# Patient Record
Sex: MALE | Race: WHITE | NOT HISPANIC OR LATINO | Employment: OTHER | URBAN - METROPOLITAN AREA
[De-identification: names, ages, dates, MRNs, and addresses within clinical notes are randomized per-mention and may not be internally consistent; named-entity substitution may affect disease eponyms.]

---

## 2017-01-26 RX ORDER — POTASSIUM CHLORIDE 750 MG/1
TABLET, EXTENDED RELEASE ORAL
Qty: 30 TABLET | Refills: 2 | Status: SHIPPED | OUTPATIENT
Start: 2017-01-26 | End: 2022-04-05 | Stop reason: HOSPADM

## 2017-03-30 ENCOUNTER — HOSPITAL ENCOUNTER (EMERGENCY)
Facility: HOSPITAL | Age: 72
Discharge: HOME | End: 2017-03-30
Attending: EMERGENCY MEDICINE | Admitting: EMERGENCY MEDICINE

## 2017-03-30 ENCOUNTER — APPOINTMENT (OUTPATIENT)
Dept: GENERAL RADIOLOGY | Facility: HOSPITAL | Age: 72
End: 2017-03-30

## 2017-03-30 VITALS
OXYGEN SATURATION: 98 % | TEMPERATURE: 97.4 F | HEIGHT: 69 IN | HEART RATE: 106 BPM | BODY MASS INDEX: 19.99 KG/M2 | WEIGHT: 135 LBS | SYSTOLIC BLOOD PRESSURE: 107 MMHG | RESPIRATION RATE: 18 BRPM | DIASTOLIC BLOOD PRESSURE: 57 MMHG

## 2017-03-30 DIAGNOSIS — S00.81XA FOREHEAD ABRASION, INITIAL ENCOUNTER: ICD-10-CM
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
## 2017-03-30 DIAGNOSIS — S61.411A LACERATION OF HAND, RIGHT, INITIAL ENCOUNTER: ICD-10-CM

## 2017-03-30 DIAGNOSIS — S00.83XA FACIAL CONTUSION, INITIAL ENCOUNTER: ICD-10-CM

## 2017-03-30 PROCEDURE — 90714 TD VACC NO PRESV 7 YRS+ IM: CPT | Performed by: EMERGENCY MEDICINE

## 2017-03-30 PROCEDURE — 99282 EMERGENCY DEPT VISIT SF MDM: CPT

## 2017-03-30 PROCEDURE — 25010000002 TETANUS-DIPHTHERIA TOXOIDS (ADULT) PER 0.5 ML: Performed by: EMERGENCY MEDICINE

## 2017-03-30 PROCEDURE — 73130 X-RAY EXAM OF HAND: CPT

## 2017-03-30 PROCEDURE — 90471 IMMUNIZATION ADMIN: CPT

## 2017-03-30 RX ORDER — LIDOCAINE HYDROCHLORIDE 10 MG/ML
10 INJECTION, SOLUTION INFILTRATION; PERINEURAL ONCE
Status: COMPLETED | OUTPATIENT
Start: 2017-03-30 | End: 2017-03-30

## 2017-03-30 RX ORDER — LIDOCAINE HYDROCHLORIDE 10 MG/ML
INJECTION, SOLUTION EPIDURAL; INFILTRATION; INTRACAUDAL; PERINEURAL
Status: COMPLETED
Start: 2017-03-30 | End: 2017-03-30

## 2017-03-30 RX ORDER — LIDOCAINE HYDROCHLORIDE AND EPINEPHRINE 10; 10 MG/ML; UG/ML
20 INJECTION, SOLUTION INFILTRATION; PERINEURAL ONCE
Status: DISCONTINUED | OUTPATIENT
Start: 2017-03-30 | End: 2017-03-30

## 2017-03-30 RX ADMIN — LIDOCAINE HYDROCHLORIDE 10 ML: 10 INJECTION, SOLUTION EPIDURAL; INFILTRATION; INTRACAUDAL; PERINEURAL at 01:48

## 2017-03-30 RX ADMIN — LIDOCAINE HYDROCHLORIDE 10 ML: 10 INJECTION, SOLUTION INFILTRATION; PERINEURAL at 01:48

## 2017-03-30 RX ADMIN — CLOSTRIDIUM TETANI TOXOID ANTIGEN (FORMALDEHYDE INACTIVATED) AND CORYNEBACTERIUM DIPHTHERIAE TOXOID ANTIGEN (FORMALDEHYDE INACTIVATED) 0.5 ML: 5; 2 INJECTION, SUSPENSION INTRAMUSCULAR at 01:33

## 2017-03-31 RX ORDER — ATORVASTATIN CALCIUM 20 MG/1
TABLET, FILM COATED ORAL
Qty: 90 TABLET | Refills: 0 | Status: ON HOLD | OUTPATIENT
Start: 2017-03-31 | End: 2018-03-20

## 2017-04-09 ENCOUNTER — HOSPITAL ENCOUNTER (EMERGENCY)
Facility: HOSPITAL | Age: 72
Discharge: HOME | End: 2017-04-09

## 2017-04-09 VITALS
BODY MASS INDEX: 20.73 KG/M2 | TEMPERATURE: 97.4 F | OXYGEN SATURATION: 100 % | RESPIRATION RATE: 16 BRPM | DIASTOLIC BLOOD PRESSURE: 55 MMHG | WEIGHT: 140 LBS | HEIGHT: 69 IN | HEART RATE: 74 BPM | SYSTOLIC BLOOD PRESSURE: 91 MMHG

## 2017-04-09 PROCEDURE — 99201: CPT

## 2018-03-20 ENCOUNTER — APPOINTMENT (OUTPATIENT)
Dept: GENERAL RADIOLOGY | Facility: HOSPITAL | Age: 73
End: 2018-03-20

## 2018-03-20 ENCOUNTER — HOSPITAL ENCOUNTER (INPATIENT)
Facility: HOSPITAL | Age: 73
LOS: 2 days | Discharge: HOME HEALTH | End: 2018-03-22
Attending: EMERGENCY MEDICINE | Admitting: FAMILY MEDICINE

## 2018-03-20 ENCOUNTER — APPOINTMENT (OUTPATIENT)
Dept: CT IMAGING | Facility: HOSPITAL | Age: 73
End: 2018-03-20

## 2018-03-20 DIAGNOSIS — A41.9 SEVERE SEPSIS (HCC): Primary | ICD-10-CM

## 2018-03-20 DIAGNOSIS — R65.20 SEVERE SEPSIS (HCC): Primary | ICD-10-CM

## 2018-03-20 DIAGNOSIS — J18.9 PNEUMONIA OF LEFT LOWER LOBE DUE TO INFECTIOUS ORGANISM: ICD-10-CM

## 2018-03-20 DIAGNOSIS — F10.20 ALCOHOLISM (HCC): ICD-10-CM

## 2018-03-20 DIAGNOSIS — E16.2 HYPOGLYCEMIA: ICD-10-CM

## 2018-03-20 PROBLEM — Z87.891 FORMER SMOKER: Status: ACTIVE | Noted: 2018-03-20

## 2018-03-20 PROBLEM — F10.10 ALCOHOL ABUSE, DAILY USE: Status: ACTIVE | Noted: 2018-03-20

## 2018-03-20 PROBLEM — L30.9 DERMATITIS: Chronic | Status: ACTIVE | Noted: 2018-03-20

## 2018-03-20 PROBLEM — I10 ESSENTIAL HYPERTENSION: Chronic | Status: ACTIVE | Noted: 2018-03-20

## 2018-03-20 PROBLEM — R26.89 BALANCE PROBLEM: Status: ACTIVE | Noted: 2018-03-20

## 2018-03-20 PROBLEM — I10 ESSENTIAL HYPERTENSION: Status: ACTIVE | Noted: 2018-03-20

## 2018-03-20 PROBLEM — E78.5 HYPERLIPIDEMIA: Status: ACTIVE | Noted: 2018-03-20

## 2018-03-20 PROBLEM — L30.9 DERMATITIS: Status: ACTIVE | Noted: 2018-03-20

## 2018-03-20 PROBLEM — F10.10 ALCOHOL ABUSE, DAILY USE: Chronic | Status: ACTIVE | Noted: 2018-03-20

## 2018-03-20 PROBLEM — R26.89 BALANCE PROBLEM: Chronic | Status: ACTIVE | Noted: 2018-03-20

## 2018-03-20 PROBLEM — Z87.891 FORMER SMOKER: Chronic | Status: ACTIVE | Noted: 2018-03-20

## 2018-03-20 PROBLEM — E78.5 HYPERLIPIDEMIA: Chronic | Status: ACTIVE | Noted: 2018-03-20

## 2018-03-20 LAB
ALBUMIN SERPL-MCNC: 3.4 G/DL (ref 3.2–4.8)
ALBUMIN/GLOB SERPL: 1 G/DL (ref 1.5–2.5)
ALP SERPL-CCNC: 139 U/L (ref 25–100)
ALT SERPL W P-5'-P-CCNC: 29 U/L (ref 7–40)
ANION GAP SERPL CALCULATED.3IONS-SCNC: 19 MMOL/L (ref 3–11)
AST SERPL-CCNC: 100 U/L (ref 0–33)
BACTERIA UR QL AUTO: ABNORMAL /HPF
BASOPHILS # BLD AUTO: 0.05 10*3/MM3 (ref 0–0.2)
BASOPHILS NFR BLD AUTO: 0.4 % (ref 0–1)
BILIRUB SERPL-MCNC: 1.8 MG/DL (ref 0.3–1.2)
BILIRUB UR QL STRIP: NEGATIVE
BUN BLD-MCNC: 8 MG/DL (ref 9–23)
BUN/CREAT SERPL: 7.3 (ref 7–25)
CALCIUM SPEC-SCNC: 8.3 MG/DL (ref 8.7–10.4)
CHLORIDE SERPL-SCNC: 102 MMOL/L (ref 99–109)
CLARITY UR: CLEAR
CO2 SERPL-SCNC: 14 MMOL/L (ref 20–31)
COLOR UR: YELLOW
CREAT BLD-MCNC: 1.1 MG/DL (ref 0.6–1.3)
D-LACTATE SERPL-SCNC: 2.5 MMOL/L (ref 0.5–2)
D-LACTATE SERPL-SCNC: 4.9 MMOL/L (ref 0.5–2)
DEPRECATED RDW RBC AUTO: 57.9 FL (ref 37–54)
EOSINOPHIL # BLD AUTO: 0.01 10*3/MM3 (ref 0–0.3)
EOSINOPHIL NFR BLD AUTO: 0.1 % (ref 0–3)
ERYTHROCYTE [DISTWIDTH] IN BLOOD BY AUTOMATED COUNT: 15.9 % (ref 11.3–14.5)
ETHANOL BLD-MCNC: 56 MG/DL (ref 0–10)
GFR SERPL CREATININE-BSD FRML MDRD: 66 ML/MIN/1.73
GLOBULIN UR ELPH-MCNC: 3.5 GM/DL
GLUCOSE BLD-MCNC: 142 MG/DL (ref 70–100)
GLUCOSE BLDC GLUCOMTR-MCNC: 137 MG/DL (ref 70–130)
GLUCOSE BLDC GLUCOMTR-MCNC: 138 MG/DL (ref 70–130)
GLUCOSE UR STRIP-MCNC: NEGATIVE MG/DL
HCT VFR BLD AUTO: 40.3 % (ref 38.9–50.9)
HGB BLD-MCNC: 13.3 G/DL (ref 13.1–17.5)
HGB UR QL STRIP.AUTO: ABNORMAL
HOLD SPECIMEN: NORMAL
HYALINE CASTS UR QL AUTO: ABNORMAL /LPF
IMM GRANULOCYTES # BLD: 0.03 10*3/MM3 (ref 0–0.03)
IMM GRANULOCYTES NFR BLD: 0.2 % (ref 0–0.6)
KETONES UR QL STRIP: ABNORMAL
LEUKOCYTE ESTERASE UR QL STRIP.AUTO: NEGATIVE
LYMPHOCYTES # BLD AUTO: 0.41 10*3/MM3 (ref 0.6–4.8)
LYMPHOCYTES NFR BLD AUTO: 2.9 % (ref 24–44)
MAGNESIUM SERPL-MCNC: 1.8 MG/DL (ref 1.3–2.7)
MCH RBC QN AUTO: 33.7 PG (ref 27–31)
MCHC RBC AUTO-ENTMCNC: 33 G/DL (ref 32–36)
MCV RBC AUTO: 102 FL (ref 80–99)
MONOCYTES # BLD AUTO: 0.62 10*3/MM3 (ref 0–1)
MONOCYTES NFR BLD AUTO: 4.4 % (ref 0–12)
NEUTROPHILS # BLD AUTO: 13.08 10*3/MM3 (ref 1.5–8.3)
NEUTROPHILS NFR BLD AUTO: 92 % (ref 41–71)
NITRITE UR QL STRIP: NEGATIVE
PH UR STRIP.AUTO: 5.5 [PH] (ref 5–8)
PLATELET # BLD AUTO: 146 10*3/MM3 (ref 150–450)
PMV BLD AUTO: 11.1 FL (ref 6–12)
POTASSIUM BLD-SCNC: 4.2 MMOL/L (ref 3.5–5.5)
PROCALCITONIN SERPL-MCNC: 0.26 NG/ML
PROT SERPL-MCNC: 6.9 G/DL (ref 5.7–8.2)
PROT UR QL STRIP: NEGATIVE
RBC # BLD AUTO: 3.95 10*6/MM3 (ref 4.2–5.76)
RBC # UR: ABNORMAL /HPF
REF LAB TEST METHOD: ABNORMAL
SODIUM BLD-SCNC: 135 MMOL/L (ref 132–146)
SP GR UR STRIP: 1.01 (ref 1–1.03)
SQUAMOUS #/AREA URNS HPF: ABNORMAL /HPF
TROPONIN I SERPL-MCNC: 0.01 NG/ML (ref 0–0.07)
UROBILINOGEN UR QL STRIP: ABNORMAL
WBC NRBC COR # BLD: 14.2 10*3/MM3 (ref 3.5–10.8)
WBC UR QL AUTO: ABNORMAL /HPF
WHOLE BLOOD HOLD SPECIMEN: NORMAL
WHOLE BLOOD HOLD SPECIMEN: NORMAL

## 2018-03-20 PROCEDURE — 87040 BLOOD CULTURE FOR BACTERIA: CPT | Performed by: EMERGENCY MEDICINE

## 2018-03-20 PROCEDURE — 82962 GLUCOSE BLOOD TEST: CPT

## 2018-03-20 PROCEDURE — 80307 DRUG TEST PRSMV CHEM ANLYZR: CPT | Performed by: EMERGENCY MEDICINE

## 2018-03-20 PROCEDURE — 80053 COMPREHEN METABOLIC PANEL: CPT | Performed by: EMERGENCY MEDICINE

## 2018-03-20 PROCEDURE — 71045 X-RAY EXAM CHEST 1 VIEW: CPT

## 2018-03-20 PROCEDURE — 25010000002 THIAMINE PER 100 MG: Performed by: FAMILY MEDICINE

## 2018-03-20 PROCEDURE — 81001 URINALYSIS AUTO W/SCOPE: CPT | Performed by: EMERGENCY MEDICINE

## 2018-03-20 PROCEDURE — 25010000002 AZITHROMYCIN: Performed by: EMERGENCY MEDICINE

## 2018-03-20 PROCEDURE — 99222 1ST HOSP IP/OBS MODERATE 55: CPT | Performed by: FAMILY MEDICINE

## 2018-03-20 PROCEDURE — 25010000003 CEFTRIAXONE PER 250 MG: Performed by: EMERGENCY MEDICINE

## 2018-03-20 PROCEDURE — 71250 CT THORAX DX C-: CPT

## 2018-03-20 PROCEDURE — 99285 EMERGENCY DEPT VISIT HI MDM: CPT

## 2018-03-20 PROCEDURE — 85025 COMPLETE CBC W/AUTO DIFF WBC: CPT | Performed by: EMERGENCY MEDICINE

## 2018-03-20 PROCEDURE — 84484 ASSAY OF TROPONIN QUANT: CPT

## 2018-03-20 PROCEDURE — 25010000002 HEPARIN (PORCINE) PER 1000 UNITS: Performed by: NURSE PRACTITIONER

## 2018-03-20 PROCEDURE — 84145 PROCALCITONIN (PCT): CPT | Performed by: EMERGENCY MEDICINE

## 2018-03-20 PROCEDURE — 36415 COLL VENOUS BLD VENIPUNCTURE: CPT

## 2018-03-20 PROCEDURE — 93005 ELECTROCARDIOGRAM TRACING: CPT | Performed by: EMERGENCY MEDICINE

## 2018-03-20 PROCEDURE — 83605 ASSAY OF LACTIC ACID: CPT | Performed by: EMERGENCY MEDICINE

## 2018-03-20 PROCEDURE — 83735 ASSAY OF MAGNESIUM: CPT | Performed by: EMERGENCY MEDICINE

## 2018-03-20 RX ORDER — LORAZEPAM 2 MG/ML
1 INJECTION INTRAMUSCULAR
Status: DISCONTINUED | OUTPATIENT
Start: 2018-03-20 | End: 2018-03-22 | Stop reason: HOSPADM

## 2018-03-20 RX ORDER — CEFTRIAXONE SODIUM 1 G/50ML
1 INJECTION, SOLUTION INTRAVENOUS EVERY 24 HOURS
Status: DISCONTINUED | OUTPATIENT
Start: 2018-03-21 | End: 2018-03-22 | Stop reason: HOSPADM

## 2018-03-20 RX ORDER — LORAZEPAM 2 MG/ML
0.5 INJECTION INTRAMUSCULAR
Status: DISCONTINUED | OUTPATIENT
Start: 2018-03-20 | End: 2018-03-22 | Stop reason: HOSPADM

## 2018-03-20 RX ORDER — SODIUM CHLORIDE 9 MG/ML
75 INJECTION, SOLUTION INTRAVENOUS CONTINUOUS
Status: DISCONTINUED | OUTPATIENT
Start: 2018-03-20 | End: 2018-03-21

## 2018-03-20 RX ORDER — THIAMINE MONONITRATE (VIT B1) 100 MG
100 TABLET ORAL DAILY
Status: DISCONTINUED | OUTPATIENT
Start: 2018-03-21 | End: 2018-03-22 | Stop reason: HOSPADM

## 2018-03-20 RX ORDER — ASPIRIN 81 MG/1
81 TABLET, CHEWABLE ORAL DAILY
Status: DISCONTINUED | OUTPATIENT
Start: 2018-03-21 | End: 2018-03-22 | Stop reason: HOSPADM

## 2018-03-20 RX ORDER — THIAMINE MONONITRATE (VIT B1) 100 MG
100 TABLET ORAL DAILY
Status: DISCONTINUED | OUTPATIENT
Start: 2018-03-20 | End: 2018-03-20

## 2018-03-20 RX ORDER — HEPARIN SODIUM 5000 [USP'U]/ML
5000 INJECTION, SOLUTION INTRAVENOUS; SUBCUTANEOUS EVERY 12 HOURS SCHEDULED
Status: DISCONTINUED | OUTPATIENT
Start: 2018-03-20 | End: 2018-03-22 | Stop reason: HOSPADM

## 2018-03-20 RX ORDER — SODIUM CHLORIDE 0.9 % (FLUSH) 0.9 %
1-10 SYRINGE (ML) INJECTION AS NEEDED
Status: DISCONTINUED | OUTPATIENT
Start: 2018-03-20 | End: 2018-03-22 | Stop reason: HOSPADM

## 2018-03-20 RX ORDER — LISINOPRIL 2.5 MG/1
2.5 TABLET ORAL DAILY
Status: DISCONTINUED | OUTPATIENT
Start: 2018-03-21 | End: 2018-03-22 | Stop reason: HOSPADM

## 2018-03-20 RX ORDER — IPRATROPIUM BROMIDE AND ALBUTEROL SULFATE 2.5; .5 MG/3ML; MG/3ML
3 SOLUTION RESPIRATORY (INHALATION)
Status: DISCONTINUED | OUTPATIENT
Start: 2018-03-21 | End: 2018-03-22 | Stop reason: HOSPADM

## 2018-03-20 RX ORDER — ATORVASTATIN CALCIUM 20 MG/1
20 TABLET, FILM COATED ORAL DAILY
Status: DISCONTINUED | OUTPATIENT
Start: 2018-03-21 | End: 2018-03-22 | Stop reason: HOSPADM

## 2018-03-20 RX ORDER — SPIRONOLACTONE 25 MG/1
50 TABLET ORAL EVERY OTHER DAY
COMMUNITY
End: 2020-02-20 | Stop reason: HOSPADM

## 2018-03-20 RX ORDER — RANITIDINE 150 MG/1
150 TABLET ORAL 2 TIMES DAILY
Status: ON HOLD | COMMUNITY
End: 2018-03-20

## 2018-03-20 RX ORDER — LORAZEPAM 0.5 MG/1
0.5 TABLET ORAL
Status: DISCONTINUED | OUTPATIENT
Start: 2018-03-20 | End: 2018-03-22 | Stop reason: HOSPADM

## 2018-03-20 RX ORDER — LEVOTHYROXINE SODIUM 0.05 MG/1
50 TABLET ORAL DAILY
Status: ON HOLD | COMMUNITY
End: 2020-03-17

## 2018-03-20 RX ORDER — ATORVASTATIN CALCIUM 20 MG/1
20 TABLET, FILM COATED ORAL DAILY
COMMUNITY

## 2018-03-20 RX ORDER — LEVOTHYROXINE SODIUM 0.05 MG/1
50 TABLET ORAL DAILY
Status: DISCONTINUED | OUTPATIENT
Start: 2018-03-21 | End: 2018-03-22 | Stop reason: HOSPADM

## 2018-03-20 RX ORDER — SODIUM CHLORIDE 0.9 % (FLUSH) 0.9 %
10 SYRINGE (ML) INJECTION AS NEEDED
Status: DISCONTINUED | OUTPATIENT
Start: 2018-03-20 | End: 2018-03-22 | Stop reason: HOSPADM

## 2018-03-20 RX ORDER — SPIRONOLACTONE 25 MG/1
25 TABLET ORAL DAILY
Status: DISCONTINUED | OUTPATIENT
Start: 2018-03-21 | End: 2018-03-22 | Stop reason: HOSPADM

## 2018-03-20 RX ORDER — CARVEDILOL 3.12 MG/1
3.12 TABLET ORAL 2 TIMES DAILY WITH MEALS
Status: DISCONTINUED | OUTPATIENT
Start: 2018-03-20 | End: 2018-03-22 | Stop reason: HOSPADM

## 2018-03-20 RX ORDER — THIAMINE MONONITRATE (VIT B1) 100 MG
100 TABLET ORAL ONCE
Status: COMPLETED | OUTPATIENT
Start: 2018-03-20 | End: 2018-03-20

## 2018-03-20 RX ORDER — POTASSIUM CHLORIDE 750 MG/1
10 TABLET, FILM COATED, EXTENDED RELEASE ORAL DAILY
Status: ON HOLD | COMMUNITY
End: 2018-03-20

## 2018-03-20 RX ORDER — CEFTRIAXONE SODIUM 2 G/50ML
2 INJECTION, SOLUTION INTRAVENOUS ONCE
Status: COMPLETED | OUTPATIENT
Start: 2018-03-20 | End: 2018-03-20

## 2018-03-20 RX ORDER — POTASSIUM CHLORIDE 750 MG/1
10 CAPSULE, EXTENDED RELEASE ORAL DAILY
Status: DISCONTINUED | OUTPATIENT
Start: 2018-03-21 | End: 2018-03-22 | Stop reason: HOSPADM

## 2018-03-20 RX ORDER — LORAZEPAM 1 MG/1
1 TABLET ORAL
Status: DISCONTINUED | OUTPATIENT
Start: 2018-03-20 | End: 2018-03-22 | Stop reason: HOSPADM

## 2018-03-20 RX ORDER — ALBUTEROL SULFATE 2.5 MG/3ML
2.5 SOLUTION RESPIRATORY (INHALATION) EVERY 6 HOURS PRN
Status: DISCONTINUED | OUTPATIENT
Start: 2018-03-20 | End: 2018-03-22 | Stop reason: HOSPADM

## 2018-03-20 RX ORDER — FUROSEMIDE 40 MG/1
80 TABLET ORAL DAILY
Status: DISCONTINUED | OUTPATIENT
Start: 2018-03-21 | End: 2018-03-22 | Stop reason: HOSPADM

## 2018-03-20 RX ADMIN — HEPARIN SODIUM 5000 UNITS: 5000 INJECTION, SOLUTION INTRAVENOUS; SUBCUTANEOUS at 22:38

## 2018-03-20 RX ADMIN — CARVEDILOL 3.12 MG: 3.12 TABLET, FILM COATED ORAL at 22:38

## 2018-03-20 RX ADMIN — SODIUM CHLORIDE 75 ML/HR: 9 INJECTION, SOLUTION INTRAVENOUS at 22:40

## 2018-03-20 RX ADMIN — THIAMINE HCL TAB 100 MG 100 MG: 100 TAB at 15:37

## 2018-03-20 RX ADMIN — AZITHROMYCIN MONOHYDRATE 500 MG: 500 INJECTION, POWDER, LYOPHILIZED, FOR SOLUTION INTRAVENOUS at 18:02

## 2018-03-20 RX ADMIN — CEFTRIAXONE SODIUM 2 G: 2 INJECTION, SOLUTION INTRAVENOUS at 19:32

## 2018-03-20 RX ADMIN — SODIUM CHLORIDE 1770 ML: 9 INJECTION, SOLUTION INTRAVENOUS at 19:01

## 2018-03-20 RX ADMIN — THIAMINE HYDROCHLORIDE 100 MG: 100 INJECTION, SOLUTION INTRAMUSCULAR; INTRAVENOUS at 22:40

## 2018-03-21 ENCOUNTER — APPOINTMENT (OUTPATIENT)
Dept: GENERAL RADIOLOGY | Facility: HOSPITAL | Age: 73
End: 2018-03-21

## 2018-03-21 PROBLEM — E16.2 HYPOGLYCEMIA: Status: ACTIVE | Noted: 2018-03-21

## 2018-03-21 PROBLEM — E87.20 LACTIC ACIDOSIS: Status: ACTIVE | Noted: 2018-03-21

## 2018-03-21 PROBLEM — G93.41 METABOLIC ENCEPHALOPATHY: Status: ACTIVE | Noted: 2018-03-21

## 2018-03-21 LAB
ANION GAP SERPL CALCULATED.3IONS-SCNC: 9 MMOL/L (ref 3–11)
BNP SERPL-MCNC: 1197 PG/ML (ref 0–100)
BUN BLD-MCNC: 10 MG/DL (ref 9–23)
BUN/CREAT SERPL: 10 (ref 7–25)
CA-I SERPL ISE-MCNC: 1.16 MMOL/L (ref 1.12–1.32)
CALCIUM SPEC-SCNC: 7.2 MG/DL (ref 8.7–10.4)
CHLORIDE SERPL-SCNC: 111 MMOL/L (ref 99–109)
CO2 SERPL-SCNC: 16 MMOL/L (ref 20–31)
CREAT BLD-MCNC: 1 MG/DL (ref 0.6–1.3)
D-LACTATE SERPL-SCNC: 0.6 MMOL/L (ref 0.5–2)
DEPRECATED RDW RBC AUTO: 56.3 FL (ref 37–54)
ERYTHROCYTE [DISTWIDTH] IN BLOOD BY AUTOMATED COUNT: 15.9 % (ref 11.3–14.5)
FLUAV SUBTYP SPEC NAA+PROBE: NOT DETECTED
FLUBV RNA ISLT QL NAA+PROBE: NOT DETECTED
GFR SERPL CREATININE-BSD FRML MDRD: 73 ML/MIN/1.73
GLUCOSE BLD-MCNC: 61 MG/DL (ref 70–100)
GLUCOSE BLDC GLUCOMTR-MCNC: 88 MG/DL (ref 70–130)
HCT VFR BLD AUTO: 31.4 % (ref 38.9–50.9)
HGB BLD-MCNC: 10.5 G/DL (ref 13.1–17.5)
MAGNESIUM SERPL-MCNC: 1.4 MG/DL (ref 1.3–2.7)
MCH RBC QN AUTO: 33.4 PG (ref 27–31)
MCHC RBC AUTO-ENTMCNC: 33.4 G/DL (ref 32–36)
MCV RBC AUTO: 100 FL (ref 80–99)
PHOSPHATE SERPL-MCNC: 2.8 MG/DL (ref 2.4–5.1)
PLATELET # BLD AUTO: 100 10*3/MM3 (ref 150–450)
PMV BLD AUTO: 10.6 FL (ref 6–12)
POTASSIUM BLD-SCNC: 4 MMOL/L (ref 3.5–5.5)
RBC # BLD AUTO: 3.14 10*6/MM3 (ref 4.2–5.76)
SODIUM BLD-SCNC: 136 MMOL/L (ref 132–146)
TSH SERPL DL<=0.05 MIU/L-ACNC: 1.93 MIU/ML (ref 0.35–5.35)
WBC NRBC COR # BLD: 7.05 10*3/MM3 (ref 3.5–10.8)

## 2018-03-21 PROCEDURE — 25010000002 MAGNESIUM SULFATE 2 GM/50ML SOLUTION: Performed by: NURSE PRACTITIONER

## 2018-03-21 PROCEDURE — 82330 ASSAY OF CALCIUM: CPT | Performed by: NURSE PRACTITIONER

## 2018-03-21 PROCEDURE — 25010000002 CEFTRIAXONE PER 250 MG: Performed by: NURSE PRACTITIONER

## 2018-03-21 PROCEDURE — 83735 ASSAY OF MAGNESIUM: CPT | Performed by: NURSE PRACTITIONER

## 2018-03-21 PROCEDURE — 80048 BASIC METABOLIC PNL TOTAL CA: CPT | Performed by: NURSE PRACTITIONER

## 2018-03-21 PROCEDURE — 85027 COMPLETE CBC AUTOMATED: CPT | Performed by: NURSE PRACTITIONER

## 2018-03-21 PROCEDURE — 25010000002 HEPARIN (PORCINE) PER 1000 UNITS: Performed by: NURSE PRACTITIONER

## 2018-03-21 PROCEDURE — 73610 X-RAY EXAM OF ANKLE: CPT

## 2018-03-21 PROCEDURE — 87502 INFLUENZA DNA AMP PROBE: CPT | Performed by: NURSE PRACTITIONER

## 2018-03-21 PROCEDURE — 84443 ASSAY THYROID STIM HORMONE: CPT | Performed by: NURSE PRACTITIONER

## 2018-03-21 PROCEDURE — 84100 ASSAY OF PHOSPHORUS: CPT | Performed by: NURSE PRACTITIONER

## 2018-03-21 PROCEDURE — 83605 ASSAY OF LACTIC ACID: CPT | Performed by: NURSE PRACTITIONER

## 2018-03-21 PROCEDURE — 94799 UNLISTED PULMONARY SVC/PX: CPT

## 2018-03-21 PROCEDURE — 83880 ASSAY OF NATRIURETIC PEPTIDE: CPT | Performed by: NURSE PRACTITIONER

## 2018-03-21 PROCEDURE — 94760 N-INVAS EAR/PLS OXIMETRY 1: CPT

## 2018-03-21 PROCEDURE — 25010000002 AZITHROMYCIN: Performed by: NURSE PRACTITIONER

## 2018-03-21 PROCEDURE — 82962 GLUCOSE BLOOD TEST: CPT

## 2018-03-21 PROCEDURE — 94640 AIRWAY INHALATION TREATMENT: CPT

## 2018-03-21 PROCEDURE — 99233 SBSQ HOSP IP/OBS HIGH 50: CPT | Performed by: HOSPITALIST

## 2018-03-21 RX ORDER — MAGNESIUM SULFATE HEPTAHYDRATE 40 MG/ML
2 INJECTION, SOLUTION INTRAVENOUS AS NEEDED
Status: DISCONTINUED | OUTPATIENT
Start: 2018-03-21 | End: 2018-03-22 | Stop reason: HOSPADM

## 2018-03-21 RX ORDER — MAGNESIUM SULFATE HEPTAHYDRATE 40 MG/ML
4 INJECTION, SOLUTION INTRAVENOUS AS NEEDED
Status: DISCONTINUED | OUTPATIENT
Start: 2018-03-21 | End: 2018-03-22 | Stop reason: HOSPADM

## 2018-03-21 RX ADMIN — CARVEDILOL 3.12 MG: 3.12 TABLET, FILM COATED ORAL at 08:58

## 2018-03-21 RX ADMIN — Medication 100 MG: at 09:00

## 2018-03-21 RX ADMIN — MAGNESIUM SULFATE HEPTAHYDRATE 2 G: 40 INJECTION, SOLUTION INTRAVENOUS at 12:21

## 2018-03-21 RX ADMIN — IPRATROPIUM BROMIDE AND ALBUTEROL SULFATE 3 ML: .5; 3 SOLUTION RESPIRATORY (INHALATION) at 07:17

## 2018-03-21 RX ADMIN — ASPIRIN 81 MG 81 MG: 81 TABLET ORAL at 09:00

## 2018-03-21 RX ADMIN — HEPARIN SODIUM 5000 UNITS: 5000 INJECTION, SOLUTION INTRAVENOUS; SUBCUTANEOUS at 09:01

## 2018-03-21 RX ADMIN — FUROSEMIDE 80 MG: 40 TABLET ORAL at 08:56

## 2018-03-21 RX ADMIN — CEFTRIAXONE SODIUM 1 G: 1 INJECTION, SOLUTION INTRAVENOUS at 17:48

## 2018-03-21 RX ADMIN — SPIRONOLACTONE 25 MG: 25 TABLET, FILM COATED ORAL at 09:01

## 2018-03-21 RX ADMIN — HEPARIN SODIUM 5000 UNITS: 5000 INJECTION, SOLUTION INTRAVENOUS; SUBCUTANEOUS at 20:54

## 2018-03-21 RX ADMIN — MAGNESIUM SULFATE HEPTAHYDRATE 2 G: 40 INJECTION, SOLUTION INTRAVENOUS at 07:01

## 2018-03-21 RX ADMIN — LEVOTHYROXINE SODIUM 50 MCG: 50 TABLET ORAL at 08:59

## 2018-03-21 RX ADMIN — CARVEDILOL 3.12 MG: 3.12 TABLET, FILM COATED ORAL at 17:48

## 2018-03-21 RX ADMIN — IPRATROPIUM BROMIDE AND ALBUTEROL SULFATE 3 ML: .5; 3 SOLUTION RESPIRATORY (INHALATION) at 19:31

## 2018-03-21 RX ADMIN — POTASSIUM CHLORIDE 10 MEQ: 750 CAPSULE, EXTENDED RELEASE ORAL at 08:59

## 2018-03-21 RX ADMIN — AZITHROMYCIN MONOHYDRATE 500 MG: 500 INJECTION, POWDER, LYOPHILIZED, FOR SOLUTION INTRAVENOUS at 17:48

## 2018-03-21 RX ADMIN — ATORVASTATIN CALCIUM 20 MG: 20 TABLET, FILM COATED ORAL at 08:59

## 2018-03-21 RX ADMIN — MAGNESIUM SULFATE HEPTAHYDRATE 2 G: 40 INJECTION, SOLUTION INTRAVENOUS at 09:02

## 2018-03-21 RX ADMIN — LISINOPRIL 2.5 MG: 2.5 TABLET ORAL at 08:58

## 2018-03-21 NOTE — PLAN OF CARE
Problem: Patient Care Overview  Goal: Plan of Care Review  Outcome: Ongoing (interventions implemented as appropriate)   03/21/18 0517   Coping/Psychosocial   Plan of Care Reviewed With patient   Plan of Care Review   Progress improving   OTHER   Outcome Summary VSS. No visible signs of withdrawl this far. pt awake all night stating he can't sleep in a hospital. early this morning he c/o left ankle pain, APRN notified and xray ordered. no other complaints or concerns.     Goal: Discharge Needs Assessment  Outcome: Ongoing (interventions implemented as appropriate)      Problem: Fall Risk (Adult)  Goal: Identify Related Risk Factors and Signs and Symptoms  Outcome: Ongoing (interventions implemented as appropriate)    Goal: Absence of Fall  Outcome: Ongoing (interventions implemented as appropriate)      Problem: Skin Injury Risk (Adult)  Goal: Identify Related Risk Factors and Signs and Symptoms  Outcome: Outcome(s) achieved Date Met: 03/21/18    Goal: Skin Health and Integrity  Outcome: Ongoing (interventions implemented as appropriate)

## 2018-03-21 NOTE — PLAN OF CARE
Problem: Patient Care Overview  Goal: Plan of Care Review  Outcome: Ongoing (interventions implemented as appropriate)   03/21/18 0517 03/21/18 1655   Coping/Psychosocial   Plan of Care Reviewed With --  patient;spouse   Plan of Care Review   Progress --  improving   OTHER   Outcome Summary VSS. No visible signs of withdrawl this far. pt awake all night stating he can't sleep in a hospital. early this morning he c/o left ankle pain, APRN notified and xray ordered. no other complaints or concerns. --      Goal: Individualization and Mutuality  Outcome: Ongoing (interventions implemented as appropriate)   03/21/18 1655   Individualization   Patient Specific Preferences no lactose     Goal: Discharge Needs Assessment  Outcome: Ongoing (interventions implemented as appropriate)   03/21/18 0517 03/21/18 1516   Discharge Needs Assessment   Readmission Within the Last 30 Days --  no previous admission in last 30 days   Concerns to be Addressed --  no discharge needs identified   Patient/Family Anticipates Transition to home --    Patient/Family Anticipated Services at Transition --  none   Transportation Concerns car, none --    Transportation Anticipated --  family or friend will provide   Anticipated Changes Related to Illness none --    Equipment Needed After Discharge --  none   Current Discharge Risk --  substance use/abuse   Discharge Coordination/Progress --  Pt. lives with his wife and grandson in [place], [place]. Pt. described both of these individuals as helpful. His wife will provide him with transportation at discharge. Pt. has a cane that he uses, but has no other DME and states he does not need additional equipment. Pt. does not use or want home health.   Disability   Equipment Currently Used at Home --  none     Goal: Interprofessional Rounds/Family Conf  Outcome: Ongoing (interventions implemented as appropriate)   03/21/18 1655   Interdisciplinary Rounds/Family Conf   Participants ;nursing        Problem: Fall Risk (Adult)  Goal: Identify Related Risk Factors and Signs and Symptoms  Outcome: Ongoing (interventions implemented as appropriate)   03/21/18 0517   Fall Risk (Adult)   Related Risk Factors (Fall Risk) gait/mobility problems     Goal: Absence of Fall  Outcome: Ongoing (interventions implemented as appropriate)   03/21/18 1655   Fall Risk (Adult)   Absence of Fall making progress toward outcome       Problem: Skin Injury Risk (Adult)  Goal: Skin Health and Integrity  Outcome: Ongoing (interventions implemented as appropriate)   03/21/18 1655   Skin Injury Risk (Adult)   Skin Health and Integrity making progress toward outcome

## 2018-03-21 NOTE — PROGRESS NOTES
[facility] Medicine Services  PROGRESS NOTE    Patient Name: Jet Floyd  : 1945  MRN: [id number]    Date of Admission: 3/20/2018  Length of Stay: 1  Primary Care Physician: Obi Soriano MD    Subjective   Subjective     CC:  F/U hypoglycemia, weakness, confusion    HPI:  Patient seen this morning. Awake, sitting up in bed. Has no complaints.    Review of Systems  Gen-no fevers, no chills  CV-no chest pain, no palpitations  Resp-no cough, no dyspnea  GI-no N/V/D, no abd pain    Otherwise ROS is negative except as mentioned in the HPI.    Objective   Objective     Vital Signs:   Temp:  [97.6 °F (36.4 °C)-98.4 °F (36.9 °C)] 97.6 °F (36.4 °C)  Heart Rate:  [] 78  Resp:  [18] 18  BP: (105-165)/(59-91) 115/68        Physical Exam:  Gen-no acute distress  CV-RRR, S1 S2 normal, no m/r/g  Resp-diminished at the bases, no wheezes, normal effort  Abd-soft, NT, ND, +BS  Ext-no edema  Neuro-A&Ox3, no focal deficits  Psych-appropriate mood    Results Reviewed:  I have personally reviewed current lab, radiology, and data and agree.      Results from last 7 days  Lab Units 18  0523 18  1505   WBC 10*3/mm3 7.05 14.20*   HEMOGLOBIN g/dL 10.5* 13.3   HEMATOCRIT % 31.4* 40.3   PLATELETS 10*3/mm3 100* 146*       Results from last 7 days  Lab Units 18  0523 18  1505   SODIUM mmol/L 136 135   POTASSIUM mmol/L 4.0 4.2   CHLORIDE mmol/L 111* 102   CO2 mmol/L 16.0* 14.0*   BUN mg/dL 10 8*   CREATININE mg/dL 1.00 1.10   GLUCOSE mg/dL 61* 142*   CALCIUM mg/dL 7.2* 8.3*   ALT (SGPT) U/L  --  29   AST (SGOT) U/L  --  100*     Estimated Creatinine Clearance: 55.7 mL/min (by C-G formula based on SCr of 1 mg/dL).  BNP   Date Value Ref Range Status   2018 1,197.0 (H) 0.0 - 100.0 pg/mL Final     Comment:     Results may be falsely decreased if patient taking Biotin.     No results found for: PHART    Microbiology Results Abnormal     Procedure Component Value -  Date/Time    Influenza A & B, RT PCR - Swab, Nasopharynx [[id number]]  (Normal) Collected:  03/21/18 0710    Lab Status:  Final result Specimen:  Swab from Nasopharynx Updated:  03/21/18 0754     Influenza A PCR Not Detected     Influenza B PCR Not Detected    Blood Culture - Blood, [[id number]]  (Normal) Collected:  03/20/18 1725    Lab Status:  Preliminary result Specimen:  Blood from Hand, Right Updated:  03/21/18 0546     Blood Culture No growth at less than 24 hours    Blood Culture - Blood, [[id number]]  (Normal) Collected:  03/20/18 1715    Lab Status:  Preliminary result Specimen:  Blood from Arm, Left Updated:  03/21/18 0546     Blood Culture No growth at less than 24 hours          Imaging Results (last 24 hours)     Procedure Component Value Units Date/Time    XR Ankle 3+ View Left [[id number]] Collected:  03/21/18 0932     Updated:  03/21/18 0932    Narrative:       EXAMINATION: XR ANKLE 3+ VW LEFT-      INDICATION: Pain; A41.9-Sepsis, unspecified organism; R65.20-Severe  sepsis without septic shock; J18.1-Lobar pneumonia, unspecified  organism; E16.2-Hypoglycemia, unspecified; F10.20-Alcohol dependence,  uncomplicated.      COMPARISON: None.     FINDINGS: Three views of the ankle reveal no evidence of fracture or  dislocation. Osteopenia of the bony structures. The joint spaces are  preserved. Cortex is intact. No fracture or dislocation.           Impression:       Osteopenia of the bony structures. No evidence of fracture  or dislocation.     D:  03/21/2018  E:  03/21/2018       CT Chest Without Contrast [[id number]] Collected:  03/20/18 1758     Updated:  03/21/18 0923    Narrative:       EXAMINATION: CT CHEST WO CONTRAST- 03/20/2018     INDICATION: abnormal densities on port CXR      TECHNIQUE: CT chest without intravenous contrast administration     The radiation dose reduction device was turned on for each scan per the  ALARA (As Low as Reasonably Achievable) protocol.     COMPARISON: NONE      FINDINGS: Thyroid is homogeneous in attenuation. No bulky mediastinal  adenopathy. Central airways are patent. Esophagus in normal course and  caliber. Cardiac size enlarged without pericardial effusion.  Atherosclerotic nonaneurysmal thoracic aorta. Extended lung windows  demonstrate severe upper lobe predominant centrilobular emphysema with  biapical pleural-parenchymal scarring and bullous formation greatest on  the right. Minimal dependent atelectasis with focal area of confluent  opacification left lower lung periphery concerning for acute airspace  disease as there is adjacent micronodularity. Multilevel degenerative  changes of the spine without aggressive osseous or soft tissue body wall  lesions of concern. Visualized portions of the upper abdomen demonstrate  hepatic steatosis.       Impression:       Area of confluent opacification left lower lung periphery  with micronodular appearance surrounding most consistent with acute  airspace disease however underlying pulmonary nodule not excluded.  Follow-up to resolution recommended given background emphysematous and  smoking-related changes of the lungs.     D:  03/20/2018  E:  03/21/2018       XR Chest 1 View [[id number]] Collected:  03/20/18 1648     Updated:  03/21/18 0906    Narrative:       EXAMINATION: XR CHEST 1 VW-03/20/2018:      INDICATION: Weak/Dizzy/AMS, triage protocol.      COMPARISON: NONE.     FINDINGS:   1.  The heart size is upper limits of normal for this technique.  2.  There is minimal ill defined density seen in the retrocardiac left  lower lobe. There is also mild density seen in the periphery of the  right mid lung. These areas appear to be new when compared to previous  examinations of 07/19/2016.           Impression:       1.  Worrisome constellation of increased density left lower lobe  retrocardiac region with a focal area of increased density in the  periphery of the right mid lung, both of these are new from distant  studies.  Parenchymal lung disease or even space occupying lesions cannot  be excluded.  2.  Otherwise, there is a background of COPD. The heart size is normal  and there is no free fluid.     D:  03/20/2018  E:  03/20/2018     This report was finalized on 3/21/2018 9:04 AM by Dr. Roberto Schmidt MD.               I have reviewed the medications.    Assessment/Plan   Assessment / Plan     Hospital Problem List     * (Principal)Sepsis    Left lower lobe pneumonia    Hypoglycemia    Lactic acidosis    Metabolic encephalopathy    Essential hypertension (Chronic)    Hyperlipidemia (Chronic)    Alcohol abuse, daily use (Chronic)    Former smoker (Chronic)    Dermatitis (Chronic)    Balance problem (Chronic)             Brief Hospital Course to date:  Jet Floyd is a 72 y.o. male with hx of HTN, HLD, and ETOH abuse who presents to the ER due to somnolence, confusion, fatigue, and hypoglycemia. His glucose was 30 per EMS. In the ER, he was found to have a LLL pneumonia.      Assessment & Plan:  --Continue Rocephin, Azithromycin. Follow cultures. Negative Flu PCR.   --Monitor for withdrawal. Social work to see.   --Monitor glucose closely.  --Potential d/c home tomorrow on PO ATBx.    DVT Prophylaxis:  [facility]    CODE STATUS: Full Code    Disposition: I expect the patient to be discharged home in day.      Electronically signed by Deepali Vincent MD, 03/21/18, 1:18 PM.

## 2018-03-21 NOTE — H&P
[facility] Medicine Services  HISTORY AND PHYSICAL    Patient Name: Jet Floyd  : 1945  MRN: [id number]  Primary Care Physician: Obi Soriano MD    Subjective   Subjective     Chief Complaint: Hypoglycemia/Weakness    HPI:  Jet Floyd is a 72 y.o. male with history of hypertension, hyperlipidemia, balance problems, dermatitis, former smoker, and daily alcohol use, presents [facility] emergency department with complaints of hypoglycemia and generalized weakness.  Patient's wife, who is at bedside, reports that the patient was at baseline when he went to bed last night, but when he woke up this morning he had difficulty getting out of bed.  Patient appeared fatigued, dizzy, and was slightly disoriented.  She also states patient was diaphoretic.  She fed him a peanut butter and jelly sandwich with some orange juice, prior to EMS arrival.  Once EMS was on scene and measured his blood glucose which resulted at 30.  His wife states that he has not been eating regularly and has increase in his daily alcohol consumption recently.  Patient denies abdominal pain, chest pain, shortness of breath, nausea, vomiting, fever, or chills.  Patient's wife currently reports that the patient appears to be at his baseline.  While the emergency department patient was found to have a new airspace disease in the left lower lobe of his lung.  Lactic acid was 4.9.  Blood cultures were obtained and IV fluids administered.  Patient be admitted to the hospital medicine service for further evaluation and treatment.    Review of Systems   Constitutional: Positive for chills. Negative for diaphoresis, fatigue and fever.   Respiratory: Negative for cough, shortness of breath and wheezing.    Cardiovascular: Negative for chest pain, palpitations and leg swelling.   Gastrointestinal: Negative for abdominal pain, nausea and vomiting.   Genitourinary: Negative for dysuria,  hematuria and urgency.   Musculoskeletal: Negative for arthralgias and myalgias.   Skin: Negative for color change, pallor, rash and wound.   Neurological: Positive for weakness. Negative for dizziness, seizures, syncope and light-headedness.        Generalized weakness   Psychiatric/Behavioral: Positive for confusion. Negative for agitation.        Confusion this morning, but wife reports patient is at baseline   All other systems reviewed and are negative.    Otherwise 10-system ROS reviewed and is negative except as mentioned in the HPI.    Personal History     Past Medical History:   Diagnosis Date   • CHF (congestive heart failure)    • Hypertension        History reviewed. No pertinent surgical history.    Family History: family history includes Cancer in his father; Dementia in his mother; No Known Problems in his brother, daughter, and sister.     Social History:  reports that he has quit smoking. His smoking use included Cigarettes. He has a 72.00 pack-year smoking history. He has never used smokeless tobacco. He reports that he drinks alcohol. He reports that he does not use drugs.  Social History     Social History Narrative   • No narrative on file       Medications:  Prescriptions Prior to Admission   Medication Sig Dispense Refill Last Dose   • aspirin 81 MG chewable tablet Chew 81 mg daily.   3/19/2018 at Unknown time   • atorvastatin (LIPITOR) 20 MG tablet TAKE ONE TABLET BY MOUTH DAILY 90 tablet 0    • atorvastatin (LIPITOR) 20 MG tablet Take 20 mg by mouth Daily.   3/18/2018   • carvedilol (COREG) 3.125 MG tablet Take 3.125 mg by mouth 2 (two) times a day with meals.   3/19/2018 at Unknown time   • furosemide (LASIX) 40 MG tablet Take 80 mg by mouth daily.   3/18/2018   • KLOR-CON 10 MEQ CR tablet TAKE ONE TABLET BY MOUTH DAILY 30 tablet 2 3/18/2018   • levothyroxine (SYNTHROID, LEVOTHROID) 50 MCG tablet Take 50 mcg by mouth Daily.   3/18/2018   • lisinopril (PRINIVIL,ZESTRIL) 5 MG tablet Take 2.5  mg by mouth Daily.   3/19/2018 at Unknown time   • potassium chloride (KLOR-CON) 10 MEQ CR tablet Take 10 mEq by mouth Daily.      • raNITIdine (ZANTAC) 150 MG tablet Take 150 mg by mouth 2 (Two) Times a Day.      • spironolactone (ALDACTONE) 25 MG tablet Take 25 mg by mouth Daily.   3/18/2018   • thiamine (VITAMINE B-1) 100 MG tablet Take 100 mg by mouth daily.          Allergies   Allergen Reactions   • Polysporin [Bacitracin-Polymyxin B] Itching and Rash       Objective   Objective     Vital Signs:   Temp:  [97.9 °F (36.6 °C)] 97.9 °F (36.6 °C)  Heart Rate:  [] 88  Resp:  [18] 18  BP: (105-165)/(66-91) 134/73        Physical Exam   Constitutional: He is oriented to person, place, and time. He appears well-developed and well-nourished.   HENT:   Head: Normocephalic and atraumatic.   Eyes: EOM are normal. Pupils are equal, round, and reactive to light. No scleral icterus.   Neck: Normal range of motion. Neck supple. No JVD present.   Cardiovascular: Normal rate, regular rhythm, normal heart sounds and intact distal pulses.  Exam reveals no gallop and no friction rub.    No murmur heard.  Pulmonary/Chest: Effort normal. No respiratory distress. He has wheezes. He has rhonchi in the left lower field. He has no rales. He exhibits no tenderness.   Abdominal: Soft. Bowel sounds are normal. He exhibits no distension and no mass. There is no tenderness. There is no rebound and no guarding. No hernia.   Musculoskeletal: Normal range of motion. He exhibits no edema, tenderness or deformity.   Neurological: He is alert and oriented to person, place, and time.   Skin: Skin is warm and dry. Capillary refill takes less than 2 seconds.   Psychiatric: He has a normal mood and affect. His behavior is normal. Judgment and thought content normal.   Nursing note and vitals reviewed.    Results Reviewed:  I have personally reviewed current lab, radiology, and data and agree.      Results from last 7 days  Lab Units  03/20/18  1505   WBC 10*3/mm3 14.20*   HEMOGLOBIN g/dL 13.3   HEMATOCRIT % 40.3   PLATELETS 10*3/mm3 146*       Results from last 7 days  Lab Units 03/20/18  1505   SODIUM mmol/L 135   POTASSIUM mmol/L 4.2   CHLORIDE mmol/L 102   CO2 mmol/L 14.0*   BUN mg/dL 8*   CREATININE mg/dL 1.10   GLUCOSE mg/dL 142*   CALCIUM mg/dL 8.3*   ALT (SGPT) U/L 29   AST (SGOT) U/L 100*     Estimated Creatinine Clearance: 50.7 mL/min (by C-G formula based on SCr of 1.1 mg/dL).  Brief Urine Lab Results  (Last result in the past 365 days)      Color   Clarity   Blood   Leuk Est   Nitrite   Protein   CREAT   Urine HCG        03/20/18 1837 Yellow Clear Trace(A) Negative Negative Negative             No results found for: BNP  No results found for: PHART  Imaging Results (last 24 hours)     Procedure Component Value Units Date/Time    CT Chest Without Contrast [[id number]] Collected:  03/20/18 1758     Updated:  03/20/18 1801    Narrative:       EXAMINATION: CT CHEST WO CONTRAST-      INDICATION: abnormal densities on port CXR      TECHNIQUE: CT chest without intravenous contrast administration     The radiation dose reduction device was turned on for each scan per the  ALARA (As Low as Reasonably Achievable) protocol.     COMPARISON: NONE     FINDINGS: Thyroid is homogeneous in attenuation. No bulky mediastinal  adenopathy. Central airways are patent. Esophagus in normal course and  caliber. Cardiac size enlarged without pericardial effusion.  Atherosclerotic nonaneurysmal thoracic aorta. Extended lung windows  demonstrate severe upper lobe predominant centrilobular emphysema with  biapical pleural-parenchymal scarring and bullous formation greatest on  the right. Minimal dependent atelectasis with focal area of confluent  opacification left lower lung periphery concerning for acute airspace  disease as there is adjacent micronodularity. Multilevel degenerative  changes of the spine without aggressive osseous or soft tissue body  wall  lesions of concern. Visualized portions of the upper abdomen demonstrate  hepatic steatosis.       Impression:       Area of confluent opacification left lower lung periphery  with micronodular appearance surrounding this consistent with acute  airspace disease however underlying pulmonary nodule not excluded.  Follow-up to resolution recommended given background emphysematous and  smoking-related changes of the lungs.          XR Chest 1 View [[id number]] Collected:  18     Updated:  18    Narrative:       EXAMINATION: XR CHEST 1 VW-2018:      INDICATION: Weak/Dizzy/AMS, triage protocol.      COMPARISON: NONE.     FINDINGS:   1.  The heart size is upper limits of normal for this technique.  2.  There is minimal ill defined density seen in the retrocardiac left  lower lobe. There is also mild density seen in the periphery of the  right mid lung. These areas appear to be new when compared to previous  examinations of 2016.           Impression:       1.  Worrisome constellation of increased density left lower lobe  retrocardiac region with a focal area of increased density in the  periphery of the right mid lung, both of these are new from distant  studies. Parenchymal lung disease or even space occupying lesions cannot  be excluded.  2.  Otherwise, there is a background of COPD. The heart size is normal  and there is no free fluid.     D:  2018  E:  2018                 Results for orders placed during the hospital encounter of 09/03/15   CONVERTED (HISTORICAL) ECHO    Narrative Patient:      SHERIE RUBIO    [facility] Rec#:     [id number]               :          1945            Date:         2015            Age:          70y                   Height:       175.26 cm / 69.0 in  Weight:       75.75 kg / 167.0 lbs  Sex:          M                     BSA:          1.91  Room#:        3348-1                    Sonographer:  Lesa Sheriff [facility]  Referring:     DAVID  Reading:      Mynor Richard MD  Primary:      Obi Soriano  Unit:         3 Main  ______________________________________________________________________    Transthoracic Echocardiogram    Indication:  chf  BP:           119/80    Conclusions  1. The left ventricular chamber size is normal.  2. Severe global hypokinesis of the left ventricle is observed.  3. Ventricular septum is dyskinetic  4. EF=30%  5. The left atrium is slightly dilated.  6. The right ventricle is mild to moderately dilated.   7. The right ventricular global systolic function is severely  reduced.Suspect chiari network in the right ventricle apex  8. The mitral valve leaflets are mildly thickened.  9. There is mild to moderate mitral regurgitation.   10. There is mild to moderate tricuspid regurgitation.  11. The right ventricular systolic pressure is calculated at 36 mmHg.   12. A trivial pericardial effusion is visualized.    Findings       Procedure Info:  The study quality is good.      Left Ventricle:  The left ventricular chamber size is normal. Severe global hypokinesis  of the left ventricle is observed.     Left Atrium:  The left atrium is slightly dilated.     Right Ventricle:  The right ventricle is mild to moderately dilated.  The right  ventricular global systolic function is severely reduced.Suspect chiari  network in the right ventricle apex     Right Atrium:  The right atrial cavity size is normal. No atrial septal defect is  visualized.   Aortic Valve:  The aortic valve is trileaflet. There is no evidence of aortic  regurgitation. There is no evidence of aortic stenosis.     Mitral Valve:  The mitral valve leaflets are mildly thickened. There is no evidence of  mitral valve prolapse. There is mild to moderate mitral regurgitation.   There is no evidence of mitral stenosis.     Tricuspid Valve:  The tricuspid valve leaflets are normal.  There is mild to moderate  tricuspid regurgitation. The right  ventricular systolic pressure is  calculated at 36 mmHg.      Pulmonic Valve:  The pulmonic valve appears normal. There is a trace pulmonic  regurgitation.      Pericardium:  A trivial pericardial effusion is visualized. The pericardial effusion  is seen adjacent to the right atrium. A pericardial fat pad is  visualized.     Aorta:  There is no dilatation of the aortic root.     Pulmonary Artery:  The main pulmonary artery appears normal.     Venous:  The venous system appears normal. The inferior vena cava is dilated.  Measures 2.1 cm There is less than 50% respiratory change in the  inferior vena cava dimension.     Measurements   Chambers  Name                    Value           Ao root diameter (MM)   2.9 cm          RVIDd (AP) 2D           3.55 cm         IVSd (2D)               0.87 cm         LVIDd (2D)              5.41 cm         LVIDs (2D)              5.22 cm         LVPWd (2D)              0.96 cm         LA dimension (AP) 2D    4.1 cm            Volumes  Name                    Value           LA ESV SP 4CH (MOD)     53 ml           LA ESV SP 2CH (MOD)     69 ml           LA ESV BP (MOD)         65 ml           LA ESV BP (MOD) index   34 ml/m2        LV EDV SP 4CH (MOD)     118 ml          LV ESV SP 4CH (MOD)     99 ml           EF SP 4CH (MOD)         15 %            Tricuspid Valve  Name                    Value           TR Vmax                 2.41 m/sec      TR peak gradient        23 mmHg         RAP                     10 mmHg         RVSP                    36 mmHg           Pulmonic Valve/Qp:Qs  Name                    Value           PV acceleration time    77 msec           Electronically signed by: Mynor Richard MD on 09/03/2015 18:06:41       Assessment/Plan   Assessment / Plan     Hospital Problem List     * (Principal)Sepsis    Left lower lobe pneumonia    Essential hypertension (Chronic)    Hyperlipidemia (Chronic)    Alcohol abuse, daily use (Chronic)    Former smoker (Chronic)     Dermatitis (Chronic)    Balance problem (Chronic)        Assessment & Plan:  - Admit to telemetry  - VS q4h  - Rocephin & Azithromycin in ED   - Continue  - Follow blood cultures  - Urine strep and legionella  - Sputum culture if able to produce  - IVF overnight  - Recheck lactic acid pending  - CIWA  - Vitamins  - Continue home medications  - Influenza swab pending  - Case management  --will need radiographic follow up to resolution.    DVT prophylaxis: TEDs/SCDs/Heparin SQ    CODE STATUS: FULL      Admission Status:  I believe this patient meets INPATIENT status due to the need for care which can only be reasonably provided in an hospital setting such as aggressive/expedited ancillary services and/or consultation services, the necessity for IV medications, close physician monitoring and/or the possible need for procedures.  In such, I feel patient’s risk for adverse outcomes and need for care warrant INPATIENT evaluation and predict the patient’s care encounter to likely last beyond 2 midnights.    Electronically signed by FIONA Boyce, 03/20/18, 8:50 PM.       Brief Attending Admission Attestation     I have seen and examined the patient, performing an independent face-to-face diagnostic evaluation with plan of care reviewed and developed with the advanced practice clinician FIONA Durant.      Brief Summary Statement/HPI:   Jet Floyd is a pleasant 72 y.o.  male with PMH significant for HTN, HLD, and alcohol abuse who presents to the ER for somnolence, low blood sugar (30), fatigue and some confusion.  He was in his normal state of health last night, but the patient slept much longer than usual this morning, and the patient's wife called EMS who measured his fsbs at 30.  He is not a diabetic.  The patient ate a sandwich and had some orange juice and felt somewhat improved prior to EMS transfer to the ER.  The patient's wife told staff that he had recently increased his alcohol use.   "In the ER, the patient's ethanol level was 56 (last drink the night before), WBC 14.2K, procal 0.26, ;actic acid 4.9, and CT chest positive for \"Area of confluent opacification left lower lung periphery with micronodular appearance surrounding this consistent with acute airspace disease however underlying pulmonary nodule not excluded.\"  The patient will be admitted to the hospital medicine service for further evaluation and care    Attending Physical Exam:  Constitutional: No acute distress, asleep but rouses easily  Eyes: PERRLA, sclerae anicteric, no conjunctival injection  HENT: NCAT, mucous membranes moist  Neck: Supple, no thyromegaly, no lymphadenopathy, trachea midline  Respiratory: Faint crackles posterior mid lung, nonlabored respirations   Cardiovascular: RRR, no murmurs, rubs, or gallops, palpable pedal pulses bilaterally  Gastrointestinal: Positive bowel sounds, soft, nontender, nondistended  Musculoskeletal: No bilateral ankle edema, no clubbing or cyanosis to extremities  Psychiatric: Appropriate affect, cooperative  Neurologic: Oriented x 3, strength symmetric in all extremities, Cranial Nerves grossly intact to confrontation, speech clear  Skin: No rashes    Brief Assessment/Plan :  See above for further detailed assessment and plan developed with APC which I have reviewed and/or edited.      Electronically signed by Niya Perkins MD, 03/21/18, 12:29 AM.               "

## 2018-03-21 NOTE — PROGRESS NOTES
Discharge Planning Assessment  [facility]     Patient Name: Jet Floyd  MRN: [id number]  Today's Date: 3/21/2018    Admit Date: 3/20/2018          Discharge Needs Assessment     Row Name 03/21/18 1516       Living Environment    Lives With spouse;grandchild(brian)    Name(s) of Who Lives With Patient Spouse is Heather.  Pt's 18-year-old grandson also lives with them.    Current Living Arrangements home/apartment/condo    Primary Care Provided by self    Provides Primary Care For no one    Family Caregiver if Needed spouse    Family Caregiver Names Spouse, Heather, is available to assist if needed.     Quality of Family Relationships helpful;involved;supportive    Able to Return to Prior Arrangements yes       Resource/Environmental Concerns    Resource/Environmental Concerns none       Transition Planning    Patient/Family Anticipated Services at Transition none    Transportation Anticipated family or friend will provide       Discharge Needs Assessment    Readmission Within the Last 30 Days no previous admission in last 30 days    Concerns to be Addressed no discharge needs identified    Equipment Currently Used at Home none    Equipment Needed After Discharge none    Current Discharge Risk substance use/abuse    Discharge Coordination/Progress Pt. lives with his wife and grandson in [place], [place].  Pt. described both of these individuals as helpful.  His wife will provide him with transportation at discharge.  Pt. has a cane that he uses, but has no other DME and states he does not need additional equipment.  Pt. does not use or want home health.            Discharge Plan     Row Name 03/21/18 1526       Plan    Plan home with spouse    Patient/Family in Agreement with Plan yes    Plan Comments MED met with pt. at bedside.  Pt.'s spouse was present with permission.  Pt. denied having any discharge needs, such as DME or home health services.  MED spoke with pt. about alcohol use.  Pt. stated his alcohol use is daily  and he has 5-6 drinks per day.  Pt. stated his usage had increased to this amount recently.  When SW asked pt. if he felt his drinking alcohol was problematic, pt. stated he was not going to drink any more.  MED offered resources to pt., however he declined.  MED informed pt. that he could request SW's assistance at any time, but also stated that pt. should stay in close contact with his PCP if he had problems since he had declined assistance from SW.  Pt and his spouse had no further needs at this time.    Final Discharge Disposition Code 01 - home or self-care        Destination     No service coordination in this encounter.      Durable Medical Equipment     No service coordination in this encounter.      Dialysis/Infusion     No service coordination in this encounter.      Home Medical Care     No service coordination in this encounter.      Social Care     No service coordination in this encounter.        Expected Discharge Date and Time     Expected Discharge Date Expected Discharge Time    Mar 23, 2018               Demographic Summary     Row Name 03/21/18 1510       General Information    Admission Type inpatient    Arrived From home    Referral Source admission list    Reason for Consult discharge planning;substance use concerns    Preferred Language English     Used During This Interaction no    General Information Comments Pt. see's Obi Soriano for primacy care.            Functional Status     Row Name 03/21/18 1510       Employment/    Employment/ Comments Pt. has insurance coverage through [employer] and [employer]. Pt. stated he has [employer] that covers his prescriptions.            Psychosocial    No documentation.           Abuse/Neglect    No documentation.           Legal    No documentation.           Substance Abuse    No documentation.           Patient Forms    No documentation.         KARINA Dennison

## 2018-03-22 VITALS
SYSTOLIC BLOOD PRESSURE: 144 MMHG | HEART RATE: 67 BPM | RESPIRATION RATE: 18 BRPM | OXYGEN SATURATION: 100 % | BODY MASS INDEX: 17.92 KG/M2 | WEIGHT: 121 LBS | TEMPERATURE: 98.4 F | DIASTOLIC BLOOD PRESSURE: 69 MMHG | HEIGHT: 69 IN

## 2018-03-22 LAB
ANION GAP SERPL CALCULATED.3IONS-SCNC: 8 MMOL/L (ref 3–11)
BUN BLD-MCNC: 9 MG/DL (ref 9–23)
BUN/CREAT SERPL: 8.2 (ref 7–25)
CALCIUM SPEC-SCNC: 7.4 MG/DL (ref 8.7–10.4)
CHLORIDE SERPL-SCNC: 103 MMOL/L (ref 99–109)
CO2 SERPL-SCNC: 23 MMOL/L (ref 20–31)
CREAT BLD-MCNC: 1.1 MG/DL (ref 0.6–1.3)
DEPRECATED RDW RBC AUTO: 56.2 FL (ref 37–54)
ERYTHROCYTE [DISTWIDTH] IN BLOOD BY AUTOMATED COUNT: 16.4 % (ref 11.3–14.5)
GFR SERPL CREATININE-BSD FRML MDRD: 66 ML/MIN/1.73
GLUCOSE BLD-MCNC: 85 MG/DL (ref 70–100)
HCT VFR BLD AUTO: 32.6 % (ref 38.9–50.9)
HGB BLD-MCNC: 11 G/DL (ref 13.1–17.5)
MAGNESIUM SERPL-MCNC: 2.1 MG/DL (ref 1.3–2.7)
MCH RBC QN AUTO: 33.1 PG (ref 27–31)
MCHC RBC AUTO-ENTMCNC: 33.7 G/DL (ref 32–36)
MCV RBC AUTO: 98.2 FL (ref 80–99)
PLATELET # BLD AUTO: 118 10*3/MM3 (ref 150–450)
PMV BLD AUTO: 11.5 FL (ref 6–12)
POTASSIUM BLD-SCNC: 3.5 MMOL/L (ref 3.5–5.5)
RBC # BLD AUTO: 3.32 10*6/MM3 (ref 4.2–5.76)
SODIUM BLD-SCNC: 134 MMOL/L (ref 132–146)
WBC NRBC COR # BLD: 5.83 10*3/MM3 (ref 3.5–10.8)

## 2018-03-22 PROCEDURE — 99239 HOSP IP/OBS DSCHRG MGMT >30: CPT | Performed by: HOSPITALIST

## 2018-03-22 PROCEDURE — 80048 BASIC METABOLIC PNL TOTAL CA: CPT | Performed by: HOSPITALIST

## 2018-03-22 PROCEDURE — 83735 ASSAY OF MAGNESIUM: CPT | Performed by: HOSPITALIST

## 2018-03-22 PROCEDURE — 94760 N-INVAS EAR/PLS OXIMETRY 1: CPT

## 2018-03-22 PROCEDURE — 85027 COMPLETE CBC AUTOMATED: CPT | Performed by: HOSPITALIST

## 2018-03-22 PROCEDURE — 94640 AIRWAY INHALATION TREATMENT: CPT

## 2018-03-22 RX ORDER — DOXYCYCLINE HYCLATE 100 MG/1
100 CAPSULE ORAL 2 TIMES DAILY
Qty: 10 CAPSULE | Refills: 0 | Status: SHIPPED | OUTPATIENT
Start: 2018-03-22 | End: 2018-03-27

## 2018-03-22 RX ADMIN — LISINOPRIL 2.5 MG: 2.5 TABLET ORAL at 09:07

## 2018-03-22 RX ADMIN — FUROSEMIDE 80 MG: 40 TABLET ORAL at 09:07

## 2018-03-22 RX ADMIN — POTASSIUM CHLORIDE 10 MEQ: 750 CAPSULE, EXTENDED RELEASE ORAL at 09:08

## 2018-03-22 RX ADMIN — Medication 100 MG: at 09:07

## 2018-03-22 RX ADMIN — ATORVASTATIN CALCIUM 20 MG: 20 TABLET, FILM COATED ORAL at 09:07

## 2018-03-22 RX ADMIN — SPIRONOLACTONE 25 MG: 25 TABLET, FILM COATED ORAL at 09:07

## 2018-03-22 RX ADMIN — LEVOTHYROXINE SODIUM 50 MCG: 50 TABLET ORAL at 09:09

## 2018-03-22 RX ADMIN — IPRATROPIUM BROMIDE AND ALBUTEROL SULFATE 3 ML: .5; 3 SOLUTION RESPIRATORY (INHALATION) at 07:05

## 2018-03-22 RX ADMIN — CARVEDILOL 3.12 MG: 3.12 TABLET, FILM COATED ORAL at 09:07

## 2018-03-22 RX ADMIN — ASPIRIN 81 MG 81 MG: 81 TABLET ORAL at 09:07

## 2018-03-22 NOTE — PROGRESS NOTES
"Adult Nutrition  Assessment/PES    Patient Name:  Jet Floyd  YOB: 1945  MRN: [id number]  Admit Date:  3/20/2018    Assessment Date:  3/22/2018    Comments: 20 minutes  Reason for Assessment   Reason For Assessment identified at risk by screening criteria   15 minutes   Identified At Risk by Screening Criteria BMI              Adult Nutrition Assessment     Row Name 03/22/18 1231       Labs/Procedures/Meds    Lab Results Reviewed Reviewed    Results from last 7 days  Lab Units 03/22/18  0646  03/20/18  1505   SODIUM mmol/L 134  < > 135   POTASSIUM mmol/L 3.5  < > 4.2   CHLORIDE mmol/L 103  < > 102   CO2 mmol/L 23.0  < > 14.0*   BUN mg/dL 9  < > 8*   CREATININE mg/dL 1.10  < > 1.10   CALCIUM mg/dL 7.4*  < > 8.3*   BILIRUBIN mg/dL  --   --  1.8*   ALK PHOS U/L  --   --  139*   ALT (SGPT) U/L  --   --  29   AST (SGOT) U/L  --   --  100*   GLUCOSE mg/dL 85  < > 142*   < > = values in this interval not displayed.         Nutrition Prescription PO    Current PO Diet Regular    Common Modifiers Lactose Restricted       PO Evaluation    Number of Days PO Intake Evaluated Insufficient Data    Number of Meals 1    % PO Intake 100   For breakfast today.    Row Name 03/22/18 1230       Nutrition/Diet History    Meal/Snack Patterns Patient advised RD that he enjoyed breakfast this am.       Anthropometrics    Height 175.3 cm (69.02\")    Weight 54.9 kg (121 lb)       Ideal Body Weight (IBW)    Ideal Body Weight (IBW) (kg) 73.73    % Ideal Body Weight 74.44       Usual Body Weight (UBW)    Usual Body Weight 58.5 kg (129 lb)    % Usual Body Weight 93.8       Body Mass Index (BMI)    BMI (kg/m2) 17.9       Row Name 03/22/18 1229                           Problem/Interventions:        Problem 1     Row Name 03/22/18 1232       Nutrition Diagnoses Problem 1    Problem 1 No Nutrition Diagnosis at this Time                    Intervention Goal     Row Name 03/22/18 1232       Intervention Goal    General Nutrition " support treatment            Nutrition Intervention     Row Name 03/22/18 1232       Nutrition Intervention    RD/Tech Action Follow Tx progress;Care plan reviewd   Noted pending discharge.              Education/Evaluation     Row Name 03/22/18 1232       Education    Education Education offered and refused       Monitor/Evaluation    Monitor Per protocol        Electronically signed by:  Marleen Cleaning RD  03/22/18 12:33 PM

## 2018-03-22 NOTE — PLAN OF CARE
Problem: Patient Care Overview  Goal: Plan of Care Review  Outcome: Ongoing (interventions implemented as appropriate)   03/22/18 0407   Coping/Psychosocial   Plan of Care Reviewed With patient   Plan of Care Review   Progress improving   OTHER   Outcome Summary VSS. no s/s of withdrawl. pt awake all night     Goal: Discharge Needs Assessment  Outcome: Ongoing (interventions implemented as appropriate)   03/22/18 0407   Discharge Needs Assessment   Readmission Within the Last 30 Days no previous admission in last 30 days   Concerns to be Addressed no discharge needs identified   Patient/Family Anticipates Transition to home   Patient/Family Anticipated Services at Transition none   Transportation Concerns car, none   Transportation Anticipated family or friend will provide   Anticipated Changes Related to Illness none   Equipment Needed After Discharge none   Current Discharge Risk substance use/abuse   Disability   Equipment Currently Used at Home none     Goal: Interprofessional Rounds/Family Conf  Outcome: Ongoing (interventions implemented as appropriate)      Problem: Fall Risk (Adult)  Goal: Identify Related Risk Factors and Signs and Symptoms  Outcome: Outcome(s) achieved Date Met: 03/22/18 03/22/18 0407   Fall Risk (Adult)   Related Risk Factors (Fall Risk) gait/mobility problems;history of falls   Signs and Symptoms (Fall Risk) presence of risk factors     Goal: Absence of Fall  Outcome: Ongoing (interventions implemented as appropriate)   03/22/18 0407   Fall Risk (Adult)   Absence of Fall making progress toward outcome       Problem: Skin Injury Risk (Adult)  Goal: Skin Health and Integrity  Outcome: Ongoing (interventions implemented as appropriate)   03/22/18 0407   Skin Injury Risk (Adult)   Skin Health and Integrity making progress toward outcome

## 2018-03-22 NOTE — PROGRESS NOTES
Continued Stay Note   Madhuri     Patient Name: Jet Floyd  MRN: [id number]  Today's Date: 3/22/2018    Admit Date: 3/20/2018          Discharge Plan     Row Name 03/22/18 1037       Plan    Plan Home with HH    Patient/Family in Agreement with Plan yes    Plan Comments The SW spoke with the pt and his spouse. They are requesting HH at [place]. I called the HH referral to [facility] at [facility] per pt choice from list.    Final Discharge Disposition Code 06 - home with home health care              Discharge Codes    No documentation.       Expected Discharge Date and Time     Expected Discharge Date Expected Discharge Time    Mar 22, 2018             Magnolia Costello RN

## 2018-03-22 NOTE — DISCHARGE SUMMARY
Psychiatric Medicine Services  DISCHARGE SUMMARY    Patient Name: Jet Floyd  : 1945  MRN: [id number]    Date of Admission: 3/20/2018  Date of Discharge:  18  Primary Care Physician: Obi Soriano MD    Consults     No orders found from 2018 to 3/21/2018.        Hospital Course     Presenting Problem:   Severe sepsis [A41.9, R65.20]    Active Hospital Problems (** Indicates Principal Problem)    Diagnosis Date Noted   • **Sepsis [A41.9] 2018   • Hypoglycemia [E16.2] 2018     Priority: Medium   • Lactic acidosis [E87.2] 2018     Priority: Medium   • Left lower lobe pneumonia [J18.1] 2018     Priority: Medium   • Metabolic encephalopathy [G93.41] 2018     Priority: Low   • Essential hypertension [I10] 2018   • Hyperlipidemia [E78.5] 2018   • Alcohol abuse, daily use [F10.10] 2018   • Former smoker [Z87.891] 2018   • Dermatitis [L30.9] 2018   • Balance problem [R26.89] 2018      Resolved Hospital Problems    Diagnosis Date Noted Date Resolved   No resolved problems to display.          Hospital Course:  Jet Floyd is a 72 y.o. male with hx of HTN, HLD, and ETOH abuse who presents to the ER due to somnolence, confusion, fatigue, and hypoglycemia. His glucose was 30 per EMS. In the ER, he was found to have a LLL pneumonia. He was admitted to the hospitalist service. Started on Rocephin and Azithromycin. Flu PCR was negative. Blood cultures no growth to date. He remained on room air. His glucose improved and he ate all of his breakfast this morning. Has ambulated to the bathroom and back. Typically uses a cane at home.    He was monitored for alcohol withdrawal but showed no signs or symptoms of it, did not require any Ativan. Social work saw the patient, he declined resources as he stated he was not going to drink anymore.     Discharge patient home today. Will prescribe Doxycycline 100  mg BID x 5 days to complete treatment course for community acquired pneumonia. Follow up with PCP in 1 week.           Day of Discharge     HPI:   Patient seen this morning. Just returned from using the bathroom. Denies SOA. Minimal cough. No other complaints.     Review of Systems  Gen-no fevers, no chills  CV-no chest pain, no palpitations  Resp-+cough, no dyspnea  GI-no N/V/D, no abd pain      Otherwise ROS is negative except as mentioned in the HPI.    Vital Signs:   Temp:  [97.3 °F (36.3 °C)-98.4 °F (36.9 °C)] 98.4 °F (36.9 °C)  Heart Rate:  [64-94] 67  Resp:  [16-18] 18  BP: (109-144)/(59-69) 144/69     Physical Exam:  Gen-no acute distress  CV-RRR, S1 S2 normal, no m/r/g  Resp-CTAB, no wheezes  Abd-soft, NT, ND, +BS  Ext-no edema  Neuro-A&Ox3, no focal deficits  Psych-appropriate mood      Pertinent  and/or Most Recent Results       Results from last 7 days  Lab Units 03/22/18  0646 03/21/18  0523 03/20/18  1505   WBC 10*3/mm3 5.83 7.05 14.20*   HEMOGLOBIN g/dL 11.0* 10.5* 13.3   HEMATOCRIT % 32.6* 31.4* 40.3   PLATELETS 10*3/mm3 118* 100* 146*   SODIUM mmol/L 134 136 135   POTASSIUM mmol/L 3.5 4.0 4.2   CHLORIDE mmol/L 103 111* 102   CO2 mmol/L 23.0 16.0* 14.0*   BUN mg/dL 9 10 8*   CREATININE mg/dL 1.10 1.00 1.10   GLUCOSE mg/dL 85 61* 142*   CALCIUM mg/dL 7.4* 7.2* 8.3*       Results from last 7 days  Lab Units 03/20/18  1505   BILIRUBIN mg/dL 1.8*   ALK PHOS U/L 139*   ALT (SGPT) U/L 29   AST (SGOT) U/L 100*           Invalid input(s): TG, LDLCALC, LDLREALC    Results from last 7 days  Lab Units 03/21/18  0523   TSH mIU/mL 1.925   BNP pg/mL 1,197.0*     Brief Urine Lab Results  (Last result in the past 365 days)      Color   Clarity   Blood   Leuk Est   Nitrite   Protein   CREAT   Urine HCG        03/20/18 1837 Yellow Clear Trace(A) Negative Negative Negative               Microbiology Results Abnormal     Procedure Component Value - Date/Time    Blood Culture - Blood, [[id number]]  (Normal) Collected:   03/20/18 1725    Lab Status:  Preliminary result Specimen:  Blood from Hand, Right Updated:  03/21/18 1746     Blood Culture No growth at 24 hours    Blood Culture - Blood, [[id number]]  (Normal) Collected:  03/20/18 1715    Lab Status:  Preliminary result Specimen:  Blood from Arm, Left Updated:  03/21/18 1746     Blood Culture No growth at 24 hours    Influenza A & B, RT PCR - Swab, Nasopharynx [[id number]]  (Normal) Collected:  03/21/18 0710    Lab Status:  Final result Specimen:  Swab from Nasopharynx Updated:  03/21/18 0754     Influenza A PCR Not Detected     Influenza B PCR Not Detected          Imaging Results (all)     Procedure Component Value Units Date/Time    CT Chest Without Contrast [[id number]] Collected:  03/20/18 1758     Updated:  03/22/18 0806    Narrative:       EXAMINATION: CT CHEST WO CONTRAST- 03/20/2018     INDICATION: abnormal densities on port CXR      TECHNIQUE: CT chest without intravenous contrast administration     The radiation dose reduction device was turned on for each scan per the  ALARA (As Low as Reasonably Achievable) protocol.     COMPARISON: NONE     FINDINGS: Thyroid is homogeneous in attenuation. No bulky mediastinal  adenopathy. Central airways are patent. Esophagus in normal course and  caliber. Cardiac size enlarged without pericardial effusion.  Atherosclerotic nonaneurysmal thoracic aorta. Extended lung windows  demonstrate severe upper lobe predominant centrilobular emphysema with  biapical pleural-parenchymal scarring and bullous formation greatest on  the right. Minimal dependent atelectasis with focal area of confluent  opacification left lower lung periphery concerning for acute airspace  disease as there is adjacent micronodularity. Multilevel degenerative  changes of the spine without aggressive osseous or soft tissue body wall  lesions of concern. Visualized portions of the upper abdomen demonstrate  hepatic steatosis.       Impression:       Area of confluent  opacification left lower lung periphery  with micronodular appearance surrounding most consistent with acute  airspace disease however underlying pulmonary nodule not excluded.  Follow-up to resolution recommended given background emphysematous and  smoking-related changes of the lungs.     D:  03/20/2018  E:  03/21/2018     This report was finalized on 3/22/2018 8:04 AM by Dr. Camacho Herron.       XR Ankle 3+ View Left [[id number]] Collected:  03/21/18 0932     Updated:  03/21/18 1613    Narrative:       EXAMINATION: XR ANKLE 3+ VW LEFT-      INDICATION: Pain; A41.9-Sepsis, unspecified organism; R65.20-Severe  sepsis without septic shock; J18.1-Lobar pneumonia, unspecified  organism; E16.2-Hypoglycemia, unspecified; F10.20-Alcohol dependence,  uncomplicated.      COMPARISON: None.     FINDINGS: Three views of the ankle reveal no evidence of fracture or  dislocation. Osteopenia of the bony structures. The joint spaces are  preserved. Cortex is intact. No fracture or dislocation.           Impression:       Osteopenia of the bony structures. No evidence of fracture  or dislocation.     D:  03/21/2018  E:  03/21/2018     This report was finalized on 3/21/2018 4:11 PM by Dr. Alecia Ruelas MD.       XR Chest 1 View [[id number]] Collected:  03/20/18 1648     Updated:  03/21/18 0906    Narrative:       EXAMINATION: XR CHEST 1 VW-03/20/2018:      INDICATION: Weak/Dizzy/AMS, triage protocol.      COMPARISON: NONE.     FINDINGS:   1.  The heart size is upper limits of normal for this technique.  2.  There is minimal ill defined density seen in the retrocardiac left  lower lobe. There is also mild density seen in the periphery of the  right mid lung. These areas appear to be new when compared to previous  examinations of 07/19/2016.           Impression:       1.  Worrisome constellation of increased density left lower lobe  retrocardiac region with a focal area of increased density in the  periphery of the right mid lung,  both of these are new from distant  studies. Parenchymal lung disease or even space occupying lesions cannot  be excluded.  2.  Otherwise, there is a background of COPD. The heart size is normal  and there is no free fluid.     D:  03/20/2018  E:  03/20/2018     This report was finalized on 3/21/2018 9:04 AM by Dr. Roberto Schmidt MD.                            Order Current Status    Blood Culture - Blood, Preliminary result    Blood Culture - Blood, Preliminary result        Discharge Details      Jet Floyd   Home Medication Instructions [id number]    Printed on:03/22/18 0903   Medication Information                      aspirin 81 MG chewable tablet  Chew 81 mg daily.             atorvastatin (LIPITOR) 20 MG tablet  Take 20 mg by mouth Daily.             carvedilol (COREG) 3.125 MG tablet  Take 3.125 mg by mouth 2 (two) times a day with meals.             doxycycline (VIBRAMYCIN) 100 MG capsule  Take 1 capsule by mouth 2 (Two) Times a Day for 5 days.             furosemide (LASIX) 40 MG tablet  Take 80 mg by mouth daily.             KLOR-CON 10 MEQ CR tablet  TAKE ONE TABLET BY MOUTH DAILY             levothyroxine (SYNTHROID, LEVOTHROID) 50 MCG tablet  Take 50 mcg by mouth Daily.             lisinopril (PRINIVIL,ZESTRIL) 5 MG tablet  Take 2.5 mg by mouth Daily.             spironolactone (ALDACTONE) 25 MG tablet  Take 25 mg by mouth Daily.             thiamine (VITAMINE B-1) 100 MG tablet  Take 100 mg by mouth daily.                   Discharge Disposition:  Home or Self Care    Discharge Diet:  Diet Instructions     Advance Diet As Tolerated             Discharge Activity:   Activity Instructions     Activity as Tolerated             No future appointments.    Additional Instructions for the Follow-ups that You Need to Schedule     Discharge Follow-up with PCP    As directed      Follow Up Details:  1 week               Time Spent on Discharge:  32 minutes    Electronically signed by Deepali FERNANDEZ  MD [place], 03/22/18, 9:07 AM

## 2018-03-25 LAB
BACTERIA SPEC AEROBE CULT: NORMAL
BACTERIA SPEC AEROBE CULT: NORMAL

## 2018-03-26 ENCOUNTER — TRANSCRIBE ORDERS (OUTPATIENT)
Dept: ADMINISTRATIVE | Facility: HOSPITAL | Age: 73
End: 2018-03-26

## 2018-03-26 ENCOUNTER — HOSPITAL ENCOUNTER (OUTPATIENT)
Dept: GENERAL RADIOLOGY | Facility: HOSPITAL | Age: 73
Discharge: HOME | End: 2018-03-26
Attending: FAMILY MEDICINE | Admitting: FAMILY MEDICINE

## 2018-03-26 DIAGNOSIS — J69.0 ASPIRATION PNEUMONIA OF LEFT LOWER LOBE, UNSPECIFIED ASPIRATION PNEUMONIA TYPE (HCC): ICD-10-CM

## 2018-03-26 DIAGNOSIS — J69.0 ASPIRATION PNEUMONIA OF LEFT LOWER LOBE, UNSPECIFIED ASPIRATION PNEUMONIA TYPE (HCC): Primary | ICD-10-CM

## 2018-03-26 PROCEDURE — 71046 X-RAY EXAM CHEST 2 VIEWS: CPT

## 2018-03-29 ENCOUNTER — LAB (OUTPATIENT)
Dept: LAB | Facility: HOSPITAL | Age: 73
End: 2018-03-29

## 2018-03-29 ENCOUNTER — TRANSCRIBE ORDERS (OUTPATIENT)
Dept: LAB | Facility: HOSPITAL | Age: 73
End: 2018-03-29

## 2018-03-29 DIAGNOSIS — J18.1 UNRESOLVED LOBAR PNEUMONIA (HCC): Primary | ICD-10-CM

## 2018-03-29 DIAGNOSIS — J18.1 UNRESOLVED LOBAR PNEUMONIA (HCC): ICD-10-CM

## 2018-03-29 LAB
ANION GAP SERPL CALCULATED.3IONS-SCNC: 8 MMOL/L (ref 3–11)
BUN BLD-MCNC: 25 MG/DL (ref 9–23)
BUN/CREAT SERPL: 12.5 (ref 7–25)
CALCIUM SPEC-SCNC: 8.2 MG/DL (ref 8.7–10.4)
CHLORIDE SERPL-SCNC: 107 MMOL/L (ref 99–109)
CO2 SERPL-SCNC: 22 MMOL/L (ref 20–31)
CREAT BLD-MCNC: 2 MG/DL (ref 0.6–1.3)
GFR SERPL CREATININE-BSD FRML MDRD: 33 ML/MIN/1.73
GLUCOSE BLD-MCNC: 84 MG/DL (ref 70–100)
POTASSIUM BLD-SCNC: 4.3 MMOL/L (ref 3.5–5.5)
SODIUM BLD-SCNC: 137 MMOL/L (ref 132–146)

## 2018-03-29 PROCEDURE — 80048 BASIC METABOLIC PNL TOTAL CA: CPT

## 2018-03-29 PROCEDURE — 36415 COLL VENOUS BLD VENIPUNCTURE: CPT

## 2018-03-30 ENCOUNTER — TRANSCRIBE ORDERS (OUTPATIENT)
Dept: LAB | Facility: HOSPITAL | Age: 73
End: 2018-03-30

## 2018-03-30 ENCOUNTER — APPOINTMENT (OUTPATIENT)
Dept: LAB | Facility: HOSPITAL | Age: 73
End: 2018-03-30

## 2018-03-30 DIAGNOSIS — J18.1 UNRESOLVED LOBAR PNEUMONIA (HCC): Primary | ICD-10-CM

## 2018-03-30 DIAGNOSIS — F10.10 ALCOHOL ABUSE, CONTINUOUS: ICD-10-CM

## 2018-03-30 DIAGNOSIS — I10 ESSENTIAL HYPERTENSION, MALIGNANT: ICD-10-CM

## 2018-03-30 LAB — C DIFF TOX GENS STL QL NAA+PROBE: NOT DETECTED

## 2018-03-30 PROCEDURE — 87493 C DIFF AMPLIFIED PROBE: CPT | Performed by: FAMILY MEDICINE

## 2018-04-26 ENCOUNTER — OUTSIDE FACILITY SERVICE (OUTPATIENT)
Dept: HOSPITALIST | Facility: HOSPITAL | Age: 73
End: 2018-04-26

## 2018-04-26 PROCEDURE — G0180 MD CERTIFICATION HHA PATIENT: HCPCS | Performed by: HOSPITALIST

## 2018-07-05 ENCOUNTER — TRANSCRIBE ORDERS (OUTPATIENT)
Dept: ADMINISTRATIVE | Facility: HOSPITAL | Age: 73
End: 2018-07-05

## 2018-07-05 DIAGNOSIS — R91.1 NODULE OF RIGHT LUNG: Primary | ICD-10-CM

## 2019-05-25 ENCOUNTER — APPOINTMENT (OUTPATIENT)
Dept: MRI IMAGING | Facility: HOSPITAL | Age: 74
End: 2019-05-25

## 2019-05-25 ENCOUNTER — HOSPITAL ENCOUNTER (EMERGENCY)
Facility: HOSPITAL | Age: 74
Discharge: HOME | End: 2019-05-25
Attending: EMERGENCY MEDICINE | Admitting: EMERGENCY MEDICINE

## 2019-05-25 VITALS
HEIGHT: 69 IN | DIASTOLIC BLOOD PRESSURE: 87 MMHG | WEIGHT: 150 LBS | BODY MASS INDEX: 22.22 KG/M2 | SYSTOLIC BLOOD PRESSURE: 138 MMHG | TEMPERATURE: 97.6 F | HEART RATE: 75 BPM | OXYGEN SATURATION: 94 % | RESPIRATION RATE: 16 BRPM

## 2019-05-25 DIAGNOSIS — N18.30 CHRONIC RENAL INSUFFICIENCY, STAGE 3 (MODERATE) (HCC): ICD-10-CM

## 2019-05-25 DIAGNOSIS — M51.36 DDD (DEGENERATIVE DISC DISEASE), LUMBAR: ICD-10-CM

## 2019-05-25 DIAGNOSIS — M54.50 ACUTE MIDLINE LOW BACK PAIN WITHOUT SCIATICA: Primary | ICD-10-CM

## 2019-05-25 DIAGNOSIS — M48.061 SPINAL STENOSIS OF LUMBAR REGION AT MULTIPLE LEVELS: ICD-10-CM

## 2019-05-25 LAB
ALBUMIN SERPL-MCNC: 4.4 G/DL (ref 3.5–5.2)
ALBUMIN/GLOB SERPL: 1.3 G/DL
ALP SERPL-CCNC: 107 U/L (ref 39–117)
ALT SERPL W P-5'-P-CCNC: 7 U/L (ref 1–41)
ANION GAP SERPL CALCULATED.3IONS-SCNC: 14 MMOL/L
AST SERPL-CCNC: 13 U/L (ref 1–40)
BASOPHILS # BLD AUTO: 0.03 10*3/MM3 (ref 0–0.2)
BASOPHILS NFR BLD AUTO: 0.5 % (ref 0–1.5)
BILIRUB SERPL-MCNC: 1.7 MG/DL (ref 0.2–1.2)
BILIRUB UR QL STRIP: NEGATIVE
BUN BLD-MCNC: 21 MG/DL (ref 8–23)
BUN/CREAT SERPL: 14.4 (ref 7–25)
CALCIUM SPEC-SCNC: 8.7 MG/DL (ref 8.6–10.5)
CHLORIDE SERPL-SCNC: 105 MMOL/L (ref 98–107)
CLARITY UR: CLEAR
CO2 SERPL-SCNC: 20 MMOL/L (ref 22–29)
COLOR UR: YELLOW
CREAT BLD-MCNC: 1.46 MG/DL (ref 0.76–1.27)
DEPRECATED RDW RBC AUTO: 56.4 FL (ref 37–54)
EOSINOPHIL # BLD AUTO: 0.15 10*3/MM3 (ref 0–0.4)
EOSINOPHIL NFR BLD AUTO: 2.7 % (ref 0.3–6.2)
ERYTHROCYTE [DISTWIDTH] IN BLOOD BY AUTOMATED COUNT: 17.4 % (ref 12.3–15.4)
GFR SERPL CREATININE-BSD FRML MDRD: 47 ML/MIN/1.73
GLOBULIN UR ELPH-MCNC: 3.3 GM/DL
GLUCOSE BLD-MCNC: 107 MG/DL (ref 65–99)
GLUCOSE UR STRIP-MCNC: NEGATIVE MG/DL
HCT VFR BLD AUTO: 37.9 % (ref 37.5–51)
HGB BLD-MCNC: 12.7 G/DL (ref 13–17.7)
HGB UR QL STRIP.AUTO: NEGATIVE
HOLD SPECIMEN: NORMAL
HOLD SPECIMEN: NORMAL
IMM GRANULOCYTES # BLD AUTO: 0.02 10*3/MM3 (ref 0–0.05)
IMM GRANULOCYTES NFR BLD AUTO: 0.4 % (ref 0–0.5)
KETONES UR QL STRIP: NEGATIVE
LEUKOCYTE ESTERASE UR QL STRIP.AUTO: NEGATIVE
LIPASE SERPL-CCNC: 37 U/L (ref 13–60)
LYMPHOCYTES # BLD AUTO: 0.84 10*3/MM3 (ref 0.7–3.1)
LYMPHOCYTES NFR BLD AUTO: 14.8 % (ref 19.6–45.3)
MCH RBC QN AUTO: 29.5 PG (ref 26.6–33)
MCHC RBC AUTO-ENTMCNC: 33.5 G/DL (ref 31.5–35.7)
MCV RBC AUTO: 87.9 FL (ref 79–97)
MONOCYTES # BLD AUTO: 0.74 10*3/MM3 (ref 0.1–0.9)
MONOCYTES NFR BLD AUTO: 13.1 % (ref 5–12)
NEUTROPHILS # BLD AUTO: 3.88 10*3/MM3 (ref 1.7–7)
NEUTROPHILS NFR BLD AUTO: 68.5 % (ref 42.7–76)
NITRITE UR QL STRIP: NEGATIVE
PH UR STRIP.AUTO: 5.5 [PH] (ref 5–8)
PLATELET # BLD AUTO: 166 10*3/MM3 (ref 140–450)
PMV BLD AUTO: 10.4 FL (ref 6–12)
POTASSIUM BLD-SCNC: 4.9 MMOL/L (ref 3.5–5.2)
PROT SERPL-MCNC: 7.7 G/DL (ref 6–8.5)
PROT UR QL STRIP: NEGATIVE
RBC # BLD AUTO: 4.31 10*6/MM3 (ref 4.14–5.8)
SODIUM BLD-SCNC: 139 MMOL/L (ref 136–145)
SP GR UR STRIP: 1.01 (ref 1–1.03)
UROBILINOGEN UR QL STRIP: NORMAL
WBC NRBC COR # BLD: 5.66 10*3/MM3 (ref 3.4–10.8)
WHOLE BLOOD HOLD SPECIMEN: NORMAL
WHOLE BLOOD HOLD SPECIMEN: NORMAL

## 2019-05-25 PROCEDURE — 25010000002 DEXAMETHASONE PER 1 MG: Performed by: EMERGENCY MEDICINE

## 2019-05-25 PROCEDURE — 96375 TX/PRO/DX INJ NEW DRUG ADDON: CPT

## 2019-05-25 PROCEDURE — 25010000002 HYDROMORPHONE PER 4 MG: Performed by: EMERGENCY MEDICINE

## 2019-05-25 PROCEDURE — 72148 MRI LUMBAR SPINE W/O DYE: CPT

## 2019-05-25 PROCEDURE — 80053 COMPREHEN METABOLIC PANEL: CPT | Performed by: EMERGENCY MEDICINE

## 2019-05-25 PROCEDURE — 83690 ASSAY OF LIPASE: CPT | Performed by: EMERGENCY MEDICINE

## 2019-05-25 PROCEDURE — 81003 URINALYSIS AUTO W/O SCOPE: CPT | Performed by: EMERGENCY MEDICINE

## 2019-05-25 PROCEDURE — 96374 THER/PROPH/DIAG INJ IV PUSH: CPT

## 2019-05-25 PROCEDURE — 85025 COMPLETE CBC W/AUTO DIFF WBC: CPT | Performed by: EMERGENCY MEDICINE

## 2019-05-25 PROCEDURE — 99284 EMERGENCY DEPT VISIT MOD MDM: CPT

## 2019-05-25 RX ORDER — SODIUM CHLORIDE 0.9 % (FLUSH) 0.9 %
10 SYRINGE (ML) INJECTION AS NEEDED
Status: DISCONTINUED | OUTPATIENT
Start: 2019-05-25 | End: 2019-05-25 | Stop reason: HOSPADM

## 2019-05-25 RX ORDER — HYDROCODONE BITARTRATE AND ACETAMINOPHEN 5; 325 MG/1; MG/1
1 TABLET ORAL EVERY 6 HOURS PRN
Qty: 12 TABLET | Refills: 0 | Status: SHIPPED | OUTPATIENT
Start: 2019-05-25 | End: 2022-04-05 | Stop reason: HOSPADM

## 2019-05-25 RX ORDER — METHYLPREDNISOLONE 4 MG/1
TABLET ORAL
Qty: 21 EACH | Refills: 0 | Status: ON HOLD | OUTPATIENT
Start: 2019-05-25 | End: 2020-02-15

## 2019-05-25 RX ORDER — HYDROMORPHONE HYDROCHLORIDE 1 MG/ML
0.25 INJECTION, SOLUTION INTRAMUSCULAR; INTRAVENOUS; SUBCUTANEOUS
Status: DISCONTINUED | OUTPATIENT
Start: 2019-05-25 | End: 2019-05-25 | Stop reason: HOSPADM

## 2019-05-25 RX ORDER — LANOLIN ALCOHOL/MO/W.PET/CERES
1000 CREAM (GRAM) TOPICAL DAILY
Status: ON HOLD | COMMUNITY
End: 2022-03-29

## 2019-05-25 RX ORDER — DIAZEPAM 5 MG/1
5 TABLET ORAL ONCE
Status: COMPLETED | OUTPATIENT
Start: 2019-05-25 | End: 2019-05-25

## 2019-05-25 RX ORDER — DEXAMETHASONE SODIUM PHOSPHATE 4 MG/ML
8 INJECTION, SOLUTION INTRA-ARTICULAR; INTRALESIONAL; INTRAMUSCULAR; INTRAVENOUS; SOFT TISSUE ONCE
Status: COMPLETED | OUTPATIENT
Start: 2019-05-25 | End: 2019-05-25

## 2019-05-25 RX ORDER — ACETAMINOPHEN 500 MG
1000 TABLET ORAL ONCE
Status: COMPLETED | OUTPATIENT
Start: 2019-05-25 | End: 2019-05-25

## 2019-05-25 RX ADMIN — ACETAMINOPHEN 1000 MG: 500 TABLET, FILM COATED ORAL at 11:13

## 2019-05-25 RX ADMIN — DIAZEPAM 5 MG: 5 TABLET ORAL at 11:13

## 2019-05-25 RX ADMIN — DEXAMETHASONE SODIUM PHOSPHATE 8 MG: 4 INJECTION, SOLUTION INTRAMUSCULAR; INTRAVENOUS at 11:14

## 2019-05-25 RX ADMIN — SODIUM CHLORIDE 500 ML: 9 INJECTION, SOLUTION INTRAVENOUS at 11:12

## 2019-05-25 RX ADMIN — HYDROMORPHONE HYDROCHLORIDE 0.25 MG: 1 INJECTION, SOLUTION INTRAMUSCULAR; INTRAVENOUS; SUBCUTANEOUS at 11:22

## 2019-05-25 RX ADMIN — SODIUM CHLORIDE, PRESERVATIVE FREE 10 ML: 5 INJECTION INTRAVENOUS at 09:45

## 2019-05-29 ENCOUNTER — HOSPITAL ENCOUNTER (OUTPATIENT)
Dept: PHYSICAL THERAPY | Facility: HOSPITAL | Age: 74
Setting detail: THERAPIES SERIES
Discharge: HOME | End: 2019-05-29

## 2019-05-29 DIAGNOSIS — M54.50 ACUTE MIDLINE LOW BACK PAIN WITHOUT SCIATICA: Primary | ICD-10-CM

## 2019-05-29 DIAGNOSIS — M48.061 SPINAL STENOSIS OF LUMBAR REGION AT MULTIPLE LEVELS: ICD-10-CM

## 2019-05-29 PROCEDURE — 97161 PT EVAL LOW COMPLEX 20 MIN: CPT | Performed by: PHYSICAL THERAPIST

## 2019-05-29 NOTE — THERAPY EVALUATION
Outpatient Physical Therapy Ortho Initial Evaluation  [facility]     Patient Name: Jet Floyd  : 1945  MRN: [id number]  Today's Date: 2019      Visit Date: 2019    Patient Active Problem List   Diagnosis   • Sepsis (CMS/HCC)   • Left lower lobe pneumonia (CMS/HCC)   • Essential hypertension   • Hyperlipidemia   • Alcohol abuse, daily use   • Former smoker   • Dermatitis   • Balance problem   • Hypoglycemia   • Metabolic encephalopathy   • Lactic acidosis        Past Medical History:   Diagnosis Date   • Cancer (CMS/HCC)    • CHF (congestive heart failure) (CMS/HCC)    • Hypertension         Past Surgical History:   Procedure Laterality Date   • CARDIAC DEFIBRILLATOR PLACEMENT Bilateral    • CARDIAC DEFIBRILLATOR REMOVAL Bilateral    • CATARACT EXTRACTION Bilateral    • CERVICAL SPINE SURGERY Bilateral        Visit Dx:     ICD-10-CM ICD-9-CM   1. Acute midline low back pain without sciatica M54.5 724.2   2. Spinal stenosis of lumbar region at multiple levels M48.061 724.02         Patient History     Row Name 19 1100             History    Chief Complaint  Pain  -CR      Type of Pain  Back pain  -CR      Date Current Problem(s) Began  19  -CR      Brief Description of Current Complaint  73 yo male reports awakening this past saturday and having signficant low back pain. He initially thought it was his kidneys . He reports kidneys were cleared and it was determined it was coming from the low back. Wednesday he reports liftinga  back of charcoal at store.  Friday night began pain after getting toe nails clipped . Sat AM pain was excruciating.  Pain is most located in right low back. Pain is described as an ache in right low back.    -CR      Patient/Caregiver Goals  Relieve pain  -CR      Current Tobacco Use  no  -CR      Smoking Status  no  -CR      Hand Dominance  right-handed  -CR      What clinical tests have you had for this problem?  MRI  -CR      Results of  Clinical Tests  lumbar stenosis   -CR         Pain     Pain at Present  1  -CR      Pain at Best  1  -CR      Pain at Worst  9  -CR      What Performance Factors Make the Current Problem(s) WORSE?  Prolonged standing  -CR      Is your sleep disturbed?  No  -CR      Is medication used to assist with sleep?  No  -CR      Difficulties with ADL's?  yes, unable to care for home  -CR         Fall Risk Assessment    Any falls in the past year:  Yes  -CR      Number of falls reported in the last 12 months  1  -CR      Factors that contributed to the fall:  Uneven surface  -CR      Does patient have a fear of falling  No  -CR         Daily Activities    Primary Language  English  -CR      Pt Participated in POC and Goals  Yes  -CR         Safety    Are you being hurt, hit, or frightened by anyone at home or in your life?  No  -CR      Are you being neglected by a caregiver  No  -CR        User Key  (r) = Recorded By, (t) = Taken By, (c) = Cosigned By    Initials Name Provider Type    Demetrius Chacon PT Physical Therapist          PT Ortho     Row Name 05/29/19 1100       Precautions and Contraindications    Precautions/Limitations  no known precautions/limitations  -CR       Posture/Observations    Alignment Options  Lumbar lordosis  -CR    Lumbar lordosis  Decreased  -CR       Quarter Clearing    Quarter Clearing  Lower Quarter Clearing  -CR       DTR- Lower Quarter Clearing    Patellar tendon (L2-4)  2- Normal response  -CR       Neural Tension Signs- Lower Quarter Clearing    Slump  Negative  -CR    Well Slump  Negative  -CR    SLR  Negative  -CR    Prone knee flexion  Negative  -CR       Sensory Screen for Light Touch- Lower Quarter Clearing    L1 (inguinal area)  Intact  -CR    L2 (anterior mid thigh)  Intact  -CR    L3 (distal anterior thigh)  Intact  -CR    L4 (medial lower leg/foot)  Intact  -CR    L5 (lateral lower leg/great toe)  Intact  -CR    S1 (bottom of foot)  Intact  -CR       Myotomal Screen- Lower  Quarter Clearing    Ankle PF (S1)  Left:;3 (Fair)  -CR       Lumbar ROM Screen- Lower Quarter Clearing    Lumbar Flexion  Impaired  -CR    Lumbar Extension  Impaired  -CR    Lumbar Lateral Flexion  Impaired  -CR    Lumbar Rotation  Impaired  -CR    Lumbar Quadrant   Impaired  -CR       SI/Hip Screen- Lower Quarter Clearing    Pain in Jeffry's area  Right:;Positive  -CR       Special Tests/Palpation    Special Tests/Palpation  Lumbar/SI  -CR       Lumbosacral Accessory Motions    Lumbosacral Accessory Motions Tested?  Yes  -CR    PA Glide- L1  Hypomobile  -CR    PA Glide- L2  Hypomobile  -CR    PA Glide- L3  Hypomobile  -CR    PA Glide- L4  Hypomobile  -CR    PA Glide- L5  Hypomobile  -CR       Lumbar/SI Special Tests    Stork Test (SI Dysfunction)  Right:;Positive  -CR    Slump Test (Neural Tension)  Right:;Negative  -CR    SLR (Neural Tension)  Right:;Negative  -CR    [place] Test (SI Dysfunction)  Right:;Positive  -CR       Lumbosacral Palpation    Lumbosacral Palpation?  Yes  -CR    SI  Right:;Tender  -CR    Thoracolumbar Segment  Right:;Tender;Guarded/taut  -CR    Quadratus Lumborum  Right:;Tender  -CR       General ROM    Head/Neck/Trunk  Trunk Extension;Trunk Flexion  -CR    RT Lower Ext  Rt Hip Flexion;Rt Hip External Rotation;Rt Hip Internal Rotation  -CR    LT Lower Ext  Lt Hip Flexion;Lt Hip External Rotation;Lt Hip Internal Rotation  -CR       Head/Neck/Trunk    Trunk Extension AROM  max limitation  -CR    Trunk Flexion AROM  mod limitation  -CR       Right Lower Ext    Rt Hip Flexion PROM  100 right low back pain/stretch  -CR    Rt Hip External Rotation PROM  30  -CR    Rt Hip Internal Rotation PROM  10  -CR       Left Lower Ext    Lt Hip Flexion PROM  105  -CR    Lt Hip External Rotation PROM  35  -CR    Lt Hip Internal Rotation PROM  30  -CR       Sensation    Sensation WNL?  WNL  -CR       Flexibility    Flexibility Tested?  Lower Extremity  -CR       Lower Extremity Flexibility    Hamstrings   Bilateral:;Moderately limited  -CR    Hip Flexors  Bilateral:;Moderately limited  -CR    Quadriceps  Bilateral:;Severely limited  -CR    Gastrocnemius  Bilateral:;Moderately limited  -CR       Pathomechanics    Spine Pathomechanics  Bends knees with attempted lumbar extension  -CR       Gait/Stairs Assessment/Training    [place] Level (Gait)  conditional independence  -CR    Assistive Device (Gait)  cane, straight  -CR    Deviations/Abnormal Patterns (Gait)  left sided deviations  -CR    Left Sided Gait Deviations  knee hyperextension  -CR      User Key  (r) = Recorded By, (t) = Taken By, (c) = Cosigned By    Initials Name Provider Type    Demetrius Chacon PT Physical Therapist                      Therapy Education  Education Details: Client provided written HEP including SKTC, LTR, hamstring stretching and education  Given: HEP, Symptoms/condition management, Pain management, Posture/body mechanics  Program: New  How Provided: Verbal, Demonstration, Written  Provided to: Patient  Level of Understanding: Verbalized     PT OP Goals     Row Name 05/29/19 1100          PT Short Term Goals    STG Date to Achieve  06/26/19  -CR     STG 1  client will be independent with initial HEP  -CR     STG 1 Progress  New  -CR     STG 2  client will demonstrate 25% increase in AROM  -CR     STG 2 Progress  New  -CR     STG 3  cleint will report pain at worst <4/10  -CR        Long Term Goals    LTG Date to Achieve  07/24/19  -CR     LTG 1  client will be independent with self management of chief complaint  -CR     LTG 1 Progress  New  -CR     LTG 2  client will demonstrate increased AROM by 50%   -CR     LTG 2 Progress  Met  -CR     LTG 3  client will report OSW limited <10/50  -CR     LTG 3 Progress  New  -CR        Time Calculation    PT Goal Re-Cert Due Date  08/27/19  -CR       User Key  (r) = Recorded By, (t) = Taken By, (c) = Cosigned By    Initials Name Provider Type    Demetrius Chacon PT Physical  Therapist          PT Assessment/Plan     Row Name 05/29/19 1200          PT Assessment    Functional Limitations  Performance in self-care ADL;Limitation in home management;Performance in leisure activities  -CR     Impairments  Range of motion;Posture;Poor body mechanics;Pain;Muscle strength;Joint mobility  -CR       User Key  (r) = Recorded By, (t) = Taken By, (c) = Cosigned By    Initials Name Provider Type    CR Demetrius Hunt, PT Physical Therapist                              Outcome Measure Options: Modifed [place]  Modified Oswestry  Modified Oswestry Score/Comments: 13/50      Time Calculation:     Start Time: 1110     Therapy Charges for Today     Code Description Service Date Service Provider Modifiers Qty    36720492872 HC PT EVAL LOW COMPLEXITY 3 5/29/2019 Demetrius Hunt, PT GP 1          PT G-Codes  Outcome Measure Options: Modifed [place]  Modified Oswestry Score/Comments: 13/50         Demetrius Hunt, ERUM  5/29/2019

## 2019-06-05 ENCOUNTER — HOSPITAL ENCOUNTER (OUTPATIENT)
Dept: PHYSICAL THERAPY | Facility: HOSPITAL | Age: 74
Setting detail: THERAPIES SERIES
Discharge: HOME | End: 2019-06-05

## 2019-06-05 DIAGNOSIS — M54.50 ACUTE MIDLINE LOW BACK PAIN WITHOUT SCIATICA: Primary | ICD-10-CM

## 2019-06-05 DIAGNOSIS — M48.061 SPINAL STENOSIS OF LUMBAR REGION AT MULTIPLE LEVELS: ICD-10-CM

## 2019-06-05 PROCEDURE — 97110 THERAPEUTIC EXERCISES: CPT | Performed by: PHYSICAL THERAPIST

## 2019-06-05 NOTE — THERAPY TREATMENT NOTE
Outpatient Physical Therapy Ortho Treatment Note  [facility]     Patient Name: Jet Floyd  : 1945  MRN: [id number]  Today's Date: 2019      Visit Date: 2019    Visit Dx:    ICD-10-CM ICD-9-CM   1. Acute midline low back pain without sciatica M54.5 724.2   2. Spinal stenosis of lumbar region at multiple levels M48.061 724.02       Patient Active Problem List   Diagnosis   • Sepsis (CMS/HCC)   • Left lower lobe pneumonia (CMS/HCC)   • Essential hypertension   • Hyperlipidemia   • Alcohol abuse, daily use   • Former smoker   • Dermatitis   • Balance problem   • Hypoglycemia   • Metabolic encephalopathy   • Lactic acidosis        Past Medical History:   Diagnosis Date   • Cancer (CMS/HCC)    • CHF (congestive heart failure) (CMS/HCC)    • Hypertension         Past Surgical History:   Procedure Laterality Date   • CARDIAC DEFIBRILLATOR PLACEMENT Bilateral    • CARDIAC DEFIBRILLATOR REMOVAL Bilateral    • CATARACT EXTRACTION Bilateral    • CERVICAL SPINE SURGERY Bilateral                        PT Assessment/Plan     Row Name 19 1110          PT Assessment    Assessment Comments  Treatment session emphasis on right hip flexibility, mobility, and soft tissue work to address what appears to be restrictions in hip abductor and glute musculature. Addition of aj stretch to HEP utilized to assist with discomfort managment.  Ongoing assessment will be neccessary to continue to develop POC.   -CR       User Key  (r) = Recorded By, (t) = Taken By, (c) = Cosigned By    Initials Name Provider Type    Demetrius Chacon, PT Physical Therapist          Modalities     Row Name 19 1000             Moist Heat    MH Applied  Yes  -CR      Location  right hip abductors  -CR      MH Prior to Rx  Yes  -CR        User Key  (r) = Recorded By, (t) = Taken By, (c) = Cosigned By    Initials Name Provider Type    Demetrius Chacon, PT Physical Therapist        Exercises     Row  Name 06/05/19 1000             Precautions    Existing Precautions/Restrictions  no known precautions/restrictions  -CR         Subjective Comments    Subjective Comments  Client reports feeling relatively well all week.  Notes that as of yesterday having an increase in pain with right hip and low back following walking around [employer]  -CR         Subjective Pain    Able to rate subjective pain?  yes  -CR      Pre-Treatment Pain Level  3  -CR      Post-Treatment Pain Level  3  -CR         Total Minutes    17187 - PT Therapeutic Exercise Minutes  25  -CR         Exercise 1    Exercise Name 1  Recumbent bike  -CR      Time 1  5  -CR         Exercise 2    Exercise Name 2  HS stretching  -CR      Reps 2  5  -CR      Time 2  30  -CR         Exercise 3    Exercise Name 3  quad stretching  -CR      Reps 3  5  -CR      Time 3  30  -CR         Exercise 4    Exercise Name 4  Hip IR/ER stretching  -CR      Sets 4  2  -CR      Reps 4  5  -CR         Exercise 5    Exercise Name 5  aj stretching   -CR      Reps 5  5  -CR      Time 5  30  -CR        User Key  (r) = Recorded By, (t) = Taken By, (c) = Cosigned By    Initials Name Provider Type    CR Demetrius Hunt, PT Physical Therapist                                          Time Calculation:   Start Time: 1045  Therapy Charges for Today     Code Description Service Date Service Provider Modifiers Qty    28796701192 HC PT THER PROC EA 15 MIN 6/5/2019 Demetrius Hunt, PT GP 2                    Demetrius Hunt PT  6/5/2019

## 2019-06-12 ENCOUNTER — HOSPITAL ENCOUNTER (OUTPATIENT)
Dept: PHYSICAL THERAPY | Facility: HOSPITAL | Age: 74
Setting detail: THERAPIES SERIES
Discharge: HOME | End: 2019-06-12

## 2019-06-12 DIAGNOSIS — M54.50 ACUTE MIDLINE LOW BACK PAIN WITHOUT SCIATICA: Primary | ICD-10-CM

## 2019-06-12 PROCEDURE — 97110 THERAPEUTIC EXERCISES: CPT | Performed by: PHYSICAL THERAPIST

## 2019-06-12 NOTE — THERAPY TREATMENT NOTE
Outpatient Physical Therapy Ortho Treatment Note  [facility]     Patient Name: Jet Floyd  : 1945  MRN: [id number]  Today's Date: 2019      Visit Date: 2019    Visit Dx:    ICD-10-CM ICD-9-CM   1. Acute midline low back pain without sciatica M54.5 724.2       Patient Active Problem List   Diagnosis   • Sepsis (CMS/HCC)   • Left lower lobe pneumonia (CMS/HCC)   • Essential hypertension   • Hyperlipidemia   • Alcohol abuse, daily use   • Former smoker   • Dermatitis   • Balance problem   • Hypoglycemia   • Metabolic encephalopathy   • Lactic acidosis        Past Medical History:   Diagnosis Date   • Cancer (CMS/HCC)    • CHF (congestive heart failure) (CMS/[facility])    • Hypertension         Past Surgical History:   Procedure Laterality Date   • CARDIAC DEFIBRILLATOR PLACEMENT Bilateral    • CARDIAC DEFIBRILLATOR REMOVAL Bilateral    • CATARACT EXTRACTION Bilateral    • CERVICAL SPINE SURGERY Bilateral                        PT Assessment/Plan     Row Name 19 1141          PT Assessment    Assessment Comments  Stretching and flexibility continues to be primary emphasis of intervention at this time.  Client is tolerating these well with decreased reports of pain and improved tolerance of ADLs.  He will continue to benefit from skilled services with addition of strengtheing next session and plan for transition towards HEP within next 2 visits should symptoms remain stable.   -CR        PT Plan    PT Plan Comments  Continue with POC.  Progression of flxibility.  Add strengthening as tolerated.    -CR       User Key  (r) = Recorded By, (t) = Taken By, (c) = Cosigned By    Initials Name Provider Type    Demetrius Chacon, PT Physical Therapist            Exercises     Row Name 19 1100             Subjective Comments    Subjective Comments  Client reports having decreased pain with rolling in bed. Overall feeling improvemetn.   -CR         Subjective Pain    Able to  rate subjective pain?  yes  -CR      Pre-Treatment Pain Level  0  -CR         Total Minutes    65436 - PT Therapeutic Exercise Minutes  30  -CR         Exercise 1    Exercise Name 1  Recumbent bike  -CR      Time 1  6  -CR         Exercise 2    Exercise Name 2  HS stretching  -CR      Reps 2  5  -CR      Time 2  30  -CR         Exercise 3    Exercise Name 3  quad stretching  -CR      Reps 3  5  -CR      Time 3  30  -CR         Exercise 4    Exercise Name 4  Hip IR/ER stretching  -CR      Sets 4  2  -CR      Reps 4  5  -CR         Exercise 5    Exercise Name 5  aj stretching   -CR      Reps 5  5  -CR      Time 5  30  -CR         Exercise 6    Exercise Name 6  bilateral piriformis stretch  -CR      Reps 6  5  -CR      Time 6  30 seconds  -CR        User Key  (r) = Recorded By, (t) = Taken By, (c) = Cosigned By    Initials Name Provider Type    CR Demetrius Hunt, PT Physical Therapist                                          Time Calculation:   Start Time: 1100  Therapy Charges for Today     Code Description Service Date Service Provider Modifiers Qty    24078735478 HC PT THER PROC EA 15 MIN 6/12/2019 Demetrius Hunt, PT GP 2                    Demetrius Hunt, PT  6/12/2019

## 2019-06-19 ENCOUNTER — HOSPITAL ENCOUNTER (OUTPATIENT)
Dept: PHYSICAL THERAPY | Facility: HOSPITAL | Age: 74
Setting detail: THERAPIES SERIES
Discharge: HOME | End: 2019-06-19

## 2019-06-19 DIAGNOSIS — M54.50 ACUTE MIDLINE LOW BACK PAIN WITHOUT SCIATICA: Primary | ICD-10-CM

## 2019-06-19 PROCEDURE — 97110 THERAPEUTIC EXERCISES: CPT | Performed by: PHYSICAL THERAPIST

## 2019-06-19 NOTE — THERAPY TREATMENT NOTE
Outpatient Physical Therapy Ortho Treatment Note  [facility]     Patient Name: Jet Floyd  : 1945  MRN: [id number]  Today's Date: 2019      Visit Date: 2019    Visit Dx:    ICD-10-CM ICD-9-CM   1. Acute midline low back pain without sciatica M54.5 724.2       Patient Active Problem List   Diagnosis   • Sepsis (CMS/HCC)   • Left lower lobe pneumonia (CMS/HCC)   • Essential hypertension   • Hyperlipidemia   • Alcohol abuse, daily use   • Former smoker   • Dermatitis   • Balance problem   • Hypoglycemia   • Metabolic encephalopathy   • Lactic acidosis        Past Medical History:   Diagnosis Date   • Cancer (CMS/HCC)    • CHF (congestive heart failure) (CMS/HCC)    • Hypertension         Past Surgical History:   Procedure Laterality Date   • CARDIAC DEFIBRILLATOR PLACEMENT Bilateral    • CARDIAC DEFIBRILLATOR REMOVAL Bilateral    • CATARACT EXTRACTION Bilateral    • CERVICAL SPINE SURGERY Bilateral                        PT Assessment/Plan     Row Name 19 1140          PT Assessment    Assessment Comments  Addition of strength training today was tolerated well and client educated on soreness that may be present following strength additions.  He will continue to benefit from additional strengthening to improve endurance and functional mobility gains.    -CR        PT Plan    PT Plan Comments  Cont with POC.  Continue with strenghtening   -CR       User Key  (r) = Recorded By, (t) = Taken By, (c) = Cosigned By    Initials Name Provider Type    Demetrius Chacon, PT Physical Therapist            Exercises     Row Name 19 1000             Subjective Comments    Subjective Comments  client reports improvement in symptoms with decreased discomfort with awakening and rolling in be  -CR         Subjective Pain    Able to rate subjective pain?  yes  -CR      Pre-Treatment Pain Level  0  -CR      Post-Treatment Pain Level  0  -CR         Total Minutes    29298 - PT  Therapeutic Exercise Minutes  38  -CR         Exercise 1    Exercise Name 1  Recumbent bike  -CR      Time 1  6  -CR         Exercise 2    Exercise Name 2  HS stretching  -CR      Reps 2  5  -CR      Time 2  30  -CR         Exercise 3    Exercise Name 3  quad stretching  -CR      Reps 3  5  -CR      Time 3  30  -CR         Exercise 4    Exercise Name 4  Hip IR/ER stretching  -CR      Sets 4  2  -CR      Reps 4  5  -CR         Exercise 5    Exercise Name 5  aj stretching   -CR      Reps 5  5  -CR      Time 5  30  -CR         Exercise 6    Exercise Name 6  bilateral piriformis stretch  -CR      Reps 6  5  -CR      Time 6  30 seconds  -CR         Exercise 7    Exercise Name 7  bridge  -CR      Sets 7  2  -CR      Reps 7  10  -CR         Exercise 8    Exercise Name 8  clamshells red t band   -CR      Sets 8  2  -CR      Reps 8  10  -CR         Exercise 9    Exercise Name 9  leg press  -CR      Sets 9  2  -CR      Reps 9  10  -CR         Exercise 10    Exercise Name 10  heel raises  -CR      Sets 10  2  -CR      Reps 10  10  -CR        User Key  (r) = Recorded By, (t) = Taken By, (c) = Cosigned By    Initials Name Provider Type    CR Demetrius Hunt, PT Physical Therapist                                          Time Calculation:   Start Time: 1050  Therapy Charges for Today     Code Description Service Date Service Provider Modifiers Qty    99706913622 HC PT THER PROC EA 15 MIN 6/19/2019 Demetrius Hunt, PT GP 3                    Demetrius Hunt, PT  6/19/2019

## 2019-06-26 ENCOUNTER — HOSPITAL ENCOUNTER (OUTPATIENT)
Dept: PHYSICAL THERAPY | Facility: HOSPITAL | Age: 74
Setting detail: THERAPIES SERIES
Discharge: HOME | End: 2019-06-26

## 2019-06-26 DIAGNOSIS — M54.50 ACUTE MIDLINE LOW BACK PAIN WITHOUT SCIATICA: Primary | ICD-10-CM

## 2019-06-26 PROCEDURE — 97110 THERAPEUTIC EXERCISES: CPT | Performed by: PHYSICAL THERAPIST

## 2019-06-26 NOTE — THERAPY DISCHARGE NOTE
Outpatient Physical Therapy Ortho Treatment Note/Discharge Summary   [facility]     Patient Name: Jet Floyd  : 1945  MRN: [id number]  Today's Date: 2019      Visit Date: 2019    Visit Dx:    ICD-10-CM ICD-9-CM   1. Acute midline low back pain without sciatica M54.5 724.2       Patient Active Problem List   Diagnosis   • Sepsis (CMS/HCC)   • Left lower lobe pneumonia (CMS/HCC)   • Essential hypertension   • Hyperlipidemia   • Alcohol abuse, daily use   • Former smoker   • Dermatitis   • Balance problem   • Hypoglycemia   • Metabolic encephalopathy   • Lactic acidosis        Past Medical History:   Diagnosis Date   • Cancer (CMS/HCC)    • CHF (congestive heart failure) (CMS/[facility])    • Hypertension         Past Surgical History:   Procedure Laterality Date   • CARDIAC DEFIBRILLATOR PLACEMENT Bilateral    • CARDIAC DEFIBRILLATOR REMOVAL Bilateral    • CATARACT EXTRACTION Bilateral    • CERVICAL SPINE SURGERY Bilateral        PT Ortho     Row Name 19 1100       Lumbar/SI Special Tests    Stork Test (SI Dysfunction)  Right:;Negative  -CR    Sacral Spring Test (SI Dysfunction)  Right:;Negative  -CR       Head/Neck/Trunk    Trunk Extension AROM  mod limitation  -CR    Trunk Flexion AROM  min limitation  -CR       Lower Extremity Flexibility    Hamstrings  Bilateral:;Mildly limited  -CR    Hip Flexors  Bilateral:;Moderately limited  -CR    Quadriceps  Bilateral:;Mildly limited  -CR       Gait/Stairs Assessment/Training    [place] Level (Gait)  conditional independence  -CR      User Key  (r) = Recorded By, (t) = Taken By, (c) = Cosigned By    Initials Name Provider Type    Demetrius Chacon, PT Physical Therapist                      PT Assessment/Plan     Row Name 19 1148          PT Assessment    Assessment Comments  Client has demonstrated independence with HEP and use of TE for self managment of symptoms.  OSW has decreased to  from initial .   Client will be following up with MD within 1 week.  He is appropriate for transition to [facility] for ongoing managmetn of current condition.    -CR        PT Plan    PT Plan Comments  Discharge to HEP for self management.   -CR       User Key  (r) = Recorded By, (t) = Taken By, (c) = Cosigned By    Initials Name Provider Type    Demetrius Chacon, PT Physical Therapist              Exercises     Row Name 06/26/19 1100             Subjective Comments    Subjective Comments  client reporrts overall improvement from strethcing program   -CR         Subjective Pain    Able to rate subjective pain?  yes  -CR      Pre-Treatment Pain Level  0  -CR      Post-Treatment Pain Level  0  -CR         Total Minutes    19480 - PT Therapeutic Exercise Minutes  30  -CR         Exercise 1    Exercise Name 1  Recumbent bike  -CR      Time 1  8  -CR         Exercise 2    Exercise Name 2  HS stretching  -CR      Reps 2  5  -CR      Time 2  30  -CR         Exercise 3    Exercise Name 3  quad stretching  -CR      Reps 3  5  -CR      Time 3  30  -CR         Exercise 4    Exercise Name 4  Hip IR/ER stretching  -CR      Sets 4  2  -CR      Reps 4  5  -CR         Exercise 5    Exercise Name 5  aj stretching   -CR      Reps 5  5  -CR      Time 5  30  -CR         Exercise 6    Exercise Name 6  bilateral piriformis stretch  -CR      Reps 6  5  -CR      Time 6  30 seconds  -CR         Exercise 7    Exercise Name 7  bridge  -CR      Sets 7  2  -CR      Reps 7  10  -CR         Exercise 8    Exercise Name 8  clamshells red t band   -CR      Sets 8  2  -CR      Reps 8  10  -CR        User Key  (r) = Recorded By, (t) = Taken By, (c) = Cosigned By    Initials Name Provider Type    Demetrius Chacon PT Physical Therapist                         PT OP Goals     Row Name 06/26/19 1100          PT Short Term Goals    STG Date to Achieve  06/26/19  -CR     STG 1  client will be independent with initial HEP  -CR     STG 1 Progress  Met  -CR     STG  2  client will demonstrate 25% increase in AROM  -CR     STG 2 Progress  Met  -CR     STG 3  cleint will report pain at worst <4/10  -CR     STG 3 Progress  Met  -CR        Long Term Goals    LTG Date to Achieve  07/24/19  -CR     LTG 1  client will be independent with self management of chief complaint  -CR     LTG 1 Progress  Met  -CR     LTG 2  client will demonstrate increased AROM by 50%   -CR     LTG 2 Progress  Met  -CR     LTG 3  client will report OSW limited <10/50  -CR     LTG 3 Progress  Met  -CR       User Key  (r) = Recorded By, (t) = Taken By, (c) = Cosigned By    Initials Name Provider Type    CR Demetrius Hunt, PT Physical Therapist               Outcome Measure Options: Modifed Owestry  Modified Oswestry  Modified Oswestry Score/Comments: 7/50      Time Calculation:   Start Time: 1050  Therapy Charges for Today     Code Description Service Date Service Provider Modifiers Qty    96292585501 HC PT THER PROC EA 15 MIN 6/26/2019 Demetrius Hnut, PT GP 2          PT G-Codes  Outcome Measure Options: Modifed Owestry  Modified Oswestry Score/Comments: 7/50     OP PT Discharge Summary  Date of Discharge: 06/26/19  Reason for Discharge: Maximum functional potential achieved, Independent  Outcomes Achieved: Able to achieve all goals within established timeline  Discharge Destination: Home with home program  Discharge Instructions/Additional Comments: Discharge to [facility] at this time.       Demetrius Hunt, PT  6/26/2019

## 2019-11-29 ENCOUNTER — HOSPITAL ENCOUNTER (EMERGENCY)
Facility: HOSPITAL | Age: 74
Discharge: HOME | End: 2019-11-30
Attending: EMERGENCY MEDICINE | Admitting: EMERGENCY MEDICINE

## 2019-11-29 ENCOUNTER — APPOINTMENT (OUTPATIENT)
Dept: GENERAL RADIOLOGY | Facility: HOSPITAL | Age: 74
End: 2019-11-29

## 2019-11-29 DIAGNOSIS — S50.02XA CONTUSION OF LEFT ELBOW, INITIAL ENCOUNTER: ICD-10-CM

## 2019-11-29 DIAGNOSIS — S51.012A SKIN TEAR OF LEFT ELBOW WITHOUT COMPLICATION, INITIAL ENCOUNTER: ICD-10-CM

## 2019-11-29 DIAGNOSIS — S70.02XA CONTUSION OF LEFT HIP, INITIAL ENCOUNTER: Primary | ICD-10-CM

## 2019-11-29 PROCEDURE — 99283 EMERGENCY DEPT VISIT LOW MDM: CPT

## 2019-11-29 PROCEDURE — 73502 X-RAY EXAM HIP UNI 2-3 VIEWS: CPT

## 2019-11-29 PROCEDURE — 73070 X-RAY EXAM OF ELBOW: CPT

## 2019-11-30 VITALS
HEIGHT: 69 IN | WEIGHT: 150 LBS | OXYGEN SATURATION: 94 % | DIASTOLIC BLOOD PRESSURE: 62 MMHG | BODY MASS INDEX: 22.22 KG/M2 | SYSTOLIC BLOOD PRESSURE: 108 MMHG | TEMPERATURE: 98.5 F | HEART RATE: 95 BPM | RESPIRATION RATE: 16 BRPM

## 2020-02-14 ENCOUNTER — HOSPITAL ENCOUNTER (INPATIENT)
Facility: HOSPITAL | Age: 75
LOS: 6 days | Discharge: HOME | End: 2020-02-20
Attending: EMERGENCY MEDICINE | Admitting: FAMILY MEDICINE

## 2020-02-14 ENCOUNTER — APPOINTMENT (OUTPATIENT)
Dept: GENERAL RADIOLOGY | Facility: HOSPITAL | Age: 75
End: 2020-02-14

## 2020-02-14 DIAGNOSIS — N17.9 AKI (ACUTE KIDNEY INJURY) (HCC): ICD-10-CM

## 2020-02-14 DIAGNOSIS — I50.9 ACUTE ON CHRONIC CONGESTIVE HEART FAILURE, UNSPECIFIED HEART FAILURE TYPE (HCC): Primary | ICD-10-CM

## 2020-02-14 DIAGNOSIS — N28.9 RENAL INSUFFICIENCY: ICD-10-CM

## 2020-02-14 DIAGNOSIS — Z78.9 IMPAIRED MOBILITY AND ADLS: ICD-10-CM

## 2020-02-14 DIAGNOSIS — Z74.09 IMPAIRED MOBILITY AND ADLS: ICD-10-CM

## 2020-02-14 DIAGNOSIS — E87.1 HYPONATREMIA: ICD-10-CM

## 2020-02-14 DIAGNOSIS — J90 PLEURAL EFFUSION: ICD-10-CM

## 2020-02-14 PROBLEM — R73.9 HYPERGLYCEMIA: Status: ACTIVE | Noted: 2020-02-14

## 2020-02-14 PROBLEM — F10.21 HISTORY OF ALCOHOLISM (HCC): Status: ACTIVE | Noted: 2020-02-14

## 2020-02-14 PROBLEM — R74.8 ELEVATED LIVER ENZYMES: Status: ACTIVE | Noted: 2020-02-14

## 2020-02-14 LAB
ALBUMIN SERPL-MCNC: 4.2 G/DL (ref 3.5–5.2)
ALBUMIN/GLOB SERPL: 1.3 G/DL
ALP SERPL-CCNC: 240 U/L (ref 39–117)
ALT SERPL W P-5'-P-CCNC: 132 U/L (ref 1–41)
ANION GAP SERPL CALCULATED.3IONS-SCNC: 15 MMOL/L (ref 5–15)
AST SERPL-CCNC: 150 U/L (ref 1–40)
BASOPHILS # BLD AUTO: 0.07 10*3/MM3 (ref 0–0.2)
BASOPHILS NFR BLD AUTO: 1.2 % (ref 0–1.5)
BILIRUB SERPL-MCNC: 2.2 MG/DL (ref 0.2–1.2)
BUN BLD-MCNC: 35 MG/DL (ref 8–23)
BUN/CREAT SERPL: 14.4 (ref 7–25)
CALCIUM SPEC-SCNC: 8.8 MG/DL (ref 8.6–10.5)
CHLORIDE SERPL-SCNC: 91 MMOL/L (ref 98–107)
CO2 SERPL-SCNC: 22 MMOL/L (ref 22–29)
CREAT BLD-MCNC: 2.43 MG/DL (ref 0.76–1.27)
DEPRECATED RDW RBC AUTO: 59.7 FL (ref 37–54)
EOSINOPHIL # BLD AUTO: 0.02 10*3/MM3 (ref 0–0.4)
EOSINOPHIL NFR BLD AUTO: 0.3 % (ref 0.3–6.2)
ERYTHROCYTE [DISTWIDTH] IN BLOOD BY AUTOMATED COUNT: 18.1 % (ref 12.3–15.4)
GFR SERPL CREATININE-BSD FRML MDRD: 26 ML/MIN/1.73
GLOBULIN UR ELPH-MCNC: 3.2 GM/DL
GLUCOSE BLD-MCNC: 149 MG/DL (ref 65–99)
HCT VFR BLD AUTO: 38 % (ref 37.5–51)
HGB BLD-MCNC: 12.5 G/DL (ref 13–17.7)
HOLD SPECIMEN: NORMAL
HOLD SPECIMEN: NORMAL
IMM GRANULOCYTES # BLD AUTO: 0.03 10*3/MM3 (ref 0–0.05)
IMM GRANULOCYTES NFR BLD AUTO: 0.5 % (ref 0–0.5)
INR PPP: 1.51 (ref 0.85–1.16)
LYMPHOCYTES # BLD AUTO: 0.83 10*3/MM3 (ref 0.7–3.1)
LYMPHOCYTES NFR BLD AUTO: 13.8 % (ref 19.6–45.3)
MCH RBC QN AUTO: 30.1 PG (ref 26.6–33)
MCHC RBC AUTO-ENTMCNC: 32.9 G/DL (ref 31.5–35.7)
MCV RBC AUTO: 91.6 FL (ref 79–97)
MONOCYTES # BLD AUTO: 0.9 10*3/MM3 (ref 0.1–0.9)
MONOCYTES NFR BLD AUTO: 15 % (ref 5–12)
NEUTROPHILS # BLD AUTO: 4.17 10*3/MM3 (ref 1.7–7)
NEUTROPHILS NFR BLD AUTO: 69.2 % (ref 42.7–76)
NRBC BLD AUTO-RTO: 0.3 /100 WBC (ref 0–0.2)
NT-PROBNP SERPL-MCNC: ABNORMAL PG/ML (ref 5–900)
PLATELET # BLD AUTO: 169 10*3/MM3 (ref 140–450)
PMV BLD AUTO: 12.2 FL (ref 6–12)
POTASSIUM BLD-SCNC: 4.7 MMOL/L (ref 3.5–5.2)
PROT SERPL-MCNC: 7.4 G/DL (ref 6–8.5)
PROTHROMBIN TIME: 17.5 SECONDS (ref 11.2–14.3)
RBC # BLD AUTO: 4.15 10*6/MM3 (ref 4.14–5.8)
SODIUM BLD-SCNC: 128 MMOL/L (ref 136–145)
TROPONIN T SERPL-MCNC: <0.01 NG/ML (ref 0–0.03)
WBC NRBC COR # BLD: 6.02 10*3/MM3 (ref 3.4–10.8)
WHOLE BLOOD HOLD SPECIMEN: NORMAL
WHOLE BLOOD HOLD SPECIMEN: NORMAL

## 2020-02-14 PROCEDURE — 80053 COMPREHEN METABOLIC PANEL: CPT

## 2020-02-14 PROCEDURE — 99222 1ST HOSP IP/OBS MODERATE 55: CPT | Performed by: INTERNAL MEDICINE

## 2020-02-14 PROCEDURE — 93005 ELECTROCARDIOGRAM TRACING: CPT

## 2020-02-14 PROCEDURE — 83930 ASSAY OF BLOOD OSMOLALITY: CPT | Performed by: INTERNAL MEDICINE

## 2020-02-14 PROCEDURE — 80307 DRUG TEST PRSMV CHEM ANLYZR: CPT | Performed by: PHYSICIAN ASSISTANT

## 2020-02-14 PROCEDURE — 85610 PROTHROMBIN TIME: CPT | Performed by: PHYSICIAN ASSISTANT

## 2020-02-14 PROCEDURE — 51798 US URINE CAPACITY MEASURE: CPT

## 2020-02-14 PROCEDURE — 93005 ELECTROCARDIOGRAM TRACING: CPT | Performed by: EMERGENCY MEDICINE

## 2020-02-14 PROCEDURE — 99284 EMERGENCY DEPT VISIT MOD MDM: CPT

## 2020-02-14 PROCEDURE — 84484 ASSAY OF TROPONIN QUANT: CPT

## 2020-02-14 PROCEDURE — 83880 ASSAY OF NATRIURETIC PEPTIDE: CPT | Performed by: EMERGENCY MEDICINE

## 2020-02-14 PROCEDURE — 71045 X-RAY EXAM CHEST 1 VIEW: CPT

## 2020-02-14 PROCEDURE — 85025 COMPLETE CBC W/AUTO DIFF WBC: CPT

## 2020-02-14 RX ORDER — SODIUM CHLORIDE 0.9 % (FLUSH) 0.9 %
10 SYRINGE (ML) INJECTION AS NEEDED
Status: DISCONTINUED | OUTPATIENT
Start: 2020-02-14 | End: 2020-02-20 | Stop reason: HOSPADM

## 2020-02-14 RX ORDER — BUMETANIDE 0.25 MG/ML
2 INJECTION INTRAMUSCULAR; INTRAVENOUS ONCE
Status: COMPLETED | OUTPATIENT
Start: 2020-02-14 | End: 2020-02-14

## 2020-02-14 RX ORDER — ALLOPURINOL 100 MG/1
100 TABLET ORAL DAILY
COMMUNITY

## 2020-02-14 RX ADMIN — BUMETANIDE 2 MG: 0.25 INJECTION INTRAMUSCULAR; INTRAVENOUS at 22:53

## 2020-02-15 ENCOUNTER — APPOINTMENT (OUTPATIENT)
Dept: CARDIOLOGY | Facility: HOSPITAL | Age: 75
End: 2020-02-15

## 2020-02-15 LAB
ANION GAP SERPL CALCULATED.3IONS-SCNC: 18 MMOL/L (ref 5–15)
BUN BLD-MCNC: 38 MG/DL (ref 8–23)
BUN/CREAT SERPL: 17.8 (ref 7–25)
CALCIUM SPEC-SCNC: 8.4 MG/DL (ref 8.6–10.5)
CHLORIDE SERPL-SCNC: 90 MMOL/L (ref 98–107)
CO2 SERPL-SCNC: 20 MMOL/L (ref 22–29)
CREAT BLD-MCNC: 2.13 MG/DL (ref 0.76–1.27)
DEPRECATED RDW RBC AUTO: 59 FL (ref 37–54)
ERYTHROCYTE [DISTWIDTH] IN BLOOD BY AUTOMATED COUNT: 17.8 % (ref 12.3–15.4)
ETHANOL BLD-MCNC: <10 MG/DL (ref 0–10)
GFR SERPL CREATININE-BSD FRML MDRD: 31 ML/MIN/1.73
GLUCOSE BLD-MCNC: 95 MG/DL (ref 65–99)
HAV IGM SERPL QL IA: NORMAL
HBA1C MFR BLD: 5.5 % (ref 4.8–5.6)
HBV CORE IGM SERPL QL IA: NORMAL
HBV SURFACE AG SERPL QL IA: NORMAL
HCT VFR BLD AUTO: 34.5 % (ref 37.5–51)
HCV AB SER DONR QL: NORMAL
HGB BLD-MCNC: 11.7 G/DL (ref 13–17.7)
HOLD SPECIMEN: NORMAL
MCH RBC QN AUTO: 31 PG (ref 26.6–33)
MCHC RBC AUTO-ENTMCNC: 33.9 G/DL (ref 31.5–35.7)
MCV RBC AUTO: 91.3 FL (ref 79–97)
OSMOLALITY SERPL: 278 MOSM/KG (ref 275–295)
OSMOLALITY UR: 312 MOSM/KG (ref 300–1100)
PLATELET # BLD AUTO: 161 10*3/MM3 (ref 140–450)
PMV BLD AUTO: 12.5 FL (ref 6–12)
POTASSIUM BLD-SCNC: 3.9 MMOL/L (ref 3.5–5.2)
RBC # BLD AUTO: 3.78 10*6/MM3 (ref 4.14–5.8)
SODIUM BLD-SCNC: 128 MMOL/L (ref 136–145)
SODIUM UR-SCNC: 61 MMOL/L
TROPONIN T SERPL-MCNC: <0.01 NG/ML (ref 0–0.03)
TROPONIN T SERPL-MCNC: <0.01 NG/ML (ref 0–0.03)
TSH SERPL DL<=0.05 MIU/L-ACNC: 4.18 UIU/ML (ref 0.27–4.2)
WBC NRBC COR # BLD: 5.33 10*3/MM3 (ref 3.4–10.8)

## 2020-02-15 PROCEDURE — 80048 BASIC METABOLIC PNL TOTAL CA: CPT | Performed by: PHYSICIAN ASSISTANT

## 2020-02-15 PROCEDURE — 25010000002 SULFUR HEXAFLUORIDE MICROSPH 60.7-25 MG RECONSTITUTED SUSPENSION: Performed by: INTERNAL MEDICINE

## 2020-02-15 PROCEDURE — 85027 COMPLETE CBC AUTOMATED: CPT | Performed by: PHYSICIAN ASSISTANT

## 2020-02-15 PROCEDURE — 94799 UNLISTED PULMONARY SVC/PX: CPT

## 2020-02-15 PROCEDURE — 25010000002 HEPARIN (PORCINE) PER 1000 UNITS: Performed by: INTERNAL MEDICINE

## 2020-02-15 PROCEDURE — 83036 HEMOGLOBIN GLYCOSYLATED A1C: CPT | Performed by: PHYSICIAN ASSISTANT

## 2020-02-15 PROCEDURE — 99233 SBSQ HOSP IP/OBS HIGH 50: CPT | Performed by: INTERNAL MEDICINE

## 2020-02-15 PROCEDURE — 93306 TTE W/DOPPLER COMPLETE: CPT

## 2020-02-15 PROCEDURE — 80074 ACUTE HEPATITIS PANEL: CPT | Performed by: PHYSICIAN ASSISTANT

## 2020-02-15 PROCEDURE — 84300 ASSAY OF URINE SODIUM: CPT | Performed by: INTERNAL MEDICINE

## 2020-02-15 PROCEDURE — 84484 ASSAY OF TROPONIN QUANT: CPT | Performed by: PHYSICIAN ASSISTANT

## 2020-02-15 PROCEDURE — 83935 ASSAY OF URINE OSMOLALITY: CPT | Performed by: INTERNAL MEDICINE

## 2020-02-15 PROCEDURE — 84443 ASSAY THYROID STIM HORMONE: CPT | Performed by: PHYSICIAN ASSISTANT

## 2020-02-15 PROCEDURE — 25010000002 HEPARIN (PORCINE) PER 1000 UNITS: Performed by: PHYSICIAN ASSISTANT

## 2020-02-15 RX ORDER — ASPIRIN 81 MG/1
81 TABLET, CHEWABLE ORAL DAILY
Status: DISCONTINUED | OUTPATIENT
Start: 2020-02-15 | End: 2020-02-20 | Stop reason: HOSPADM

## 2020-02-15 RX ORDER — ALLOPURINOL 100 MG/1
100 TABLET ORAL DAILY
Status: DISCONTINUED | OUTPATIENT
Start: 2020-02-15 | End: 2020-02-20 | Stop reason: HOSPADM

## 2020-02-15 RX ORDER — BUMETANIDE 0.25 MG/ML
2 INJECTION INTRAMUSCULAR; INTRAVENOUS
Status: DISCONTINUED | OUTPATIENT
Start: 2020-02-15 | End: 2020-02-17

## 2020-02-15 RX ORDER — FUROSEMIDE 40 MG/1
80 TABLET ORAL DAILY
Status: DISCONTINUED | OUTPATIENT
Start: 2020-02-15 | End: 2020-02-15

## 2020-02-15 RX ORDER — SODIUM CHLORIDE 0.9 % (FLUSH) 0.9 %
10 SYRINGE (ML) INJECTION EVERY 12 HOURS SCHEDULED
Status: DISCONTINUED | OUTPATIENT
Start: 2020-02-15 | End: 2020-02-20 | Stop reason: HOSPADM

## 2020-02-15 RX ORDER — SODIUM CHLORIDE 0.9 % (FLUSH) 0.9 %
10 SYRINGE (ML) INJECTION AS NEEDED
Status: DISCONTINUED | OUTPATIENT
Start: 2020-02-15 | End: 2020-02-20 | Stop reason: HOSPADM

## 2020-02-15 RX ORDER — CARVEDILOL 3.12 MG/1
3.12 TABLET ORAL 2 TIMES DAILY WITH MEALS
Status: DISCONTINUED | OUTPATIENT
Start: 2020-02-15 | End: 2020-02-16

## 2020-02-15 RX ORDER — HEPARIN SODIUM 5000 [USP'U]/ML
5000 INJECTION, SOLUTION INTRAVENOUS; SUBCUTANEOUS EVERY 12 HOURS SCHEDULED
Status: DISCONTINUED | OUTPATIENT
Start: 2020-02-15 | End: 2020-02-20 | Stop reason: HOSPADM

## 2020-02-15 RX ORDER — SPIRONOLACTONE 25 MG/1
25 TABLET ORAL DAILY
Status: DISCONTINUED | OUTPATIENT
Start: 2020-02-15 | End: 2020-02-18

## 2020-02-15 RX ORDER — SPIRONOLACTONE 25 MG/1
25 TABLET ORAL DAILY
Status: DISCONTINUED | OUTPATIENT
Start: 2020-02-15 | End: 2020-02-15

## 2020-02-15 RX ORDER — ATORVASTATIN CALCIUM 20 MG/1
20 TABLET, FILM COATED ORAL DAILY
Status: DISCONTINUED | OUTPATIENT
Start: 2020-02-15 | End: 2020-02-20 | Stop reason: HOSPADM

## 2020-02-15 RX ORDER — METOLAZONE 2.5 MG/1
2.5 TABLET ORAL ONCE
Status: COMPLETED | OUTPATIENT
Start: 2020-02-15 | End: 2020-02-15

## 2020-02-15 RX ADMIN — ASPIRIN 81 MG 81 MG: 81 TABLET ORAL at 08:04

## 2020-02-15 RX ADMIN — CARVEDILOL 3.12 MG: 3.12 TABLET, FILM COATED ORAL at 08:04

## 2020-02-15 RX ADMIN — HEPARIN SODIUM 5000 UNITS: 5000 INJECTION, SOLUTION INTRAVENOUS; SUBCUTANEOUS at 06:35

## 2020-02-15 RX ADMIN — SULFUR HEXAFLUORIDE 2 ML: KIT at 09:04

## 2020-02-15 RX ADMIN — ALLOPURINOL 100 MG: 100 TABLET ORAL at 08:05

## 2020-02-15 RX ADMIN — SODIUM CHLORIDE, PRESERVATIVE FREE 10 ML: 5 INJECTION INTRAVENOUS at 03:19

## 2020-02-15 RX ADMIN — HEPARIN SODIUM 5000 UNITS: 5000 INJECTION, SOLUTION INTRAVENOUS; SUBCUTANEOUS at 20:14

## 2020-02-15 RX ADMIN — BUMETANIDE 2 MG: 0.25 INJECTION INTRAMUSCULAR; INTRAVENOUS at 08:05

## 2020-02-15 RX ADMIN — CARVEDILOL 3.12 MG: 3.12 TABLET, FILM COATED ORAL at 19:40

## 2020-02-15 RX ADMIN — SODIUM CHLORIDE, PRESERVATIVE FREE 10 ML: 5 INJECTION INTRAVENOUS at 08:05

## 2020-02-15 RX ADMIN — BUMETANIDE 2 MG: 0.25 INJECTION INTRAMUSCULAR; INTRAVENOUS at 18:57

## 2020-02-15 RX ADMIN — SODIUM CHLORIDE, PRESERVATIVE FREE 10 ML: 5 INJECTION INTRAVENOUS at 20:15

## 2020-02-15 RX ADMIN — METOLAZONE 2.5 MG: 2.5 TABLET ORAL at 03:19

## 2020-02-15 RX ADMIN — SPIRONOLACTONE 25 MG: 25 TABLET ORAL at 13:04

## 2020-02-15 RX ADMIN — ATORVASTATIN CALCIUM 20 MG: 20 TABLET, FILM COATED ORAL at 08:05

## 2020-02-15 NOTE — PLAN OF CARE
Problem: Patient Care Overview  Goal: Plan of Care Review  Outcome: Ongoing (interventions implemented as appropriate)  Flowsheets (Taken 2/15/2020 0142)  Plan of Care Reviewed With: patient  Outcome Summary: Patient arrived from the ER for CHF, alert and oriented, stable vital signs, EKG- NSR, UAL standby with the cane at bedside, swelling bilateral lower extremities. Plan: Echo in AM. Will continue to monitor.

## 2020-02-15 NOTE — PROGRESS NOTES
[facility] Medicine Services  Short Stay Unit  PROGRESS NOTE    Patient Name: Jet Floyd  : 1945  MRN: [id number]    Admission Date and Time: 2020  8:31 PM  Primary Care Physician: Obi Soriano MD    Subjective   Subjective     CC:  F/U CHF exacerbation    HPI:  Patient resting in bed. He reports his swelling and shortness of air have significantly improved overnight. He denies chest pain.     Review of Systems  Gen- No fevers, chills  CV- No chest pain, palpitations  Resp- No cough, dyspnea  GI- No N/V/D, abd pain    Objective   Objective     Vital Signs:   Temp:  [97.1 °F (36.2 °C)-98.2 °F (36.8 °C)] 97.1 °F (36.2 °C)  Heart Rate:  [] 86  Resp:  [16-18] 16  BP: (117-135)/(75-94) 133/89        Physical Exam:  Constitutional: No acute distress, awake, alert  HENT: NCAT, mucous membranes moist  Respiratory: Clear to auscultation bilaterally, respiratory effort normal   Cardiovascular: RRR, no murmurs, rubs, or gallops, palpable pedal pulses bilaterally  Gastrointestinal: Positive bowel sounds, soft, nontender, nondistended  Musculoskeletal: 1+ mild edema in bilateral ankles  Psychiatric: Appropriate affect, cooperative  Neurologic: Oriented x 3, strength symmetric in all extremities, Cranial Nerves grossly intact to confrontation, speech clear  Skin: No rashes      Results Reviewed:  Results from last 7 days   Lab Units 02/15/20  0721   WBC 10*3/mm3 5.33   HEMOGLOBIN g/dL 11.7*   HEMATOCRIT % 34.5*   PLATELETS 10*3/mm3 161     Results from last 7 days   Lab Units 02/15/20  0721   SODIUM mmol/L 128*   POTASSIUM mmol/L 3.9   CHLORIDE mmol/L 90*   CO2 mmol/L 20.0*   BUN mg/dL 38*   CREATININE mg/dL 2.13*   GLUCOSE mg/dL 95   CALCIUM mg/dL 8.4*         Assessment/Plan   Assessment / Plan     Active Hospital Problems    Diagnosis  POA   • **Acute on chronic congestive heart failure (CMS/HCC) [I50.9]  Yes   • History of alcoholism (CMS/HCC) [F10.21]  Not  Applicable   • GABRIELLE (acute kidney injury) (CMS/HCC) [N17.9]  Yes   • Elevated liver enzymes [R74.8]  Yes   • Hyperglycemia [R73.9]  Yes   • Hyponatremia [E87.1]  Yes   • Pleural effusion [J90]  Yes   • Essential hypertension [I10]  Yes   • Hyperlipidemia [E78.5]  Yes      Resolved Hospital Problems   No resolved problems to display.        Brief course to date:  Jet Floyd is a 74 y.o. male with past medical history of hypertension, CHF, alcoholism, hyperlipidemia who presents to the ER with progressively worsening shortness of breath over the last 3 days, found to have acute on chronic congestive heart failure exacerbation.    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 Richard (last Echo 9/2015, EF 30%)  - continue Bumex 2mg IV BID, strict I&O's, daily weights, Net output -1.2L  - daily fluid restriction  - continue coreg and aldactone, holding lisinopril due to worsening renal fxn     GABRIELLE on suspected CKD  - ? Cardiorenal. Baseline creatinine ~1.1. Has previously followed with Nephrology  - improving slowly with diuresis, monitor  - avoid nephrotoxins, holding lisinopril     Hyperglycemia:  - no history of DM. A1c 5.5     Hyponatremia:  - suspect hypervolemic hyponatremia secondary to volume overload, likely also compounded by ETOH use  - TSH WNL  - urine studies reviewed, mixed picture     Elevated liver enzymes:  - ? Suspect Congestive hepatopathy, also possibly related to ETOH use  - Hep panel negative  - avoid hepatotoxins     History of alcoholism:  - continue with plan above  - CIWA assessment. Initiate protocol as needed     DVT prophylaxis: DAVID    Discharge Blueprint:   1. Improvement in volume status  2. Improvement in renal function and electrolytes    Electronically signed by Radha ABBASI  DO Martin, 02/15/20, 9:59 AM.

## 2020-02-15 NOTE — H&P
"    [facility]  HISTORY AND PHYSICAL    Patient Name: Jet Folyd  : 1945  MRN: [id number]  Primary Care Physician: Obi Soriano MD  Date of admission: 2020      Subjective   Subjective     Chief Complaint:  SOB    HPI:  Jet Floyd is a 74 y.o. male with a past medical history significant for hypertension, HLD, systolic CHF last known EF of 30% in , and alcoholism. He presents today with complaints of progressively worsening dyspnea going on for the pat 3 days. Dyspnea worse with exertion and lying flat. There is associated lower extremity edema and unknown amt of weight gain. Patient also endorses decreased urinary output with dysuria. He is reporting compliance with lasix  and spironolactone. There are no complaints of fever, cough, congestion, chest pain, syncope, abdominal pain, or N/V/D. No history of DVT/PE. Patient currently reporting cessation of alcoholism. States he hasn't had a drink in \"months\". No other complaints at this time. Will admit for further evaluation and treatment.    Emergency Department Evaluation: vitals stable. troponin negative. BNP 24992. Glucose 150. Sodium 128. Creatinine 2.43. BUN 35. . . CXR demonstrating cardiomegaly with pulmonary edema and right sided pleural effusion    Review of Systems   Constitutional: Positive for unexpected weight change. Negative for chills and fever.   HENT: Negative for congestion and trouble swallowing.    Eyes: Negative for photophobia and visual disturbance.   Respiratory: Positive for shortness of breath. Negative for cough.    Cardiovascular: Positive for leg swelling. Negative for chest pain.   Gastrointestinal: Negative for abdominal pain, diarrhea, nausea and vomiting.   Endocrine: Negative for cold intolerance and heat intolerance.   Genitourinary: Positive for decreased urine volume and dysuria.   Musculoskeletal: Negative for arthralgias and back " pain.   Skin: Negative for pallor and rash.   Allergic/Immunologic: Positive for immunocompromised state.   Neurological: Negative for dizziness, weakness and headaches.   Hematological: Negative for adenopathy.   Psychiatric/Behavioral: Negative for agitation and confusion.        All other systems reviewed and are negative.     Personal History     Past Medical History:   Diagnosis Date   • Cancer (CMS/HCC)    • CHF (congestive heart failure) (CMS/HCC)    • Hypertension        Past Surgical History:   Procedure Laterality Date   • CARDIAC DEFIBRILLATOR PLACEMENT Bilateral 1998   • CARDIAC DEFIBRILLATOR REMOVAL Bilateral 2003   • CATARACT EXTRACTION Bilateral    • CERVICAL SPINE SURGERY Bilateral 2014       Family History: family history includes Cancer in his father; Dementia in his mother; No Known Problems in his brother, daughter, and sister. Otherwise pertinent FHx was reviewed and unremarkable.     Social History:  reports that he has quit smoking. His smoking use included cigarettes. He has a 72.00 pack-year smoking history. He has never used smokeless tobacco. He reports that he drinks about 35.0 standard drinks of alcohol per week. He reports that he does not use drugs.  Social History     Social History Narrative   • Not on file       Medications:  Available home medication information reviewed.    (Not in a hospital admission)    Allergies   Allergen Reactions   • Lactose Intolerance (Gi) Diarrhea   • Polysporin [Bacitracin-Polymyxin B] Itching and Rash       Objective   Objective     Vital Signs:   Temp:  [98.2 °F (36.8 °C)] 98.2 °F (36.8 °C)  Heart Rate:  [] 95  Resp:  [16] 16  BP: (117-129)/(75-82) 129/81        Physical Exam   Constitutional: Awake, alert  Eyes: PERRLA, sclerae anicteric, no conjunctival injection  HENT: NCAT, mucous membranes moist  Neck: Supple, no thyromegaly, no lymphadenopathy, trachea midline  Respiratory: Clear to auscultation bilaterally, nonlabored respirations    Cardiovascular: RRR, no murmurs, rubs, or gallops, palpable pedal pulses bilaterally  Gastrointestinal: Positive bowel sounds, soft, nontender, nondistended  Musculoskeletal: BLE edema, trace no clubbing or cyanosis to extremities  Psychiatric: Appropriate affect, cooperative  Neurologic: Oriented x 3, strength symmetric in all extremities, Cranial Nerves grossly intact to confrontation, speech clear  Skin: No rashes    Results Reviewed:  I have personally reviewed current lab and radiology data.    Results from last 7 days   Lab Units 02/14/20 2013   WBC 10*3/mm3 6.02   HEMOGLOBIN g/dL 12.5*   HEMATOCRIT % 38.0   PLATELETS 10*3/mm3 169     Results from last 7 days   Lab Units 02/14/20 2013   SODIUM mmol/L 128*   POTASSIUM mmol/L 4.7   CHLORIDE mmol/L 91*   CO2 mmol/L 22.0   BUN mg/dL 35*   CREATININE mg/dL 2.43*   GLUCOSE mg/dL 149*   CALCIUM mg/dL 8.8   ALT (SGPT) U/L 132*   AST (SGOT) U/L 150*   TROPONIN T ng/mL <0.010   PROBNP pg/mL 47,622.0*     Estimated Creatinine Clearance: 25.7 mL/min (A) (by C-G formula based on SCr of 2.43 mg/dL (H)).  Brief Urine Lab Results  (Last result in the past 365 days)      Color   Clarity   Blood   Leuk Est   Nitrite   Protein   CREAT   Urine HCG        05/25/19 1128 Yellow Clear Negative Negative Negative Negative             Imaging Results (Last 24 Hours)     Procedure Component Value Units Date/Time    XR Chest 1 View [[id number]] Collected:  02/14/20 2045     Updated:  02/14/20 2048    Narrative:       CR Chest 1 Vw 2/14/2020    INDICATION:   Increased shortness of breath for the past 2 days. Fluid buildup.     COMPARISON:    None available.    FINDINGS:  Single portable AP view(s) of the chest.  There is moderate cardiomegaly. Mild pulmonary edema. Small right pleural effusion. No pneumothorax.      Impression:       Cardiomegaly. Mild pulmonary edema and small right pleural effusion.    Signer Name: Alex Paredes MD   Signed: 2/14/2020 8:45 PM   Workstation Name:  [id number]    [facility]             Assessment/Plan   Assessment & Plan     Active Hospital Problems    Diagnosis POA   • **Acute on chronic congestive heart failure (CMS/HCC) [I50.9] Yes     Priority: High   • GABRIELLE (acute kidney injury) (CMS/HCC) [N17.9] Yes     Priority: High   • Hyperglycemia [R73.9] Yes     Priority: High   • Hyponatremia [E87.1] Yes     Priority: High   • Pleural effusion [J90] Yes     Priority: High   • Elevated liver enzymes [R74.8] Yes     Priority: Medium   • Essential hypertension [I10] Yes     Priority: Medium   • History of alcoholism (CMS/[facility]) [F10.21] Not Applicable     Priority: Low   • Hyperlipidemia [E78.5] Yes     Priority: Low       1. 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 On room air. No TTE on file.  - administered bumex 2 mg in ED. Continue bumex 2mg Q12h due to better bioavailability, consider addition of metolazone if output is not adequate.  hold spironolactone until renal function improves to avoid hyperkalemia  - obtain TTE. Check TSH. continue to trend cardiac enzymes  - daily weights  - strict I&O  - daily fluid restriction  - am labs    2. GABRIELLE:  - ? Cardiorenal. Baseline creatinine ~1.1.  He does report he started following with nephrology about 6 months ago.  He had overall improvement in his renal function over the next several months and has a follow-up appt in April.  He was last told his CrCl was 35-45  - suspect some baseline insuffiencey  - monitor chemistry. Anticipation improvement with diuresis  - avoid nephrotoxins  -if no improvement consider nephro consult    3. Hyperglycemia:  - no history of DM. Check A1c    4. Hyponatremia:  - suspect hypervolemic hyponatremia.   - baseline sodium ~ 139  - appears that patient was once taking levothyroxine. Check TSH  - check urine sodium and serum, urine osmilality   - trend chemistry q 4 hours  X6    5. Elevated liver enzymes:  - ? Congestive hepatopathy  - also concern for alcoholism  - check acute hepatitis panel, ethanol level, PT/INR  - avoid hepatotoxins    7. History of alcoholism:  - continue with plan above  - CIWA assessment. Initiate protocol as needed    DVT prophylaxis: DAVID    CODE STATUS:  Full code  Code Status and Medical Interventions:   Ordered at: 02/14/20 0092     Level Of Support Discussed With:    Patient     Code Status:    CPR     Medical Interventions (Level of Support Prior to Arrest):    Full       Admission Status:  I believe this patient meets INPATIENT status due to heat failure.  I feel patient’s risk for adverse outcomes and need for care warrant INPATIENT evaluation and I predict the patient’s care encounter to likely last beyond 2 midnights.      Electronically signed by Jayson Belle PA-C, 02/14/20, 11:46 PM.        Attending   Admission Attestation       I have seen and examined the patient, performing an independent face-to-face diagnostic evaluation with plan of care reviewed and developed with the advanced practice clinician (APC).      Brief Summary Statement:   Jet Floyd is a 74 y.o. male with past medical history of hypertension, CHF, alcoholism, hyperlipidemia who presents to the ER with progressively worsening shortness of breath over the last 3 days.  Patient reports that he has had worsening shortness of breath, worsened by lying flat, and increasing lower extremity edema.  He does report that he has had weight gain, however does not weigh himself.  He reports decreased urinary output compared to his baseline with dysuria.  Denies any difficulty with bladder emptying.  Bladder scan was performed in the ER and was found to have 51 cc.    Right shortness of breath as 6 out of 10.  Bumex 2 was administered in the ER.  Patient will be admitted for further evaluation and treatment.    Remainder of detailed HPI is as noted by APC and has been reviewed  and/or edited by me for completeness.    Attending Physical Exam:  Constitutional: Awake, alert  Eyes: PERRLA, sclerae anicteric, no conjunctival injection  HENT: NCAT, mucous membranes moist  Neck: Supple, no thyromegaly, no lymphadenopathy, trachea midline  Respiratory: Bibasilar crackles, mildly labored respirations   Cardiovascular: RRR, no murmurs, rubs, or gallops, palpable pedal pulses bilaterally  Gastrointestinal: Positive bowel sounds, soft, nontender, nondistended  Musculoskeletal:  bilateral ankle edema, no clubbing or cyanosis to extremities  Psychiatric: Appropriate affect, cooperative  Neurologic: Oriented x 3, strength symmetric in all extremities, Cranial Nerves grossly intact to confrontation, speech clear  Skin: No rashes      Brief Assessment/Plan :  See detailed assessment and plan developed with APC which I have reviewed and/or edited for completeness.    Electronically signed by Edwar Roberts DO, 02/15/20, 1:53 AM.

## 2020-02-15 NOTE — PLAN OF CARE
Problem: Fall Risk (Adult)  Goal: Identify Related Risk Factors and Signs and Symptoms  Outcome: Ongoing (interventions implemented as appropriate)  Goal: Absence of Fall  Outcome: Ongoing (interventions implemented as appropriate)     Problem: Pain, Chronic (Adult)  Goal: Identify Related Risk Factors and Signs and Symptoms  Outcome: Ongoing (interventions implemented as appropriate)  Goal: Acceptable Pain/Comfort Level and Functional Ability  Outcome: Ongoing (interventions implemented as appropriate)     Problem: Patient Care Overview  Goal: Plan of Care Review  Outcome: Ongoing (interventions implemented as appropriate)  Flowsheets  Taken 2/15/2020 1714  Progress: improving  Outcome Summary: Pt states he is feeling much better today. He continues to deny getting out of bed to chair for a walk. IS was taught and demonstrated back.  Taken 2/15/2020 1600  Plan of Care Reviewed With: patient  Goal: Individualization and Mutuality  Outcome: Ongoing (interventions implemented as appropriate)  Goal: Discharge Needs Assessment  Outcome: Ongoing (interventions implemented as appropriate)  Goal: Interprofessional Rounds/Family Conf  Outcome: Ongoing (interventions implemented as appropriate)

## 2020-02-15 NOTE — ED PROVIDER NOTES
EMERGENCY DEPARTMENT ENCOUNTER      Pt Name: Jet Floyd  MRN: [id number]  YOB: 1945  Date of evaluation: 2/14/2020  Provider: Brannon Meyers DO    CHIEF COMPLAINT       Chief Complaint   Patient presents with   • Shortness of Breath         HISTORY OF PRESENT ILLNESS  (Location/Symptom, Timing/Onset, Context/Setting, Quality, Duration, Modifying Factors, Severity.)   Jet Floyd is a 74 y.o. male who presents to the emergency department with complaints of shortness of breath. The patient reports his shortness of breath has been ongoing for the past couple weeks. Since onset, his shortness of breath has been worsening. His shortness of breath is especially exacerbated upon exertion. He has also noticed fluid retention in his lower extremities and he has had decreased urine output, despite taking Lasix and Spironolactone daily. He denies any fever, cough, chills, or recent illnesses. There are no other complaints at this time.     Nursing notes were reviewed.    REVIEW OF SYSTEMS    (2-9 systems for level 4, 10 or more for level 5)   ROS:  General:  No fevers, no chills, no weakness  Cardiovascular:  No chest pain, no palpitations, +leg swelling  Respiratory:  +shortness of breath, no cough, no wheezing  Gastrointestinal:  No pain, no nausea, no vomiting, no diarrhea  Musculoskeletal:  No muscle pain, no joint pain  Skin:  No rash, no easy bruising  Neurologic:  No speech problems, no headache, no extremity numbness, no extremity tingling, no extremity weakness  Psychiatric:  No anxiety  Genitourinary:  No dysuria, no hematuria, +decreased urine output    Except as noted above the remainder of the review of systems was reviewed and negative.       PAST MEDICAL HISTORY     Past Medical History:   Diagnosis Date   • Cancer (CMS/HCC)    • CHF (congestive heart failure) (CMS/HCC)    • Hypertension          SURGICAL HISTORY       Past Surgical History:   Procedure Laterality Date   •  CARDIAC DEFIBRILLATOR PLACEMENT Bilateral 1998   • CARDIAC DEFIBRILLATOR REMOVAL Bilateral 2003   • CATARACT EXTRACTION Bilateral    • CERVICAL SPINE SURGERY Bilateral 2014         CURRENT MEDICATIONS       Current Facility-Administered Medications:   •  allopurinol (ZYLOPRIM) tablet 100 mg, 100 mg, Oral, Daily, Jayson Belle PA-C  •  aspirin chewable tablet 81 mg, 81 mg, Oral, Daily, Jayson Belle PA-C  •  atorvastatin (LIPITOR) tablet 20 mg, 20 mg, Oral, Daily, Jayson Belle PA-C  •  bumetanide (BUMEX) injection 2 mg, 2 mg, Intravenous, BID, Edwar Roberts, DO  •  carvedilol (COREG) tablet 3.125 mg, 3.125 mg, Oral, BID With Meals, Jayson Belle PA-C  •  heparin (porcine) 5000 UNIT/ML injection 5,000 Units, 5,000 Units, Subcutaneous, Q12H, Jayson Belle PA-C  •  sodium chloride 0.9 % flush 10 mL, 10 mL, Intravenous, PRN, Brannon Meyers, DO  •  sodium chloride 0.9 % flush 10 mL, 10 mL, Intravenous, Q12H, Jayson Belle PA-C, 10 mL at 02/15/20 0319  •  sodium chloride 0.9 % flush 10 mL, 10 mL, Intravenous, PRN, Jayson Belle PA-C    ALLERGIES     Lactose intolerance (gi) and Polysporin [bacitracin-polymyxin b]    FAMILY HISTORY       Family History   Problem Relation Age of Onset   • Dementia Mother    • Cancer Father         prostate   • No Known Problems Sister    • No Known Problems Brother    • No Known Problems Daughter           SOCIAL HISTORY       Social History     Socioeconomic History   • Marital status:      Spouse name: Not on file   • Number of children: Not on file   • Years of education: Not on file   • Highest education level: Not on file   Tobacco Use   • Smoking status: Former Smoker     Packs/day: 1.50     Years: 48.00     Pack years: 72.00     Types: Cigarettes   • Smokeless tobacco: Never Used   Substance and Sexual Activity   • Alcohol use: Yes     Alcohol/week: 35.0 standard drinks     Types: 35 Shots of liquor per week     Comment: liquor daily  5-6   • Drug use: No   • Sexual activity: Defer         PHYSICAL EXAM    (up to 7 for level 4, 8 or more for level 5)     Vitals:    02/15/20 0000 02/15/20 0101 02/15/20 0105 02/15/20 0319   BP: 132/89  129/82 125/90   BP Location:   Left arm    Patient Position:   Lying    Pulse: 96  102 97   Resp:   18    Temp:   98.1 °F (36.7 °C) 97.6 °F (36.4 °C)   TempSrc:   Oral    SpO2: 95%  98%    Weight:  68.5 kg (151 lb)     Height:           Physical Exam  General :Patient is awake, alert, oriented, in no acute distress, nontoxic appearing  HEENT: Pupils are equally round and reactive to light, EOMI, conjunctivae clear, sclerae white, there is no injection no icterus.  Oral mucosa is moist, no exudate. Uvula is midline  Neck: Neck is supple, full range of motion, trachea midline  Cardiac: Heart regular rate, rhythm, no murmurs, rubs, or gallops  Lungs: There is no wheezing or rhonchi. There is no use of accessory muscles. Decreased breath sounds bilaterally. Scattered rales.   Chest wall: There is no tenderness to palpation over the chest wall or over ribs  Abdomen: Abdomen is soft, nontender, nondistended. There is no firm or pulsatile masses, no rebound rigidity or guarding.   Musculoskeletal: 5 out of 5 strength in all 4 extremities.  No focal muscle deficits are appreciated. Trace edema to bilateral lower extremities.   Neuro: Motor intact, sensory intact, level of consciousness is normal  Dermatology: Skin is warm and dry  Psych: Mentation is grossly normal, cognition is grossly normal. Affect is appropriate.      DIAGNOSTIC RESULTS     EKG: All EKG's are interpreted by the Emergency Department Physician who either signs or Co-signs this chart in the absence of a cardiologist.    ECG 12 Lead   Final Result   Test Reason : SOB   Blood Pressure : **/** mmHG   Vent. Rate : 100 BPM     Atrial Rate : 100 BPM      P-R Int : 176 ms          QRS Dur : 156 ms       QT Int : 404 ms       P-R-T Axes : 053 -30 -10 degrees       QTc Int : 521 ms         Normal sinus rhythm   Left atrial enlargement   Indeterminate axis   Nonspecific intraventricular block   Abnormal ECG   When compared with ECG of 20-MAR-2018 18:31,   premature atrial complexes are no longer present   Nonspecific T wave abnormality has replaced inverted T waves in Lateral   leads   Confirmed by EARNEST ELIZABETH MD ([id number]) on 2/14/2020 9:02:08 PM      Referred By:  ED MD           Confirmed By:EARNEST ELIZABETH MD          RADIOLOGY:   Non-plain film images such as CT, Ultrasound and MRI are read by the radiologist. Plain radiographic images are visualized and preliminarily interpreted by the emergency physician with the below findings:      [] Radiologist's Report Reviewed:  XR Chest 1 View   Final Result   Cardiomegaly. Mild pulmonary edema and small right pleural effusion.      Signer Name: Alex Paredes MD    Signed: 2/14/2020 8:45 PM    Workstation Name: [id number]     [facility]            ED BEDSIDE ULTRASOUND:   Performed by ED Physician - none    LABS:    I have reviewed and interpreted all of the currently available lab results from this visit (if applicable):  Results for orders placed or performed during the hospital encounter of 02/14/20   Comprehensive Metabolic Panel   Result Value Ref Range    Glucose 149 (H) 65 - 99 mg/dL    BUN 35 (H) 8 - 23 mg/dL    Creatinine 2.43 (H) 0.76 - 1.27 mg/dL    Sodium 128 (L) 136 - 145 mmol/L    Potassium 4.7 3.5 - 5.2 mmol/L    Chloride 91 (L) 98 - 107 mmol/L    CO2 22.0 22.0 - 29.0 mmol/L    Calcium 8.8 8.6 - 10.5 mg/dL    Total Protein 7.4 6.0 - 8.5 g/dL    Albumin 4.20 3.50 - 5.20 g/dL    ALT (SGPT) 132 (H) 1 - 41 U/L    AST (SGOT) 150 (H) 1 - 40 U/L    Alkaline Phosphatase 240 (H) 39 - 117 U/L    Total Bilirubin 2.2 (H) 0.2 - 1.2 mg/dL    eGFR Non African Amer 26 (L) >60 mL/min/1.73    Globulin 3.2 gm/dL    A/G Ratio 1.3 g/dL    BUN/Creatinine Ratio 14.4 7.0 - 25.0    Anion Gap 15.0 5.0 - 15.0 mmol/L    BNP   Result Value Ref Range    proBNP 47,622.0 (H) 5.0 - 900.0 pg/mL   Troponin   Result Value Ref Range    Troponin T <0.010 0.000 - 0.030 ng/mL   CBC Auto Differential   Result Value Ref Range    WBC 6.02 3.40 - 10.80 10*3/mm3    RBC 4.15 4.14 - 5.80 10*6/mm3    Hemoglobin 12.5 (L) 13.0 - 17.7 g/dL    Hematocrit 38.0 37.5 - 51.0 %    MCV 91.6 79.0 - 97.0 fL    MCH 30.1 26.6 - 33.0 pg    MCHC 32.9 31.5 - 35.7 g/dL    RDW 18.1 (H) 12.3 - 15.4 %    RDW-SD 59.7 (H) 37.0 - 54.0 fl    MPV 12.2 (H) 6.0 - 12.0 fL    Platelets 169 140 - 450 10*3/mm3    Neutrophil % 69.2 42.7 - 76.0 %    Lymphocyte % 13.8 (L) 19.6 - 45.3 %    Monocyte % 15.0 (H) 5.0 - 12.0 %    Eosinophil % 0.3 0.3 - 6.2 %    Basophil % 1.2 0.0 - 1.5 %    Immature Grans % 0.5 0.0 - 0.5 %    Neutrophils, Absolute 4.17 1.70 - 7.00 10*3/mm3    Lymphocytes, Absolute 0.83 0.70 - 3.10 10*3/mm3    Monocytes, Absolute 0.90 0.10 - 0.90 10*3/mm3    Eosinophils, Absolute 0.02 0.00 - 0.40 10*3/mm3    Basophils, Absolute 0.07 0.00 - 0.20 10*3/mm3    Immature Grans, Absolute 0.03 0.00 - 0.05 10*3/mm3    nRBC 0.3 (H) 0.0 - 0.2 /100 WBC   Osmolality, Serum   Result Value Ref Range    Osmolality 278 275 - 295 mOsm/kg   Sodium, Urine, Random - Urine, Clean Catch   Result Value Ref Range    Sodium, Urine 61 mmol/L   Protime-INR   Result Value Ref Range    Protime 17.5 (H) 11.2 - 14.3 Seconds    INR 1.51 (H) 0.85 - 1.16   Hepatitis Panel, Acute   Result Value Ref Range    Hepatitis B Surface Ag Non-Reactive Non-Reactive    Hep A IgM Non-Reactive Non-Reactive    Hep B C IgM Non-Reactive Non-Reactive    Hepatitis C Ab Non-Reactive Non-Reactive   Ethanol   Result Value Ref Range    Ethanol <10 0 - 10 mg/dL   Troponin   Result Value Ref Range    Troponin T <0.010 0.000 - 0.030 ng/mL   Light Blue Top   Result Value Ref Range    Extra Tube hold for add-on    Green Top (Gel)   Result Value Ref Range    Extra Tube Hold for add-ons.    Lavender Top   Result Value Ref Range    Extra  Tube hold for add-on    Gold Top - SST   Result Value Ref Range    Extra Tube Hold for add-ons.         All other labs were within normal range or not returned as of this dictation.      EMERGENCY DEPARTMENT COURSE and DIFFERENTIAL DIAGNOSIS/MDM:   Vitals:    Vitals:    02/15/20 0000 02/15/20 0101 02/15/20 0105 02/15/20 0319   BP: 132/89  129/82 125/90   BP Location:   Left arm    Patient Position:   Lying    Pulse: 96  102 97   Resp:   18    Temp:   98.1 °F (36.7 °C) 97.6 °F (36.4 °C)   TempSrc:   Oral    SpO2: 95%  98%    Weight:  68.5 kg (151 lb)     Height:            !    Patient with increasing shortness of breath, dyspnea, decreased urinary output over the last few days.  Does have a history of chronic congestive heart failure but has been compliant with his Lasix and spironolactone.  Does admit to decreased urinary output, also has a history of renal insufficiency.  Today his BUN is 35, creatinine 2.5 which is elevated compared to his baseline.  BNP over 47,000.  We did obtain chest x-ray which does show cardiomegaly with CHF findings.  Patient is without any severe respiratory distress, overall not feeling well with dyspnea with even mild exertion.  We did give the patient a dose of Bumex, bladder scan, plan for admission for continued therapies and reevaluation's by his specialist.  Case  discussed with our hospitalist team for admission.      MEDICATIONS ADMINISTERED IN ED:  Medications   sodium chloride 0.9 % flush 10 mL (has no administration in time range)   carvedilol (COREG) tablet 3.125 mg (has no administration in time range)   atorvastatin (LIPITOR) tablet 20 mg (has no administration in time range)   aspirin chewable tablet 81 mg (has no administration in time range)   allopurinol (ZYLOPRIM) tablet 100 mg (has no administration in time range)   sodium chloride 0.9 % flush 10 mL (10 mL Intravenous Given 2/15/20 0319)   sodium chloride 0.9 % flush 10 mL (has no administration in time range)    heparin (porcine) 5000 UNIT/ML injection 5,000 Units (has no administration in time range)   bumetanide (BUMEX) injection 2 mg (has no administration in time range)   bumetanide (BUMEX) injection 2 mg (2 mg Intravenous Given 2/14/20 1929)   metOLazone (ZAROXOLYN) tablet 2.5 mg (2.5 mg Oral Given 2/15/20 3239)       PROCEDURES:  Procedures    CRITICAL CARE TIME    Total Critical Care time was 0 minutes, excluding separately reportable procedures.   There was a high probability of clinically significant/life threatening deterioration in the patient's condition which required my urgent intervention.      FINAL IMPRESSION      1. Acute on chronic congestive heart failure, unspecified heart failure type (CMS/HCC)    2. Renal insufficiency    3. Pleural effusion    4. Hyponatremia          DISPOSITION/PLAN     ED Disposition     ED Disposition Condition Comment    Decision to Admit  Level of Care: Telemetry [5]   Diagnosis: Acute on chronic congestive heart failure, unspecified heart failure type (CMS/HCC) [[id number]]   Admitting Physician: ARTUR HERNANDEZ [[id number]]   Attending Physician: ARTUR HERNANDEZ [[id number]]   Certification: I certify that inpatient hospital services are medically necessary for greater than 2 midnights.            PATIENT REFERRED TO:  No follow-up provider specified.    DISCHARGE MEDICATIONS:     Medication List      ASK your doctor about these medications    allopurinol 100 MG tablet  Commonly known as:  ZYLOPRIM     aspirin 81 MG chewable tablet     atorvastatin 20 MG tablet  Commonly known as:  LIPITOR     carvedilol 3.125 MG tablet  Commonly known as:  COREG     furosemide 40 MG tablet  Commonly known as:  LASIX     HYDROcodone-acetaminophen 5-325 MG per tablet  Commonly known as:  NORCO  Take 1 tablet by mouth Every 6 (Six) Hours As Needed for Moderate Pain .     KLOR-CON 10 MEQ CR tablet  Generic drug:  potassium chloride  TAKE ONE TABLET BY MOUTH DAILY     levothyroxine 50 MCG tablet  Commonly  known as:  SYNTHROID, LEVOTHROID     lisinopril 5 MG tablet  Commonly known as:  PRINIVIL,ZESTRIL     methylPREDNISolone 4 MG tablet  Commonly known as:  MEDROL (LAURA)  Take as directed on package instructions.     spironolactone 25 MG tablet  Commonly known as:  ALDACTONE     thiamine 100 MG tablet  Commonly known as:  VITAMIN B-1     vitamin B-12 1000 MCG tablet  Commonly known as:  CYANOCOBALAMIN            Documentation assistance provided by Leeroy Key acting as scribe for Dr. Brannon Meyers.     The scribe's documentation has been prepared under my direction and personally reviewed by me in its entirety.  I confirm that the note above accurately reflects all work, treatment, procedures, and medical decision making performed by me.      Comment: Please note this report has been produced using speech recognition software.      Brannon Meyers DO  Attending Emergency Physician                Leeroy Key  02/14/20 [id number]       Brannon Meyers DO  02/15/20 [id number]

## 2020-02-16 LAB
ALBUMIN SERPL-MCNC: 3.9 G/DL (ref 3.5–5.2)
ALBUMIN/GLOB SERPL: 1.3 G/DL
ALP SERPL-CCNC: 195 U/L (ref 39–117)
ALT SERPL W P-5'-P-CCNC: 93 U/L (ref 1–41)
ANION GAP SERPL CALCULATED.3IONS-SCNC: 19 MMOL/L (ref 5–15)
AST SERPL-CCNC: 50 U/L (ref 1–40)
BASOPHILS # BLD AUTO: 0.07 10*3/MM3 (ref 0–0.2)
BASOPHILS NFR BLD AUTO: 1.4 % (ref 0–1.5)
BH CV ECHO MEAS - AO ROOT AREA (BSA CORRECTED): 1.5
BH CV ECHO MEAS - AO ROOT AREA: 5.7 CM^2
BH CV ECHO MEAS - AO ROOT DIAM: 2.7 CM
BH CV ECHO MEAS - BSA(HAYCOCK): 1.8 M^2
BH CV ECHO MEAS - BSA: 1.8 M^2
BH CV ECHO MEAS - BZI_BMI: 22.3 KILOGRAMS/M^2
BH CV ECHO MEAS - BZI_METRIC_HEIGHT: 175.3 CM
BH CV ECHO MEAS - BZI_METRIC_WEIGHT: 68.5 KG
BH CV ECHO MEAS - EDV(CUBED): 139.8 ML
BH CV ECHO MEAS - EDV(MOD-SP2): 84 ML
BH CV ECHO MEAS - EDV(MOD-SP4): 97 ML
BH CV ECHO MEAS - EDV(TEICH): 129 ML
BH CV ECHO MEAS - EF(CUBED): 26.6 %
BH CV ECHO MEAS - EF(MOD-BP): 21 %
BH CV ECHO MEAS - EF(MOD-SP2): 19 %
BH CV ECHO MEAS - EF(MOD-SP4): 20.6 %
BH CV ECHO MEAS - EF(TEICH): 21.4 %
BH CV ECHO MEAS - ESV(CUBED): 102.6 ML
BH CV ECHO MEAS - ESV(MOD-SP2): 68 ML
BH CV ECHO MEAS - ESV(MOD-SP4): 77 ML
BH CV ECHO MEAS - ESV(TEICH): 101.4 ML
BH CV ECHO MEAS - FS: 9.8 %
BH CV ECHO MEAS - IVS/LVPW: 1.1
BH CV ECHO MEAS - IVSD: 1.3 CM
BH CV ECHO MEAS - LA DIMENSION: 4 CM
BH CV ECHO MEAS - LA/AO: 1.5
BH CV ECHO MEAS - LAD MAJOR: 6.4 CM
BH CV ECHO MEAS - LV DIASTOLIC VOL/BSA (35-75): 52.9 ML/M^2
BH CV ECHO MEAS - LV MASS(C)D: 259.5 GRAMS
BH CV ECHO MEAS - LV MASS(C)DI: 141.5 GRAMS/M^2
BH CV ECHO MEAS - LV SYSTOLIC VOL/BSA (12-30): 42 ML/M^2
BH CV ECHO MEAS - LVIDD: 5.2 CM
BH CV ECHO MEAS - LVIDS: 4.7 CM
BH CV ECHO MEAS - LVLD AP2: 7.2 CM
BH CV ECHO MEAS - LVLD AP4: 7.9 CM
BH CV ECHO MEAS - LVLS AP2: 7.8 CM
BH CV ECHO MEAS - LVLS AP4: 8 CM
BH CV ECHO MEAS - LVPWD: 1.2 CM
BH CV ECHO MEAS - MV DEC SLOPE: 1051 CM/SEC^2
BH CV ECHO MEAS - MV DEC TIME: 0.11 SEC
BH CV ECHO MEAS - MV E MAX VEL: 130.3 CM/SEC
BH CV ECHO MEAS - MV P1/2T MAX VEL: 162.9 CM/SEC
BH CV ECHO MEAS - MV P1/2T: 45.4 MSEC
BH CV ECHO MEAS - MVA P1/2T LCG: 1.4 CM^2
BH CV ECHO MEAS - MVA(P1/2T): 4.8 CM^2
BH CV ECHO MEAS - PA ACC SLOPE: 453.1 CM/SEC^2
BH CV ECHO MEAS - PA ACC TIME: 0.1 SEC
BH CV ECHO MEAS - PA PR(ACCEL): 34.6 MMHG
BH CV ECHO MEAS - RVDD: 4 CM
BH CV ECHO MEAS - SI(CUBED): 20.3 ML/M^2
BH CV ECHO MEAS - SI(MOD-SP2): 8.7 ML/M^2
BH CV ECHO MEAS - SI(MOD-SP4): 10.9 ML/M^2
BH CV ECHO MEAS - SI(TEICH): 15 ML/M^2
BH CV ECHO MEAS - SV(CUBED): 37.3 ML
BH CV ECHO MEAS - SV(MOD-SP2): 16 ML
BH CV ECHO MEAS - SV(MOD-SP4): 20 ML
BH CV ECHO MEAS - SV(TEICH): 27.6 ML
BH CV ECHO MEAS - TAPSE (>1.6): 1.6 CM2
BH CV ECHO MEAS - TR MAX PG: 25 MMHG
BH CV ECHO MEAS - TR MAX VEL: 250 CM/SEC
BH CV VAS BP RIGHT ARM: NORMAL MMHG
BH CV XLRA - RV BASE: 3.6 CM
BH CV XLRA - RV LENGTH: 7.8 CM
BH CV XLRA - RV MID: 3.2 CM
BH CV XLRA - TDI S': 6.6 CM/SEC
BILIRUB SERPL-MCNC: 1.7 MG/DL (ref 0.2–1.2)
BILIRUB UR QL STRIP: NEGATIVE
BUN BLD-MCNC: 40 MG/DL (ref 8–23)
BUN/CREAT SERPL: 17.2 (ref 7–25)
CALCIUM SPEC-SCNC: 8.6 MG/DL (ref 8.6–10.5)
CHLORIDE SERPL-SCNC: 86 MMOL/L (ref 98–107)
CK SERPL-CCNC: 34 U/L (ref 20–200)
CLARITY UR: CLEAR
CO2 SERPL-SCNC: 24 MMOL/L (ref 22–29)
COLOR UR: YELLOW
CREAT BLD-MCNC: 2.32 MG/DL (ref 0.76–1.27)
CREAT UR-MCNC: 25.6 MG/DL
DEPRECATED RDW RBC AUTO: 56.7 FL (ref 37–54)
EOSINOPHIL # BLD AUTO: 0.07 10*3/MM3 (ref 0–0.4)
EOSINOPHIL NFR BLD AUTO: 1.4 % (ref 0.3–6.2)
EOSINOPHIL SPEC QL MICRO: 0 % EOS/100 CELLS (ref 0–0)
ERYTHROCYTE [DISTWIDTH] IN BLOOD BY AUTOMATED COUNT: 17.7 % (ref 12.3–15.4)
GFR SERPL CREATININE-BSD FRML MDRD: 28 ML/MIN/1.73
GLOBULIN UR ELPH-MCNC: 2.9 GM/DL
GLUCOSE BLD-MCNC: 99 MG/DL (ref 65–99)
GLUCOSE UR STRIP-MCNC: NEGATIVE MG/DL
HCT VFR BLD AUTO: 35.4 % (ref 37.5–51)
HGB BLD-MCNC: 12 G/DL (ref 13–17.7)
HGB UR QL STRIP.AUTO: NEGATIVE
IMM GRANULOCYTES # BLD AUTO: 0.02 10*3/MM3 (ref 0–0.05)
IMM GRANULOCYTES NFR BLD AUTO: 0.4 % (ref 0–0.5)
KETONES UR QL STRIP: NEGATIVE
LDH SERPL-CCNC: 133 U/L (ref 135–225)
LEFT ATRIUM VOLUME INDEX: 40.9 ML/M^2
LEFT ATRIUM VOLUME: 75 ML
LEUKOCYTE ESTERASE UR QL STRIP.AUTO: NEGATIVE
LV EF 2D ECHO EST: 20 %
LYMPHOCYTES # BLD AUTO: 1.05 10*3/MM3 (ref 0.7–3.1)
LYMPHOCYTES NFR BLD AUTO: 20.5 % (ref 19.6–45.3)
MAXIMAL PREDICTED HEART RATE: 146 BPM
MCH RBC QN AUTO: 30.1 PG (ref 26.6–33)
MCHC RBC AUTO-ENTMCNC: 33.9 G/DL (ref 31.5–35.7)
MCV RBC AUTO: 88.7 FL (ref 79–97)
MONOCYTES # BLD AUTO: 0.8 10*3/MM3 (ref 0.1–0.9)
MONOCYTES NFR BLD AUTO: 15.7 % (ref 5–12)
NEUTROPHILS # BLD AUTO: 3.1 10*3/MM3 (ref 1.7–7)
NEUTROPHILS NFR BLD AUTO: 60.6 % (ref 42.7–76)
NITRITE UR QL STRIP: NEGATIVE
NRBC BLD AUTO-RTO: 0 /100 WBC (ref 0–0.2)
NT-PROBNP SERPL-MCNC: ABNORMAL PG/ML (ref 5–900)
PH UR STRIP.AUTO: 6 [PH] (ref 5–8)
PLATELET # BLD AUTO: 163 10*3/MM3 (ref 140–450)
PMV BLD AUTO: 12.2 FL (ref 6–12)
POTASSIUM BLD-SCNC: 3.8 MMOL/L (ref 3.5–5.2)
PROT SERPL-MCNC: 6.8 G/DL (ref 6–8.5)
PROT UR QL STRIP: NEGATIVE
PROT UR-MCNC: <4 MG/DL
RBC # BLD AUTO: 3.99 10*6/MM3 (ref 4.14–5.8)
SODIUM BLD-SCNC: 129 MMOL/L (ref 136–145)
SODIUM UR-SCNC: 97 MMOL/L
SP GR UR STRIP: 1.01 (ref 1–1.03)
STRESS TARGET HR: 124 BPM
URATE SERPL-MCNC: 8.3 MG/DL (ref 3.4–7)
UROBILINOGEN UR QL STRIP: NORMAL
UUN 24H UR-MCNC: 131 MG/DL
WBC NRBC COR # BLD: 5.11 10*3/MM3 (ref 3.4–10.8)

## 2020-02-16 PROCEDURE — 99223 1ST HOSP IP/OBS HIGH 75: CPT | Performed by: INTERNAL MEDICINE

## 2020-02-16 PROCEDURE — 99232 SBSQ HOSP IP/OBS MODERATE 35: CPT | Performed by: INTERNAL MEDICINE

## 2020-02-16 PROCEDURE — 83615 LACTATE (LD) (LDH) ENZYME: CPT | Performed by: INTERNAL MEDICINE

## 2020-02-16 PROCEDURE — 80053 COMPREHEN METABOLIC PANEL: CPT | Performed by: INTERNAL MEDICINE

## 2020-02-16 PROCEDURE — 82570 ASSAY OF URINE CREATININE: CPT | Performed by: INTERNAL MEDICINE

## 2020-02-16 PROCEDURE — 87205 SMEAR GRAM STAIN: CPT | Performed by: INTERNAL MEDICINE

## 2020-02-16 PROCEDURE — 85025 COMPLETE CBC W/AUTO DIFF WBC: CPT | Performed by: INTERNAL MEDICINE

## 2020-02-16 PROCEDURE — 84540 ASSAY OF URINE/UREA-N: CPT | Performed by: INTERNAL MEDICINE

## 2020-02-16 PROCEDURE — 84156 ASSAY OF PROTEIN URINE: CPT | Performed by: INTERNAL MEDICINE

## 2020-02-16 PROCEDURE — 82550 ASSAY OF CK (CPK): CPT | Performed by: INTERNAL MEDICINE

## 2020-02-16 PROCEDURE — 94799 UNLISTED PULMONARY SVC/PX: CPT

## 2020-02-16 PROCEDURE — 81003 URINALYSIS AUTO W/O SCOPE: CPT | Performed by: INTERNAL MEDICINE

## 2020-02-16 PROCEDURE — 84300 ASSAY OF URINE SODIUM: CPT | Performed by: INTERNAL MEDICINE

## 2020-02-16 PROCEDURE — 83880 ASSAY OF NATRIURETIC PEPTIDE: CPT | Performed by: INTERNAL MEDICINE

## 2020-02-16 PROCEDURE — 84550 ASSAY OF BLOOD/URIC ACID: CPT | Performed by: INTERNAL MEDICINE

## 2020-02-16 PROCEDURE — 25010000002 HEPARIN (PORCINE) PER 1000 UNITS: Performed by: INTERNAL MEDICINE

## 2020-02-16 RX ORDER — CARVEDILOL 6.25 MG/1
6.25 TABLET ORAL 2 TIMES DAILY WITH MEALS
Status: DISCONTINUED | OUTPATIENT
Start: 2020-02-16 | End: 2020-02-20 | Stop reason: HOSPADM

## 2020-02-16 RX ADMIN — SODIUM CHLORIDE, PRESERVATIVE FREE 10 ML: 5 INJECTION INTRAVENOUS at 21:15

## 2020-02-16 RX ADMIN — CARVEDILOL 6.25 MG: 6.25 TABLET, FILM COATED ORAL at 17:23

## 2020-02-16 RX ADMIN — SODIUM CHLORIDE, PRESERVATIVE FREE 10 ML: 5 INJECTION INTRAVENOUS at 08:25

## 2020-02-16 RX ADMIN — HEPARIN SODIUM 5000 UNITS: 5000 INJECTION, SOLUTION INTRAVENOUS; SUBCUTANEOUS at 08:23

## 2020-02-16 RX ADMIN — CARVEDILOL 3.12 MG: 3.12 TABLET, FILM COATED ORAL at 08:23

## 2020-02-16 RX ADMIN — BUMETANIDE 2 MG: 0.25 INJECTION INTRAMUSCULAR; INTRAVENOUS at 08:23

## 2020-02-16 RX ADMIN — HEPARIN SODIUM 5000 UNITS: 5000 INJECTION, SOLUTION INTRAVENOUS; SUBCUTANEOUS at 21:14

## 2020-02-16 RX ADMIN — ALLOPURINOL 100 MG: 100 TABLET ORAL at 08:23

## 2020-02-16 RX ADMIN — ASPIRIN 81 MG 81 MG: 81 TABLET ORAL at 08:23

## 2020-02-16 RX ADMIN — BUMETANIDE 2 MG: 0.25 INJECTION INTRAMUSCULAR; INTRAVENOUS at 17:23

## 2020-02-16 RX ADMIN — SPIRONOLACTONE 25 MG: 25 TABLET ORAL at 08:23

## 2020-02-16 RX ADMIN — ATORVASTATIN CALCIUM 20 MG: 20 TABLET, FILM COATED ORAL at 08:23

## 2020-02-16 NOTE — PLAN OF CARE
"Patient resting in bed. Up to bedside chair earlier this shift. Denies pain or discomfort. Room air. Telemetry running NSR. Bumex IV given as ordered. Patient reports \"my breathing is much better.\" Fall precautions in use. Call light in reach to make needs known.  "

## 2020-02-16 NOTE — PROGRESS NOTES
"    [facility] Medicine Services  Short Stay Unit  PROGRESS NOTE    Patient Name: Jet Floyd  : 1945  MRN: [id number]    Admission Date and Time: 2020  8:31 PM  Primary Care Physician: Obi Soriano MD    Subjective   Subjective     CC:  F/U CHF exacerbation    HPI:  Patient seen getting out of bed to chair, very unsteady on his feet. Says he uses a cane at baseline but did have a fall around thanksgiving. He denies chest pain. Discussed ICD placement, says he previously had one but that it \"fell out\". Breathing improved.    Review of Systems  Gen- No fevers, chills  CV- No chest pain, palpitations  Resp- No cough, dyspnea  GI- No N/V/D, abd pain    Objective   Objective     Vital Signs:   Temp:  [97.4 °F (36.3 °C)-97.9 °F (36.6 °C)] 97.9 °F (36.6 °C)  Heart Rate:  [84-95] 92  Resp:  [16-18] 16  BP: (119-130)/(68-84) 129/68        Physical Exam:  Constitutional: No acute distress, awake, alert, sitting in bedside chair  HENT: NCAT, mucous membranes moist  Respiratory: Clear to auscultation bilaterally, respiratory effort normal   Cardiovascular: tachycardic, no murmurs, rubs, or gallops, palpable pedal pulses bilaterally  Gastrointestinal: Positive bowel sounds, soft, nontender, nondistended  Musculoskeletal: 1+ mild edema in bilateral ankles, much improved  Psychiatric: Appropriate affect, cooperative  Neurologic: Oriented x 3, strength symmetric in all extremities, Cranial Nerves grossly intact to confrontation, speech clear  Skin: No rashes      Results Reviewed:  Results from last 7 days   Lab Units 20  0240   WBC 10*3/mm3 5.11   HEMOGLOBIN g/dL 12.0*   HEMATOCRIT % 35.4*   PLATELETS 10*3/mm3 163     Results from last 7 days   Lab Units 20  0240   SODIUM mmol/L 129*   POTASSIUM mmol/L 3.8   CHLORIDE mmol/L 86*   CO2 mmol/L 24.0   BUN mg/dL 40*   CREATININE mg/dL 2.32*   GLUCOSE mg/dL 99   CALCIUM mg/dL 8.6     ProBNP 42,458    Assessment/Plan "   Assessment / Plan     Active Hospital Problems    Diagnosis  POA   • **Acute on chronic congestive heart failure (CMS/HCC) [I50.9]  Yes   • History of alcoholism (CMS/HCC) [F10.21]  Not Applicable   • GABRIELLE (acute kidney injury) (CMS/HCC) [N17.9]  Yes   • Elevated liver enzymes [R74.8]  Yes   • Hyperglycemia [R73.9]  Yes   • Hyponatremia [E87.1]  Yes   • Pleural effusion [J90]  Yes   • Essential hypertension [I10]  Yes   • Hyperlipidemia [E78.5]  Yes      Resolved Hospital Problems   No resolved problems to display.        Brief course to date:  Jet Floyd is a 74 y.o. male with past medical history of hypertension, CHF, alcoholism, hyperlipidemia who presents to the ER with progressively worsening shortness of breath over the last 3 days, found to have acute on chronic congestive heart failure exacerbation.    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 Richard (last Echo 9/2015, EF 30%), states he has not had cardiology follow up in 5 yrs (previous ICD that fell out?) Will consult cardiology.  - continue Bumex 2mg IV BID, strict I&O's, daily weights, Net output -3.6L  - daily fluid restriction  - continue coreg and aldactone, holding lisinopril due to worsening renal fxn     GABRIELLE on suspected CKD  - ? Cardiorenal. Baseline creatinine ~1.1, follows outpatient with Nephrology  - Nephrology consult today   - avoid nephrotoxins, holding lisinopril     Hyperglycemia:  - no history of DM. A1c 5.5     Hyponatremia:  - suspect hypervolemic hyponatremia secondary to volume overload, likely also compounded by ETOH use  - urine studies reviewed, mixed picture  - TSH WNL     Elevated liver enzymes - improving  - ? Suspect Congestive hepatopathy, improving with diuresis, also possibly related  to ETOH use  - Hep panel negative  - avoid hepatotoxins     History of alcoholism:  - continue with plan above  - CIWA has been normal     DVT prophylaxis: DAVID    Discharge Blueprint:   1. Improvement in volume status  2. Improvement in renal function and electrolytes    Electronically signed by Radha Salazar DO, 02/16/20, 9:35 AM.

## 2020-02-16 NOTE — PLAN OF CARE
Problem: Fall Risk (Adult)  Goal: Identify Related Risk Factors and Signs and Symptoms  Outcome: Ongoing (interventions implemented as appropriate)   Pt adm 2/14 for worsening SOA X 3 days.  Pt has been diuresed and feels notably better.  Pt is being maintained on CIWA protocol,  which has been negative.  Will continue to reaccess and monitor

## 2020-02-16 NOTE — CONSULTS
[facility]   Consult Note    Referring Provider: Dr. Salazar    Reason for Consultation: CHF    Patient Care Team:  Obi Soriano MD as PCP - General  Obi Soriano MD as PCP - Family Medicine    Past Medical History:   Diagnosis Date   • Cancer (CMS/HCC)    • CHF (congestive heart failure) (CMS/[facility])    • Hypertension        Past Surgical History:   Procedure Laterality Date   • CARDIAC DEFIBRILLATOR PLACEMENT Bilateral 1998   • CARDIAC DEFIBRILLATOR REMOVAL Bilateral 2003   • CATARACT EXTRACTION Bilateral    • CERVICAL SPINE SURGERY Bilateral 2014         Allergies   Allergen Reactions   • Lactose Intolerance (Gi) Diarrhea   • Polysporin [Bacitracin-Polymyxin B] Itching and Rash           Current Facility-Administered Medications:   •  allopurinol (ZYLOPRIM) tablet 100 mg, 100 mg, Oral, Daily, Radha Salazar, DO, 100 mg at 02/16/20 0823  •  aspirin chewable tablet 81 mg, 81 mg, Oral, Daily, Radha Salazar R, DO, 81 mg at 02/16/20 0823  •  atorvastatin (LIPITOR) tablet 20 mg, 20 mg, Oral, Daily, Radha Salazar R, DO, 20 mg at 02/16/20 0823  •  bumetanide (BUMEX) injection 2 mg, 2 mg, Intravenous, BID, Radha Salazar R, DO, 2 mg at 02/16/20 0823  •  carvedilol (COREG) tablet 3.125 mg, 3.125 mg, Oral, BID With Meals, Radha Salazar R, DO, 3.125 mg at 02/16/20 0823  •  heparin (porcine) 5000 UNIT/ML injection 5,000 Units, 5,000 Units, Subcutaneous, Q12H, Radha Salazar R, DO, 5,000 Units at 02/16/20 0823  •  Pharmacy Consult - [facility], , Does not apply, Daily, Mike Scott RP, Stopped at 02/15/20 9118  •  sodium chloride 0.9 % flush 10 mL, 10 mL, Intravenous, PRN, Radha Salazar R, DO  •  sodium chloride 0.9 % flush 10 mL, 10 mL, Intravenous, Q12H, Radha Salazar R, DO, 10 mL at 02/16/20 0825  •  sodium chloride 0.9 % flush 10 mL, 10 mL, Intravenous, PRN, Radha Salazar, DO  •  spironolactone (ALDACTONE) tablet 25 mg, 25 mg, Oral, Daily, Radha Salazar DO,  25 mg at 02/16/20 0823         Medications Prior to Admission   Medication Sig Dispense Refill Last Dose   • allopurinol (ZYLOPRIM) 100 MG tablet Take 100 mg by mouth Daily.      • aspirin 81 MG chewable tablet Chew 81 mg daily.   3/19/2018 at Unknown time   • atorvastatin (LIPITOR) 20 MG tablet Take 20 mg by mouth Daily.   3/18/2018   • carvedilol (COREG) 3.125 MG tablet Take 3.125 mg by mouth 2 (two) times a day with meals.   3/19/2018 at Unknown time   • furosemide (LASIX) 40 MG tablet Take 80 mg by mouth Every Other Day.   3/18/2018   • KLOR-CON 10 MEQ CR tablet TAKE ONE TABLET BY MOUTH DAILY 30 tablet 2 3/18/2018   • spironolactone (ALDACTONE) 25 MG tablet Take 50 mg by mouth Every Other Day.   3/18/2018   • vitamin B-12 (CYANOCOBALAMIN) 1000 MCG tablet Take 1,000 mcg by mouth Daily.      • HYDROcodone-acetaminophen (NORCO) 5-325 MG per tablet Take 1 tablet by mouth Every 6 (Six) Hours As Needed for Moderate Pain . 12 tablet 0    • levothyroxine (SYNTHROID, LEVOTHROID) 50 MCG tablet Take 50 mcg by mouth Daily.   3/18/2018         Subjective .   History of present illness:    Patient is a 74-year-old  male who we are asked to see today for acute exacerbation of congestive heart failure.  Patient has previously documented LVEF of 20%.  Is on chronic diuretic therapy.  Patient reports that last week he was told by his nephrologist to stop taking some of his diuretics.  These were then taken out of his pills by his wife.  Since that time he had significant lower extremity edema as well as increasing shortness of breath.  He presented to the hospital for further evaluation and was subsequently admitted for treatment of heart failure.  Patient has noted renal insufficiency and nephrology has been consulted but not yet seen the patient.  No significant chest pain, pressure.  Patient was given gentle diuretics and has responded nicely.  Is feeling better but is not back to 100%.  He typically follows with   [facility].  No reported syncope, near syncope.  Symptoms presented and got worse over the last 3 to 4 days.      Social History     Socioeconomic History   • Marital status:      Spouse name: Not on file   • Number of children: Not on file   • Years of education: Not on file   • Highest education level: Not on file   Tobacco Use   • Smoking status: Former Smoker     Packs/day: 1.50     Years: 48.00     Pack years: 72.00     Types: Cigarettes   • Smokeless tobacco: Never Used   Substance and Sexual Activity   • Alcohol use: Yes     Alcohol/week: 35.0 standard drinks     Types: 35 Shots of liquor per week     Comment: liquor daily 5-6   • Drug use: No   • Sexual activity: Defer     Family History   Problem Relation Age of Onset   • Dementia Mother    • Cancer Father         prostate   • No Known Problems Sister    • No Known Problems Brother    • No Known Problems Daughter          Review of Systems:  Review of Systems   Constitution: Positive for malaise/fatigue. Negative for fever.   HENT: Negative for nosebleeds.    Eyes: Negative for redness and visual disturbance.   Cardiovascular: Positive for leg swelling. Negative for palpitations and paroxysmal nocturnal dyspnea.   Respiratory: Positive for shortness of breath. Negative for cough, snoring, sputum production and wheezing.    Hematologic/Lymphatic: Negative for bleeding problem.   Skin: Negative for flushing, itching and rash.   Musculoskeletal: Positive for arthritis and joint pain. Negative for falls and muscle cramps.   Gastrointestinal: Negative for abdominal pain, diarrhea, heartburn, nausea and vomiting.   Genitourinary: Negative for hematuria.   Neurological: Positive for excessive daytime sleepiness. Negative for dizziness, headaches, tremors and weakness.   Psychiatric/Behavioral: Negative for substance abuse. The patient is not nervous/anxious.         Objective   Vitals:  /68 (BP Location: Right arm, Patient Position: Sitting)   Pulse 92  "  Temp 97.9 °F (36.6 °C) (Oral)   Resp 16   Ht 175 cm (68.9\")   Wt 67.2 kg (148 lb 3.2 oz)   SpO2 93%   BMI 21.95 kg/m²      Intake/Output Summary (Last 24 hours) at 2/16/2020 1126  Last data filed at 2/16/2020 0700  Gross per 24 hour   Intake 590 ml   Output 2425 ml   Net -1835 ml       Physical Exam   Constitutional: He is oriented to person, place, and time. He appears well-developed and well-nourished. No distress.   HENT:   Head: Normocephalic and atraumatic.   Eyes: Right eye exhibits no discharge. Left eye exhibits no discharge.   Neck: No JVD present. No tracheal deviation present.   Cardiovascular: Normal rate, regular rhythm, normal heart sounds and intact distal pulses. Exam reveals no friction rub.   No murmur heard.  Pulmonary/Chest: Effort normal. No respiratory distress.   Bibasilar crackles   Abdominal: Soft. Bowel sounds are normal. There is no tenderness.   Musculoskeletal: He exhibits edema (Trace bilateral pretibial). He exhibits no deformity.   Neurological: He is alert and oriented to person, place, and time.   Skin: Skin is warm and dry.            Results Review:  I reviewed the patient's new clinical results.  Results from last 7 days   Lab Units 02/16/20  0240   WBC 10*3/mm3 5.11   HEMOGLOBIN g/dL 12.0*   HEMATOCRIT % 35.4*   PLATELETS 10*3/mm3 163     Results from last 7 days   Lab Units 02/16/20  0240   SODIUM mmol/L 129*   POTASSIUM mmol/L 3.8   CHLORIDE mmol/L 86*   CO2 mmol/L 24.0   BUN mg/dL 40*   CREATININE mg/dL 2.32*   CALCIUM mg/dL 8.6   BILIRUBIN mg/dL 1.7*   ALK PHOS U/L 195*   ALT (SGPT) U/L 93*   AST (SGOT) U/L 50*   GLUCOSE mg/dL 99     Results from last 7 days   Lab Units 02/16/20  0240   SODIUM mmol/L 129*   POTASSIUM mmol/L 3.8   CHLORIDE mmol/L 86*   CO2 mmol/L 24.0   BUN mg/dL 40*   CREATININE mg/dL 2.32*   GLUCOSE mg/dL 99   CALCIUM mg/dL 8.6     Results from last 7 days   Lab Units 02/14/20 2013   INR  1.51*     No results found for: TROPONINI  Results from last " 7 days   Lab Units 02/15/20  0721   TSH uIU/mL 4.180         Results from last 7 days   Lab Units 02/16/20  0240   PROBNP pg/mL 42,458.0*           Tele:  SR    EKG: SR with LBBB      Assessment/Plan     1. Acute on chronic systolic congestive heart failure  2. Acute renal insufficiency  3. Hypertension  4. LBBB  5. Hyponatremia, nephrology consulted.      Plan:    1. Continue diuretics. Nephrology to see also regarding renal issues and volume management.   2. Increase coreg to 6.25 mg BID  3. No ACE/ARB/Entresto due to renal insufficiency  4. If needed may want to consider Isorsorbide/hydralazine combination  5. Possible candidate for BIV pacing. Will defer to patient's primary Cardiologist Dr. Richard who will assume cardiac care tomorrow.      FIONA Galicia obtained past medical, family history, social history, review of systems and functioned as a scribe for the remainder of the dictation for Dr. Michelle.      Dictated utilizing Dragon dictation    Patient seen and examined in face-to-face encounter.  Case discussed with nurse practitioner.  Jet is presenting with lower extremity edema, abdominal bloating and worsening dyspnea on exertion after missing diuretic doses.  Has responded well to overnight diuresis.  Physical exam is remarkable for normal S1-S2, mild bibasilar inspiratory rales and trivial posterior tibial edema.  Overall consistent with acute on chronic systolic congestive heart failure.  Continue gradual diuresis.  Titrating afterload reduction as blood pressure tolerates.

## 2020-02-16 NOTE — CONSULTS
Referring Provider: Radha Salazar  Reason for Consultation: GABRIELLE     Subjective     Chief complaint SOA    History of present illness:  This is 74 year old man with past medical hx of hypertension, HLD, systolic CHF with EF 30% and alcoholism who presented to ED with Shortness of breath. SOA has been getting progressive worse over 3 days and associated with leg swelling. Denies fever, chills, rigors, headache, nausea, vomiting, chest pain, dysuria, hematuria or hemoptysis. No hx of NSAID use. Has been complaint with his diuretics. He follows with [facility]. Nephrology service has been consulted for further management.     History  Past Medical History:   Diagnosis Date   • Cancer (CMS/HCC)    • CHF (congestive heart failure) (CMS/HCC)    • Hypertension    ,   Past Surgical History:   Procedure Laterality Date   • CARDIAC DEFIBRILLATOR PLACEMENT Bilateral 1998   • CARDIAC DEFIBRILLATOR REMOVAL Bilateral 2003   • CATARACT EXTRACTION Bilateral    • CERVICAL SPINE SURGERY Bilateral 2014   ,   Family History   Problem Relation Age of Onset   • Dementia Mother    • Cancer Father         prostate   • No Known Problems Sister    • No Known Problems Brother    • No Known Problems Daughter    ,   Social History     Tobacco Use   • Smoking status: Former Smoker     Packs/day: 1.50     Years: 48.00     Pack years: 72.00     Types: Cigarettes   • Smokeless tobacco: Never Used   Substance Use Topics   • Alcohol use: Yes     Alcohol/week: 35.0 standard drinks     Types: 35 Shots of liquor per week     Comment: liquor daily 5-6   • Drug use: No   ,   Medications Prior to Admission   Medication Sig Dispense Refill Last Dose   • allopurinol (ZYLOPRIM) 100 MG tablet Take 100 mg by mouth Daily.      • aspirin 81 MG chewable tablet Chew 81 mg daily.   3/19/2018 at Unknown time   • atorvastatin (LIPITOR) 20 MG tablet Take 20 mg by mouth Daily.   3/18/2018   • carvedilol (COREG) 3.125 MG tablet Take 3.125 mg by mouth 2 (two) times a day with  meals.   3/19/2018 at Unknown time   • furosemide (LASIX) 40 MG tablet Take 80 mg by mouth Every Other Day.   3/18/2018   • KLOR-CON 10 MEQ CR tablet TAKE ONE TABLET BY MOUTH DAILY 30 tablet 2 3/18/2018   • spironolactone (ALDACTONE) 25 MG tablet Take 50 mg by mouth Every Other Day.   3/18/2018   • vitamin B-12 (CYANOCOBALAMIN) 1000 MCG tablet Take 1,000 mcg by mouth Daily.      • HYDROcodone-acetaminophen (NORCO) 5-325 MG per tablet Take 1 tablet by mouth Every 6 (Six) Hours As Needed for Moderate Pain . 12 tablet 0    • levothyroxine (SYNTHROID, LEVOTHROID) 50 MCG tablet Take 50 mcg by mouth Daily.   3/18/2018   , Scheduled Meds:    allopurinol 100 mg Oral Daily   aspirin 81 mg Oral Daily   atorvastatin 20 mg Oral Daily   bumetanide 2 mg Intravenous BID   carvedilol 6.25 mg Oral BID With Meals   heparin (porcine) 5,000 Units Subcutaneous Q12H   pharmacy consult - MTM  Does not apply Daily   sodium chloride 10 mL Intravenous Q12H   spironolactone 25 mg Oral Daily   , Continuous Infusions:   , PRN Meds:  sodium chloride  •  sodium chloride and Allergies:  Lactose intolerance (gi) and Polysporin [bacitracin-polymyxin b]    Review of Systems  Pertinent items are noted in HPI    Objective     Vital Signs  Temp:  [97.4 °F (36.3 °C)-97.9 °F (36.6 °C)] 97.9 °F (36.6 °C)  Heart Rate:  [88-94] 92  Resp:  [16-18] 16  BP: (119-130)/(68-83) 129/68    No intake/output data recorded.  I/O last 3 completed shifts:  In: 590 [P.O.:590]  Out: 4250 [Urine:3900; Other:350]    Physical Exam:     General Appearance:    Alert, cooperative, in no acute distress   Head:    Normocephalic, without obvious abnormality, atraumatic   Eyes:            Lids and lashes normal, conjunctivae and sclerae normal, no   icterus, no pallor, corneas clear, PERRLA           Neck:   No adenopathy, supple, trachea midline, no thyromegaly, no   carotid bruit, no JVD       Lungs:     Clear to auscultation,respirations regular, even and                   unlabored    Heart:    Regular rhythm and normal rate, normal S1 and S2, no            murmur, no gallop, no rub, no click   Chest Wall:    No abnormalities observed   Abdomen:     Normal bowel sounds, no masses, no organomegaly, soft        non-tender, non-distended, no guarding, no rebound                tenderness   Rectal:     Deferred   Extremities:   Moves all extremities well, trace edema, no cyanosis, no             redness               Neurologic:   Cranial nerves 2 - 12 grossly intact, No focal deficit noted.        Results Review:   I reviewed the patient's new clinical results.    WBC WBC   Date Value Ref Range Status   02/16/2020 5.11 3.40 - 10.80 10*3/mm3 Final   02/15/2020 5.33 3.40 - 10.80 10*3/mm3 Final   02/14/2020 6.02 3.40 - 10.80 10*3/mm3 Final      HGB Hemoglobin   Date Value Ref Range Status   02/16/2020 12.0 (L) 13.0 - 17.7 g/dL Final   02/15/2020 11.7 (L) 13.0 - 17.7 g/dL Final   02/14/2020 12.5 (L) 13.0 - 17.7 g/dL Final      HCT Hematocrit   Date Value Ref Range Status   02/16/2020 35.4 (L) 37.5 - 51.0 % Final   02/15/2020 34.5 (L) 37.5 - 51.0 % Final   02/14/2020 38.0 37.5 - 51.0 % Final      Platlets No results found for: LABPLAT   MCV MCV   Date Value Ref Range Status   02/16/2020 88.7 79.0 - 97.0 fL Final   02/15/2020 91.3 79.0 - 97.0 fL Final   02/14/2020 91.6 79.0 - 97.0 fL Final          Sodium Sodium   Date Value Ref Range Status   02/16/2020 129 (L) 136 - 145 mmol/L Final   02/15/2020 128 (L) 136 - 145 mmol/L Final   02/14/2020 128 (L) 136 - 145 mmol/L Final      Potassium Potassium   Date Value Ref Range Status   02/16/2020 3.8 3.5 - 5.2 mmol/L Final   02/15/2020 3.9 3.5 - 5.2 mmol/L Final   02/14/2020 4.7 3.5 - 5.2 mmol/L Final      Chloride Chloride   Date Value Ref Range Status   02/16/2020 86 (L) 98 - 107 mmol/L Final   02/15/2020 90 (L) 98 - 107 mmol/L Final   02/14/2020 91 (L) 98 - 107 mmol/L Final      CO2 CO2   Date Value Ref Range Status   02/16/2020 24.0 22.0 - 29.0  mmol/L Final   02/15/2020 20.0 (L) 22.0 - 29.0 mmol/L Final   02/14/2020 22.0 22.0 - 29.0 mmol/L Final      BUN BUN   Date Value Ref Range Status   02/16/2020 40 (H) 8 - 23 mg/dL Final   02/15/2020 38 (H) 8 - 23 mg/dL Final   02/14/2020 35 (H) 8 - 23 mg/dL Final      Creatinine Creatinine   Date Value Ref Range Status   02/16/2020 2.32 (H) 0.76 - 1.27 mg/dL Final   02/15/2020 2.13 (H) 0.76 - 1.27 mg/dL Final   02/14/2020 2.43 (H) 0.76 - 1.27 mg/dL Final      Calcium Calcium   Date Value Ref Range Status   02/16/2020 8.6 8.6 - 10.5 mg/dL Final   02/15/2020 8.4 (L) 8.6 - 10.5 mg/dL Final   02/14/2020 8.8 8.6 - 10.5 mg/dL Final      PO4 No results found for: CAPO4   Albumin Albumin   Date Value Ref Range Status   02/16/2020 3.90 3.50 - 5.20 g/dL Final   02/14/2020 4.20 3.50 - 5.20 g/dL Final      Magnesium No results found for: MG   Uric Acid No results found for: URICACID           allopurinol 100 mg Oral Daily   aspirin 81 mg Oral Daily   atorvastatin 20 mg Oral Daily   bumetanide 2 mg Intravenous BID   carvedilol 6.25 mg Oral BID With Meals   heparin (porcine) 5,000 Units Subcutaneous Q12H   pharmacy consult - MTM  Does not apply Daily   sodium chloride 10 mL Intravenous Q12H   spironolactone 25 mg Oral Daily          Assessment/Plan       Acute on chronic congestive heart failure (CMS/HCC)    Essential hypertension    Hyperlipidemia    History of alcoholism (CMS/HCC)    GABRIELLE (acute kidney injury) (CMS/[facility])    Elevated liver enzymes    Hyperglycemia    Hyponatremia    Pleural effusion      1- GABRIELLE on CKD- non oliguric - Cardio-renal syndrome.    2- Volume overload  3- Respiratory distress secondary to volume overload  4- hyponatremia   5- Systolic CHF with EF 30%    Plan:-   - UA   - Urine lytes   - Monitor I/O   - Avoid nephrotoxic agents.   - Continue with Diuresis   - Adjust meds per renal function   - Fluid restriction   - no emergent need of dialysis.   - Will get labs from office tomorrow.      I discussed the  patients findings and my recommendations with patient and nursing staff    Ave Roberson MD  02/16/20  @NOW

## 2020-02-17 ENCOUNTER — TRANSCRIBE ORDERS (OUTPATIENT)
Dept: CARDIAC REHAB | Facility: HOSPITAL | Age: 75
End: 2020-02-17

## 2020-02-17 DIAGNOSIS — I50.23 ACUTE ON CHRONIC SYSTOLIC CHF (CONGESTIVE HEART FAILURE) (HCC): Primary | ICD-10-CM

## 2020-02-17 LAB
ALBUMIN SERPL-MCNC: 3.6 G/DL (ref 3.5–5.2)
ALBUMIN/GLOB SERPL: 1 G/DL
ALP SERPL-CCNC: 181 U/L (ref 39–117)
ALT SERPL W P-5'-P-CCNC: 65 U/L (ref 1–41)
ANION GAP SERPL CALCULATED.3IONS-SCNC: 16 MMOL/L (ref 5–15)
ANION GAP SERPL CALCULATED.3IONS-SCNC: 16 MMOL/L (ref 5–15)
ANION GAP SERPL CALCULATED.3IONS-SCNC: 18 MMOL/L (ref 5–15)
AST SERPL-CCNC: 26 U/L (ref 1–40)
BILIRUB SERPL-MCNC: 1.8 MG/DL (ref 0.2–1.2)
BUN BLD-MCNC: 41 MG/DL (ref 8–23)
BUN BLD-MCNC: 42 MG/DL (ref 8–23)
BUN BLD-MCNC: 44 MG/DL (ref 8–23)
BUN/CREAT SERPL: 17.7 (ref 7–25)
BUN/CREAT SERPL: 17.7 (ref 7–25)
BUN/CREAT SERPL: 18.5 (ref 7–25)
CALCIUM SPEC-SCNC: 8.1 MG/DL (ref 8.6–10.5)
CALCIUM SPEC-SCNC: 8.2 MG/DL (ref 8.6–10.5)
CALCIUM SPEC-SCNC: 8.3 MG/DL (ref 8.6–10.5)
CHLORIDE SERPL-SCNC: 79 MMOL/L (ref 98–107)
CHLORIDE SERPL-SCNC: 82 MMOL/L (ref 98–107)
CHLORIDE SERPL-SCNC: 83 MMOL/L (ref 98–107)
CO2 SERPL-SCNC: 26 MMOL/L (ref 22–29)
CO2 SERPL-SCNC: 27 MMOL/L (ref 22–29)
CO2 SERPL-SCNC: 28 MMOL/L (ref 22–29)
CREAT BLD-MCNC: 2.32 MG/DL (ref 0.76–1.27)
CREAT BLD-MCNC: 2.37 MG/DL (ref 0.76–1.27)
CREAT BLD-MCNC: 2.38 MG/DL (ref 0.76–1.27)
GFR SERPL CREATININE-BSD FRML MDRD: 27 ML/MIN/1.73
GFR SERPL CREATININE-BSD FRML MDRD: 27 ML/MIN/1.73
GFR SERPL CREATININE-BSD FRML MDRD: 28 ML/MIN/1.73
GLOBULIN UR ELPH-MCNC: 3.5 GM/DL
GLUCOSE BLD-MCNC: 151 MG/DL (ref 65–99)
GLUCOSE BLD-MCNC: 168 MG/DL (ref 65–99)
GLUCOSE BLD-MCNC: 91 MG/DL (ref 65–99)
POTASSIUM BLD-SCNC: 3.1 MMOL/L (ref 3.5–5.2)
POTASSIUM BLD-SCNC: 3.3 MMOL/L (ref 3.5–5.2)
POTASSIUM BLD-SCNC: 3.4 MMOL/L (ref 3.5–5.2)
PROT SERPL-MCNC: 7.1 G/DL (ref 6–8.5)
SODIUM BLD-SCNC: 122 MMOL/L (ref 136–145)
SODIUM BLD-SCNC: 126 MMOL/L (ref 136–145)
SODIUM BLD-SCNC: 127 MMOL/L (ref 136–145)

## 2020-02-17 PROCEDURE — 25010000002 HEPARIN (PORCINE) PER 1000 UNITS: Performed by: INTERNAL MEDICINE

## 2020-02-17 PROCEDURE — 97165 OT EVAL LOW COMPLEX 30 MIN: CPT

## 2020-02-17 PROCEDURE — 94799 UNLISTED PULMONARY SVC/PX: CPT

## 2020-02-17 PROCEDURE — 99232 SBSQ HOSP IP/OBS MODERATE 35: CPT | Performed by: PHYSICIAN ASSISTANT

## 2020-02-17 PROCEDURE — 80053 COMPREHEN METABOLIC PANEL: CPT | Performed by: INTERNAL MEDICINE

## 2020-02-17 PROCEDURE — 97162 PT EVAL MOD COMPLEX 30 MIN: CPT

## 2020-02-17 RX ORDER — FUROSEMIDE 40 MG/1
80 TABLET ORAL DAILY
Status: DISCONTINUED | OUTPATIENT
Start: 2020-02-18 | End: 2020-02-18

## 2020-02-17 RX ORDER — TEMAZEPAM 15 MG/1
15 CAPSULE ORAL NIGHTLY
Status: DISCONTINUED | OUTPATIENT
Start: 2020-02-17 | End: 2020-02-20 | Stop reason: HOSPADM

## 2020-02-17 RX ORDER — TOLVAPTAN 15 MG/1
7.5 TABLET ORAL ONCE
Status: COMPLETED | OUTPATIENT
Start: 2020-02-17 | End: 2020-02-17

## 2020-02-17 RX ADMIN — SPIRONOLACTONE 25 MG: 25 TABLET ORAL at 08:07

## 2020-02-17 RX ADMIN — ATORVASTATIN CALCIUM 20 MG: 20 TABLET, FILM COATED ORAL at 08:07

## 2020-02-17 RX ADMIN — TOLVAPTAN 7.5 MG: 15 TABLET ORAL at 15:33

## 2020-02-17 RX ADMIN — SODIUM CHLORIDE, PRESERVATIVE FREE 10 ML: 5 INJECTION INTRAVENOUS at 08:10

## 2020-02-17 RX ADMIN — CARVEDILOL 6.25 MG: 6.25 TABLET, FILM COATED ORAL at 18:01

## 2020-02-17 RX ADMIN — HEPARIN SODIUM 5000 UNITS: 5000 INJECTION, SOLUTION INTRAVENOUS; SUBCUTANEOUS at 21:16

## 2020-02-17 RX ADMIN — ASPIRIN 81 MG 81 MG: 81 TABLET ORAL at 08:08

## 2020-02-17 RX ADMIN — TEMAZEPAM 15 MG: 15 CAPSULE ORAL at 21:16

## 2020-02-17 RX ADMIN — ALLOPURINOL 100 MG: 100 TABLET ORAL at 08:08

## 2020-02-17 RX ADMIN — HEPARIN SODIUM 5000 UNITS: 5000 INJECTION, SOLUTION INTRAVENOUS; SUBCUTANEOUS at 08:07

## 2020-02-17 RX ADMIN — BUMETANIDE 2 MG: 0.25 INJECTION INTRAMUSCULAR; INTRAVENOUS at 08:08

## 2020-02-17 RX ADMIN — CARVEDILOL 6.25 MG: 6.25 TABLET, FILM COATED ORAL at 08:07

## 2020-02-17 NOTE — THERAPY EVALUATION
Acute Care - Occupational Therapy Initial Evaluation  [facility]     Patient Name: Jet Floyd  : 1945  MRN: [id number]  Today's Date: 2020     Date of Referral to OT: 20       Admit Date: 2020       ICD-10-CM ICD-9-CM   1. Acute on chronic congestive heart failure, unspecified heart failure type (CMS/HCC) I50.9 428.0   2. Renal insufficiency N28.9 593.9   3. Pleural effusion J90 511.9   4. Hyponatremia E87.1 276.1   5. Impaired mobility and ADLs Z74.09 799.89     Patient Active Problem List   Diagnosis   • Sepsis (CMS/HCC)   • Left lower lobe pneumonia (CMS/HCC)   • Essential hypertension   • Hyperlipidemia   • Alcohol abuse, daily use   • Former smoker   • Dermatitis   • Balance problem   • Hypoglycemia   • Metabolic encephalopathy   • Lactic acidosis   • Acute on chronic congestive heart failure (CMS/HCC)   • History of alcoholism (CMS/HCC)   • GABRIELLE (acute kidney injury) (CMS/HCC)   • Elevated liver enzymes   • Hyperglycemia   • Hyponatremia   • Pleural effusion     Past Medical History:   Diagnosis Date   • Cancer (CMS/HCC)    • CHF (congestive heart failure) (CMS/HCC)    • Hypertension      Past Surgical History:   Procedure Laterality Date   • CARDIAC DEFIBRILLATOR PLACEMENT Bilateral    • CARDIAC DEFIBRILLATOR REMOVAL Bilateral    • CATARACT EXTRACTION Bilateral    • CERVICAL SPINE SURGERY Bilateral           OT ASSESSMENT FLOWSHEET (last 12 hours)      Occupational Therapy Evaluation     Row Name 20 0755                   OT Evaluation Time/Intention    Subjective Information  complains of;weakness;fatigue  -KF        Document Type  evaluation  -KF        Mode of Treatment  occupational therapy  -KF        Patient Effort  excellent  -KF        Symptoms Noted During/After Treatment  none  -KF           General Information    Patient Profile Reviewed?  yes  -KF        Patient Observations  alert;cooperative;agree to therapy  -KF        Prior Level of Function   independent:;all household mobility;community mobility;gait;transfer;bed mobility;ADL's  -KF        Equipment Currently Used at Home  cane, quad;shower chair  -KF        Existing Precautions/Restrictions  fall  -KF        Risks Reviewed  patient:;LOB;nausea/vomiting;dizziness;increased discomfort  -KF        Benefits Reviewed  patient:;improve function;increase independence;increase strength;increase balance;decrease pain;increase knowledge  -KF        Barriers to Rehab  none identified  -KF           Relationship/Environment    Primary Source of Support/Comfort  spouse  -KF        Lives With  spouse  -KF        Family Caregiver if Needed  spouse  -KF           Resource/Environmental Concerns    Current Living Arrangements  home/apartment/condo  -KF           Home Main Entrance    Number of Stairs, Main Entrance  one garage door  -KF           Stairs Within Home, Primary    Stairs, Within Home, Primary  6+7  -KF        Stair Railings, Within Home, Primary  railings on both sides of stairs  -KF           Cognitive Assessment/Interventions    Additional Documentation  Cognitive Assessment/Intervention (Group)  -KF           Cognitive Assessment/Intervention- PT/OT    Affect/Mental Status (Cognitive)  WNL  -KF        Orientation Status (Cognition)  oriented x 4  -KF        Follows Commands (Cognition)  WNL;follows one step commands;over 90% accuracy  -KF        Cognitive Function (Cognitive)  safety deficit  -KF        Safety Deficit (Cognitive)  mild deficit;safety precautions awareness  -KF        Cognitive Interventions (Cognitive)  occupation/activity based interventions;process/task specific training  -KF           Safety Issues, Functional Mobility    Safety Issues Affecting Function (Mobility)  insight into deficits/self awareness;safety precaution awareness  -KF        Impairments Affecting Function (Mobility)  balance;endurance/activity tolerance  -KF           Bed Mobility Assessment/Treatment    Bed Mobility  "Assessment/Treatment  rolling right;scooting/bridging;supine-sit  -KF        Rolling Right [place] (Bed Mobility)  conditional independence  -KF        Scooting/Bridging [place] (Bed Mobility)  conditional independence  -KF        Supine-Sit [place] (Bed Mobility)  conditional independence  -KF        Bed Mobility, Safety Issues  other (see comments) good safety for bed mob   -KF           Functional Mobility    Functional Mobility- Ind. Level  contact guard assist;standby assist  -KF        Functional Mobility- Device  quad cane;other (see comments) \"hurrycane\"  -KF        Functional Mobility-Distance (Feet)  16  -KF        Functional Mobility- Safety Issues  weight-shifting ability decreased  -KF        Functional Mobility- Comment  slightly unsteady with mobilty no LOB, progressed to SBA   -KF           Transfer Assessment/Treatment    Transfer Assessment/Treatment  sit-stand transfer;stand-sit transfer;bed-chair transfer;chair-bed transfer  -KF        Comment (Transfers)  min Vc's for seq   -KF           Bed-Chair Transfer    Bed-Chair [place] (Transfers)  stand by assist;verbal cues  -KF        Assistive Device (Bed-Chair Transfers)  cane, quad  -KF           Chair-Bed Transfer    Chair-Bed [place] (Transfers)  stand by assist;verbal cues  -KF        Assistive Device (Chair-Bed Transfers)  cane, quad  -KF           Sit-Stand Transfer    Sit-Stand [place] (Transfers)  stand by assist;verbal cues  -KF        Assistive Device (Sit-Stand Transfers)  cane, quad  -KF           Stand-Sit Transfer    Stand-Sit [place] (Transfers)  stand by assist  -KF        Assistive Device (Stand-Sit Transfers)  cane, quad  -KF           ADL Assessment/Intervention    BADL Assessment/Intervention  lower body dressing;upper body dressing  -KF           Upper Body Dressing Assessment/Training    Upper Body Dressing [place] Level  don;front opening garment;set up  -KF        Upper Body Dressing " Position  edge of bed sitting  -KF        Comment (Upper Body Dressing)  good AROM, no assist required   -KF           Lower Body Dressing Assessment/Training    Lower Body Dressing [place] Level  don;doff;socks;set up  -KF        Lower Body Dressing Position  edge of bed sitting  -KF        Comment (Lower Body Dressing)  no LOB   -KF           BADL Safety/Performance    Impairments, BADL Safety/Performance  balance;endurance/activity tolerance  -KF        Skilled BADL Treatment/Intervention  BADL process/adaptation training;cognitive/safety deficit modifications  -KF        Progress in BADL Status  improvement noted  -KF           General ROM    GENERAL ROM COMMENTS  BUE WFL   -KF           MMT (Manual Muscle Testing)    General MMT Comments  BUE grossly 4+/5  -KF           Motor Assessment/Interventions    Additional Documentation  Balance (Group);Balance Interventions (Group);Fine Motor Testing & Training (Group);Gross Motor Coordination (Group)  -KF           Gross Motor Coordination    Gross Motor Impairments  finger to nose;AROM (active range of motion)  -KF        Gross Motor Skill, Impairments Detail  WNL BUE   -KF           Balance    Balance  static sitting balance;static standing balance;dynamic sitting balance;dynamic standing balance  -KF           Static Sitting Balance    Level of [place] (Unsupported Sitting, Static Balance)  independent  -KF        Sitting Position (Unsupported Sitting, Static Balance)  sitting in chair;sitting on edge of bed  -KF           Dynamic Sitting Balance    Level of [place], Reaches Outside Midline (Sitting, Dynamic Balance)  independent  -KF        Sitting Position, Reaches Outside Midline (Sitting, Dynamic Balance)  sitting in chair;sitting on edge of bed  -KF        Comment, Reaches Outside Midline (Sitting, Dynamic Balance)  no LOB in sitting   -KF           Static Standing Balance    Level of [place] (Supported Standing, Static Balance)  standby  assist  -KF        Time Able to Maintain Position (Supported Standing, Static Balance)  1 to 2 minutes  -KF        Assistive Device Utilized (Supported Standing, Static Balance)  cane, quad  -KF           Dynamic Standing Balance    Level of [place], Reaches Outside Midline (Standing, Dynamic Balance)  contact guard assist  -KF        Time Able to Maintain Position, Reaches Outside Midline (Standing, Dynamic Balance)  1 to 2 minutes  -KF        Assistive Device Utilized (Supported Standing, Dynamic Balance)  cane, quad  -KF        Comment, Reaches Outside Midline (Standing, Dynamic Balance)  moments SBA   -KF           Fine Motor Testing & Training    Training Activity, Fine Motor Coordination  manipulation of eating utensils  -KF        Comment, Fine Motor Coordination  WFL demo during med management with RN   -KF           Sensory Assessment/Intervention    Sensory General Assessment  no sensation deficits identified  -KF           Positioning and Restraints    Pre-Treatment Position  in bed  -KF        Post Treatment Position  chair  -KF        In Chair  notified nsg;reclined;sitting;call light within reach;encouraged to call for assist;with nsg;legs elevated RN waved exit   -KF           Pain Assessment    Additional Documentation  Pain Scale: Numbers Pre/Post-Treatment (Group)  -KF           Pain Scale: Numbers Pre/Post-Treatment    Pain Scale: Numbers, Pretreatment  0/10 - no pain  -KF        Pain Scale: Numbers, Post-Treatment  0/10 - no pain  -KF        Pain Intervention(s)  Repositioned;Ambulation/increased activity  -KF           Plan of Care Review    Plan of Care Reviewed With  patient  -KF        Progress  improving  -KF           Clinical Impression (OT)    Date of Referral to OT  02/16/20  -KF        OT Diagnosis  ADL decline   -KF        Patient/Family Goals Statement (OT Eval)  PLOF   -KF        Criteria for Skilled Therapeutic Interventions Met (OT Eval)  yes;treatment indicated  -KF         Rehab Potential (OT Eval)  good, to achieve stated therapy goals  -KF        Therapy Frequency (OT Eval)  daily  -KF        Care Plan Review (OT)  evaluation/treatment results reviewed;care plan/treatment goals reviewed;risks/benefits reviewed;current/potential barriers reviewed;patient/other agree to care plan  -KF        Anticipated Equipment Needs at Discharge (OT)  -- TBD  -KF        Anticipated Discharge Disposition (OT)  home with assist  -KF           Vital Signs    Pre Systolic BP Rehab  118  -KF        Pre Treatment Diastolic BP  78  -KF        Post Systolic BP Rehab  120  -KF        Post Treatment Diastolic BP  67  -KF        Pretreatment Heart Rate (beats/min)  95  -KF        Posttreatment Heart Rate (beats/min)  88  -KF        Pre SpO2 (%)  95  -KF        O2 Delivery Pre Treatment  room air  -KF        Pre Patient Position  Supine  -KF        Intra Patient Position  Standing  -KF        Post Patient Position  Sitting  -KF           Planned OT Interventions    Planned Therapy Interventions (OT Eval)  activity tolerance training;adaptive equipment training;BADL retraining;cognitive/visual perception retraining;edema control/reduction;functional balance retraining;neuromuscular control/coordination retraining;IADL retraining;occupation/activity based interventions;patient/caregiver education/training;ROM/therapeutic exercise;strengthening exercise;transfer/mobility retraining  -KF           OT Goals    Transfer Goal Selection (OT)  transfer, OT goal 1  -KF        Bathing Goal Selection (OT)  bathing, OT goal 1  -KF        Toileting Goal Selection (OT)  toileting, OT goal 1  -KF           Transfer Goal 1 (OT)    Activity/Assistive Device (Transfer Goal 1, OT)  toilet;cane, quad  -KF        [place] Level/Cues Needed (Transfer Goal 1, OT)  supervision required  -KF        Time Frame (Transfer Goal 1, OT)  long term goal (LTG);by discharge  -KF        Progress/Outcome (Transfer Goal 1, OT)  goal ongoing   -KF           Bathing Goal 1 (OT)    Activity/Assistive Device (Bathing Goal 1, OT)  upper body bathing;lower body bathing;shower chair AE prn  -KF        [place] Level/Cues Needed (Bathing Goal 1, OT)  set-up required  -KF        Time Frame (Bathing Goal 1, OT)  long term goal (LTG);by discharge  -KF        Progress/Outcomes (Bathing Goal 1, OT)  goal ongoing  -KF           Toileting Goal 1 (OT)    Activity/Device (Toileting Goal 1, OT)  adjust/manage clothing;perform perineal hygiene;commode;grab bar/safety frame  -KF        [place] Level/Cues Needed (Toileting Goal 1, OT)  supervision required  -KF        Time Frame (Toileting Goal 1, OT)  long term goal (LTG);by discharge  -KF        Progress/Outcome (Toileting Goal 1, OT)  goal ongoing  -KF           Living Environment    Home Accessibility  stairs within home;stairs to enter home;tub/shower is not walk in  -KF          User Key  (r) = Recorded By, (t) = Taken By, (c) = Cosigned By    Initials Name Effective Dates    Emerald dHz, OT 04/03/18 -                OT Recommendation and Plan  Outcome Summary/Treatment Plan (OT)  Anticipated Equipment Needs at Discharge (OT): (TBD)  Anticipated Discharge Disposition (OT): home with assist  Planned Therapy Interventions (OT Eval): activity tolerance training, adaptive equipment training, BADL retraining, cognitive/visual perception retraining, edema control/reduction, functional balance retraining, neuromuscular control/coordination retraining, IADL retraining, occupation/activity based interventions, patient/caregiver education/training, ROM/therapeutic exercise, strengthening exercise, transfer/mobility retraining  Therapy Frequency (OT Eval): daily  Plan of Care Review  Plan of Care Reviewed With: patient  Plan of Care Reviewed With: patient  Outcome Summary: OT completed Pt presents with defictis in balance and activity tolerance compared to baseline ADL completion, recom IPOT d/c home with  assist    Outcome Measures     Row Name 02/17/20 0755             How much help from another is currently needed...    Putting on and taking off regular lower body clothing?  3  -KF      Bathing (including washing, rinsing, and drying)  3  -KF      Toileting (which includes using toilet bed pan or urinal)  3  -KF      Putting on and taking off regular upper body clothing  4  -KF      Taking care of personal grooming (such as brushing teeth)  4  -KF      Eating meals  4  -KF      AM-PAC 6 Clicks Score (OT)  21  -KF         Functional Assessment    Outcome Measure Options  AM-PAC 6 Clicks Daily Activity (OT)  -KF        User Key  (r) = Recorded By, (t) = Taken By, (c) = Cosigned By    Initials Name Provider Type    Emerald Hdz OT Occupational Therapist          Time Calculation:   Time Calculation- OT     Row Name 02/17/20 0755             Time Calculation- OT    OT Start Time  0755  -KF      Total Timed Code Minutes- OT  0 minute(s)  -KF      OT Received On  02/17/20  -KF      OT Goal Re-Cert Due Date  02/27/20  -KF        User Key  (r) = Recorded By, (t) = Taken By, (c) = Cosigned By    Initials Name Provider Type    Emerald Hdz OT Occupational Therapist        Therapy Charges for Today     Code Description Service Date Service Provider Modifiers Qty    [id number]  OT EVAL LOW COMPLEXITY 4 2/17/2020 Emerald Elizondo OT GO 1               Emerald Elizondo OT  2/17/2020

## 2020-02-17 NOTE — NURSING NOTE
Pt. Referred for Phase II Cardiac Rehab. Staff discussed benefits of exercise, program protocol, and educational material provided. Teach back verified.  Patient scheduled for orientation at [facility] on .Pt. Referred for Phase II Cardiac Rehab. Staff discussed benefits of exercise, program protocol, and educational material provided. Teach back verified.  Patient scheduled for orientation at [facility] on April 1, 2020 at 1:00 pm.

## 2020-02-17 NOTE — PROGRESS NOTES
"                                 [facility] Progress Note      LOS: 3 days   Patient Care Team:  Obi Soriano MD as PCP - General  Obi Soriano MD as PCP - Family Medicine    Chief Complaint:    Chief Complaint   Patient presents with   • Shortness of Breath       Subjective     Interval History:     Patient Complaints: No chest pain or dyspnea today.      Review of Systems:   A 14 point review of systems was negative except as was stated in the HPI      Objective     Vital Sign Min/Max for last 24 hours  Temp  Min: 97.3 °F (36.3 °C)  Max: 98.5 °F (36.9 °C)   BP  Min: 100/69  Max: 124/90   Pulse  Min: 83  Max: 96   Resp  Min: 16  Max: 18   SpO2  Min: 90 %  Max: 96 %   No data recorded   No data recorded     Flowsheet Rows      First Filed Value   Admission Height  175.3 cm (69\") Documented at 02/14/2020 2006   Admission Weight  68 kg (150 lb) Documented at 02/14/2020 2006          Physical Exam:  General Appearance: Alert, appears stated age and cooperative  Lungs: Clear to auscultation  Heart:: RRR   No Murmurs, Rubs or Gallops  Abdomen: Soft and nontender with adequate bowel sounds.  No organomegaly  Extremities: No cyanosis, clubbing or edema  Pulses: Pulses palpable and equal bilaterally  Skin: Warm and dry with no rash  Psych: Normal     Results Review:     I reviewed the patient's new clinical results.  Results from last 7 days   Lab Units 02/16/20  0240 02/15/20  0721 02/14/20  2013   SODIUM mmol/L 129* 128* 128*   POTASSIUM mmol/L 3.8 3.9 4.7   CHLORIDE mmol/L 86* 90* 91*   CO2 mmol/L 24.0 20.0* 22.0   BUN mg/dL 40* 38* 35*   CREATININE mg/dL 2.32* 2.13* 2.43*   GLUCOSE mg/dL 99 95 149*   CALCIUM mg/dL 8.6 8.4* 8.8     Results from last 7 days   Lab Units 02/16/20  0240 02/15/20  0721 02/14/20  2013   WBC 10*3/mm3 5.11 5.33 6.02   HEMOGLOBIN g/dL 12.0* 11.7* 12.5*   HEMATOCRIT % 35.4* 34.5* 38.0   PLATELETS 10*3/mm3 163 161 169     Lab Results   Lab Value Date/Time    " TROPONINT <0.010 02/15/2020 0721    TROPONINT <0.010 02/15/2020 0158    TROPONINT <0.010 02/14/2020 2013         Results from last 7 days   Lab Units 02/14/20 2013   INR  1.51*               Medication Review: yes  Current Facility-Administered Medications   Medication Dose Route Frequency Provider Last Rate Last Dose   • allopurinol (ZYLOPRIM) tablet 100 mg  100 mg Oral Daily Radha Salazar, DO   100 mg at 02/17/20 0808   • aspirin chewable tablet 81 mg  81 mg Oral Daily Radha Salazar, DO   81 mg at 02/17/20 0808   • atorvastatin (LIPITOR) tablet 20 mg  20 mg Oral Daily Radha Salazar DO   20 mg at 02/17/20 0807   • bumetanide (BUMEX) injection 2 mg  2 mg Intravenous BID Radha Salazar, DO   2 mg at 02/17/20 0808   • carvedilol (COREG) tablet 6.25 mg  6.25 mg Oral BID With Meals Emily Barron APRN   6.25 mg at 02/17/20 0807   • heparin (porcine) 5000 UNIT/ML injection 5,000 Units  5,000 Units Subcutaneous Q12H Radha Salazar DO   5,000 Units at 02/17/20 0807   • Pharmacy Consult - MTM   Does not apply Daily Mike Scott RPH   Stopped at 02/15/20 1548   • sodium chloride 0.9 % flush 10 mL  10 mL Intravenous PRN Radha Salazar R, DO       • sodium chloride 0.9 % flush 10 mL  10 mL Intravenous Q12H Radha Salazar, DO   10 mL at 02/17/20 0810   • sodium chloride 0.9 % flush 10 mL  10 mL Intravenous PRN Radha Salazar R, DO       • spironolactone (ALDACTONE) tablet 25 mg  25 mg Oral Daily Radha Salazar DO   25 mg at 02/17/20 0807         Acute on chronic congestive heart failure (CMS/HCC)    Essential hypertension    Hyperlipidemia    History of alcoholism (CMS/HCC)    GABRIELLE (acute kidney injury) (CMS/[facility])    Elevated liver enzymes    Hyperglycemia    Hyponatremia    Pleural effusion        Impression      History of nonischemic cardiomyopathy  Ejection fraction 20% with moderate to severe MR by echocardiography February 2020  Decompensated heart failure presently well treated  Chronic  kidney disease  Left bundle branch block            Plan     Continue present medications.  After present illness resolves the patient would be a candidate for biventricular pacing and/or ICD backup.  I will follow this up as an outpatient.              Mynor Richard MD  02/17/20  8:20 AM

## 2020-02-17 NOTE — THERAPY EVALUATION
Patient Name: Jet Floyd  : 1945    MRN: [id number]                              Today's Date: 2020       Admit Date: 2020    Visit Dx:     ICD-10-CM ICD-9-CM   1. Acute on chronic congestive heart failure, unspecified heart failure type (CMS/HCC) I50.9 428.0   2. Renal insufficiency N28.9 593.9   3. Pleural effusion J90 511.9   4. Hyponatremia E87.1 276.1   5. Impaired mobility and ADLs Z74.09 799.89     Patient Active Problem List   Diagnosis   • Sepsis (CMS/HCC)   • Left lower lobe pneumonia (CMS/HCC)   • Essential hypertension   • Hyperlipidemia   • Alcohol abuse, daily use   • Former smoker   • Dermatitis   • Balance problem   • Hypoglycemia   • Metabolic encephalopathy   • Lactic acidosis   • Acute on chronic congestive heart failure (CMS/HCC)   • History of alcoholism (CMS/HCC)   • GABRIELLE (acute kidney injury) (CMS/HCC)   • Elevated liver enzymes   • Hyperglycemia   • Hyponatremia   • Pleural effusion     Past Medical History:   Diagnosis Date   • Cancer (CMS/HCC)    • CHF (congestive heart failure) (CMS/HCC)    • Hypertension      Past Surgical History:   Procedure Laterality Date   • CARDIAC DEFIBRILLATOR PLACEMENT Bilateral    • CARDIAC DEFIBRILLATOR REMOVAL Bilateral    • CATARACT EXTRACTION Bilateral    • CERVICAL SPINE SURGERY Bilateral      General Information     Row Name 20 1111          PT Evaluation Time/Intention    Document Type  evaluation  -SC     Mode of Treatment  physical therapy  -SC     Row Name 20 1111          General Information    Patient Profile Reviewed?  yes  -SC     Existing Precautions/Restrictions  fall HX OF FALLS  -SC     Row Name 20 1111          Relationship/Environment    Lives With  spouse  -SC     Row Name 20 1111          Resource/Environmental Concerns    Current Living Arrangements  home/apartment/condo  -SC     Row Name 20 1111          Home Main Entrance    Number of Stairs, Main Entrance  one  -SC      Row Name 02/17/20 1111          Stairs Within Home, Primary    Stairs, Within Home, Primary  6X2   -SC     Stair Railings, Within Home, Primary  railings safe and in good condition  -SC     Row Name 02/17/20 1111          Cognitive Assessment/Intervention- PT/OT    Orientation Status (Cognition)  oriented x 3  -SC     Cognitive Assessment/Intervention Comment  ALERT, FOLLOWING COMMANDS  -SC     Row Name 02/17/20 1111          Safety Issues, Functional Mobility    Impairments Affecting Function (Mobility)  balance;endurance/activity tolerance;motor control;coordination  -SC       User Key  (r) = Recorded By, (t) = Taken By, (c) = Cosigned By    Initials Name Provider Type    SC Altagracia, Shearon A, PT Physical Therapist        Mobility     Row Name 02/17/20 1112          Bed Mobility Assessment/Treatment    Comment (Bed Mobility)  UIC  -SC     Row Name 02/17/20 1112          Sit-Stand Transfer    Sit-Stand [place] (Transfers)  stand by assist;verbal cues  -SC     Assistive Device (Sit-Stand Transfers)  cane, quad  -SC     Row Name 02/17/20 1112          Gait/Stairs Assessment/Training    Gait/Stairs Assessment/Training  gait/ambulation independence  -SC     [place] Level (Gait)  contact guard  -SC     Assistive Device (Gait)  cane, quad  -SC     Distance in Feet (Gait)  70  -SC     Pattern (Gait)  step-through  -SC     Deviations/Abnormal Patterns (Gait)  base of support, narrow;ataxic;gait speed decreased;stride length decreased  -SC     Left Sided Gait Deviations  heel strike decreased  -SC     Right Sided Gait Deviations  heel strike decreased  -SC     Comment (Gait/Stairs)  Patient demonstrated unsteady ambulation with some sway on turns,   -SC       User Key  (r) = Recorded By, (t) = Taken By, (c) = Cosigned By    Initials Name Provider Type    SC Altagracia, Shearon A, PT Physical Therapist        Obj/Interventions     Row Name 02/17/20 1120          General ROM    GENERAL ROM COMMENTS  wfl  -SC     Row Name  02/17/20 1120          MMT (Manual Muscle Testing)    General MMT Comments  B LE grossly 4/5  -SC     Row Name 02/17/20 1120          Therapeutic Exercise    Lower Extremity (Therapeutic Exercise)  LAQ (long arc quad), bilateral  -SC     Sets/Reps (Therapeutic Exercise)  10  -SC     Row Name 02/17/20 1120          Dynamic Standing Balance    Level of [place], Reaches Outside Midline (Standing, Dynamic Balance)  contact guard assist  -SC     Assistive Device Utilized (Supported Standing, Dynamic Balance)  cane, quad  -[facility] Name 02/17/20 1123          Sensory Assessment/Intervention    Sensory General Assessment  -- L LE with impaired coordination  -SC       User Key  (r) = Recorded By, (t) = Taken By, (c) = Cosigned By    Initials Name Provider Type    SC Tisha Frias, PT Physical Therapist        Goals/Plan     [facility] Name 02/17/20 1122          Bed Mobility Goal 1 (PT)    Activity/Assistive Device (Bed Mobility Goal 1, PT)  bed mobility activities, all  -SC     [place] Level/Cues Needed (Bed Mobility Goal 1, PT)  conditional independence  -SC     Time Frame (Bed Mobility Goal 1, PT)  long term goal (LTG);10 days  -[facility] Name 02/17/20 1122          Transfer Goal 1 (PT)    Activity/Assistive Device (Transfer Goal 1, PT)  transfers, all;cane, quad  -SC     [place] Level/Cues Needed (Transfer Goal 1, PT)  conditional independence  -SC     Time Frame (Transfer Goal 1, PT)  long term goal (LTG)  -[facility] Name 02/17/20 1122          Gait Training Goal 1 (PT)    Activity/Assistive Device (Gait Training Goal 1, PT)  gait (walking locomotion);cane, quad  -SC     [place] Level (Gait Training Goal 1, PT)  supervision required  -SC     Distance (Gait Goal 1, PT)  150  -SC     Time Frame (Gait Training Goal 1, PT)  long term goal (LTG);10 days  -[facility] Name 02/17/20 1122          Stairs Goal 1 (PT)    Activity/Assistive Device (Stairs Goal 1, PT)  stairs, all skills  -SC     [place]  Level/Cues Needed (Stairs Goal 1, PT)  contact guard assist  -SC     Number of Stairs (Stairs Goal 1, PT)  6  -SC     Time Frame (Stairs Goal 1, PT)  long term goal (LTG)  -SC       User Key  (r) = Recorded By, (t) = Taken By, (c) = Cosigned By    Initials Name Provider Type    SC Tisha Frias, PT Physical Therapist        Clinical Impression     [facility] Name 02/17/20 1121          Pain Assessment    Additional Documentation  Pain Scale: FACES Pre/Post-Treatment (Group)  -SC     Row Name 02/17/20 1121          Pain Scale: Numbers Pre/Post-Treatment    Pain Location - Side  Bilateral  -SC     Pain Location  foot  -SC     Pain Intervention(s)  Repositioned  -SC     Row Name 02/17/20 1121          Pain Scale: FACES Pre/Post-Treatment    Pain: FACES Scale, Pretreatment  2-->hurts little bit  -SC     Pain: FACES Scale, Post-Treatment  2-->hurts little bit  -SC     Row Name 02/17/20 1121          Plan of Care Review    Plan of Care Reviewed With  patient  -SC     Row Name 02/17/20 1121          Physical Therapy Clinical Impression    Criteria for Skilled Interventions Met (PT Clinical Impression)  treatment indicated  -SC     Rehab Potential (PT Clinical Summary)  good, to achieve stated therapy goals  -SC     Row Name 02/17/20 1121          Vital Signs    Post Systolic BP Rehab  112  -SC     Post Treatment Diastolic BP  62  -SC     Posttreatment Heart Rate (beats/min)  96  -SC     Row Name 02/17/20 1121          Positioning and Restraints    Pre-Treatment Position  sitting in chair/recliner  -SC     Post Treatment Position  chair  -SC     In Chair  reclined;sitting;notified nsg;call light within reach  -SC       User Key  (r) = Recorded By, (t) = Taken By, (c) = Cosigned By    Initials Name Provider Type    SC Tisha Frias, PT Physical Therapist        Outcome Measures     Row Name 02/17/20 1124          How much help from another person do you currently need...    Turning from your back to your side while in flat bed  without using bedrails?  3  -SC     Moving from lying on back to sitting on the side of a flat bed without bedrails?  3  -SC     Moving to and from a bed to a chair (including a wheelchair)?  3  -SC     Standing up from a chair using your arms (e.g., wheelchair, bedside chair)?  3  -SC     Climbing 3-5 steps with a railing?  3  -SC     To walk in hospital room?  3  -SC     AM-PAC 6 Clicks Score (PT)  18  -SC     Row Name 02/17/20 1124          Functional Assessment    Outcome Measure Options  AM-PAC 6 Clicks Basic Mobility (PT)  -SC       User Key  (r) = Recorded By, (t) = Taken By, (c) = Cosigned By    Initials Name Provider Type    SC Tisha Frias PT Physical Therapist          PT Recommendation and Plan  Planned Therapy Interventions (PT Eval): balance training, gait training, bed mobility training, home exercise program, patient/family education, stair training, transfer training, strengthening  Outcome Summary/Treatment Plan (PT)  Anticipated Discharge Disposition (PT): home with home health  Plan of Care Reviewed With: patient  Outcome Summary: Patient presents with impaired strength and coordination affecting safe ambulation. Recommend continued skilled PT with home with home health     Time Calculation:   PT Charges     Row Name 02/17/20 0843             Time Calculation    Start Time  0843  -SC      PT Received On  02/17/20  -SC      PT Goal Re-Cert Due Date  02/27/20  -SC        User Key  (r) = Recorded By, (t) = Taken By, (c) = Cosigned By    Initials Name Provider Type    SC Tisha Frias PT Physical Therapist        Therapy Charges for Today     Code Description Service Date Service Provider Modifiers Qty    25549578975 HC PT EVAL MOD COMPLEXITY 4 2/17/2020 Tisha Frias PT GP 1          PT G-Codes  Outcome Measure Options: AM-PAC 6 Clicks Basic Mobility (PT)  AM-PAC 6 Clicks Score (PT): 18  AM-PAC 6 Clicks Score (OT): 21    Tisha Frias PT  2/17/2020

## 2020-02-17 NOTE — PLAN OF CARE
Problem: Patient Care Overview  Goal: Plan of Care Review  Flowsheets (Taken 2/17/2020 1125)  Plan of Care Reviewed With: patient  Outcome Summary: Patient presents with impaired strength and coordination affecting safe ambulation. He may be close to his baseline function. Recommend continued skilled PT with home with home health

## 2020-02-17 NOTE — PLAN OF CARE
Problem: Patient Care Overview  Goal: Plan of Care Review  Flowsheets (Taken 2/17/2020 5404)  Outcome Summary: OT completed Pt presents with defictis in balance and activity tolerance compared to baseline ADL completion, recom IPOT d/c home with assist

## 2020-02-17 NOTE — PROGRESS NOTES
[facility] Medicine Services  Short Stay Unit  PROGRESS NOTE    Patient Name: Jet Floyd  : 1945  MRN: [id number]    Admission Date and Time: 2020  8:31 PM  Primary Care Physician: Obi Soriano MD    Subjective     CC: f/u CHF exacerbation    HPI:  Sitting up in chair. He is feeling better today and hopeful that he can go home soon     Review of Systems  Gen- No fevers, chills  CV- No chest pain, palpitations  Resp- No cough, dyspnea  GI- No N/V/D, abd pain    Objective     Vital Signs:   Temp:  [97.3 °F (36.3 °C)-98.5 °F (36.9 °C)] 97.3 °F (36.3 °C)  Heart Rate:  [83-96] 85  Resp:  [16-18] 18  BP: (107-124)/(64-90) 113/64        Physical Exam:  Constitutional: No acute distress, awake, alert, sitting in bedside chair  HENT: NCAT, mucous membranes moist  Respiratory: Clear to auscultation bilaterally, respiratory effort normal on room air   Cardiovascular: tachycardic, no murmurs, rubs, or gallops, palpable pedal pulses bilaterally  Gastrointestinal: Positive bowel sounds, soft, nontender, nondistended  Musculoskeletal: 1+ pitting edema in BLEs   Psychiatric: Appropriate affect, cooperative  Neurologic: Oriented x 3, strength symmetric in all extremities, Cranial Nerves grossly intact to confrontation, speech clear  Skin: No rashes    Results Reviewed:  Results from last 7 days   Lab Units 20  0240   WBC 10*3/mm3 5.11   HEMOGLOBIN g/dL 12.0*   HEMATOCRIT % 35.4*   PLATELETS 10*3/mm3 163     Results from last 7 days   Lab Units 20  0836   SODIUM mmol/L 126*   POTASSIUM mmol/L 3.3*   CHLORIDE mmol/L 82*   CO2 mmol/L 28.0   BUN mg/dL 42*   CREATININE mg/dL 2.37*   GLUCOSE mg/dL 168*   CALCIUM mg/dL 8.3*     ProBNP 42,458    Assessment / Plan     Active Hospital Problems    Diagnosis  POA   • **Acute on chronic congestive heart failure (CMS/HCC) [I50.9]  Yes   • History of alcoholism (CMS/[facility]) [F10.21]  Not Applicable   • GABRIELLE (acute kidney injury)  (CMS/HCC) [N17.9]  Yes   • Elevated liver enzymes [R74.8]  Yes   • Hyperglycemia [R73.9]  Yes   • Hyponatremia [E87.1]  Yes   • Pleural effusion [J90]  Yes   • Essential hypertension [I10]  Yes   • Hyperlipidemia [E78.5]  Yes      Resolved Hospital Problems   No resolved problems to display.     Brief course to date:  Jet Floyd is a 74 y.o. male with past medical history of hypertension, CHF, alcoholism, hyperlipidemia who presents to the ER with progressively worsening shortness of breath over the last 3 days, found to have acute on chronic congestive heart failure exacerbation.    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 Richard (last Echo 9/2015, EF 30%), states he has not had cardiology follow up in 5 yrs (previous ICD that fell out?)  - Appreciate cardiology evaluation - Dr. Richard recommends transition to Lasix 80mg PO daily tomorrow   - Will complete Bumex 2mg IV BID today - continue strict I&O's, daily weights, Net output -3.6L  - daily fluid restriction  - continue coreg and aldactone, holding lisinopril due to worsening renal fxn  - No LifeVest at discharge per Dr. Richard      GABRIELLE on suspected CKD  - ? Cardiorenal. Baseline creatinine ~1.1, follows outpatient with Nephrology  - Appreciate nephrology evaluation -   - avoid nephrotoxins, holding lisinopril     Hyperglycemia:  - no history of DM. A1c 5.5     Hyponatremia:  - suspect hypervolemic hyponatremia secondary to volume overload, likely also compounded by ETOH use  - urine studies reviewed, mixed picture  - TSH WNL  - Nephrology giving Tolvaptan x 1 dose today      Elevated liver enzymes - improving  - ? Suspect Congestive hepatopathy, improving with diuresis, also possibly related to ETOH use  - Hep panel  negative  - avoid hepatotoxins     History of alcoholism:  - continue with plan above  - CIWA has been normal     DVT prophylaxis: DAVID    Discharge Blueprint:   1. Improvement in volume status  2. Improvement in renal function and electrolytes  3. Cleared for DC by cardiology and nephrology     Electronically signed by Rachel Velázquez PA-C, 02/17/20, 1:38 PM.

## 2020-02-17 NOTE — PLAN OF CARE
Problem: Fall Risk (Adult)  Goal: Identify Related Risk Factors and Signs and Symptoms  Outcome: Ongoing (interventions implemented as appropriate)  Flowsheets (Taken 2/15/2020 0142 by Elyssa Asher RN)  Related Risk Factors (Fall Risk): age-related changes;objects hard to reach;slippery/uneven surfaces;environment unfamiliar  Signs and Symptoms (Fall Risk): presence of risk factors  Goal: Absence of Fall  Outcome: Ongoing (interventions implemented as appropriate)     Problem: Pain, Chronic (Adult)  Goal: Identify Related Risk Factors and Signs and Symptoms  Outcome: Ongoing (interventions implemented as appropriate)  Goal: Acceptable Pain/Comfort Level and Functional Ability  Outcome: Ongoing (interventions implemented as appropriate)     Problem: Patient Care Overview  Goal: Plan of Care Review  Outcome: Ongoing (interventions implemented as appropriate)  Goal: Individualization and Mutuality  Outcome: Ongoing (interventions implemented as appropriate)  Goal: Discharge Needs Assessment  Outcome: Ongoing (interventions implemented as appropriate)  Goal: Interprofessional Rounds/Family Conf  Outcome: Ongoing (interventions implemented as appropriate)

## 2020-02-17 NOTE — PROGRESS NOTES
"   LOS: 3 days    Patient Care Team:  Obi Soriano MD as PCP - General  Obi Soriano MD as PCP - Family Medicine    Chief Complaint:  SOA    Subjective     Interval History:     No acute events overnight. No new complaints.     Review of Systems:   No CP or SOA . Pedal edema present.     Objective     Vital Sign Min/Max for last 24 hours  Temp  Min: 97.3 °F (36.3 °C)  Max: 98.5 °F (36.9 °C)   BP  Min: 100/69  Max: 124/90   Pulse  Min: 83  Max: 96   Resp  Min: 16  Max: 18   SpO2  Min: 90 %  Max: 96 %   No data recorded   No data recorded     Flowsheet Rows      First Filed Value   Admission Height  175.3 cm (69\") Documented at 02/14/2020 2006   Admission Weight  68 kg (150 lb) Documented at 02/14/2020 2006          I/O this shift:  In: -   Out: 325 [Urine:325]  I/O last 3 completed shifts:  In: 1550 [P.O.:1550]  Out: 3825 [Urine:3475; Other:350]    Physical Exam:     General Appearance:    Alert, cooperative, in no acute distress   Head:    Normocephalic, without obvious abnormality, atraumatic               Neck:   No adenopathy, supple, trachea midline, no thyromegaly, no   carotid bruit, no JVD       Lungs:     Clear to auscultation,respirations regular, even and                  unlabored    Heart:    Regular rhythm and normal rate, normal S1 and S2, no            murmur, no gallop, no rub, no click       Abdomen:     Normal bowel sounds, no masses, no organomegaly, soft        non-tender, non-distended, no guarding, no rebound                tenderness       Extremities:   Moves all extremities well, ++edema, no cyanosis, no             redness               Neurologic:   No focal deficit noted.        WBC WBC   Date Value Ref Range Status   02/16/2020 5.11 3.40 - 10.80 10*3/mm3 Final   02/15/2020 5.33 3.40 - 10.80 10*3/mm3 Final   02/14/2020 6.02 3.40 - 10.80 10*3/mm3 Final      HGB Hemoglobin   Date Value Ref Range Status   02/16/2020 12.0 (L) 13.0 - 17.7 g/dL Final   02/15/2020 11.7 (L) " 13.0 - 17.7 g/dL Final   02/14/2020 12.5 (L) 13.0 - 17.7 g/dL Final      HCT Hematocrit   Date Value Ref Range Status   02/16/2020 35.4 (L) 37.5 - 51.0 % Final   02/15/2020 34.5 (L) 37.5 - 51.0 % Final   02/14/2020 38.0 37.5 - 51.0 % Final      Platlets No results found for: LABPLAT   MCV MCV   Date Value Ref Range Status   02/16/2020 88.7 79.0 - 97.0 fL Final   02/15/2020 91.3 79.0 - 97.0 fL Final   02/14/2020 91.6 79.0 - 97.0 fL Final          Sodium Sodium   Date Value Ref Range Status   02/17/2020 126 (L) 136 - 145 mmol/L Final   02/17/2020 127 (L) 136 - 145 mmol/L Final   02/16/2020 129 (L) 136 - 145 mmol/L Final   02/15/2020 128 (L) 136 - 145 mmol/L Final   02/14/2020 128 (L) 136 - 145 mmol/L Final      Potassium Potassium   Date Value Ref Range Status   02/17/2020 3.3 (L) 3.5 - 5.2 mmol/L Final   02/17/2020 3.1 (L) 3.5 - 5.2 mmol/L Final   02/16/2020 3.8 3.5 - 5.2 mmol/L Final   02/15/2020 3.9 3.5 - 5.2 mmol/L Final   02/14/2020 4.7 3.5 - 5.2 mmol/L Final      Chloride Chloride   Date Value Ref Range Status   02/17/2020 82 (L) 98 - 107 mmol/L Final   02/17/2020 83 (L) 98 - 107 mmol/L Final   02/16/2020 86 (L) 98 - 107 mmol/L Final   02/15/2020 90 (L) 98 - 107 mmol/L Final   02/14/2020 91 (L) 98 - 107 mmol/L Final      CO2 CO2   Date Value Ref Range Status   02/17/2020 28.0 22.0 - 29.0 mmol/L Final   02/17/2020 26.0 22.0 - 29.0 mmol/L Final   02/16/2020 24.0 22.0 - 29.0 mmol/L Final   02/15/2020 20.0 (L) 22.0 - 29.0 mmol/L Final   02/14/2020 22.0 22.0 - 29.0 mmol/L Final      BUN BUN   Date Value Ref Range Status   02/17/2020 42 (H) 8 - 23 mg/dL Final   02/17/2020 41 (H) 8 - 23 mg/dL Final   02/16/2020 40 (H) 8 - 23 mg/dL Final   02/15/2020 38 (H) 8 - 23 mg/dL Final   02/14/2020 35 (H) 8 - 23 mg/dL Final      Creatinine Creatinine   Date Value Ref Range Status   02/17/2020 2.37 (H) 0.76 - 1.27 mg/dL Final   02/17/2020 2.32 (H) 0.76 - 1.27 mg/dL Final   02/16/2020 2.32 (H) 0.76 - 1.27 mg/dL Final   02/15/2020  2.13 (H) 0.76 - 1.27 mg/dL Final   02/14/2020 2.43 (H) 0.76 - 1.27 mg/dL Final      Calcium Calcium   Date Value Ref Range Status   02/17/2020 8.3 (L) 8.6 - 10.5 mg/dL Final   02/17/2020 8.2 (L) 8.6 - 10.5 mg/dL Final   02/16/2020 8.6 8.6 - 10.5 mg/dL Final   02/15/2020 8.4 (L) 8.6 - 10.5 mg/dL Final   02/14/2020 8.8 8.6 - 10.5 mg/dL Final      PO4 No results found for: CAPO4   Albumin Albumin   Date Value Ref Range Status   02/17/2020 3.60 3.50 - 5.20 g/dL Final   02/16/2020 3.90 3.50 - 5.20 g/dL Final   02/14/2020 4.20 3.50 - 5.20 g/dL Final      Magnesium No results found for: MG   Uric Acid Uric Acid   Date Value Ref Range Status   02/16/2020 8.3 (H) 3.4 - 7.0 mg/dL Final     Comment:     Falsely depressed results may occur on samples drawn from patients receiving N-Acetylcysteine (NAC) or Metamizole.           Results Review:     I reviewed the patient's new clinical results.      allopurinol 100 mg Oral Daily   aspirin 81 mg Oral Daily   atorvastatin 20 mg Oral Daily   bumetanide 2 mg Intravenous BID   carvedilol 6.25 mg Oral BID With Meals   heparin (porcine) 5,000 Units Subcutaneous Q12H   pharmacy consult - MTM  Does not apply Daily   sodium chloride 10 mL Intravenous Q12H   spironolactone 25 mg Oral Daily          Medication Review:     Assessment/Plan       Acute on chronic congestive heart failure (CMS/HCC)    Essential hypertension    Hyperlipidemia    History of alcoholism (CMS/HCC)    GABRIELLE (acute kidney injury) (CMS/HCC)    Elevated liver enzymes    Hyperglycemia    Hyponatremia    Pleural effusion         1- GABRIELLE on CKD- non oliguric - Cardio-renal syndrome.  Stable.   2- Volume overload  3- Respiratory distress secondary to volume overload  4- hyponatremia -  Worsening.   5- Systolic CHF with EF 30%  6- CKD stage III - baseline Scr 1.5-1.8 range.     Plan:-   - Will give a dose of tolvaptan. No fluid restriction while on tolvaptan.   - Monitor I/O   - Avoid nephrotoxic agents.   - Continue with  Diuresis    - Adjust meds per renal function   - no emergent need of dialysis.       I discussed the patients findings and my recommendations with patient and nursing staff    Ave Roberson MD  02/17/20  10:21 AM

## 2020-02-17 NOTE — PLAN OF CARE
Problem: Fall Risk (Adult)  Goal: Identify Related Risk Factors and Signs and Symptoms  2/17/2020 0335 by Yazmin Capps, RN  Outcome: Ongoing (interventions implemented as appropriate)  Flowsheets (Taken 2/15/2020 0142 by Elyssa Asher, RN)  Related Risk Factors (Fall Risk): age-related changes;objects hard to reach;slippery/uneven surfaces;environment unfamiliar  Signs and Symptoms (Fall Risk): presence of risk factors   Pt up in chair.  No distress noted.  Has no co pain or SOA.  Will cont to diuresis and with improvement of creatinine will be d/c'd soon.  Will cont to monitor

## 2020-02-17 NOTE — PROGRESS NOTES
Discharge Planning Assessment  [facility]     Patient Name: Jet Floyd  MRN: [id number]  Today's Date: 2/17/2020    Admit Date: 2/14/2020    Discharge Needs Assessment     Row Name 02/17/20 1447       Living Environment    Lives With  spouse;grandchild(brian) pt resides in [facility]    Name(s) of Who Lives With Patient  wife- Heather and 21yo grandson    Current Living Arrangements  home/apartment/condo    Primary Care Provided by  self    Provides Primary Care For  no one    Family Caregiver if Needed  spouse;grandchild(brian), adult    Family Caregiver Names  Heather and grandchauncey    Quality of Family Relationships  helpful;involved;supportive    Able to Return to Prior Arrangements  yes       Resource/Environmental Concerns    Resource/Environmental Concerns  none       Transition Planning    Patient/Family Anticipates Transition to  home with family;home with help/services    Patient/Family Anticipated Services at Transition  home health care    Transportation Anticipated  family or friend will provide       Discharge Needs Assessment    Readmission Within the Last 30 Days  no previous admission in last 30 days    Concerns to be Addressed  discharge planning    Equipment Currently Used at Home  cane, quad;walker, rolling;shower chair    Anticipated Changes Related to Illness  none    Equipment Needed After Discharge  none    Outpatient/Agency/Support Group Needs  homecare agency    Discharge Facility/Level of Care Needs  home with home health    Provided post acute provider list?  Yes    Post Acute Provider List  Home Health    Post Acute Provider Quality & Resource List  Yes    Delivered To  Patient    Method of Delivery  In person    Patient's Choice of Community Agency(s)  pt has used [facility] in the past and would like to use them again        Discharge Plan     Row Name 02/17/20 1449       Plan    Plan  home with home health    Patient/Family in Agreement with Plan  yes    Plan Comments  CM spoke  with pt at bedside, pt resides in [facility] with his wife and 19 yo grandson. Pt is independent of adls with use of Dme. Pt has a quad cane, shower chair and walker. Pt is not current with home health or outpatient services at this time. Pt has been evaluated by therapy services and they recommend home with home health and he is agreeable. CM provided list of agencies and pt would like to use [facility] as he has used them in the past. CM received order and called referral to [facility] with [facility]. Cm will continue to follow and pt will have private transportation home at discharge.     Final Discharge Disposition Code  06 - home with home health care        Destination      Coordination has not been started for this encounter.      Durable Medical Equipment      Coordination has not been started for this encounter.      Dialysis/Infusion      Coordination has not been started for this encounter.      Home Medical Care - Selection Complete      Service Provider Request Status Selected Services Address Phone Number Fax Number    [facility] [address] [facility] [address] [phone]      Therapy      Coordination has not been started for this encounter.      Community Resources      Coordination has not been started for this encounter.          Demographic Summary     Row Name 02/17/20 1445       General Information    Referral Source  admission list    Reason for Consult  decision making    Preferred Language  English     Used During This Interaction  no    General Information Comments  HONEY- Obi Soriano       Contact Information    Permission Granted to Share Info With      Contact Information Comments  [phone]        Functional Status     Row Name 02/17/20 1446       Functional Status, IADL    Medications  independent    Meal Preparation  assistive equipment    Housekeeping  assistive  equipment    Laundry  assistive equipment    Shopping  assistive equipment       Employment/    Employment/ Comments  Pt confirms he has Medicare and [employer] [place] [employer] [place], denies concerns or disruption in coverage. Pt has prescription drug coverage and denies issues obtaining or affording current medications.         Psychosocial    No documentation.       Abuse/Neglect    No documentation.       Legal    No documentation.       Substance Abuse    No documentation.       Patient Forms    No documentation.           Niya Marin RN

## 2020-02-18 LAB
ANION GAP SERPL CALCULATED.3IONS-SCNC: 16 MMOL/L (ref 5–15)
ANION GAP SERPL CALCULATED.3IONS-SCNC: 17 MMOL/L (ref 5–15)
ANION GAP SERPL CALCULATED.3IONS-SCNC: 20 MMOL/L (ref 5–15)
BUN BLD-MCNC: 45 MG/DL (ref 8–23)
BUN BLD-MCNC: 47 MG/DL (ref 8–23)
BUN BLD-MCNC: 51 MG/DL (ref 8–23)
BUN/CREAT SERPL: 15.9 (ref 7–25)
BUN/CREAT SERPL: 17.2 (ref 7–25)
BUN/CREAT SERPL: 18.2 (ref 7–25)
CALCIUM SPEC-SCNC: 8 MG/DL (ref 8.6–10.5)
CALCIUM SPEC-SCNC: 8.1 MG/DL (ref 8.6–10.5)
CALCIUM SPEC-SCNC: 8.4 MG/DL (ref 8.6–10.5)
CHLORIDE SERPL-SCNC: 79 MMOL/L (ref 98–107)
CHLORIDE SERPL-SCNC: 79 MMOL/L (ref 98–107)
CHLORIDE SERPL-SCNC: 80 MMOL/L (ref 98–107)
CO2 SERPL-SCNC: 27 MMOL/L (ref 22–29)
CO2 SERPL-SCNC: 27 MMOL/L (ref 22–29)
CO2 SERPL-SCNC: 28 MMOL/L (ref 22–29)
CREAT BLD-MCNC: 2.61 MG/DL (ref 0.76–1.27)
CREAT BLD-MCNC: 2.8 MG/DL (ref 0.76–1.27)
CREAT BLD-MCNC: 2.96 MG/DL (ref 0.76–1.27)
GFR SERPL CREATININE-BSD FRML MDRD: 21 ML/MIN/1.73
GFR SERPL CREATININE-BSD FRML MDRD: 22 ML/MIN/1.73
GFR SERPL CREATININE-BSD FRML MDRD: 24 ML/MIN/1.73
GLUCOSE BLD-MCNC: 113 MG/DL (ref 65–99)
GLUCOSE BLD-MCNC: 127 MG/DL (ref 65–99)
GLUCOSE BLD-MCNC: 85 MG/DL (ref 65–99)
POTASSIUM BLD-SCNC: 3.3 MMOL/L (ref 3.5–5.2)
POTASSIUM BLD-SCNC: 3.3 MMOL/L (ref 3.5–5.2)
POTASSIUM BLD-SCNC: 3.7 MMOL/L (ref 3.5–5.2)
SODIUM BLD-SCNC: 123 MMOL/L (ref 136–145)
SODIUM BLD-SCNC: 123 MMOL/L (ref 136–145)
SODIUM BLD-SCNC: 127 MMOL/L (ref 136–145)

## 2020-02-18 PROCEDURE — 80048 BASIC METABOLIC PNL TOTAL CA: CPT | Performed by: INTERNAL MEDICINE

## 2020-02-18 PROCEDURE — 25010000002 HEPARIN (PORCINE) PER 1000 UNITS: Performed by: INTERNAL MEDICINE

## 2020-02-18 PROCEDURE — 97116 GAIT TRAINING THERAPY: CPT

## 2020-02-18 PROCEDURE — 99231 SBSQ HOSP IP/OBS SF/LOW 25: CPT | Performed by: PHYSICIAN ASSISTANT

## 2020-02-18 PROCEDURE — 97110 THERAPEUTIC EXERCISES: CPT

## 2020-02-18 PROCEDURE — 94799 UNLISTED PULMONARY SVC/PX: CPT

## 2020-02-18 RX ORDER — TOLVAPTAN 15 MG/1
15 TABLET ORAL ONCE
Status: COMPLETED | OUTPATIENT
Start: 2020-02-18 | End: 2020-02-18

## 2020-02-18 RX ADMIN — HEPARIN SODIUM 5000 UNITS: 5000 INJECTION, SOLUTION INTRAVENOUS; SUBCUTANEOUS at 09:03

## 2020-02-18 RX ADMIN — ASPIRIN 81 MG 81 MG: 81 TABLET ORAL at 09:03

## 2020-02-18 RX ADMIN — TEMAZEPAM 15 MG: 15 CAPSULE ORAL at 21:03

## 2020-02-18 RX ADMIN — ATORVASTATIN CALCIUM 20 MG: 20 TABLET, FILM COATED ORAL at 09:03

## 2020-02-18 RX ADMIN — CARVEDILOL 6.25 MG: 6.25 TABLET, FILM COATED ORAL at 09:02

## 2020-02-18 RX ADMIN — TOLVAPTAN 15 MG: 15 TABLET ORAL at 11:54

## 2020-02-18 RX ADMIN — HEPARIN SODIUM 5000 UNITS: 5000 INJECTION, SOLUTION INTRAVENOUS; SUBCUTANEOUS at 21:04

## 2020-02-18 RX ADMIN — ALLOPURINOL 100 MG: 100 TABLET ORAL at 09:03

## 2020-02-18 RX ADMIN — CARVEDILOL 6.25 MG: 6.25 TABLET, FILM COATED ORAL at 17:53

## 2020-02-18 NOTE — PROGRESS NOTES
"   LOS: 4 days    Patient Care Team:  Obi Soriano MD as PCP - General  Obi Soriano MD as PCP - Family Medicine    Chief Complaint:  SOA    Subjective     Interval History:     No acute events overnight. No new complaints.     Review of Systems:   No CP or SOA . Pedal edema improved.      Objective     Vital Sign Min/Max for last 24 hours  Temp  Min: 97.2 °F (36.2 °C)  Max: 98.3 °F (36.8 °C)   BP  Min: 103/67  Max: 116/70   Pulse  Min: 76  Max: 97   Resp  Min: 14  Max: 18   SpO2  Min: 92 %  Max: 96 %   No data recorded   Weight  Min: 58.7 kg (129 lb 6.4 oz)  Max: 60.7 kg (133 lb 12.8 oz)     Flowsheet Rows      First Filed Value   Admission Height  175.3 cm (69\") Documented at 02/14/2020 2006   Admission Weight  68 kg (150 lb) Documented at 02/14/2020 2006          I/O this shift:  In: 240 [P.O.:240]  Out: -   I/O last 3 completed shifts:  In: -   Out: 3200 [Urine:3200]    Physical Exam:     General Appearance:    Alert, cooperative, in no acute distress   Head:    Normocephalic, without obvious abnormality, atraumatic               Neck:   Supple, trachea midline, no thyromegaly, no   carotid bruit, no JVD       Lungs:     Clear to auscultation,respirations regular, even and                  unlabored    Heart:    Regular rhythm and normal rate, normal S1 and S2       Abdomen:     Normal bowel sounds, soft ,non-tender, non-distended       Extremities:   Moves all extremities well, trace edema, no cyanosis, no             redness               Neurologic:   No focal deficit noted.        WBC WBC   Date Value Ref Range Status   02/16/2020 5.11 3.40 - 10.80 10*3/mm3 Final      HGB Hemoglobin   Date Value Ref Range Status   02/16/2020 12.0 (L) 13.0 - 17.7 g/dL Final      HCT Hematocrit   Date Value Ref Range Status   02/16/2020 35.4 (L) 37.5 - 51.0 % Final      Platlets No results found for: LABPLAT   MCV MCV   Date Value Ref Range Status   02/16/2020 88.7 79.0 - 97.0 fL Final          Sodium " Sodium   Date Value Ref Range Status   02/18/2020 127 (L) 136 - 145 mmol/L Final   02/18/2020 123 (L) 136 - 145 mmol/L Final   02/17/2020 122 (L) 136 - 145 mmol/L Final   02/17/2020 126 (L) 136 - 145 mmol/L Final   02/17/2020 127 (L) 136 - 145 mmol/L Final   02/16/2020 129 (L) 136 - 145 mmol/L Final      Potassium Potassium   Date Value Ref Range Status   02/18/2020 3.3 (L) 3.5 - 5.2 mmol/L Final   02/18/2020 3.3 (L) 3.5 - 5.2 mmol/L Final   02/17/2020 3.4 (L) 3.5 - 5.2 mmol/L Final   02/17/2020 3.3 (L) 3.5 - 5.2 mmol/L Final   02/17/2020 3.1 (L) 3.5 - 5.2 mmol/L Final   02/16/2020 3.8 3.5 - 5.2 mmol/L Final      Chloride Chloride   Date Value Ref Range Status   02/18/2020 79 (L) 98 - 107 mmol/L Final   02/18/2020 80 (L) 98 - 107 mmol/L Final   02/17/2020 79 (L) 98 - 107 mmol/L Final   02/17/2020 82 (L) 98 - 107 mmol/L Final   02/17/2020 83 (L) 98 - 107 mmol/L Final   02/16/2020 86 (L) 98 - 107 mmol/L Final      CO2 CO2   Date Value Ref Range Status   02/18/2020 28.0 22.0 - 29.0 mmol/L Final   02/18/2020 27.0 22.0 - 29.0 mmol/L Final   02/17/2020 27.0 22.0 - 29.0 mmol/L Final   02/17/2020 28.0 22.0 - 29.0 mmol/L Final   02/17/2020 26.0 22.0 - 29.0 mmol/L Final   02/16/2020 24.0 22.0 - 29.0 mmol/L Final      BUN BUN   Date Value Ref Range Status   02/18/2020 47 (H) 8 - 23 mg/dL Final   02/18/2020 45 (H) 8 - 23 mg/dL Final   02/17/2020 44 (H) 8 - 23 mg/dL Final   02/17/2020 42 (H) 8 - 23 mg/dL Final   02/17/2020 41 (H) 8 - 23 mg/dL Final   02/16/2020 40 (H) 8 - 23 mg/dL Final      Creatinine Creatinine   Date Value Ref Range Status   02/18/2020 2.96 (H) 0.76 - 1.27 mg/dL Final   02/18/2020 2.61 (H) 0.76 - 1.27 mg/dL Final   02/17/2020 2.38 (H) 0.76 - 1.27 mg/dL Final   02/17/2020 2.37 (H) 0.76 - 1.27 mg/dL Final   02/17/2020 2.32 (H) 0.76 - 1.27 mg/dL Final   02/16/2020 2.32 (H) 0.76 - 1.27 mg/dL Final      Calcium Calcium   Date Value Ref Range Status   02/18/2020 8.4 (L) 8.6 - 10.5 mg/dL Final   02/18/2020 8.0 (L)  8.6 - 10.5 mg/dL Final   02/17/2020 8.1 (L) 8.6 - 10.5 mg/dL Final   02/17/2020 8.3 (L) 8.6 - 10.5 mg/dL Final   02/17/2020 8.2 (L) 8.6 - 10.5 mg/dL Final   02/16/2020 8.6 8.6 - 10.5 mg/dL Final      PO4 No results found for: CAPO4   Albumin Albumin   Date Value Ref Range Status   02/17/2020 3.60 3.50 - 5.20 g/dL Final   02/16/2020 3.90 3.50 - 5.20 g/dL Final      Magnesium No results found for: MG   Uric Acid Uric Acid   Date Value Ref Range Status   02/16/2020 8.3 (H) 3.4 - 7.0 mg/dL Final     Comment:     Falsely depressed results may occur on samples drawn from patients receiving N-Acetylcysteine (NAC) or Metamizole.           Results Review:     I reviewed the patient's new clinical results.      allopurinol 100 mg Oral Daily   aspirin 81 mg Oral Daily   atorvastatin 20 mg Oral Daily   carvedilol 6.25 mg Oral BID With Meals   heparin (porcine) 5,000 Units Subcutaneous Q12H   pharmacy consult - MTM  Does not apply Daily   sodium chloride 10 mL Intravenous Q12H   temazepam 15 mg Oral Nightly   tolvaptan 15 mg Oral Once          Medication Review:     Assessment/Plan       Acute on chronic congestive heart failure (CMS/HCC)    Essential hypertension    Hyperlipidemia    History of alcoholism (CMS/[facility])    GABRIELLE (acute kidney injury) (CMS/[facility])    Elevated liver enzymes    Hyperglycemia    Hyponatremia    Pleural effusion         1- GABRIELLE on CKD- non oliguric - Cardio-renal syndrome.  Stable.   2- Volume overload  3- Respiratory distress secondary to volume overload  4- hyponatremia -  Worsening.   5- Systolic CHF with EF 30%  6- CKD stage III - baseline Scr 1.5-1.8 range.     Plan:-   - will give tolvaptan 15 mg today once.   - Hold diuretics for now  - Monitor I/O   - Avoid nephrotoxic agents.   - Adjust meds per renal function        I discussed the patients findings and my recommendations with patient and nursing staff    Ave Roberson MD  02/18/20  10:13 AM

## 2020-02-18 NOTE — PROGRESS NOTES
Continued Stay Note  [facility]     Patient Name: Jet Floyd  MRN: [id number]  Today's Date: 2/18/2020    Admit Date: 2/14/2020    Discharge Plan     Row Name 02/18/20 1335       Plan    Plan Comments  CM discussed in morning rounds and pt is not medically ready for discharge due to no change in his renal function and low sodium. Pt is receiving additional dose of Samsca today, per FLAKO Page they do not anticipate pt needing to be discharged on Samsca. CM will continue to follow.         Discharge Codes    No documentation.             Niya Marin RN

## 2020-02-18 NOTE — PLAN OF CARE
Problem: Patient Care Overview  Goal: Plan of Care Review  Flowsheets (Taken 2/18/2020 1133)  Progress: improving  Plan of Care Reviewed With: patient;spouse  Outcome Summary: Pt increased ambulation distance to 120' using RW and CGA. Pt a bit unsteady today, attempted use of quad cane but had to switch to RW due to minor LOB. Distance limited by fatigue. Pt also completed LE exercises. Cont to progress as tolerated.

## 2020-02-18 NOTE — PLAN OF CARE
Problem: Patient Care Overview  Goal: Plan of Care Review  Outcome: Ongoing (interventions implemented as appropriate)  Flowsheets  Taken 2/18/2020 1134 by Magui Ramirez PT  Progress: improving  Taken 2/18/2020 1742 by Dalila Goode RN  Plan of Care Reviewed With: patient  Outcome Summary: Pt is remaining to get electrolytes in balance. He is eager to go home.  Goal: Individualization and Mutuality  Outcome: Ongoing (interventions implemented as appropriate)  Goal: Discharge Needs Assessment  Outcome: Ongoing (interventions implemented as appropriate)  Goal: Interprofessional Rounds/Family Conf  Outcome: Ongoing (interventions implemented as appropriate)     Problem: Fall Risk (Adult)  Goal: Identify Related Risk Factors and Signs and Symptoms  Outcome: Ongoing (interventions implemented as appropriate)  Goal: Absence of Fall  Outcome: Ongoing (interventions implemented as appropriate)     Problem: Cardiac: Heart Failure (Adult)  Goal: Signs and Symptoms of Listed Potential Problems Will be Absent, Minimized or Managed (Cardiac: Heart Failure)  Outcome: Ongoing (interventions implemented as appropriate)

## 2020-02-18 NOTE — PROGRESS NOTES
Continued Stay Note  [facility]     Patient Name: Jet Floyd  MRN: [id number]  Today's Date: 2/18/2020    Admit Date: 2/14/2020    Discharge Plan     Row Name 02/18/20 1335       Plan    Plan Comments  CM discussed in morning rounds and pt is not medically ready for discharge due to no change in his renal function and low sodium. Pt is receiving additional dose of Samsca today, per FLAKO Page they do not anticipate pt needing to be discharged on Samsca. CM will continue to follow.         Discharge Codes    No documentation.             Niya Marin RN

## 2020-02-18 NOTE — NURSING NOTE
Patient Name:  Jet Floyd  :  1945  DOS:  20    Active Hospital Problems    Diagnosis   • **Acute on chronic congestive heart failure (CMS/HCC)   • History of alcoholism (CMS/HCC)   • GABRIELLE (acute kidney injury) (CMS/HCC)   • Elevated liver enzymes   • Hyperglycemia   • Hyponatremia   • Pleural effusion   • Essential hypertension   • Hyperlipidemia       Consult has been received.  Medical records have been reviewed.  Patient is a good candidate for the [facility].  Education provided.  Education time 5 min.  Patient to be scheduled follow up 1 week post discharge.    Echo Results:  · Left ventricular wall thickness is consistent with mild concentric hypertrophy.  · Right ventricular cavity is mildly dilated.  · Moderately reduced right ventricular systolic function noted.  · Left atrial cavity size is borderline dilated.  · Moderate-to-severe mitral valve regurgitation is present  · Moderate tricuspid valve regurgitation is present.  · Estimated EF = 20%.  · Left ventricular systolic function is severely decreased.  NYHA Class: III    Heart Failure Quality Measures    ACE I, ARB, ARNI (if EF <40%)     If no contraindications:  GABRIELLE / CKD / Renal Stenosis    Evidence-based Beta Blocker (EF<40%)    (Bisoprolol, Carvedilol, Metoprolol Succinate)  Yes  Coreg    Aldosterone Antagonist (EF <40%)  No-on hold due to GABRIELLE    Heart Failure Education    Low Na diet, Signs / symptoms, Daily weight monitoring and Role of [facility] and when to call    If EF < 35%  LifeVest per cardiology discretion

## 2020-02-18 NOTE — PLAN OF CARE
Patient's VSS, UOP adequate.  Remained on room air throughout shift.  Lung sounds clear & equal bilaterally.  Slept well this shift.  Looking forward to being discharged tomorrow.  Will continue to monitor.

## 2020-02-18 NOTE — PROGRESS NOTES
[facility] Medicine Services  Short Stay Unit  PROGRESS NOTE    Patient Name: Jet Floyd  : 1945  MRN: [id number]    Admission Date and Time: 2020  8:31 PM  Primary Care Physician: Obi Soriano MD    Subjective     CC: f/u CHF exacerbation    HPI:  Sitting up in bed, watching TV. He is feeling much better and states that leg swelling is almost completely resolved. He is disappointed that his renal function has not improved more.     Review of Systems  Gen- No fevers, chills  CV- No chest pain, palpitations  Resp- No cough, dyspnea  GI- No N/V/D, abd pain    Objective     Vital Signs:   Temp:  [97.2 °F (36.2 °C)-98.3 °F (36.8 °C)] 98.2 °F (36.8 °C)  Heart Rate:  [76-97] 79  Resp:  [14-18] 18  BP: (103-116)/(58-79) 116/70        Physical Exam:  Constitutional: No acute distress, awake, alert and conversant. Sitting up in bed   HENT: NCAT, mucous membranes moist  Respiratory: Clear to auscultation bilaterally, respiratory effort normal on room air   Cardiovascular: RRR, no murmurs, rubs, or gallops, palpable pedal pulses bilaterally  Gastrointestinal: Positive bowel sounds, soft, nontender, nondistended  Musculoskeletal: Trace bilateral ankle edema  Psychiatric: Appropriate affect, cooperative  Neurologic: Oriented x 3, strength symmetric in all extremities, Cranial Nerves grossly intact to confrontation, speech clear  Skin: No rashes    Results Reviewed:  Results from last 7 days   Lab Units 20  0240   WBC 10*3/mm3 5.11   HEMOGLOBIN g/dL 12.0*   HEMATOCRIT % 35.4*   PLATELETS 10*3/mm3 163     Results from last 7 days   Lab Units 20  0500   SODIUM mmol/L 127*   POTASSIUM mmol/L 3.3*   CHLORIDE mmol/L 79*   CO2 mmol/L 28.0   BUN mg/dL 47*   CREATININE mg/dL 2.96*   GLUCOSE mg/dL 85   CALCIUM mg/dL 8.4*     Assessment / Plan     Active Hospital Problems    Diagnosis  POA   • **Acute on chronic congestive heart failure (CMS/HCC) [I50.9]  Yes   • History  of alcoholism (CMS/HCC) [F10.21]  Not Applicable   • GABRIELLE (acute kidney injury) (CMS/HCC) [N17.9]  Yes   • Elevated liver enzymes [R74.8]  Yes   • Hyperglycemia [R73.9]  Yes   • Hyponatremia [E87.1]  Yes   • Pleural effusion [J90]  Yes   • Essential hypertension [I10]  Yes   • Hyperlipidemia [E78.5]  Yes      Resolved Hospital Problems   No resolved problems to display.     Brief course to date:  Jet Folyd is a 74 y.o. male with past medical history of hypertension, dilated DM, systolic CHF, alcoholism, hyperlipidemia who presents to the ER with progressively worsening shortness of breath over the last 3 days, found to have acute on chronic congestive heart failure exacerbation.    Acute on chronic systolic congestive heart failure   - 2/15 ECHO showed EF 20% with mild concentric LV hypertrophy, mild RV dilation, moderately reduced RV systolic function, borderline dilation of LA and moderate to severe MR   - Appreciate nephrology and Dr. Richard assistance  - s/p diuresis with IV Bumex  - Holding further diuretic due to elevated creatinine  - Continue Carvedilol, holding Aldactone   - Discussed LifeVest with Dr. Richard on 2/17, no LifeVest at [place]     GABRIELLE on CKD   - Creatinine 2.93 today   - Baseline creatinine ~1.1 - follows with Dr. Vences of [facility] outpatient  - Holding nephrotoxins  - Nephrology giving Tolvaptan x 1 today in hopes it will improve Na and Cr    Hyponatremia  - s/p Tolvaptan 15mg on 2/17, repeating 15mg again today  - Na 127     Hypokalemia  - Monitoring due to low GFR     Hyperglycemia:  - no history of DM. A1c 5.5     Elevated liver enzymes, resolved  - Suspect congestive hepatopathy worsened by ETOH abuse, improved with diuresis  - Hepatitis panel negative      History of alcoholism:  - continue with plan above  - CIWA has been normal     DVT prophylaxis: DAVID    Discharge Blueprint:   1. Improvement in volume status - GOAL MET   2. Improvement in renal function and electrolytes  3. Cleared  for DC by cardiology and nephrology     Electronically signed by Rachel Velázquez PA-C, 02/18/20, .

## 2020-02-18 NOTE — THERAPY TREATMENT NOTE
Patient Name: Jet Floyd  : 1945    MRN: [id number]                              Today's Date: 2020       Admit Date: 2020    Visit Dx:     ICD-10-CM ICD-9-CM   1. Acute on chronic congestive heart failure, unspecified heart failure type (CMS/HCC) I50.9 428.0   2. Renal insufficiency N28.9 593.9   3. Pleural effusion J90 511.9   4. Hyponatremia E87.1 276.1   5. Impaired mobility and ADLs Z74.09 799.89   6. GABRIELLE (acute kidney injury) (CMS/HCC) N17.9 584.9     Patient Active Problem List   Diagnosis   • Sepsis (CMS/HCC)   • Left lower lobe pneumonia (CMS/HCC)   • Essential hypertension   • Hyperlipidemia   • Alcohol abuse, daily use   • Former smoker   • Dermatitis   • Balance problem   • Hypoglycemia   • Metabolic encephalopathy   • Lactic acidosis   • Acute on chronic congestive heart failure (CMS/HCC)   • History of alcoholism (CMS/[facility])   • GABRIELLE (acute kidney injury) (CMS/[facility])   • Elevated liver enzymes   • Hyperglycemia   • Hyponatremia   • Pleural effusion     Past Medical History:   Diagnosis Date   • Cancer (CMS/HCC)    • CHF (congestive heart failure) (CMS/[facility])    • Hypertension      Past Surgical History:   Procedure Laterality Date   • CARDIAC DEFIBRILLATOR PLACEMENT Bilateral    • CARDIAC DEFIBRILLATOR REMOVAL Bilateral    • CATARACT EXTRACTION Bilateral    • CERVICAL SPINE SURGERY Bilateral      General Information     Row Name 20 1130          PT Evaluation Time/Intention    Document Type  therapy note (daily note)  -BENITO     Mode of Treatment  physical therapy  -     Row Name 20 1130          General Information    Patient Profile Reviewed?  yes  -BENITO     Existing Precautions/Restrictions  fall  -BENITO     Row Name 20 1130          Cognitive Assessment/Intervention- PT/OT    Orientation Status (Cognition)  oriented x 3  -BENITO     Row Name 20 1130          Safety Issues, Functional Mobility    Impairments Affecting Function (Mobility)   balance;endurance/activity tolerance;motor control;coordination  -BENITO       User Key  (r) = Recorded By, (t) = Taken By, (c) = Cosigned By    Initials Name Provider Type    Magui Beach PT Physical Therapist        Mobility     Row Name 02/18/20 1130          Bed Mobility Assessment/Treatment    Bed Mobility Assessment/Treatment  supine-sit;sit-supine  -BENITO     Supine-Sit [place] (Bed Mobility)  conditional independence  -BENITO     Sit-Supine [place] (Bed Mobility)  conditional independence  -BENITO     Assistive Device (Bed Mobility)  head of bed elevated  -BENITO     Row Name 02/18/20 1130          Transfer Assessment/Treatment    Comment (Transfers)  Assisted with donning shoes. VC for sequencing.   -BENITO     Row Name 02/18/20 1130          Sit-Stand Transfer    Sit-Stand [place] (Transfers)  contact guard  -BENITO     Assistive Device (Sit-Stand Transfers)  cane, quad  -BENITO     Row Name 02/18/20 1130          Gait/Stairs Assessment/Training    Gait/Stairs Assessment/Training  gait/ambulation independence  -BENITO     [place] Level (Gait)  minimum assist (75% patient effort);contact guard  -BENITO     Assistive Device (Gait)  cane, quad  -BENITO     Distance in Feet (Gait)  120  -BENITO     Pattern (Gait)  step-through  -BENITO     Deviations/Abnormal Patterns (Gait)  base of support, narrow;ataxic;gait speed decreased;stride length decreased  -BENITO     Bilateral Gait Deviations  heel strike decreased  -BENITO     Comment (Gait/Stairs)  Started with using quad cane, pt very unsteady, had 2 minor LOB. Req Shana. PT then replaced cane with RW, pt demo much more steady gait, req only CGA. Pt stated his L foot was cramping and that was what caused his unsteadiness.   -BENITO       User Key  (r) = Recorded By, (t) = Taken By, (c) = Cosigned By    Initials Name Provider Type    Magui Beach PT Physical Therapist        Obj/Interventions     Row Name 02/18/20 1133          Therapeutic Exercise    Lower Extremity (Therapeutic Exercise)   LAQ (long arc quad), bilateral;marching while seated;heel slides, bilateral  -BENITO     Lower Extremity Range of Motion (Therapeutic Exercise)  hip internal/external rotation, bilateral;hip abduction/adduction, bilateral;ankle dorsiflexion/plantar flexion, bilateral  -BENITO     Exercise Type (Therapeutic Exercise)  AROM (active range of motion)  -BENITO     Position (Therapeutic Exercise)  seated;supine  -BENITO     Sets/Reps (Therapeutic Exercise)  10  -BENITO     Comment (Therapeutic Exercise)  Pt completed all TE with cues, no complaints.   -[facility] Name 02/18/20 1133          Static Sitting Balance    Sitting Position (Unsupported Sitting, Static Balance)  sitting on edge of bed  -BENITO     Time Able to Maintain Position (Unsupported Sitting, Static Balance)  2 to 3 minutes  -[facility] Name 02/18/20 1133          Dynamic Sitting Balance    Level of [place], Reaches Outside Midline (Sitting, Dynamic Balance)  supervision  -BENITO     Sitting Position, Reaches Outside Midline (Sitting, Dynamic Balance)  sitting on edge of bed  -BENITO     Comment, Reaches Outside Midline (Sitting, Dynamic Balance)  Reaching to put shoes on.   -[facility] Name 02/18/20 1133          Static Standing Balance    Level of [place] (Supported Standing, Static Balance)  supervision  -BENITO     Time Able to Maintain Position (Supported Standing, Static Balance)  30 to 45 seconds  -     Assistive Device Utilized (Supported Standing, Static Balance)  cane, quad  -       User Key  (r) = Recorded By, (t) = Taken By, (c) = Cosigned By    Initials Name Provider Type    Magui Beach, PT Physical Therapist        Goals/Plan    No documentation.       Clinical Impression     [facility] Name 02/18/20 1134          Pain Assessment    Additional Documentation  Pain Scale: Numbers Pre/Post-Treatment (Group)  -BENITO     Row Name 02/18/20 1134          Pain Scale: Numbers Pre/Post-Treatment    Pain Scale: Numbers, Pretreatment  0/10 - no pain  -     Pain Scale:  Numbers, Post-Treatment  0/10 - no pain  -BENITO     Row Name 02/18/20 1134          Plan of Care Review    Plan of Care Reviewed With  patient;spouse  -BENITO     Progress  improving  -BENITO     Outcome Summary  Pt increased ambulation distance to 120' using RW and CGA. Pt a bit unsteady today, attempted use of quad cane but had to switch to RW. Distance limited by fatigue. Pt also completed LE exercises. Cont to progress as tolerated.   -BENITO     Row Name 02/18/20 1134          Physical Therapy Clinical Impression    Criteria for Skilled Interventions Met (PT Clinical Impression)  treatment indicated;yes  -BENITO     Rehab Potential (PT Clinical Summary)  good, to achieve stated therapy goals  -BENITO     Row Name 02/18/20 1134          Vital Signs    Pre Systolic BP Rehab  -- VSS throughout  -BENITO     O2 Delivery Pre Treatment  room air  -BENITO     O2 Delivery Intra Treatment  room air  -BENITO     O2 Delivery Post Treatment  room air  -BENITO     Pre Patient Position  Supine  -BENITO     Intra Patient Position  Standing  -BENITO     Post Patient Position  Supine  -BENITO     Row Name 02/18/20 1134          Positioning and Restraints    Pre-Treatment Position  in bed No alarm on.   -BENITO     Post Treatment Position  bed  -BENITO     In Bed  notified nsg;fowlers;encouraged to call for assist;call light within reach;with family/caregiver  -BENITO       User Key  (r) = Recorded By, (t) = Taken By, (c) = Cosigned By    Initials Name Provider Type    Magui Beach, PT Physical Therapist        Outcome Measures     Row Name 02/18/20 1133          How much help from another person do you currently need...    Turning from your back to your side while in flat bed without using bedrails?  4  -BENITO     Moving from lying on back to sitting on the side of a flat bed without bedrails?  4  -BENITO     Moving to and from a bed to a chair (including a wheelchair)?  3  -BENITO     Standing up from a chair using your arms (e.g., wheelchair, bedside chair)?  3  -BENITO     Climbing 3-5 steps  with a railing?  2  -BENITO     To walk in hospital room?  3  -BENITO     AM-PAC 6 Clicks Score (PT)  19  -BENITO     Row Name 02/18/20 1136          Functional Assessment    Outcome Measure Options  AM-PAC 6 Clicks Basic Mobility (PT)  -BENITO       User Key  (r) = Recorded By, (t) = Taken By, (c) = Cosigned By    Initials Name Provider Type    Magui Beach PT Physical Therapist          PT Recommendation and Plan     Outcome Summary/Treatment Plan (PT)  Anticipated Discharge Disposition (PT): home with home health  Plan of Care Reviewed With: patient, spouse  Progress: improving  Outcome Summary: Pt increased ambulation distance to 120' using RW and CGA. Pt a bit unsteady today, attempted use of quad cane but had to switch to RW. Distance limited by fatigue. Pt also completed LE exercises. Cont to progress as tolerated.      Time Calculation:   PT Charges     Row Name 02/18/20 1137             Time Calculation    Start Time  1102  -BENITO      PT Received On  02/18/20  -      PT Goal Re-Cert Due Date  02/27/20  -BENITO         Time Calculation- PT    Total Timed Code Minutes- PT  23 minute(s)  -BENITO         Timed Charges    70800 - PT Therapeutic Exercise Minutes  9  -BENITO      32468 - Gait Training Minutes   14  -BENITO        User Key  (r) = Recorded By, (t) = Taken By, (c) = Cosigned By    Initials Name Provider Type    Magui Beach PT Physical Therapist        Therapy Charges for Today     Code Description Service Date Service Provider Modifiers Qty    04085322642 HC PT THER PROC EA 15 MIN 2/18/2020 Magui Ramirez, PT GP 1    47244905095 HC GAIT TRAINING EA 15 MIN 2/18/2020 Magui Ramirez, PT GP 1          PT G-Codes  Outcome Measure Options: AM-PAC 6 Clicks Basic Mobility (PT)  AM-PAC 6 Clicks Score (PT): 19  AM-PAC 6 Clicks Score (OT): 21    Magui Ramirez PT  2/18/2020

## 2020-02-18 NOTE — PROGRESS NOTES
"                                 [facility] Progress Note      LOS: 4 days   Patient Care Team:  Obi Soriano MD as PCP - General  Obi Soriano MD as PCP - Family Medicine    Chief Complaint:    Chief Complaint   Patient presents with   • Shortness of Breath       Subjective     Interval History: Breathing much better today    Patient Complaints: No chest pain or dyspnea today.      Review of Systems:   A 14 point review of systems was negative except as was stated in the HPI      Objective     Vital Sign Min/Max for last 24 hours  Temp  Min: 97.2 °F (36.2 °C)  Max: 98.3 °F (36.8 °C)   BP  Min: 103/67  Max: 116/70   Pulse  Min: 76  Max: 97   Resp  Min: 14  Max: 18   SpO2  Min: 92 %  Max: 96 %   No data recorded   Weight  Min: 58.7 kg (129 lb 6.4 oz)  Max: 60.7 kg (133 lb 12.8 oz)     Flowsheet Rows      First Filed Value   Admission Height  175.3 cm (69\") Documented at 02/14/2020 2006   Admission Weight  68 kg (150 lb) Documented at 02/14/2020 2006          Physical Exam:  General Appearance: Alert, appears stated age and cooperative  Lungs: Clear to auscultation  Heart:: RRR   No Murmurs, Rubs or Gallops  Abdomen: Soft and nontender with adequate bowel sounds.  No organomegaly  Extremities: No cyanosis, clubbing or edema  Pulses: Pulses palpable and equal bilaterally  Skin: Warm and dry with no rash  Psych: Normal     Results Review:     I reviewed the patient's new clinical results.  Results from last 7 days   Lab Units 02/18/20  0500 02/18/20  0001 02/17/20  1829   SODIUM mmol/L 127* 123* 122*   POTASSIUM mmol/L 3.3* 3.3* 3.4*   CHLORIDE mmol/L 79* 80* 79*   CO2 mmol/L 28.0 27.0 27.0   BUN mg/dL 47* 45* 44*   CREATININE mg/dL 2.96* 2.61* 2.38*   GLUCOSE mg/dL 85 113* 151*   CALCIUM mg/dL 8.4* 8.0* 8.1*     Results from last 7 days   Lab Units 02/16/20  0240 02/15/20  0721 02/14/20 2013   WBC 10*3/mm3 5.11 5.33 6.02   HEMOGLOBIN g/dL 12.0* 11.7* 12.5*   HEMATOCRIT % 35.4* " 34.5* 38.0   PLATELETS 10*3/mm3 163 161 169     Lab Results   Lab Value Date/Time    TROPONINT <0.010 02/15/2020 0721    TROPONINT <0.010 02/15/2020 0158    TROPONINT <0.010 02/14/2020 2013         Results from last 7 days   Lab Units 02/14/20 2013   INR  1.51*               Medication Review: yes  Current Facility-Administered Medications   Medication Dose Route Frequency Provider Last Rate Last Dose   • allopurinol (ZYLOPRIM) tablet 100 mg  100 mg Oral Daily Radha Salazar, DO   100 mg at 02/18/20 0903   • aspirin chewable tablet 81 mg  81 mg Oral Daily Radha Salazar DO   81 mg at 02/18/20 0903   • atorvastatin (LIPITOR) tablet 20 mg  20 mg Oral Daily Radha Salazar DO   20 mg at 02/18/20 0903   • carvedilol (COREG) tablet 6.25 mg  6.25 mg Oral BID With Meals Emily Barron APRN   6.25 mg at 02/18/20 0902   • heparin (porcine) 5000 UNIT/ML injection 5,000 Units  5,000 Units Subcutaneous Q12H Radha Salazar, DO   5,000 Units at 02/18/20 0903   • Pharmacy Consult - MTM   Does not apply Daily Mike Scott RPH   Stopped at 02/15/20 1548   • sodium chloride 0.9 % flush 10 mL  10 mL Intravenous PRN Radha Salazar R, DO       • sodium chloride 0.9 % flush 10 mL  10 mL Intravenous Q12H Radha Salazar, DO   10 mL at 02/17/20 0810   • sodium chloride 0.9 % flush 10 mL  10 mL Intravenous PRN Radha Salazar R, DO       • temazepam (RESTORIL) capsule 15 mg  15 mg Oral Nightly Rachel Velázquez PA-C   15 mg at 02/17/20 2116         Acute on chronic congestive heart failure (CMS/HCC)    Essential hypertension    Hyperlipidemia    History of alcoholism (CMS/HCC)    GABRIELLE (acute kidney injury) (CMS/HCC)    Elevated liver enzymes    Hyperglycemia    Hyponatremia    Pleural effusion        Impression      History of nonischemic cardiomyopathy  Ejection fraction 20% with moderate to severe MR by echocardiography February 2020  Decompensated heart failure presently well treated  Chronic kidney disease  Left  bundle branch block            Plan     Continue present medications.  After present illness resolves the patient would be a candidate for biventricular pacing and/or ICD backup.  I will follow this up as an outpatient.            MD Shiva Acuña PA  02/18/20

## 2020-02-19 LAB
ANION GAP SERPL CALCULATED.3IONS-SCNC: 15 MMOL/L (ref 5–15)
ANION GAP SERPL CALCULATED.3IONS-SCNC: 16 MMOL/L (ref 5–15)
ANION GAP SERPL CALCULATED.3IONS-SCNC: 19 MMOL/L (ref 5–15)
BUN BLD-MCNC: 54 MG/DL (ref 8–23)
BUN BLD-MCNC: 57 MG/DL (ref 8–23)
BUN BLD-MCNC: 59 MG/DL (ref 8–23)
BUN/CREAT SERPL: 18.9 (ref 7–25)
BUN/CREAT SERPL: 19.4 (ref 7–25)
BUN/CREAT SERPL: 19.6 (ref 7–25)
CALCIUM SPEC-SCNC: 8 MG/DL (ref 8.6–10.5)
CHLORIDE SERPL-SCNC: 78 MMOL/L (ref 98–107)
CHLORIDE SERPL-SCNC: 81 MMOL/L (ref 98–107)
CHLORIDE SERPL-SCNC: 84 MMOL/L (ref 98–107)
CO2 SERPL-SCNC: 27 MMOL/L (ref 22–29)
CO2 SERPL-SCNC: 27 MMOL/L (ref 22–29)
CO2 SERPL-SCNC: 28 MMOL/L (ref 22–29)
CREAT BLD-MCNC: 2.75 MG/DL (ref 0.76–1.27)
CREAT BLD-MCNC: 3.02 MG/DL (ref 0.76–1.27)
CREAT BLD-MCNC: 3.04 MG/DL (ref 0.76–1.27)
GFR SERPL CREATININE-BSD FRML MDRD: 20 ML/MIN/1.73
GFR SERPL CREATININE-BSD FRML MDRD: 20 ML/MIN/1.73
GFR SERPL CREATININE-BSD FRML MDRD: 23 ML/MIN/1.73
GLUCOSE BLD-MCNC: 140 MG/DL (ref 65–99)
GLUCOSE BLD-MCNC: 149 MG/DL (ref 65–99)
GLUCOSE BLD-MCNC: 154 MG/DL (ref 65–99)
POTASSIUM BLD-SCNC: 3.4 MMOL/L (ref 3.5–5.2)
POTASSIUM BLD-SCNC: 3.7 MMOL/L (ref 3.5–5.2)
POTASSIUM BLD-SCNC: 3.8 MMOL/L (ref 3.5–5.2)
SODIUM BLD-SCNC: 124 MMOL/L (ref 136–145)
SODIUM BLD-SCNC: 124 MMOL/L (ref 136–145)
SODIUM BLD-SCNC: 127 MMOL/L (ref 136–145)

## 2020-02-19 PROCEDURE — 80048 BASIC METABOLIC PNL TOTAL CA: CPT | Performed by: PHYSICIAN ASSISTANT

## 2020-02-19 PROCEDURE — 80048 BASIC METABOLIC PNL TOTAL CA: CPT | Performed by: INTERNAL MEDICINE

## 2020-02-19 PROCEDURE — 97110 THERAPEUTIC EXERCISES: CPT

## 2020-02-19 PROCEDURE — P9047 ALBUMIN (HUMAN), 25%, 50ML: HCPCS | Performed by: INTERNAL MEDICINE

## 2020-02-19 PROCEDURE — 99232 SBSQ HOSP IP/OBS MODERATE 35: CPT | Performed by: PHYSICIAN ASSISTANT

## 2020-02-19 PROCEDURE — 97535 SELF CARE MNGMENT TRAINING: CPT

## 2020-02-19 PROCEDURE — 25010000002 ALBUMIN HUMAN 25% PER 50 ML: Performed by: INTERNAL MEDICINE

## 2020-02-19 PROCEDURE — 97116 GAIT TRAINING THERAPY: CPT

## 2020-02-19 PROCEDURE — 25010000002 HEPARIN (PORCINE) PER 1000 UNITS: Performed by: INTERNAL MEDICINE

## 2020-02-19 RX ORDER — ALBUMIN (HUMAN) 12.5 G/50ML
12.5 SOLUTION INTRAVENOUS ONCE
Status: COMPLETED | OUTPATIENT
Start: 2020-02-19 | End: 2020-02-19

## 2020-02-19 RX ORDER — POTASSIUM CHLORIDE 750 MG/1
20 CAPSULE, EXTENDED RELEASE ORAL DAILY
Status: DISCONTINUED | OUTPATIENT
Start: 2020-02-19 | End: 2020-02-20 | Stop reason: HOSPADM

## 2020-02-19 RX ORDER — SODIUM CHLORIDE 9 MG/ML
75 INJECTION, SOLUTION INTRAVENOUS CONTINUOUS
Status: DISCONTINUED | OUTPATIENT
Start: 2020-02-19 | End: 2020-02-20 | Stop reason: HOSPADM

## 2020-02-19 RX ORDER — TOLVAPTAN 30 MG/1
30 TABLET ORAL ONCE
Status: COMPLETED | OUTPATIENT
Start: 2020-02-19 | End: 2020-02-19

## 2020-02-19 RX ADMIN — TEMAZEPAM 15 MG: 15 CAPSULE ORAL at 22:11

## 2020-02-19 RX ADMIN — HEPARIN SODIUM 5000 UNITS: 5000 INJECTION, SOLUTION INTRAVENOUS; SUBCUTANEOUS at 08:09

## 2020-02-19 RX ADMIN — POTASSIUM CHLORIDE 20 MEQ: 750 CAPSULE, EXTENDED RELEASE ORAL at 11:15

## 2020-02-19 RX ADMIN — HEPARIN SODIUM 5000 UNITS: 5000 INJECTION, SOLUTION INTRAVENOUS; SUBCUTANEOUS at 21:09

## 2020-02-19 RX ADMIN — ALLOPURINOL 100 MG: 100 TABLET ORAL at 08:09

## 2020-02-19 RX ADMIN — CARVEDILOL 6.25 MG: 6.25 TABLET, FILM COATED ORAL at 16:49

## 2020-02-19 RX ADMIN — CARVEDILOL 6.25 MG: 6.25 TABLET, FILM COATED ORAL at 08:09

## 2020-02-19 RX ADMIN — ALBUMIN HUMAN 12.5 G: 0.25 SOLUTION INTRAVENOUS at 16:50

## 2020-02-19 RX ADMIN — TOLVAPTAN 30 MG: 30 TABLET ORAL at 09:41

## 2020-02-19 RX ADMIN — SODIUM CHLORIDE, PRESERVATIVE FREE 10 ML: 5 INJECTION INTRAVENOUS at 21:09

## 2020-02-19 RX ADMIN — ASPIRIN 81 MG 81 MG: 81 TABLET ORAL at 08:09

## 2020-02-19 RX ADMIN — ATORVASTATIN CALCIUM 20 MG: 20 TABLET, FILM COATED ORAL at 08:09

## 2020-02-19 RX ADMIN — SODIUM CHLORIDE 75 ML/HR: 9 INJECTION, SOLUTION INTRAVENOUS at 15:39

## 2020-02-19 NOTE — PLAN OF CARE
Problem: Patient Care Overview  Goal: Plan of Care Review  Outcome: Ongoing (interventions implemented as appropriate)  Flowsheets (Taken 2/19/2020 0255)  Progress: improving  Plan of Care Reviewed With: patient  Outcome Summary: VSS. No complaints of pain, discomfort, or edema. Will continue to monitor.

## 2020-02-19 NOTE — PLAN OF CARE
Problem: Patient Care Overview  Goal: Plan of Care Review  Outcome: Ongoing (interventions implemented as appropriate)  Flowsheets  Taken 2/19/2020 1621 by Laura Foreman RN  Progress: improving  Outcome Summary: VSS, no complaints of pain. Pt. AxO, RA, NSR with 1st AVB. Up with assist. Pt. receiving samsca for low NA+, started on IVF and given a dose of Albumin. Plan is to go home tomorrow if NA+ improved. Continue to monitor.  Taken 2/19/2020 1330 by Melvi Saleem OT  Plan of Care Reviewed With: patient  Goal: Individualization and Mutuality  Outcome: Ongoing (interventions implemented as appropriate)  Goal: Discharge Needs Assessment  Outcome: Ongoing (interventions implemented as appropriate)  Goal: Interprofessional Rounds/Family Conf  Outcome: Ongoing (interventions implemented as appropriate)     Problem: Fall Risk (Adult)  Goal: Identify Related Risk Factors and Signs and Symptoms  Outcome: Ongoing (interventions implemented as appropriate)  Goal: Absence of Fall  Outcome: Ongoing (interventions implemented as appropriate)     Problem: Pain, Chronic (Adult)  Goal: Identify Related Risk Factors and Signs and Symptoms  Outcome: Ongoing (interventions implemented as appropriate)  Goal: Acceptable Pain/Comfort Level and Functional Ability  Outcome: Ongoing (interventions implemented as appropriate)

## 2020-02-19 NOTE — PLAN OF CARE
Problem: Patient Care Overview  Goal: Plan of Care Review  Outcome: Ongoing (interventions implemented as appropriate)  Flowsheets (Taken 2/19/2020 1051)  Progress: improving  Plan of Care Reviewed With: patient  Outcome Summary: patient ambulated 120 feet with CGA and rolling walker for support, verbal cues to widen SANDRO , patient reported left foot weakness has brace but does not have in room. Distance limited by weakness.

## 2020-02-19 NOTE — THERAPY TREATMENT NOTE
Patient Name: Jet Floyd  : 1945    MRN: [id number]                              Today's Date: 2020       Admit Date: 2020    Visit Dx:     ICD-10-CM ICD-9-CM   1. Acute on chronic congestive heart failure, unspecified heart failure type (CMS/HCC) I50.9 428.0   2. Renal insufficiency N28.9 593.9   3. Pleural effusion J90 511.9   4. Hyponatremia E87.1 276.1   5. Impaired mobility and ADLs Z74.09 799.89   6. GABRIELLE (acute kidney injury) (CMS/HCC) N17.9 584.9     Patient Active Problem List   Diagnosis   • Sepsis (CMS/[facility])   • Left lower lobe pneumonia (CMS/[facility])   • Essential hypertension   • Hyperlipidemia   • Alcohol abuse, daily use   • Former smoker   • Dermatitis   • Balance problem   • Hypoglycemia   • Metabolic encephalopathy   • Lactic acidosis   • Acute on chronic congestive heart failure (CMS/HCC)   • History of alcoholism (CMS/[facility])   • GABRIELLE (acute kidney injury) (CMS/[facility])   • Elevated liver enzymes   • Hyperglycemia   • Hyponatremia   • Pleural effusion     Past Medical History:   Diagnosis Date   • Cancer (CMS/HCC)    • CHF (congestive heart failure) (CMS/[facility])    • Hypertension      Past Surgical History:   Procedure Laterality Date   • CARDIAC DEFIBRILLATOR PLACEMENT Bilateral    • CARDIAC DEFIBRILLATOR REMOVAL Bilateral    • CATARACT EXTRACTION Bilateral    • CERVICAL SPINE SURGERY Bilateral      General Information     Row Name 20 1041          PT Evaluation Time/Intention    Document Type  therapy note (daily note)  -AS     Mode of Treatment  physical therapy  -AS     Row Name 20 1041          General Information    Patient Profile Reviewed?  yes  -AS     Existing Precautions/Restrictions  fall  -AS     Barriers to Rehab  none identified  -AS     Row Name 20 1041          Cognitive Assessment/Intervention- PT/OT    Orientation Status (Cognition)  oriented x 3  -AS     Cognitive Assessment/Intervention Comment  alert, following commands  -AS     Row Name  02/19/20 1041          Safety Issues, Functional Mobility    Safety Issues Affecting Function (Mobility)  awareness of need for assistance;insight into deficits/self awareness;safety precaution awareness;safety precautions follow-through/compliance  -AS     Impairments Affecting Function (Mobility)  balance;endurance/activity tolerance;motor control;coordination  -AS       User Key  (r) = Recorded By, (t) = Taken By, (c) = Cosigned By    Initials Name Provider Type    AS Octavia Mansfield PTA Physical Therapy Assistant        Mobility     Row Name 02/19/20 1045          Bed Mobility Assessment/Treatment    Supine-Sit [place] (Bed Mobility)  conditional independence  -AS     Sit-Supine [place] (Bed Mobility)  not tested  -AS     Assistive Device (Bed Mobility)  head of bed elevated  -AS     Comment (Bed Mobility)  patient demonstrated safe technique  -AS     Row Name 02/19/20 1045          Transfer Assessment/Treatment    Comment (Transfers)  donned shoes prior to OOB activity  -AS     Row Name 02/19/20 1045          Bed-Chair Transfer    Bed-Chair [place] (Transfers)  stand by assist  -AS     Assistive Device (Bed-Chair Transfers)  walker, front-wheeled  -AS     Row Name 02/19/20 1045          Sit-Stand Transfer    Sit-Stand [place] (Transfers)  verbal cues;contact guard  -AS     Assistive Device (Sit-Stand Transfers)  walker, front-wheeled  -AS     Row Name 02/19/20 1045          Gait/Stairs Assessment/Training    Gait/Stairs Assessment/Training  gait/ambulation assistive device  -AS     [place] Level (Gait)  verbal cues;contact guard  -AS     Assistive Device (Gait)  walker, front-wheeled  -AS     Distance in Feet (Gait)  120  -AS     Pattern (Gait)  step-through  -AS     Deviations/Abnormal Patterns (Gait)  base of support, narrow;sade decreased;gait speed decreased;ataxic  -AS     Comment (Gait/Stairs)  patient ambulated 120 feet with CGA and rolling walker for support, verbal cues  to widen [place]DRO , patient reported left foot weakness has brace but does not have in room.  -AS       User Key  (r) = Recorded By, (t) = Taken By, (c) = Cosigned By    Initials Name Provider Type    AS Octavia Mansfield PTA Physical Therapy Assistant        Obj/Interventions     Row Name 02/19/20 1051          Therapeutic Exercise    Lower Extremity (Therapeutic Exercise)  LAQ (long arc quad), bilateral;marching while seated  -AS     Lower Extremity Range of Motion (Therapeutic Exercise)  ankle dorsiflexion/plantar flexion, bilateral  -AS     Exercise Type (Therapeutic Exercise)  AROM (active range of motion)  -AS     Position (Therapeutic Exercise)  seated  -AS     Sets/Reps (Therapeutic Exercise)  1/10  -AS       User Key  (r) = Recorded By, (t) = Taken By, (c) = Cosigned By    Initials Name Provider Type    AS Octavia Mansfield PTA Physical Therapy Assistant        Goals/Plan    No documentation.       Clinical Impression     Row Name 02/19/20 1051          Pain Assessment    Additional Documentation  Pain Scale: Numbers Pre/Post-Treatment (Group)  -AS     Row Name 02/19/20 1051          Pain Scale: Numbers Pre/Post-Treatment    Pain Scale: Numbers, Pretreatment  0/10 - no pain  -AS     Pain Scale: Numbers, Post-Treatment  0/10 - no pain  -AS       User Key  (r) = Recorded By, (t) = Taken By, (c) = Cosigned By    Initials Name Provider Type    AS Octavia Mansfield PTA Physical Therapy Assistant        Outcome Measures     Row Name 02/19/20 1051          How much help from another person do you currently need...    Turning from your back to your side while in flat bed without using bedrails?  4  -AS     Moving from lying on back to sitting on the side of a flat bed without bedrails?  4  -AS     Moving to and from a bed to a chair (including a wheelchair)?  3  -AS     Standing up from a chair using your arms (e.g., wheelchair, bedside chair)?  3  -AS     Climbing 3-5 steps with a railing?  3  -AS     To walk  in hospital room?  3  -AS     AM-PAC 6 Clicks Score (PT)  20  -AS       User Key  (r) = Recorded By, (t) = Taken By, (c) = Cosigned By    Initials Name Provider Type    AS Octavia Mansfield PTA Physical Therapy Assistant          PT Recommendation and Plan     Plan of Care Reviewed With: patient  Progress: improving  Outcome Summary: patient ambulated 120 feet with CGA and rolling walker for support, verbal cues to widen [place]DRO , patient reported left foot weakness has brace but does not have in room. Distance limited by weakness.     Time Calculation:   PT Charges     Row Name 02/19/20 1052             Time Calculation    Start Time  0915  -AS      PT Received On  02/19/20  -AS      PT Goal Re-Cert Due Date  02/27/20  -AS         Timed Charges    36860 - PT Therapeutic Exercise Minutes  8  -AS      19456 - Gait Training Minutes   15  -AS        User Key  (r) = Recorded By, (t) = Taken By, (c) = Cosigned By    Initials Name Provider Type    AS Octavia Mansfield PTA Physical Therapy Assistant        Therapy Charges for Today     Code Description Service Date Service Provider Modifiers Qty    56426726711 HC PT THER PROC EA 15 MIN 2/19/2020 Octavia Mansfield PTA GP 1    03067837151 HC GAIT TRAINING EA 15 MIN 2/19/2020 Octavia Mansfield PTA GP 1          PT G-Codes  Outcome Measure Options: AM-PAC 6 Clicks Basic Mobility (PT)  AM-PAC 6 Clicks Score (PT): 20  AM-PAC 6 Clicks Score (OT): 21    Octavia Mansfield PTA  2/19/2020

## 2020-02-19 NOTE — PROGRESS NOTES
"   LOS: 5 days    Patient Care Team:  Obi Soriano MD as PCP - General  Obi Soriano MD as PCP - Family Medicine    Chief Complaint:  SOA    Subjective     Interval History:     No acute events overnight. No new complaints.     Review of Systems:   No CP or SOA . No edema       Objective     Vital Sign Min/Max for last 24 hours  Temp  Min: 97.5 °F (36.4 °C)  Max: 98.2 °F (36.8 °C)   BP  Min: 93/59  Max: 108/69   Pulse  Min: 76  Max: 92   Resp  Min: 14  Max: 18   SpO2  Min: 93 %  Max: 98 %   No data recorded   No data recorded     Flowsheet Rows      First Filed Value   Admission Height  175.3 cm (69\") Documented at 02/14/2020 2006   Admission Weight  68 kg (150 lb) Documented at 02/14/2020 2006          No intake/output data recorded.  I/O last 3 completed shifts:  In: 1080 [P.O.:1080]  Out: 1950 [Urine:1950]    Physical Exam:     General Appearance:    Alert, cooperative, in no acute distress   Head:    Normocephalic, without obvious abnormality, atraumatic               Neck:   Supple, trachea midline, no thyromegaly, no   carotid bruit, no JVD       Lungs:     Clear to auscultation,respirations regular, even and                  unlabored    Heart:    Regular rhythm and normal rate, normal S1 and S2       Abdomen:     Normal bowel sounds, soft ,non-tender, non-distended       Extremities:   Moves all extremities well, trace edema, no cyanosis, no             redness               Neurologic:   No focal deficit noted.        WBC No results found for: WBC   HGB No results found for: HGB   HCT No results found for: HCT   Platlets No results found for: LABPLAT   MCV No results found for: MCV       Sodium Sodium   Date Value Ref Range Status   02/19/2020 124 (L) 136 - 145 mmol/L Final   02/18/2020 123 (L) 136 - 145 mmol/L Final   02/18/2020 127 (L) 136 - 145 mmol/L Final   02/18/2020 123 (L) 136 - 145 mmol/L Final   02/17/2020 122 (L) 136 - 145 mmol/L Final   02/17/2020 126 (L) 136 - 145 mmol/L " Final   02/17/2020 127 (L) 136 - 145 mmol/L Final      Potassium Potassium   Date Value Ref Range Status   02/19/2020 3.4 (L) 3.5 - 5.2 mmol/L Final   02/18/2020 3.7 3.5 - 5.2 mmol/L Final   02/18/2020 3.3 (L) 3.5 - 5.2 mmol/L Final   02/18/2020 3.3 (L) 3.5 - 5.2 mmol/L Final   02/17/2020 3.4 (L) 3.5 - 5.2 mmol/L Final   02/17/2020 3.3 (L) 3.5 - 5.2 mmol/L Final   02/17/2020 3.1 (L) 3.5 - 5.2 mmol/L Final      Chloride Chloride   Date Value Ref Range Status   02/19/2020 81 (L) 98 - 107 mmol/L Final   02/18/2020 79 (L) 98 - 107 mmol/L Final   02/18/2020 79 (L) 98 - 107 mmol/L Final   02/18/2020 80 (L) 98 - 107 mmol/L Final   02/17/2020 79 (L) 98 - 107 mmol/L Final   02/17/2020 82 (L) 98 - 107 mmol/L Final   02/17/2020 83 (L) 98 - 107 mmol/L Final      CO2 CO2   Date Value Ref Range Status   02/19/2020 27.0 22.0 - 29.0 mmol/L Final   02/18/2020 27.0 22.0 - 29.0 mmol/L Final   02/18/2020 28.0 22.0 - 29.0 mmol/L Final   02/18/2020 27.0 22.0 - 29.0 mmol/L Final   02/17/2020 27.0 22.0 - 29.0 mmol/L Final   02/17/2020 28.0 22.0 - 29.0 mmol/L Final   02/17/2020 26.0 22.0 - 29.0 mmol/L Final      BUN BUN   Date Value Ref Range Status   02/19/2020 54 (H) 8 - 23 mg/dL Final   02/18/2020 51 (H) 8 - 23 mg/dL Final   02/18/2020 47 (H) 8 - 23 mg/dL Final   02/18/2020 45 (H) 8 - 23 mg/dL Final   02/17/2020 44 (H) 8 - 23 mg/dL Final   02/17/2020 42 (H) 8 - 23 mg/dL Final   02/17/2020 41 (H) 8 - 23 mg/dL Final      Creatinine Creatinine   Date Value Ref Range Status   02/19/2020 2.75 (H) 0.76 - 1.27 mg/dL Final   02/18/2020 2.80 (H) 0.76 - 1.27 mg/dL Final   02/18/2020 2.96 (H) 0.76 - 1.27 mg/dL Final   02/18/2020 2.61 (H) 0.76 - 1.27 mg/dL Final   02/17/2020 2.38 (H) 0.76 - 1.27 mg/dL Final   02/17/2020 2.37 (H) 0.76 - 1.27 mg/dL Final   02/17/2020 2.32 (H) 0.76 - 1.27 mg/dL Final      Calcium Calcium   Date Value Ref Range Status   02/19/2020 8.0 (L) 8.6 - 10.5 mg/dL Final   02/18/2020 8.1 (L) 8.6 - 10.5 mg/dL Final   02/18/2020  8.4 (L) 8.6 - 10.5 mg/dL Final   02/18/2020 8.0 (L) 8.6 - 10.5 mg/dL Final   02/17/2020 8.1 (L) 8.6 - 10.5 mg/dL Final   02/17/2020 8.3 (L) 8.6 - 10.5 mg/dL Final   02/17/2020 8.2 (L) 8.6 - 10.5 mg/dL Final      PO4 No results found for: CAPO4   Albumin Albumin   Date Value Ref Range Status   02/17/2020 3.60 3.50 - 5.20 g/dL Final      Magnesium No results found for: MG   Uric Acid No results found for: URICACID        Results Review:     I reviewed the patient's new clinical results.      allopurinol 100 mg Oral Daily   aspirin 81 mg Oral Daily   atorvastatin 20 mg Oral Daily   carvedilol 6.25 mg Oral BID With Meals   heparin (porcine) 5,000 Units Subcutaneous Q12H   pharmacy consult - MTM  Does not apply Daily   potassium chloride 20 mEq Oral Daily   sodium chloride 10 mL Intravenous Q12H   temazepam 15 mg Oral Nightly          Medication Review:     Assessment/Plan       Acute on chronic congestive heart failure (CMS/[facility])    Essential hypertension    Hyperlipidemia    History of alcoholism (CMS/[facility])    GABRIELLE (acute kidney injury) (CMS/[facility])    Elevated liver enzymes    Hyperglycemia    Hyponatremia    Pleural effusion         1- GABRIELLE on CKD- non oliguric - Cardio-renal syndrome.  Stable.   2- Volume overload- resolved.  3- Respiratory distress secondary to volume overload  4- hyponatremia -  Worsening.   5- Systolic CHF with EF 30%  6- CKD stage III - baseline Scr 1.5-1.8 range.     Plan:-   -  tolvaptan 30 mg today once.   - Hold diuretics for now  - Monitor I/O   - Avoid nephrotoxic agents.   - Adjust meds per renal function        I discussed the patients findings and my recommendations with patient and nursing staff    Ave Roberson MD  02/19/20  10:55 AM

## 2020-02-19 NOTE — PROGRESS NOTES
"                                 [facility] Progress Note      LOS: 5 days   Patient Care Team:  Obi Soriano MD as PCP - General  Obi Soriano MD as PCP - Family Medicine    Chief Complaint:    Chief Complaint   Patient presents with   • Shortness of Breath       Subjective     Interval History: Breathing much better today    Patient Complaints: No chest pain or dyspnea today.  Wants to go home      Review of Systems:   A 14 point review of systems was negative except as was stated in the HPI      Objective     Vital Sign Min/Max for last 24 hours  Temp  Min: 97.5 °F (36.4 °C)  Max: 98.2 °F (36.8 °C)   BP  Min: 93/59  Max: 108/69   Pulse  Min: 76  Max: 92   Resp  Min: 14  Max: 18   SpO2  Min: 93 %  Max: 97 %   No data recorded   No data recorded     Flowsheet Rows      First Filed Value   Admission Height  175.3 cm (69\") Documented at 02/14/2020 2006   Admission Weight  68 kg (150 lb) Documented at 02/14/2020 2006          Physical Exam:  General Appearance: Alert, appears stated age and cooperative  Lungs: Clear to auscultation  Heart:: RRR   No Murmurs, Rubs or Gallops  Abdomen: Soft and nontender with adequate bowel sounds.  No organomegaly  Extremities: No cyanosis, clubbing or edema  Pulses: Pulses palpable and equal bilaterally  Skin: Warm and dry with no rash  Psych: Normal     Results Review:     I reviewed the patient's new clinical results.  Results from last 7 days   Lab Units 02/19/20  0821 02/18/20  1129 02/18/20  0500   SODIUM mmol/L 124* 123* 127*   POTASSIUM mmol/L 3.4* 3.7 3.3*   CHLORIDE mmol/L 81* 79* 79*   CO2 mmol/L 27.0 27.0 28.0   BUN mg/dL 54* 51* 47*   CREATININE mg/dL 2.75* 2.80* 2.96*   GLUCOSE mg/dL 154* 127* 85   CALCIUM mg/dL 8.0* 8.1* 8.4*     Results from last 7 days   Lab Units 02/16/20  0240 02/15/20  0721 02/14/20 2013   WBC 10*3/mm3 5.11 5.33 6.02   HEMOGLOBIN g/dL 12.0* 11.7* 12.5*   HEMATOCRIT % 35.4* 34.5* 38.0   PLATELETS 10*3/mm3 163 " 161 169     Lab Results   Lab Value Date/Time    TROPONINT <0.010 02/15/2020 0721    TROPONINT <0.010 02/15/2020 0158    TROPONINT <0.010 02/14/2020 2013         Results from last 7 days   Lab Units 02/14/20 2013   INR  1.51*               Medication Review: yes  Current Facility-Administered Medications   Medication Dose Route Frequency Provider Last Rate Last Dose   • allopurinol (ZYLOPRIM) tablet 100 mg  100 mg Oral Daily Radha Salazar DO   100 mg at 02/19/20 0809   • aspirin chewable tablet 81 mg  81 mg Oral Daily Radha Salazar, DO   81 mg at 02/19/20 0809   • atorvastatin (LIPITOR) tablet 20 mg  20 mg Oral Daily Radha Salazar DO   20 mg at 02/19/20 0809   • carvedilol (COREG) tablet 6.25 mg  6.25 mg Oral BID With Meals Emily Barron APRN   6.25 mg at 02/19/20 0809   • heparin (porcine) 5000 UNIT/ML injection 5,000 Units  5,000 Units Subcutaneous Q12H Radha Salazar DO   5,000 Units at 02/19/20 0809   • Pharmacy Consult - MTM   Does not apply Daily Mike Scott RPH   Stopped at 02/15/20 1548   • sodium chloride 0.9 % flush 10 mL  10 mL Intravenous PRN Radha Salazar DO       • sodium chloride 0.9 % flush 10 mL  10 mL Intravenous Q12H Radha Salazar, DO   10 mL at 02/17/20 0810   • sodium chloride 0.9 % flush 10 mL  10 mL Intravenous PRN Radha Salazar DO       • temazepam (RESTORIL) capsule 15 mg  15 mg Oral Nightly Rachel Velázquez PA-C   15 mg at 02/18/20 2103         Acute on chronic congestive heart failure (CMS/HCC)    Essential hypertension    Hyperlipidemia    History of alcoholism (CMS/HCC)    GABRIELLE (acute kidney injury) (CMS/HCC)    Elevated liver enzymes    Hyperglycemia    Hyponatremia    Pleural effusion        Impression      History of nonischemic cardiomyopathy  Ejection fraction 20% with moderate to severe MR by echocardiography February 2020  Decompensated heart failure presently well treated  Chronic kidney disease  Left bundle branch block            Plan      Continue present medications.  After present illness resolves the patient would be a candidate for biventricular pacing and/or ICD backup.  I will follow this up as an outpatient.    Continue support        MD Shiva Acuña PA  02/19/20

## 2020-02-19 NOTE — THERAPY TREATMENT NOTE
Acute Care - Occupational Therapy Treatment Note  [facility]     Patient Name: Jet Floyd  : 1945  MRN: [id number]  Today's Date: 2020     Date of Referral to OT: 20       Admit Date: 2020       ICD-10-CM ICD-9-CM   1. Acute on chronic congestive heart failure, unspecified heart failure type (CMS/HCC) I50.9 428.0   2. Renal insufficiency N28.9 593.9   3. Pleural effusion J90 511.9   4. Hyponatremia E87.1 276.1   5. Impaired mobility and ADLs Z74.09 799.89   6. GABRIELLE (acute kidney injury) (CMS/HCC) N17.9 584.9     Patient Active Problem List   Diagnosis   • Sepsis (CMS/HCC)   • Left lower lobe pneumonia (CMS/HCC)   • Essential hypertension   • Hyperlipidemia   • Alcohol abuse, daily use   • Former smoker   • Dermatitis   • Balance problem   • Hypoglycemia   • Metabolic encephalopathy   • Lactic acidosis   • Acute on chronic congestive heart failure (CMS/HCC)   • History of alcoholism (CMS/HCC)   • GABRIELLE (acute kidney injury) (CMS/HCC)   • Elevated liver enzymes   • Hyperglycemia   • Hyponatremia   • Pleural effusion     Past Medical History:   Diagnosis Date   • Cancer (CMS/HCC)    • CHF (congestive heart failure) (CMS/[facility])    • Hypertension      Past Surgical History:   Procedure Laterality Date   • CARDIAC DEFIBRILLATOR PLACEMENT Bilateral    • CARDIAC DEFIBRILLATOR REMOVAL Bilateral    • CATARACT EXTRACTION Bilateral    • CERVICAL SPINE SURGERY Bilateral        Therapy Treatment    Rehabilitation Treatment Summary     Row Name 20 1330             Treatment Time/Intention    Discipline  occupational therapist  -KA      Document Type  therapy note (daily note)  -KA      Subjective Information  no complaints  -KA      Mode of Treatment  occupational therapy  -KA      Therapy Frequency (OT Eval)  daily  -KA      Patient Effort  excellent  -KA      Existing Precautions/Restrictions  fall  -KA      Equipment Issued to Patient  long handled sponge;reacher  -KA      Recorded  by [KA] Melvi Saleem, OT 02/19/20 1406      Row Name 02/19/20 1330             Vital Signs    Pre Systolic BP Rehab  99  -KA      Pre Treatment Diastolic BP  64  -KA      Post Systolic BP Rehab  96  -KA      Post Treatment Diastolic BP  72  -KA      Pre Patient Position  Sitting  -KA      Intra Patient Position  Standing  -KA      Post Patient Position  Sitting  -KA      Recorded by [KA] Melvi Saleem OT 02/19/20 1406      Row Name 02/19/20 1330             Transfer Assessment/Treatment    Transfer Assessment/Treatment  sit-stand transfer;stand-sit transfer  -KA      Recorded by [KA] Melvi Saleem OT 02/19/20 1406      Row Name 02/19/20 1330             Sit-Stand Transfer    Sit-Stand [place] (Transfers)  stand by assist  -KA      Recorded by [KA] Melvi Saleem OT 02/19/20 1406      Row Name 02/19/20 1330             Stand-Sit Transfer    Stand-Sit [place] (Transfers)  stand by assist  -KA      Recorded by [KA] Melvi Saleem OT 02/19/20 1406      Row Name 02/19/20 1330             ADL Assessment/Intervention    61585 - OT Self Care/Mgmt Minutes  10  -KA      BADL Assessment/Intervention  bathing  -KA      Recorded by [KA] Melvi Saleem OT 02/19/20 1406      Row Name 02/19/20 1330             Bathing Assessment/Intervention    Comment (Bathing)  pt provided with and educated on use of long handled bath sponge.   -KA      Recorded by [KA] Melvi Saleem OT 02/19/20 1406      Row Name 02/19/20 1330             Motor Skills Assessment/Interventions    Additional Documentation  Therapeutic Exercise (Group);Therapeutic Exercise Interventions (Group)  -KA      Recorded by [KA] Melvi Saleem OT 02/19/20 1406      Row Name 02/19/20 1330             Therapeutic Exercise    57129 - OT Therapeutic Exercise Minutes  15  -KA      Recorded by [KA] Melvi Saleem OT 02/19/20 1406      Row Name 02/19/20 1330             Therapeutic Exercise    Upper Extremity (Therapeutic  Exercise)  tricep extension, left;tricep extension, bilateral;other (see comments) bahman abd  -KA      Weight/Resistance (Therapeutic Exercise)  green  -KA      Exercise Type (Therapeutic Exercise)  resistive exercises  -KA      Position (Therapeutic Exercise)  standing  -KA      Sets/Reps (Therapeutic Exercise)  1/15  -KA      Equipment (Therapeutic Exercise)  resistive bands  -KA      Comment (Therapeutic Exercise)  no rest breaks required; educated to complete 3x/day 1x20  -KA      Recorded by [KA] Melvi Saleem, OT 02/19/20 1406      Row Name 02/19/20 1330 02/19/20 1200          Positioning and Restraints    Pre-Treatment Position  sitting in chair/recliner  -KA  in bed  -KA     Post Treatment Position  chair  -KA  --     In Bed  sitting;call light within reach;encouraged to call for assist;exit alarm on;with family/caregiver  -KA  --     Recorded by [KA] Melvi Saleem, MATHEUS 02/19/20 1406 [KA] Melvi Saleem OT 02/19/20 1406     Row Name 02/19/20 1330             Pain Scale: Numbers Pre/Post-Treatment    Pain Scale: Numbers, Pretreatment  0/10 - no pain  -KA      Pain Scale: Numbers, Post-Treatment  0/10 - no pain  -KA      Recorded by [KA] Melvi Saleem, OT 02/19/20 1406      Row Name 02/19/20 1330             Plan of Care Review    Plan of Care Reviewed With  patient  -KA      Progress  improving  -KA      Recorded by [KA] Melvi Saleem OT 02/19/20 1406      Row Name 02/19/20 1330             Outcome Summary/Treatment Plan (OT)    Daily Summary of Progress (OT)  progress toward functional goals is good  -RENE      Anticipated Discharge Disposition (OT)  home with assist  -KA      Recorded by [KA] Melvi Saleem, OT 02/19/20 1406        User Key  (r) = Recorded By, (t) = Taken By, (c) = Cosigned By    Initials Name Effective Dates Discipline    KA Melvi Saleem, OT 07/17/19 -  OT                 OT Recommendation and Plan  Outcome Summary/Treatment Plan (OT)  Daily Summary of Progress  (OT): progress toward functional goals is good  Anticipated Discharge Disposition (OT): home with assist  Therapy Frequency (OT Eval): daily  Daily Summary of Progress (OT): progress toward functional goals is good  Plan of Care Review  Plan of Care Reviewed With: patient  Plan of Care Reviewed With: patient  Outcome Summary: B UE HEP w/green tband in stance 1x15 in all planes; no rest breaks.  Educated to complete HEP 2-3x/day 1x20.  Sit<>Stand: SBA.  Provided with and educated on use of reacher and long handled sponge.  Cont to recommend home w/assist.  Will cont to observe and address ADL/IADL deficits as needed.  Outcome Measures     Row Name 02/19/20 1330 02/17/20 0755          How much help from another is currently needed...    Putting on and taking off regular lower body clothing?  3  -KA  3  -KF     Bathing (including washing, rinsing, and drying)  3  -KA  3  -KF     Toileting (which includes using toilet bed pan or urinal)  3  -KA  3  -KF     Putting on and taking off regular upper body clothing  4  -KA  4  -KF     Taking care of personal grooming (such as brushing teeth)  4  -KA  4  -KF     Eating meals  4  -KA  4  -KF     AM-PAC 6 Clicks Score (OT)  21  -KA  21  -KF        Functional Assessment    Outcome Measure Options  --  AM-PAC 6 Clicks Daily Activity (OT)  -KF       User Key  (r) = Recorded By, (t) = Taken By, (c) = Cosigned By    Initials Name Provider Type    KF Emerald Elizondo OT Occupational Therapist    Melvi Valadez OT Occupational Therapist           Time Calculation:   Time Calculation- OT     Row Name 02/19/20 1330 02/19/20 1052          Time Calculation- OT    OT Start Time  1330  -KA  --     OT Received On  02/19/20 -KA  --     OT Goal Re-Cert Due Date  02/27/20 -KA  --        Timed Charges    98518 - OT Therapeutic Exercise Minutes  15  -KA  --     52549 - Gait Training Minutes   --  15  -AS     13183 - OT Self Care/Mgmt Minutes  10  -KA  --       User Key  (r) = Recorded  By, (t) = Taken By, (c) = Cosigned By    Initials Name Provider Type    AS Octavia Mansfield PTA Physical Therapy Assistant    Melvi Valadez OT Occupational Therapist        Therapy Charges for Today     Code Description Service Date Service Provider Modifiers Qty    44287175889 HC OT THER PROC EA 15 MIN 2/19/2020 Melvi Saleem OT GO 1    07357696531 HC OT SELF CARE/MGMT/TRAIN EA 15 MIN 2/19/2020 Melvi Saleem OT GO 1               Melvi Saleem OT  2/19/2020

## 2020-02-19 NOTE — PLAN OF CARE
Problem: Patient Care Overview  Goal: Plan of Care Review  Flowsheets (Taken 2/19/2020 1330)  Outcome Summary: B UE HEP w/green tband in stance 1x15 in all planes; no rest breaks.  Educated to complete HEP 2-3x/day 1x20.  Sit<>Stand: SBA.  Provided with and educated on use of reacher and long handled sponge.  Cont to recommend home w/assist.  Will cont to observe and address ADL/IADL deficits as needed.

## 2020-02-19 NOTE — PROGRESS NOTES
Continued Stay Note  [facility]     Patient Name: Jet Floyd  MRN: [id number]  Today's Date: 2/19/2020    Admit Date: 2/14/2020    Discharge Plan     Row Name 02/19/20 1136       Plan    Plan Comments  At this time pt reports his plan for discharge is to return home with HH. CM is following for possible need of [facility] at discharge. Once final decision is made CM can reach out to the [facility] coordinator to make needed referral.         Discharge Codes    No documentation.             Keri Linares RN

## 2020-02-20 VITALS
OXYGEN SATURATION: 96 % | HEART RATE: 95 BPM | WEIGHT: 130 LBS | TEMPERATURE: 98.1 F | DIASTOLIC BLOOD PRESSURE: 73 MMHG | BODY MASS INDEX: 19.26 KG/M2 | HEIGHT: 69 IN | RESPIRATION RATE: 18 BRPM | SYSTOLIC BLOOD PRESSURE: 117 MMHG

## 2020-02-20 PROBLEM — N17.9 AKI (ACUTE KIDNEY INJURY) (HCC): Status: RESOLVED | Noted: 2020-02-14 | Resolved: 2020-02-20

## 2020-02-20 PROBLEM — I34.0 SEVERE MITRAL VALVE REGURGITATION: Chronic | Status: ACTIVE | Noted: 2020-02-20

## 2020-02-20 PROBLEM — I50.43 ACUTE ON CHRONIC COMBINED SYSTOLIC AND DIASTOLIC CONGESTIVE HEART FAILURE: Status: RESOLVED | Noted: 2020-02-14 | Resolved: 2020-02-20

## 2020-02-20 PROBLEM — I50.43 ACUTE ON CHRONIC COMBINED SYSTOLIC AND DIASTOLIC CONGESTIVE HEART FAILURE: Status: ACTIVE | Noted: 2020-02-14

## 2020-02-20 PROBLEM — I50.23 ACUTE ON CHRONIC SYSTOLIC CONGESTIVE HEART FAILURE: Status: ACTIVE | Noted: 2020-02-14

## 2020-02-20 PROBLEM — N18.30 CKD (CHRONIC KIDNEY DISEASE) STAGE 3, GFR 30-59 ML/MIN (HCC): Chronic | Status: ACTIVE | Noted: 2020-02-20

## 2020-02-20 PROBLEM — R74.8 ELEVATED LIVER ENZYMES: Status: RESOLVED | Noted: 2020-02-14 | Resolved: 2020-02-20

## 2020-02-20 PROBLEM — N18.30 CKD (CHRONIC KIDNEY DISEASE) STAGE 3, GFR 30-59 ML/MIN: Status: ACTIVE | Noted: 2020-02-20

## 2020-02-20 PROBLEM — I13.10 CARDIORENAL SYNDROME: Status: ACTIVE | Noted: 2020-02-20

## 2020-02-20 PROBLEM — I13.10 CARDIORENAL SYNDROME: Chronic | Status: ACTIVE | Noted: 2020-02-20

## 2020-02-20 PROBLEM — F10.21 HISTORY OF ALCOHOLISM: Chronic | Status: ACTIVE | Noted: 2020-02-14

## 2020-02-20 PROBLEM — I34.0 SEVERE MITRAL VALVE REGURGITATION: Status: ACTIVE | Noted: 2020-02-20

## 2020-02-20 PROBLEM — E87.1 HYPONATREMIA: Status: RESOLVED | Noted: 2020-02-14 | Resolved: 2020-02-20

## 2020-02-20 LAB
ANION GAP SERPL CALCULATED.3IONS-SCNC: 14 MMOL/L (ref 5–15)
ANION GAP SERPL CALCULATED.3IONS-SCNC: 16 MMOL/L (ref 5–15)
BUN BLD-MCNC: 52 MG/DL (ref 8–23)
BUN BLD-MCNC: 56 MG/DL (ref 8–23)
BUN/CREAT SERPL: 20.9 (ref 7–25)
BUN/CREAT SERPL: 22 (ref 7–25)
CALCIUM SPEC-SCNC: 7.9 MG/DL (ref 8.6–10.5)
CALCIUM SPEC-SCNC: 8.2 MG/DL (ref 8.6–10.5)
CHLORIDE SERPL-SCNC: 90 MMOL/L (ref 98–107)
CHLORIDE SERPL-SCNC: 90 MMOL/L (ref 98–107)
CO2 SERPL-SCNC: 24 MMOL/L (ref 22–29)
CO2 SERPL-SCNC: 25 MMOL/L (ref 22–29)
CREAT BLD-MCNC: 2.49 MG/DL (ref 0.76–1.27)
CREAT BLD-MCNC: 2.55 MG/DL (ref 0.76–1.27)
GFR SERPL CREATININE-BSD FRML MDRD: 25 ML/MIN/1.73
GFR SERPL CREATININE-BSD FRML MDRD: 25 ML/MIN/1.73
GLUCOSE BLD-MCNC: 141 MG/DL (ref 65–99)
GLUCOSE BLD-MCNC: 97 MG/DL (ref 65–99)
POTASSIUM BLD-SCNC: 3.8 MMOL/L (ref 3.5–5.2)
POTASSIUM BLD-SCNC: 3.9 MMOL/L (ref 3.5–5.2)
SODIUM BLD-SCNC: 129 MMOL/L (ref 136–145)
SODIUM BLD-SCNC: 130 MMOL/L (ref 136–145)

## 2020-02-20 PROCEDURE — 25010000002 HEPARIN (PORCINE) PER 1000 UNITS: Performed by: INTERNAL MEDICINE

## 2020-02-20 PROCEDURE — 99239 HOSP IP/OBS DSCHRG MGMT >30: CPT | Performed by: FAMILY MEDICINE

## 2020-02-20 PROCEDURE — 80048 BASIC METABOLIC PNL TOTAL CA: CPT | Performed by: INTERNAL MEDICINE

## 2020-02-20 RX ORDER — FUROSEMIDE 40 MG/1
40 TABLET ORAL DAILY
Qty: 30 TABLET | Refills: 1 | Status: ON HOLD | OUTPATIENT
Start: 2020-02-20 | End: 2022-03-29

## 2020-02-20 RX ADMIN — HEPARIN SODIUM 5000 UNITS: 5000 INJECTION, SOLUTION INTRAVENOUS; SUBCUTANEOUS at 08:57

## 2020-02-20 RX ADMIN — CARVEDILOL 6.25 MG: 6.25 TABLET, FILM COATED ORAL at 08:58

## 2020-02-20 RX ADMIN — ALLOPURINOL 100 MG: 100 TABLET ORAL at 08:58

## 2020-02-20 RX ADMIN — POTASSIUM CHLORIDE 20 MEQ: 750 CAPSULE, EXTENDED RELEASE ORAL at 08:58

## 2020-02-20 RX ADMIN — ATORVASTATIN CALCIUM 20 MG: 20 TABLET, FILM COATED ORAL at 08:57

## 2020-02-20 RX ADMIN — ASPIRIN 81 MG 81 MG: 81 TABLET ORAL at 08:58

## 2020-02-20 NOTE — PROGRESS NOTES
"                                 [facility] Progress Note      LOS: 6 days   Patient Care Team:  Obi Soriano MD as PCP - General  Obi Soriano MD as PCP - Family Medicine    Chief Complaint:    Chief Complaint   Patient presents with   • Shortness of Breath       Subjective     Interval History: Breathing much better today    Patient Complaints: No chest pain or dyspnea today.  Wants to go home.  Feels good      Review of Systems:   A 14 point review of systems was negative except as was stated in the HPI      Objective     Vital Sign Min/Max for last 24 hours  Temp  Min: 97.7 °F (36.5 °C)  Max: 98.1 °F (36.7 °C)   BP  Min: 94/65  Max: 117/73   Pulse  Min: 74  Max: 95   Resp  Min: 18  Max: 18   SpO2  Min: 94 %  Max: 98 %   No data recorded   Weight  Min: 59 kg (130 lb)  Max: 59 kg (130 lb)     Flowsheet Rows      First Filed Value   Admission Height  175.3 cm (69\") Documented at 02/14/2020 2006   Admission Weight  68 kg (150 lb) Documented at 02/14/2020 2006          Physical Exam:  General Appearance: Alert, appears stated age and cooperative  Lungs: Clear to auscultation  Heart:: RRR   No Murmurs, Rubs or Gallops  Abdomen: Soft and nontender with adequate bowel sounds.  No organomegaly  Extremities: No cyanosis, clubbing or edema  Pulses: Pulses palpable and equal bilaterally  Skin: Warm and dry with no rash  Psych: Normal     Results Review:     I reviewed the patient's new clinical results.  Results from last 7 days   Lab Units 02/20/20  0527 02/19/20  1946 02/19/20  1347   SODIUM mmol/L 129* 127* 124*   POTASSIUM mmol/L 3.8 3.7 3.8   CHLORIDE mmol/L 90* 84* 78*   CO2 mmol/L 25.0 28.0 27.0   BUN mg/dL 56* 59* 57*   CREATININE mg/dL 2.55* 3.04* 3.02*   GLUCOSE mg/dL 97 140* 149*   CALCIUM mg/dL 7.9* 8.0* 8.0*     Results from last 7 days   Lab Units 02/16/20  0240 02/15/20  0721 02/14/20 2013   WBC 10*3/mm3 5.11 5.33 6.02   HEMOGLOBIN g/dL 12.0* 11.7* 12.5*   HEMATOCRIT % " 35.4* 34.5* 38.0   PLATELETS 10*3/mm3 163 161 169     Lab Results   Lab Value Date/Time    TROPONINT <0.010 02/15/2020 0721    TROPONINT <0.010 02/15/2020 0158    TROPONINT <0.010 02/14/2020 2013         Results from last 7 days   Lab Units 02/14/20 2013   INR  1.51*               Medication Review: yes  Current Facility-Administered Medications   Medication Dose Route Frequency Provider Last Rate Last Dose   • allopurinol (ZYLOPRIM) tablet 100 mg  100 mg Oral Daily Radha Salazar DO   100 mg at 02/20/20 0858   • aspirin chewable tablet 81 mg  81 mg Oral Daily Radha Salazar DO   81 mg at 02/20/20 0858   • atorvastatin (LIPITOR) tablet 20 mg  20 mg Oral Daily Radha Salazar DO   20 mg at 02/20/20 0857   • carvedilol (COREG) tablet 6.25 mg  6.25 mg Oral BID With Meals Emily Barron APRN   6.25 mg at 02/20/20 0858   • heparin (porcine) 5000 UNIT/ML injection 5,000 Units  5,000 Units Subcutaneous Q12H Radha Salazar DO   5,000 Units at 02/20/20 0857   • Pharmacy Consult - MTM   Does not apply Daily Mike Scott RPH   Stopped at 02/15/20 1548   • potassium chloride (MICRO-K) CR capsule 20 mEq  20 mEq Oral Daily Rachel Velázquez PA-C   20 mEq at 02/20/20 0858   • sodium chloride 0.9 % flush 10 mL  10 mL Intravenous PRN Radha Salazar DO       • sodium chloride 0.9 % flush 10 mL  10 mL Intravenous Q12H Radha Salazar DO   10 mL at 02/19/20 2109   • sodium chloride 0.9 % flush 10 mL  10 mL Intravenous PRN Radha Salazar DO       • sodium chloride 0.9 % infusion  75 mL/hr Intravenous Continuous Ave Roberson MD 75 mL/hr at 02/19/20 1539 75 mL/hr at 02/19/20 1539   • temazepam (RESTORIL) capsule 15 mg  15 mg Oral Nightly Rachel Velázquez PA-C   15 mg at 02/19/20 2211         Acute on chronic systolic congestive heart failure (CMS/HCC)    Essential hypertension    Hyperlipidemia    History of alcoholism (CMS/HCC)    GABRIELLE (acute kidney injury) (CMS/HCC)    Elevated liver  enzymes    Hyperglycemia    Hyponatremia    Pleural effusion    CKD (chronic kidney disease) stage 3, GFR 30-59 ml/min (CMS/HCC)    Cardiorenal syndrome        Impression      History of nonischemic cardiomyopathy  Ejection fraction 20% with moderate to severe MR by echocardiography February 2020  Decompensated heart failure presently well treated  Chronic kidney disease  Left bundle branch block            Plan     Continue present medications.  After present illness resolves the patient would be a candidate for biventricular pacing and/or ICD backup.  I will follow this up as an outpatient.  GFR better  Continue support      MD Shiva Acuña PA  02/20/20

## 2020-02-20 NOTE — PROGRESS NOTES
Continued Stay Note  [facility]     Patient Name: Jet Floyd  MRN: [id number]  Today's Date: 2/20/2020    Admit Date: 2/14/2020    Discharge Plan     Row Name 02/20/20 1139       Plan    Plan Comments  Pt to discharge home today,  notified Hima with [facility] of pt's discharge and they will make arrangements to see pt.         Discharge Codes    No documentation.       Expected Discharge Date and Time     Expected Discharge Date Expected Discharge Time    Feb 20, 2020             Niya Marin RN

## 2020-02-20 NOTE — PLAN OF CARE
Problem: Patient Care Overview  Goal: Plan of Care Review  Outcome: Ongoing (interventions implemented as appropriate)  Flowsheets (Taken 2/20/2020 0301)  Progress: improving  Plan of Care Reviewed With: patient  Outcome Summary: VSS. No complaints of pain or discomfort. RA. Will continue to monitor.

## 2020-02-20 NOTE — DISCHARGE SUMMARY
[facility] Medicine Services  DISCHARGE SUMMARY    Patient Name: Jet Floyd  : 1945  MRN: [id number]    Date of Admission: 2020  8:31 PM  Date of Discharge:  20    Primary Care Physician: Obi Soriano MD    Consults     Date and Time Order Name Status Description    2020 0725 Inpatient Nephrology Consult Completed     2020 0723 Inpatient Cardiology Consult Completed           Hospital Course     Presenting Problem:   Acute on chronic congestive heart failure, unspecified heart failure type (CMS/HCC) [I50.9]    Active Hospital Problems    Diagnosis  POA   • CKD (chronic kidney disease) stage 3, GFR 30-59 ml/min (CMS/[facility]) [N18.3]  Yes   • Cardiorenal syndrome [I13.10]  Yes   • Severe mitral valve regurgitation [I34.0]  Yes   • History of alcoholism (CMS/[facility]) [F10.21]  Not Applicable   • Hyperglycemia [R73.9]  Yes   • Pleural effusion [J90]  Yes   • Essential hypertension [I10]  Yes   • Hyperlipidemia [E78.5]  Yes      Resolved Hospital Problems    Diagnosis Date Resolved POA   • **Acute on chronic combined systolic and diastolic congestive heart failure (CMS/HCC) [I50.43] 2020 Yes   • GABRIELLE (acute kidney injury) (CMS/[facility]) [N17.9] 2020 Yes   • Elevated liver enzymes [R74.8] 2020 Yes   • Hyponatremia [E87.1] 2020 Yes          Hospital Course:  Jet Floyd is a 74 y.o. male with past medical history of hypertension, dilated DM, CHF, alcoholism, hyperlipidemia who presents to the ER with progressively worsening shortness of breath for 3 days, found to have acute on chronic congestive heart failure exacerbation.     Acute on chronic systolic congestive heart failure, resolved  - 2/15 ECHO showed EF 20% with mild concentric LV hypertrophy, mild RV dilation, moderately reduced RV systolic function, borderline dilation of LA and moderate to severe MR   - Appreciate nephrology and Dr. Richard assistance  - s/p diuresis with  "IV Bumex and Tolvaptan 15mg on 2/17 & 2/18, 30mg on 2/19   - Discussed LifeVest with Dr. Richard on 2/17 --> no LifeVest at [place], appt will be made at d/c to discuss candidacy for biventricular pacing and/or ICD backup     GABRIELLE on CKD3   - Creatinine improving   - Baseline creatinine ~1.5-1.8 per nephrology - follows with Dr. Vences of [facility] outpatient  - Nephrology gave Tolvaptan 30mg x 1again on 2/19 to improve Na and Cr -->  final plan is to resume home Lasix at reduced dose of 40mg daily and to STOP aldactone (due to risk of hyponatremia).  F/u with [facility] within the week for repeat renal panel and eval.      Hyponatremia  - s/p Tolvaptan 15mg on 2/17 & 2/18, 30mg on 2/19  --> Na improved   - pt to continue 1800 mL fluid restriction at home, education given by nursing      Discharge Follow Up Recommendations for outpatient labs/diagnostics:  Per Dr. Roberson -- needs renal panel drawn at [facility] f/u appt next week to trend creatinine and sodium    Day of Discharge     HPI:   Fells \"myself\", no edema, denies SOA or JOSEPH.    Review of Systems  Gen- No fevers, chills, HA, uncontrolled pain  CV- No chest pain, palpitations, new edema  Resp- No cough, dyspnea  GI- No N/V/D, abd pain, constipation  Skin - No rash    Vital Signs:   Temp:  [97.7 °F (36.5 °C)-98.1 °F (36.7 °C)] 98.1 °F (36.7 °C)  Heart Rate:  [74-95] 95  Resp:  [18] 18  BP: ()/(57-83) 117/73     Physical Exam:  Constitutional: No acute distress, awake, alert, nontoxic, thin body habitus  HENT: poor dentition  Respiratory: Clear to auscultation bilaterally, good effort, nonlabored respirations   Cardiovascular: RRR, (+)murmur  Musculoskeletal: No peripheral edema, normal muscle tone for age  Psychiatric: Appropriate affect, good insight and judgement, cooperative    Pertinent  and/or Most Recent Results     Results from last 7 days   Lab Units 02/20/20  0919 02/20/20  0527 02/19/20  1946 02/19/20  1347 02/19/20  0821 02/18/20  1129 02/18/20  0500  02/16/20  0240 " 02/15/20  0721 02/14/20 2013   WBC 10*3/mm3  --   --   --   --   --   --   --   --  5.11 5.33 6.02   HEMOGLOBIN g/dL  --   --   --   --   --   --   --   --  12.0* 11.7* 12.5*   HEMATOCRIT %  --   --   --   --   --   --   --   --  35.4* 34.5* 38.0   PLATELETS 10*3/mm3  --   --   --   --   --   --   --   --  163 161 169   SODIUM mmol/L 130* 129* 127* 124* 124* 123* 127*   < > 129* 128* 128*   POTASSIUM mmol/L 3.9 3.8 3.7 3.8 3.4* 3.7 3.3*   < > 3.8 3.9 4.7   CHLORIDE mmol/L 90* 90* 84* 78* 81* 79* 79*   < > 86* 90* 91*   CO2 mmol/L 24.0 25.0 28.0 27.0 27.0 27.0 28.0   < > 24.0 20.0* 22.0   BUN mg/dL 52* 56* 59* 57* 54* 51* 47*   < > 40* 38* 35*   CREATININE mg/dL 2.49* 2.55* 3.04* 3.02* 2.75* 2.80* 2.96*   < > 2.32* 2.13* 2.43*   GLUCOSE mg/dL 141* 97 140* 149* 154* 127* 85   < > 99 95 149*   CALCIUM mg/dL 8.2* 7.9* 8.0* 8.0* 8.0* 8.1* 8.4*   < > 8.6 8.4* 8.8    < > = values in this interval not displayed.     Results from last 7 days   Lab Units 02/17/20  0621 02/16/20  0240 02/14/20 2013   BILIRUBIN mg/dL 1.8* 1.7* 2.2*   ALK PHOS U/L 181* 195* 240*   ALT (SGPT) U/L 65* 93* 132*   AST (SGOT) U/L 26 50* 150*   PROTIME Seconds  --   --  17.5*   INR   --   --  1.51*           Invalid input(s): TG, LDLCALC, LDLREALC  Results from last 7 days   Lab Units 02/16/20  0240 02/15/20  0722 02/15/20  0721 02/15/20  0158 02/14/20 2013   TSH uIU/mL  --   --  4.180  --   --    HEMOGLOBIN A1C %  --  5.50  --   --   --    PROBNP pg/mL 42,458.0*  --   --   --  47,622.0*   TROPONIN T ng/mL  --   --  <0.010 <0.010 <0.010       Brief Urine Lab Results  (Last result in the past 365 days)      Color   Clarity   Blood   Leuk Est   Nitrite   Protein   CREAT   Urine HCG        02/16/20 1418             25.6       02/16/20 1418 Yellow Clear Negative Negative Negative Negative               Microbiology Results Abnormal     Procedure Component Value - Date/Time    Eosinophil Smear - Urine, Urine, Clean Catch [[id number]]  (Normal) Collected:   02/16/20 1418    Lab Status:  Final result Specimen:  Urine, Clean Catch Updated:  02/16/20 1611     Eosinophil Smear 0 % EOS/100 Cells     Narrative:       No eosinophil seen          Imaging Results (All)     Procedure Component Value Units Date/Time    XR Chest 1 View [[id number]] Collected:  02/14/20 2045     Updated:  02/14/20 2048    Narrative:       CR Chest 1 Vw 2/14/2020    INDICATION:   Increased shortness of breath for the past 2 days. Fluid buildup.     COMPARISON:    None available.    FINDINGS:  Single portable AP view(s) of the chest.  There is moderate cardiomegaly. Mild pulmonary edema. Small right pleural effusion. No pneumothorax.      Impression:       Cardiomegaly. Mild pulmonary edema and small right pleural effusion.    Signer Name: Alex Paredes MD   Signed: 2/14/2020 8:45 PM   Workstation Name: [id number]    [facility]                    Results for orders placed during the hospital encounter of 02/14/20   Adult Transthoracic Echo Complete With Contrast if Necessary Per Protocol    Narrative · Left ventricular wall thickness is consistent with mild concentric   hypertrophy.  · Right ventricular cavity is mildly dilated.  · Moderately reduced right ventricular systolic function noted.  · Left atrial cavity size is borderline dilated.  · Moderate-to-severe mitral valve regurgitation is present  · Moderate tricuspid valve regurgitation is present.  · Estimated EF = 20%.  · Left ventricular systolic function is severely decreased.            Discharge Details        Discharge Medications      Changes to Medications      Instructions Start Date   furosemide 40 MG tablet  Commonly known as:  LASIX  What changed:    · how much to take  · when to take this  · additional instructions   40 mg, Oral, Daily, For increased leg swelling may take up to 80mg         Continue These Medications      Instructions Start Date   allopurinol 100 MG tablet  Commonly known as:  ZYLOPRIM    100 mg, Oral, Daily      aspirin 81 MG chewable tablet   81 mg, Oral, Daily      atorvastatin 20 MG tablet  Commonly known as:  LIPITOR   20 mg, Oral, Daily      carvedilol 3.125 MG tablet  Commonly known as:  COREG   3.125 mg, Oral, 2 Times Daily With Meals      HYDROcodone-acetaminophen 5-325 MG per tablet  Commonly known as:  NORCO   1 tablet, Oral, Every 6 Hours PRN      KLOR-CON 10 MEQ CR tablet  Generic drug:  potassium chloride   TAKE ONE TABLET BY MOUTH DAILY      levothyroxine 50 MCG tablet  Commonly known as:  SYNTHROID, LEVOTHROID   50 mcg, Oral, Daily      vitamin B-12 1000 MCG tablet  Commonly known as:  CYANOCOBALAMIN   1,000 mcg, Oral, Daily         Stop These Medications    spironolactone 25 MG tablet  Commonly known as:  ALDACTONE            Allergies   Allergen Reactions   • Lactose Intolerance (Gi) Diarrhea   • Polysporin [Bacitracin-Polymyxin B] Itching and Rash         Discharge Disposition:  Home or Self Care    Diet:  Hospital:  Diet Order   Procedures   • Diet Regular; Cardiac, Consistent Carbohydrate, Daily Fluid Restriction; Other; 1,800          CODE STATUS:    Code Status and Medical Interventions:   Ordered at: 02/14/20 1985     Level Of Support Discussed With:    Patient     Code Status:    CPR     Medical Interventions (Level of Support Prior to Arrest):    Full       Future Appointments   Date Time Provider Department Center   4/1/2020  1:00 PM ORIENTATION -  [facility]       Additional Instructions for the Follow-ups that You Need to Schedule     Ambulatory Referral to Home Health   As directed      Face to Face Visit Date:  2/17/2020    Follow-up provider for Plan of Care?:  I treated the patient in an acute care facility and will not continue treatment after discharge.    Follow-up provider:  LILLY VINSON [[id number]]    Reason/Clinical Findings:  acute on chronic congestive heart failue,acute kidney injury, impaired mobility and adl's    Describe mobility  limitations that make leaving home difficult:  acute on chronic congestive heart failue,acute kidney injury, impaired mobility and adl's    Nursing/Therapeutic Services Requested:  Skilled Nursing Physical Therapy Occupational Therapy    Skilled nursing orders:  CHF management    PT orders:  Strengthening Home safety assessment    Occupational orders:  Activities of daily living Home safety assessment Strengthening    Frequency:  Other         Discharge Follow-up with PCP   As directed       Currently Documented PCP:    Obi Soriano MD    PCP Phone Number:    [phone]     Follow Up Details:  within 2 weeks for Transition of Care visit - medication changes performed this stay         Discharge Follow-up with Specified Provider: Dr. Richard; 3 Weeks   As directed      To:  Dr. Richard    Follow Up:  3 Weeks         Discharge Follow-up with Specified Provider: [facility] clinic next week (per Dr. Roberson) - will need Renal Panel at time of visit to eval creatinine and sodium   As directed      To:  [facility] clinic next week (per Dr. Roberson) - will need Renal Panel at time of visit to eval creatinine and sodium               Time Spent on Discharge:  40 minutes    Electronically signed by Maryann Velazquez MD, 02/20/20, 11:27 AM.

## 2020-02-21 ENCOUNTER — READMISSION MANAGEMENT (OUTPATIENT)
Dept: CALL CENTER | Facility: HOSPITAL | Age: 75
End: 2020-02-21

## 2020-02-22 NOTE — OUTREACH NOTE
Prep Survey      Responses   Facility patient discharged from?  [place]   Is patient eligible?  Yes   Discharge diagnosis  Acute on chronic combined systolic and diastolic congestive heart failure    Does the patient have one of the following disease processes/diagnoses(primary or secondary)?  CHF   Does the patient have Home health ordered?  Yes   What is the Home health agency?   [facility]   Is there a DME ordered?  No   Prep survey completed?  Yes          Felicity Knight RN

## 2020-02-24 ENCOUNTER — READMISSION MANAGEMENT (OUTPATIENT)
Dept: CALL CENTER | Facility: HOSPITAL | Age: 75
End: 2020-02-24

## 2020-02-24 NOTE — OUTREACH NOTE
CHF Week 1 Survey      Responses   Facility patient discharged from?  [place]   Does the patient have one of the following disease processes/diagnoses(primary or secondary)?  CHF   Is there a successful TCM telephone encounter documented?  No   CHF Week 1 attempt successful?  No   Unsuccessful attempts  Attempt 1          Miri Guerrero, RN

## 2020-02-25 ENCOUNTER — READMISSION MANAGEMENT (OUTPATIENT)
Dept: CALL CENTER | Facility: HOSPITAL | Age: 75
End: 2020-02-25

## 2020-02-25 NOTE — OUTREACH NOTE
CHF Week 1 Survey      Responses   Facility patient discharged from?  [place]   Does the patient have one of the following disease processes/diagnoses(primary or secondary)?  CHF   Is there a successful TCM telephone encounter documented?  No   CHF Week 1 attempt successful?  Yes   Call start time  1127   Revoke  Decline to participate   Discharge diagnosis  Acute on chronic combined systolic and diastolic congestive heart failure           Felicity Knight RN

## 2020-03-12 ENCOUNTER — TRANSCRIBE ORDERS (OUTPATIENT)
Dept: ADMINISTRATIVE | Facility: HOSPITAL | Age: 75
End: 2020-03-12

## 2020-03-12 DIAGNOSIS — I50.22 CHRONIC SYSTOLIC HEART FAILURE (HCC): Primary | ICD-10-CM

## 2020-03-14 ENCOUNTER — APPOINTMENT (OUTPATIENT)
Dept: CT IMAGING | Facility: HOSPITAL | Age: 75
End: 2020-03-14

## 2020-03-14 ENCOUNTER — HOSPITAL ENCOUNTER (EMERGENCY)
Facility: HOSPITAL | Age: 75
Discharge: HOME | End: 2020-03-15
Attending: EMERGENCY MEDICINE | Admitting: EMERGENCY MEDICINE

## 2020-03-14 ENCOUNTER — APPOINTMENT (OUTPATIENT)
Dept: GENERAL RADIOLOGY | Facility: HOSPITAL | Age: 75
End: 2020-03-14

## 2020-03-14 DIAGNOSIS — R60.0 BILATERAL LOWER EXTREMITY EDEMA: ICD-10-CM

## 2020-03-14 DIAGNOSIS — N40.0 ENLARGED PROSTATE: ICD-10-CM

## 2020-03-14 DIAGNOSIS — R06.02 SHORTNESS OF BREATH: ICD-10-CM

## 2020-03-14 DIAGNOSIS — I50.9 CHRONIC CONGESTIVE HEART FAILURE, UNSPECIFIED HEART FAILURE TYPE (HCC): Primary | ICD-10-CM

## 2020-03-14 LAB
ALBUMIN SERPL-MCNC: 3.9 G/DL (ref 3.5–5.2)
ALBUMIN/GLOB SERPL: 1.1 G/DL
ALP SERPL-CCNC: 143 U/L (ref 39–117)
ALT SERPL W P-5'-P-CCNC: 36 U/L (ref 1–41)
ANION GAP SERPL CALCULATED.3IONS-SCNC: 17 MMOL/L (ref 5–15)
AST SERPL-CCNC: 39 U/L (ref 1–40)
BASOPHILS # BLD AUTO: 0.06 10*3/MM3 (ref 0–0.2)
BASOPHILS NFR BLD AUTO: 1.2 % (ref 0–1.5)
BILIRUB SERPL-MCNC: 2.6 MG/DL (ref 0.2–1.2)
BILIRUB UR QL STRIP: NEGATIVE
BUN BLD-MCNC: 39 MG/DL (ref 8–23)
BUN/CREAT SERPL: 14.7 (ref 7–25)
CALCIUM SPEC-SCNC: 9 MG/DL (ref 8.6–10.5)
CHLORIDE SERPL-SCNC: 91 MMOL/L (ref 98–107)
CLARITY UR: CLEAR
CO2 SERPL-SCNC: 23 MMOL/L (ref 22–29)
COLOR UR: YELLOW
CREAT BLD-MCNC: 2.66 MG/DL (ref 0.76–1.27)
DEPRECATED RDW RBC AUTO: 57.1 FL (ref 37–54)
EOSINOPHIL # BLD AUTO: 0.1 10*3/MM3 (ref 0–0.4)
EOSINOPHIL NFR BLD AUTO: 2 % (ref 0.3–6.2)
ERYTHROCYTE [DISTWIDTH] IN BLOOD BY AUTOMATED COUNT: 17.3 % (ref 12.3–15.4)
GFR SERPL CREATININE-BSD FRML MDRD: 24 ML/MIN/1.73
GLOBULIN UR ELPH-MCNC: 3.6 GM/DL
GLUCOSE BLD-MCNC: 127 MG/DL (ref 65–99)
GLUCOSE UR STRIP-MCNC: NEGATIVE MG/DL
HCT VFR BLD AUTO: 40.4 % (ref 37.5–51)
HGB BLD-MCNC: 13.1 G/DL (ref 13–17.7)
HGB UR QL STRIP.AUTO: NEGATIVE
HOLD SPECIMEN: NORMAL
HOLD SPECIMEN: NORMAL
IMM GRANULOCYTES # BLD AUTO: 0.02 10*3/MM3 (ref 0–0.05)
IMM GRANULOCYTES NFR BLD AUTO: 0.4 % (ref 0–0.5)
KETONES UR QL STRIP: NEGATIVE
LEUKOCYTE ESTERASE UR QL STRIP.AUTO: NEGATIVE
LYMPHOCYTES # BLD AUTO: 0.82 10*3/MM3 (ref 0.7–3.1)
LYMPHOCYTES NFR BLD AUTO: 16.3 % (ref 19.6–45.3)
MCH RBC QN AUTO: 29.4 PG (ref 26.6–33)
MCHC RBC AUTO-ENTMCNC: 32.4 G/DL (ref 31.5–35.7)
MCV RBC AUTO: 90.6 FL (ref 79–97)
MONOCYTES # BLD AUTO: 0.58 10*3/MM3 (ref 0.1–0.9)
MONOCYTES NFR BLD AUTO: 11.5 % (ref 5–12)
NEUTROPHILS # BLD AUTO: 3.45 10*3/MM3 (ref 1.7–7)
NEUTROPHILS NFR BLD AUTO: 68.6 % (ref 42.7–76)
NITRITE UR QL STRIP: NEGATIVE
NRBC BLD AUTO-RTO: 0 /100 WBC (ref 0–0.2)
NT-PROBNP SERPL-MCNC: ABNORMAL PG/ML (ref 5–900)
PH UR STRIP.AUTO: 5.5 [PH] (ref 5–8)
PLATELET # BLD AUTO: 113 10*3/MM3 (ref 140–450)
PMV BLD AUTO: 13.3 FL (ref 6–12)
POTASSIUM BLD-SCNC: 4 MMOL/L (ref 3.5–5.2)
PROT SERPL-MCNC: 7.5 G/DL (ref 6–8.5)
PROT UR QL STRIP: ABNORMAL
RBC # BLD AUTO: 4.46 10*6/MM3 (ref 4.14–5.8)
SODIUM BLD-SCNC: 131 MMOL/L (ref 136–145)
SP GR UR STRIP: 1.01 (ref 1–1.03)
TROPONIN T SERPL-MCNC: <0.01 NG/ML (ref 0–0.03)
UROBILINOGEN UR QL STRIP: ABNORMAL
WBC NRBC COR # BLD: 5.03 10*3/MM3 (ref 3.4–10.8)
WHOLE BLOOD HOLD SPECIMEN: NORMAL
WHOLE BLOOD HOLD SPECIMEN: NORMAL

## 2020-03-14 PROCEDURE — 85025 COMPLETE CBC W/AUTO DIFF WBC: CPT | Performed by: EMERGENCY MEDICINE

## 2020-03-14 PROCEDURE — 84484 ASSAY OF TROPONIN QUANT: CPT | Performed by: EMERGENCY MEDICINE

## 2020-03-14 PROCEDURE — 83880 ASSAY OF NATRIURETIC PEPTIDE: CPT | Performed by: EMERGENCY MEDICINE

## 2020-03-14 PROCEDURE — 74176 CT ABD & PELVIS W/O CONTRAST: CPT

## 2020-03-14 PROCEDURE — 99284 EMERGENCY DEPT VISIT MOD MDM: CPT

## 2020-03-14 PROCEDURE — 93005 ELECTROCARDIOGRAM TRACING: CPT | Performed by: EMERGENCY MEDICINE

## 2020-03-14 PROCEDURE — 71045 X-RAY EXAM CHEST 1 VIEW: CPT

## 2020-03-14 PROCEDURE — 81003 URINALYSIS AUTO W/O SCOPE: CPT | Performed by: EMERGENCY MEDICINE

## 2020-03-14 PROCEDURE — 80053 COMPREHEN METABOLIC PANEL: CPT | Performed by: EMERGENCY MEDICINE

## 2020-03-14 RX ORDER — SODIUM CHLORIDE 0.9 % (FLUSH) 0.9 %
10 SYRINGE (ML) INJECTION AS NEEDED
Status: DISCONTINUED | OUTPATIENT
Start: 2020-03-14 | End: 2020-03-15 | Stop reason: HOSPADM

## 2020-03-15 VITALS
BODY MASS INDEX: 20.29 KG/M2 | RESPIRATION RATE: 18 BRPM | OXYGEN SATURATION: 96 % | TEMPERATURE: 97.3 F | WEIGHT: 137 LBS | DIASTOLIC BLOOD PRESSURE: 80 MMHG | SYSTOLIC BLOOD PRESSURE: 123 MMHG | HEIGHT: 69 IN | HEART RATE: 90 BPM

## 2020-03-15 NOTE — ED PROVIDER NOTES
Subjective   Jet Floyd is a 74 y.o. male who presents to the ED with complaints of shortness of breath for the past 2 days. Additionally, he endorses associated leg swelling, weight gain, difficulty urinating, urinary urgency, and decreased urinary output. He reports that he has gained 9 lbs in 9 days. He was 128 lbs on 3-5-20 and is 137 lbs today. His wife reports that the patient has had a decreased appetite. However, the patient denies any nausea, vomiting, fever, chills, cough, or chest pain. He reports that he has taken 2 Lasix today without any urinary output. He has a history of CHF, HTN, HLD, CKD, cancer, and alcoholism. His past surgical history includes cardiac defibrillator insertion/removal and cervical spine surgery. His PCP is Dr. Soriano. There are no other acute complaints at this time.      History provided by:  Patient and spouse  Shortness of Breath   Severity:  Moderate  Onset quality:  Gradual  Duration:  2 days  Timing:  Constant  Progression:  Worsening  Chronicity:  Chronic  Relieved by:  None tried  Worsened by:  Nothing  Ineffective treatments:  None tried  Associated symptoms: no chest pain, no cough, no fever and no vomiting    Risk factors: hx of cancer    Risk factors: no obesity        Review of Systems   Constitutional: Positive for appetite change (decreased) and unexpected weight change (gain). Negative for chills and fever.   Respiratory: Positive for shortness of breath. Negative for cough.    Cardiovascular: Positive for leg swelling. Negative for chest pain.   Gastrointestinal: Negative for nausea and vomiting.   Genitourinary: Positive for decreased urine volume, difficulty urinating and urgency.   All other systems reviewed and are negative.      Past Medical History:   Diagnosis Date   • Cancer (CMS/HCC)    • CHF (congestive heart failure) (CMS/HCC)    • Hypertension    • Kidney disease        Allergies   Allergen Reactions   • Lactose Intolerance (Gi) Diarrhea    • Polysporin [Bacitracin-Polymyxin B] Itching and Rash       Past Surgical History:   Procedure Laterality Date   • CARDIAC DEFIBRILLATOR PLACEMENT Bilateral 1998   • CARDIAC DEFIBRILLATOR REMOVAL Bilateral 2003   • CATARACT EXTRACTION Bilateral    • CERVICAL SPINE SURGERY Bilateral 2014       Family History   Problem Relation Age of Onset   • Dementia Mother    • Cancer Father         prostate   • No Known Problems Sister    • No Known Problems Brother    • No Known Problems Daughter        Social History     Socioeconomic History   • Marital status:      Spouse name: Not on file   • Number of children: Not on file   • Years of education: Not on file   • Highest education level: Not on file   Tobacco Use   • Smoking status: Former Smoker     Packs/day: 1.50     Years: 48.00     Pack years: 72.00     Types: Cigarettes   • Smokeless tobacco: Never Used   Substance and Sexual Activity   • Alcohol use: Not Currently     Alcohol/week: 35.0 standard drinks     Types: 35 Shots of liquor per week     Comment: liquor daily 5-6   • Drug use: No   • Sexual activity: Defer         Objective   Physical Exam   Constitutional: He is oriented to person, place, and time. He appears well-developed and well-nourished.   Mild increased work of breathing.   HENT:   Head: Normocephalic and atraumatic.   Nose: Nose normal.   Eyes: Conjunctivae are normal. No scleral icterus.   Neck: Normal range of motion. Neck supple.   Cardiovascular: Normal rate, regular rhythm and normal heart sounds.   No murmur heard.  Pulmonary/Chest: Effort normal and breath sounds normal. No respiratory distress.   Mild increased work of breathing. Lungs clear to auscultation.   Abdominal: Soft. He exhibits no distension. There is no tenderness.   Musculoskeletal: Normal range of motion. He exhibits edema. He exhibits no deformity.   2+ bilateral lower extremity edema.   Neurological: He is alert and oriented to person, place, and time.   Skin: Skin is  warm and dry.   Psychiatric: He has a normal mood and affect. His behavior is normal.   Nursing note and vitals reviewed.      Procedures         ED Course  ED Course as of Mar 15 0012   Sun Mar 15, 2020   0008 Patient presents to the emergency department with complaints of shortness of breath.  Review of labs shows results consistent with previous values, no acute abnormal/emergent results.  Chest x-ray without acute abnormality.  CT scan of the abdomen and pelvis without acute abnormalities.  Patient able to maintain O2 sats greater than 96% with ambulation through the emergency department.  Patient has scheduled follow-up with Dr. Richard and will be given outpatient follow-up with urology for his enlarged prostate.  Patient was seen and evaluated by Dr. Allen who also was in agreement patient was appropriate for discharge home with outpatient follow-up.  At the time of disposition, patient is afebrile, nontoxic appearing, vital signs stable and able to maintain O2 sats of 96% on room air. Patient will be discharged home with outpatient follow up to their primary care provider, cardiology and urology as recommended. Patient is agreeable to plan of care of outpatient follow up, provided clear return precautions and demonstrated understanding.    [JG]      ED Course User Index  [JG] Raimundo Jones, PA            Recent Results (from the past 24 hour(s))   Comprehensive Metabolic Panel    Collection Time: 03/14/20  9:11 PM   Result Value Ref Range    Glucose 127 (H) 65 - 99 mg/dL    BUN 39 (H) 8 - 23 mg/dL    Creatinine 2.66 (H) 0.76 - 1.27 mg/dL    Sodium 131 (L) 136 - 145 mmol/L    Potassium 4.0 3.5 - 5.2 mmol/L    Chloride 91 (L) 98 - 107 mmol/L    CO2 23.0 22.0 - 29.0 mmol/L    Calcium 9.0 8.6 - 10.5 mg/dL    Total Protein 7.5 6.0 - 8.5 g/dL    Albumin 3.90 3.50 - 5.20 g/dL    ALT (SGPT) 36 1 - 41 U/L    AST (SGOT) 39 1 - 40 U/L    Alkaline Phosphatase 143 (H) 39 - 117 U/L    Total Bilirubin 2.6 (H) 0.2 - 1.2  mg/dL    eGFR Non African Amer 24 (L) >60 mL/min/1.73    Globulin 3.6 gm/dL    A/G Ratio 1.1 g/dL    BUN/Creatinine Ratio 14.7 7.0 - 25.0    Anion Gap 17.0 (H) 5.0 - 15.0 mmol/L   BNP    Collection Time: 03/14/20  9:11 PM   Result Value Ref Range    proBNP 42,824.0 (H) 5.0 - 900.0 pg/mL   Troponin    Collection Time: 03/14/20  9:11 PM   Result Value Ref Range    Troponin T <0.010 0.000 - 0.030 ng/mL   Light Blue Top    Collection Time: 03/14/20  9:11 PM   Result Value Ref Range    Extra Tube hold for add-on    Green Top (Gel)    Collection Time: 03/14/20  9:11 PM   Result Value Ref Range    Extra Tube Hold for add-ons.    Lavender Top    Collection Time: 03/14/20  9:11 PM   Result Value Ref Range    Extra Tube hold for add-on    Gold Top - SST    Collection Time: 03/14/20  9:11 PM   Result Value Ref Range    Extra Tube Hold for add-ons.    CBC Auto Differential    Collection Time: 03/14/20  9:11 PM   Result Value Ref Range    WBC 5.03 3.40 - 10.80 10*3/mm3    RBC 4.46 4.14 - 5.80 10*6/mm3    Hemoglobin 13.1 13.0 - 17.7 g/dL    Hematocrit 40.4 37.5 - 51.0 %    MCV 90.6 79.0 - 97.0 fL    MCH 29.4 26.6 - 33.0 pg    MCHC 32.4 31.5 - 35.7 g/dL    RDW 17.3 (H) 12.3 - 15.4 %    RDW-SD 57.1 (H) 37.0 - 54.0 fl    MPV 13.3 (H) 6.0 - 12.0 fL    Platelets 113 (L) 140 - 450 10*3/mm3    Neutrophil % 68.6 42.7 - 76.0 %    Lymphocyte % 16.3 (L) 19.6 - 45.3 %    Monocyte % 11.5 5.0 - 12.0 %    Eosinophil % 2.0 0.3 - 6.2 %    Basophil % 1.2 0.0 - 1.5 %    Immature Grans % 0.4 0.0 - 0.5 %    Neutrophils, Absolute 3.45 1.70 - 7.00 10*3/mm3    Lymphocytes, Absolute 0.82 0.70 - 3.10 10*3/mm3    Monocytes, Absolute 0.58 0.10 - 0.90 10*3/mm3    Eosinophils, Absolute 0.10 0.00 - 0.40 10*3/mm3    Basophils, Absolute 0.06 0.00 - 0.20 10*3/mm3    Immature Grans, Absolute 0.02 0.00 - 0.05 10*3/mm3    nRBC 0.0 0.0 - 0.2 /100 WBC   Urinalysis With Microscopic If Indicated (No Culture) - Urine, Clean Catch    Collection Time: 03/14/20 11:30 PM    Result Value Ref Range    Color, UA Yellow Yellow, Straw    Appearance, UA Clear Clear    pH, UA 5.5 5.0 - 8.0    Specific Gravity, UA 1.013 1.001 - 1.030    Glucose, UA Negative Negative    Ketones, UA Negative Negative    Bilirubin, UA Negative Negative    Blood, UA Negative Negative    Protein, UA Trace (A) Negative    Leuk Esterase, UA Negative Negative    Nitrite, UA Negative Negative    Urobilinogen, UA 1.0 E.U./dL 0.2 - 1.0 E.U./dL     Note: In addition to lab results from this visit, the labs listed above may include labs taken at another facility or during a different encounter within the last 24 hours. Please correlate lab times with ED admission and discharge times for further clarification of the services performed during this visit.    CT Abdomen Pelvis Without Contrast   Final Result      1. Small amount of nonspecific abdominal and pelvic ascites. No other acute findings in the abdomen or pelvis.   2. Appendix not clearly visualized.   3. Marked cardiomegaly with trace right pleural effusion and mild dependent bibasilar atelectasis.               Signer Name: Ramakrishna Cruz MD    Signed: 3/14/2020 10:17 PM    Workstation Name: [email or web address]     [facility]      XR Chest 1 View   Final Result   Cardiomegaly. No active pulmonary disease.      Signer Name: Alex Paredes MD    Signed: 3/14/2020 10:07 PM    Workstation Name: [email or web address]     [facility]        Vitals:    03/14/20 2231 03/14/20 2300 03/14/20 2330 03/15/20 0000   BP:  123/72 119/85 123/80   BP Location:       Patient Position:       Pulse: 81 86 88 90   Resp:       Temp:       TempSrc:       SpO2: 97% 93% 96% 96%   Weight:       Height:         Medications   sodium chloride 0.9 % flush 10 mL (has no administration in time range)     ECG/EMG Results (last 24 hours)     Procedure Component Value Units Date/Time    ECG 12 Lead [[id number]] Collected:  03/14/20 2105     Updated:  03/14/20 2106         ECG 12 Lead                         MDM  Number of Diagnoses or Management Options  Bilateral lower extremity edema: established and worsening  Chronic congestive heart failure, unspecified heart failure type (CMS/HCC): established and worsening  Enlarged prostate: new and requires workup  Shortness of breath: new and requires workup     Amount and/or Complexity of Data Reviewed  Clinical lab tests: reviewed  Tests in the radiology section of CPT®: reviewed  Tests in the medicine section of CPT®: reviewed  Decide to obtain previous medical records or to obtain history from someone other than the patient: yes    Risk of Complications, Morbidity, and/or Mortality  Presenting problems: moderate  Diagnostic procedures: moderate  Management options: moderate    Patient Progress  Patient progress: stable      Final diagnoses:   Chronic congestive heart failure, unspecified heart failure type (CMS/HCC)   Shortness of breath   Enlarged prostate   Bilateral lower extremity edema       Documentation assistance provided by scribabi Gómez.  Information recorded by the scribe was done at my direction and has been verified and validated by me.     Yeny Gómez  03/14/20 2129       Raimundo Jones PA  03/15/20 0012

## 2020-03-15 NOTE — DISCHARGE INSTRUCTIONS
Symptomatic care is recommended.  Take all medications as prescribed instructed.  Follow-up with primary care, cardiology and urology as recommended.  Return to ED with worsening of symptoms.

## 2020-03-17 ENCOUNTER — HOSPITAL ENCOUNTER (OUTPATIENT)
Facility: HOSPITAL | Age: 75
Discharge: HOME | End: 2020-03-18
Attending: INTERNAL MEDICINE | Admitting: INTERNAL MEDICINE

## 2020-03-17 DIAGNOSIS — I50.22 CHRONIC SYSTOLIC HEART FAILURE (HCC): ICD-10-CM

## 2020-03-17 PROCEDURE — 25010000003 CEFAZOLIN 1-4 GM/50ML-% SOLUTION: Performed by: INTERNAL MEDICINE

## 2020-03-17 PROCEDURE — C1730 CATH, EP, 19 OR FEW ELECT: HCPCS | Performed by: INTERNAL MEDICINE

## 2020-03-17 PROCEDURE — 25010000002 FENTANYL CITRATE (PF) 100 MCG/2ML SOLUTION: Performed by: INTERNAL MEDICINE

## 2020-03-17 PROCEDURE — C1887 CATHETER, GUIDING: HCPCS | Performed by: INTERNAL MEDICINE

## 2020-03-17 PROCEDURE — 99153 MOD SED SAME PHYS/QHP EA: CPT | Performed by: INTERNAL MEDICINE

## 2020-03-17 PROCEDURE — 0 IOPAMIDOL PER 1 ML: Performed by: INTERNAL MEDICINE

## 2020-03-17 PROCEDURE — 25010000003 CEFAZOLIN 1-4 GM/50ML-% SOLUTION: Performed by: PHYSICIAN ASSISTANT

## 2020-03-17 PROCEDURE — C1769 GUIDE WIRE: HCPCS | Performed by: INTERNAL MEDICINE

## 2020-03-17 PROCEDURE — 63710000001 TEMAZEPAM 7.5 MG CAPSULE: Performed by: INTERNAL MEDICINE

## 2020-03-17 PROCEDURE — 33249 INSJ/RPLCMT DEFIB W/LEAD(S): CPT | Performed by: INTERNAL MEDICINE

## 2020-03-17 PROCEDURE — C1892 INTRO/SHEATH,FIXED,PEEL-AWAY: HCPCS | Performed by: INTERNAL MEDICINE

## 2020-03-17 PROCEDURE — 99152 MOD SED SAME PHYS/QHP 5/>YRS: CPT | Performed by: INTERNAL MEDICINE

## 2020-03-17 PROCEDURE — G0378 HOSPITAL OBSERVATION PER HR: HCPCS

## 2020-03-17 PROCEDURE — 25010000002 MIDAZOLAM PER 1 MG: Performed by: INTERNAL MEDICINE

## 2020-03-17 PROCEDURE — 33225 L VENTRIC PACING LEAD ADD-ON: CPT | Performed by: INTERNAL MEDICINE

## 2020-03-17 PROCEDURE — C1882 AICD, OTHER THAN SING/DUAL: HCPCS | Performed by: INTERNAL MEDICINE

## 2020-03-17 PROCEDURE — C1900 LEAD, CORONARY VENOUS: HCPCS | Performed by: INTERNAL MEDICINE

## 2020-03-17 PROCEDURE — C1777 LEAD, AICD, ENDO SINGLE COIL: HCPCS | Performed by: INTERNAL MEDICINE

## 2020-03-17 PROCEDURE — A9270 NON-COVERED ITEM OR SERVICE: HCPCS | Performed by: INTERNAL MEDICINE

## 2020-03-17 PROCEDURE — C1898 LEAD, PMKR, OTHER THAN TRANS: HCPCS | Performed by: INTERNAL MEDICINE

## 2020-03-17 PROCEDURE — 25010000003 CEFAZOLIN IN DEXTROSE 2-4 GM/100ML-% SOLUTION: Performed by: INTERNAL MEDICINE

## 2020-03-17 DEVICE — LD QUARTET CRT VENT LNG SPACING 75CM: Type: IMPLANTABLE DEVICE | Status: FUNCTIONAL

## 2020-03-17 DEVICE — LD DEFIB DURATA SJ4 58CM 7122Q58: Type: IMPLANTABLE DEVICE | Status: FUNCTIONAL

## 2020-03-17 DEVICE — LD PM TENDRIL STS 6F46CM 2088TC46: Type: IMPLANTABLE DEVICE | Status: FUNCTIONAL

## 2020-03-17 DEVICE — ICD QUADRA ASSURA NXGN MP CRTD40 CD336940Q: Type: IMPLANTABLE DEVICE | Status: FUNCTIONAL

## 2020-03-17 RX ORDER — SODIUM CHLORIDE 9 MG/ML
250 INJECTION, SOLUTION INTRAVENOUS CONTINUOUS
Status: DISCONTINUED | OUTPATIENT
Start: 2020-03-17 | End: 2020-03-17

## 2020-03-17 RX ORDER — MIDAZOLAM HYDROCHLORIDE 1 MG/ML
INJECTION INTRAMUSCULAR; INTRAVENOUS AS NEEDED
Status: DISCONTINUED | OUTPATIENT
Start: 2020-03-17 | End: 2020-03-17 | Stop reason: HOSPADM

## 2020-03-17 RX ORDER — ONDANSETRON 2 MG/ML
4 INJECTION INTRAMUSCULAR; INTRAVENOUS EVERY 6 HOURS PRN
Status: DISCONTINUED | OUTPATIENT
Start: 2020-03-17 | End: 2020-03-18 | Stop reason: HOSPADM

## 2020-03-17 RX ORDER — ALLOPURINOL 100 MG/1
100 TABLET ORAL DAILY
Status: DISCONTINUED | OUTPATIENT
Start: 2020-03-17 | End: 2020-03-18 | Stop reason: HOSPADM

## 2020-03-17 RX ORDER — ASPIRIN 81 MG/1
81 TABLET, CHEWABLE ORAL DAILY
Status: DISCONTINUED | OUTPATIENT
Start: 2020-03-17 | End: 2020-03-18 | Stop reason: HOSPADM

## 2020-03-17 RX ORDER — CEFAZOLIN SODIUM 2 G/100ML
2 INJECTION, SOLUTION INTRAVENOUS EVERY 8 HOURS
Status: COMPLETED | OUTPATIENT
Start: 2020-03-17 | End: 2020-03-17

## 2020-03-17 RX ORDER — ATORVASTATIN CALCIUM 20 MG/1
20 TABLET, FILM COATED ORAL DAILY
Status: DISCONTINUED | OUTPATIENT
Start: 2020-03-18 | End: 2020-03-18 | Stop reason: HOSPADM

## 2020-03-17 RX ORDER — LANOLIN ALCOHOL/MO/W.PET/CERES
1000 CREAM (GRAM) TOPICAL DAILY
Status: DISCONTINUED | OUTPATIENT
Start: 2020-03-18 | End: 2020-03-18 | Stop reason: HOSPADM

## 2020-03-17 RX ORDER — CARVEDILOL 3.12 MG/1
3.12 TABLET ORAL 2 TIMES DAILY WITH MEALS
Status: DISCONTINUED | OUTPATIENT
Start: 2020-03-17 | End: 2020-03-18 | Stop reason: HOSPADM

## 2020-03-17 RX ORDER — LIDOCAINE HYDROCHLORIDE 10 MG/ML
INJECTION, SOLUTION EPIDURAL; INFILTRATION; INTRACAUDAL; PERINEURAL AS NEEDED
Status: DISCONTINUED | OUTPATIENT
Start: 2020-03-17 | End: 2020-03-17 | Stop reason: HOSPADM

## 2020-03-17 RX ORDER — NALOXONE HCL 0.4 MG/ML
0.4 VIAL (ML) INJECTION
Status: DISCONTINUED | OUTPATIENT
Start: 2020-03-17 | End: 2020-03-18 | Stop reason: HOSPADM

## 2020-03-17 RX ORDER — TEMAZEPAM 7.5 MG/1
7.5 CAPSULE ORAL NIGHTLY PRN
Status: DISCONTINUED | OUTPATIENT
Start: 2020-03-17 | End: 2020-03-18 | Stop reason: HOSPADM

## 2020-03-17 RX ORDER — FENTANYL CITRATE 50 UG/ML
INJECTION, SOLUTION INTRAMUSCULAR; INTRAVENOUS AS NEEDED
Status: DISCONTINUED | OUTPATIENT
Start: 2020-03-17 | End: 2020-03-17 | Stop reason: HOSPADM

## 2020-03-17 RX ORDER — MORPHINE SULFATE 2 MG/ML
1 INJECTION, SOLUTION INTRAMUSCULAR; INTRAVENOUS EVERY 4 HOURS PRN
Status: DISCONTINUED | OUTPATIENT
Start: 2020-03-17 | End: 2020-03-18 | Stop reason: HOSPADM

## 2020-03-17 RX ORDER — CEFAZOLIN SODIUM 1 G/50ML
1 INJECTION, SOLUTION INTRAVENOUS ONCE
Status: COMPLETED | OUTPATIENT
Start: 2020-03-17 | End: 2020-03-17

## 2020-03-17 RX ORDER — HYDROCODONE BITARTRATE AND ACETAMINOPHEN 5; 325 MG/1; MG/1
1 TABLET ORAL EVERY 6 HOURS PRN
Status: DISCONTINUED | OUTPATIENT
Start: 2020-03-17 | End: 2020-03-18 | Stop reason: HOSPADM

## 2020-03-17 RX ORDER — FUROSEMIDE 40 MG/1
40 TABLET ORAL DAILY
Status: DISCONTINUED | OUTPATIENT
Start: 2020-03-18 | End: 2020-03-18 | Stop reason: HOSPADM

## 2020-03-17 RX ORDER — CEFAZOLIN SODIUM 1 G/50ML
1 INJECTION, SOLUTION INTRAVENOUS ONCE
Status: DISCONTINUED | OUTPATIENT
Start: 2020-03-17 | End: 2020-03-17

## 2020-03-17 RX ADMIN — CEFAZOLIN SODIUM 1 G: 1 INJECTION, SOLUTION INTRAVENOUS at 13:17

## 2020-03-17 RX ADMIN — CEFAZOLIN SODIUM 2 G: 2 INJECTION, SOLUTION INTRAVENOUS at 22:53

## 2020-03-17 RX ADMIN — CEFAZOLIN SODIUM 1 G: 1 INJECTION, SOLUTION INTRAVENOUS at 13:30

## 2020-03-17 RX ADMIN — TEMAZEPAM 7.5 MG: 7.5 CAPSULE ORAL at 22:05

## 2020-03-18 ENCOUNTER — APPOINTMENT (OUTPATIENT)
Dept: GENERAL RADIOLOGY | Facility: HOSPITAL | Age: 75
End: 2020-03-18

## 2020-03-18 VITALS
DIASTOLIC BLOOD PRESSURE: 69 MMHG | HEART RATE: 88 BPM | BODY MASS INDEX: 21.05 KG/M2 | HEIGHT: 69 IN | OXYGEN SATURATION: 94 % | WEIGHT: 142.1 LBS | TEMPERATURE: 97.6 F | SYSTOLIC BLOOD PRESSURE: 129 MMHG | RESPIRATION RATE: 16 BRPM

## 2020-03-18 LAB
ANION GAP SERPL CALCULATED.3IONS-SCNC: 18 MMOL/L (ref 5–15)
BUN BLD-MCNC: 37 MG/DL (ref 8–23)
BUN/CREAT SERPL: 15.2 (ref 7–25)
CALCIUM SPEC-SCNC: 8 MG/DL (ref 8.6–10.5)
CHLORIDE SERPL-SCNC: 93 MMOL/L (ref 98–107)
CO2 SERPL-SCNC: 23 MMOL/L (ref 22–29)
CREAT BLD-MCNC: 2.43 MG/DL (ref 0.76–1.27)
DEPRECATED RDW RBC AUTO: 56.7 FL (ref 37–54)
ERYTHROCYTE [DISTWIDTH] IN BLOOD BY AUTOMATED COUNT: 17.5 % (ref 12.3–15.4)
GFR SERPL CREATININE-BSD FRML MDRD: 26 ML/MIN/1.73
GLUCOSE BLD-MCNC: 87 MG/DL (ref 65–99)
HCT VFR BLD AUTO: 33.5 % (ref 37.5–51)
HGB BLD-MCNC: 11.2 G/DL (ref 13–17.7)
MCH RBC QN AUTO: 29.9 PG (ref 26.6–33)
MCHC RBC AUTO-ENTMCNC: 33.4 G/DL (ref 31.5–35.7)
MCV RBC AUTO: 89.3 FL (ref 79–97)
PLATELET # BLD AUTO: 110 10*3/MM3 (ref 140–450)
PMV BLD AUTO: 12.8 FL (ref 6–12)
POTASSIUM BLD-SCNC: 3.2 MMOL/L (ref 3.5–5.2)
RBC # BLD AUTO: 3.75 10*6/MM3 (ref 4.14–5.8)
SODIUM BLD-SCNC: 134 MMOL/L (ref 136–145)
WBC NRBC COR # BLD: 5.24 10*3/MM3 (ref 3.4–10.8)

## 2020-03-18 PROCEDURE — 80048 BASIC METABOLIC PNL TOTAL CA: CPT | Performed by: INTERNAL MEDICINE

## 2020-03-18 PROCEDURE — 85027 COMPLETE CBC AUTOMATED: CPT | Performed by: INTERNAL MEDICINE

## 2020-03-18 PROCEDURE — G0378 HOSPITAL OBSERVATION PER HR: HCPCS

## 2020-03-18 PROCEDURE — 71046 X-RAY EXAM CHEST 2 VIEWS: CPT

## 2020-03-25 ENCOUNTER — TRANSCRIBE ORDERS (OUTPATIENT)
Dept: LAB | Facility: HOSPITAL | Age: 75
End: 2020-03-25

## 2020-03-25 ENCOUNTER — LAB (OUTPATIENT)
Dept: LAB | Facility: HOSPITAL | Age: 75
End: 2020-03-25

## 2020-03-25 ENCOUNTER — TELEPHONE (OUTPATIENT)
Dept: CARDIAC REHAB | Facility: HOSPITAL | Age: 75
End: 2020-03-25

## 2020-03-25 DIAGNOSIS — I50.22 CHRONIC SYSTOLIC HEART FAILURE (HCC): Primary | ICD-10-CM

## 2020-03-25 DIAGNOSIS — N18.9 CHRONIC KIDNEY DISEASE, UNSPECIFIED CKD STAGE: ICD-10-CM

## 2020-03-25 DIAGNOSIS — I50.22 CHRONIC SYSTOLIC HEART FAILURE (HCC): ICD-10-CM

## 2020-03-25 LAB
ANION GAP SERPL CALCULATED.3IONS-SCNC: 15.6 MMOL/L (ref 5–15)
BUN BLD-MCNC: 27 MG/DL (ref 8–23)
BUN/CREAT SERPL: 12.9 (ref 7–25)
CALCIUM SPEC-SCNC: 8.8 MG/DL (ref 8.6–10.5)
CHLORIDE SERPL-SCNC: 91 MMOL/L (ref 98–107)
CO2 SERPL-SCNC: 27.4 MMOL/L (ref 22–29)
CREAT BLD-MCNC: 2.1 MG/DL (ref 0.76–1.27)
GFR SERPL CREATININE-BSD FRML MDRD: 31 ML/MIN/1.73
GLUCOSE BLD-MCNC: 60 MG/DL (ref 65–99)
POTASSIUM BLD-SCNC: 3.4 MMOL/L (ref 3.5–5.2)
SODIUM BLD-SCNC: 134 MMOL/L (ref 136–145)

## 2020-03-25 PROCEDURE — 80048 BASIC METABOLIC PNL TOTAL CA: CPT

## 2020-03-25 PROCEDURE — 36415 COLL VENOUS BLD VENIPUNCTURE: CPT

## 2020-03-25 NOTE — TELEPHONE ENCOUNTER
Cardiac Rehab closed related to COVID-19 on 3/18/20. Patient notified. Staff will contact Patient when Cardiac Rehab re-opens. Teach back verified.

## 2020-04-01 ENCOUNTER — APPOINTMENT (OUTPATIENT)
Dept: CARDIAC REHAB | Facility: HOSPITAL | Age: 75
End: 2020-04-01

## 2020-04-02 ENCOUNTER — DOCUMENTATION (OUTPATIENT)
Dept: CARDIAC REHAB | Facility: HOSPITAL | Age: 75
End: 2020-04-02

## 2020-04-02 NOTE — PROGRESS NOTES
Due to COVID-19, Cardiac Rehab has been closed until further notice. Staff to send educational packet to Patient. Packet to include home exercise guidelines, benefits of exercise, target heart rates, heart healthy eating guidelines, stress management and smoking cessation. Staff will contact Patient when Cardiac Rehab re-opens to schedule orientation appointment. Staff available as needed.

## 2020-06-16 ENCOUNTER — DOCUMENTATION (OUTPATIENT)
Dept: CARDIAC REHAB | Facility: HOSPITAL | Age: 75
End: 2020-06-16

## 2020-06-16 NOTE — PROGRESS NOTES
Cardiac Rehab is re-opening Monday, June 22. Staff left detailed message with Patient to return phone call in regards to re-scheduling his orientation appointment.

## 2021-03-15 ENCOUNTER — HOSPITAL ENCOUNTER (OUTPATIENT)
Dept: GENERAL RADIOLOGY | Facility: HOSPITAL | Age: 76
Discharge: HOME | End: 2021-03-15
Admitting: FAMILY MEDICINE

## 2021-03-15 ENCOUNTER — TRANSCRIBE ORDERS (OUTPATIENT)
Dept: ADMINISTRATIVE | Facility: HOSPITAL | Age: 76
End: 2021-03-15

## 2021-03-15 DIAGNOSIS — M48.062 SPINAL STENOSIS OF LUMBAR REGION WITH NEUROGENIC CLAUDICATION: ICD-10-CM

## 2021-03-15 DIAGNOSIS — M16.11 PRIMARY OSTEOARTHRITIS OF RIGHT HIP: Primary | ICD-10-CM

## 2021-03-15 PROCEDURE — 73502 X-RAY EXAM HIP UNI 2-3 VIEWS: CPT

## 2021-03-15 PROCEDURE — 72110 X-RAY EXAM L-2 SPINE 4/>VWS: CPT

## 2021-03-18 ENCOUNTER — TRANSCRIBE ORDERS (OUTPATIENT)
Dept: ADMINISTRATIVE | Facility: HOSPITAL | Age: 76
End: 2021-03-18

## 2021-03-18 DIAGNOSIS — S32.020A COMPRESSION FRACTURE OF L2, CLOSED, INITIAL ENCOUNTER (HCC): Primary | ICD-10-CM

## 2021-03-22 ENCOUNTER — HOSPITAL ENCOUNTER (OUTPATIENT)
Dept: CT IMAGING | Facility: HOSPITAL | Age: 76
Discharge: HOME | End: 2021-03-22
Admitting: FAMILY MEDICINE

## 2021-03-22 DIAGNOSIS — S32.020A COMPRESSION FRACTURE OF L2, CLOSED, INITIAL ENCOUNTER (HCC): ICD-10-CM

## 2021-03-22 PROCEDURE — 72131 CT LUMBAR SPINE W/O DYE: CPT

## 2021-04-13 NOTE — PROGRESS NOTES
"Chief Complaint: \"I had pain in my lower back pain and right hip. Now, the pain is gone.\"        History of Present Illness:   Patient: Mr. Jet Floyd, 75 y.o. male   Referring Physician: Dr. Obi Soriano  Reason for Referral: Consultation for chronic intractable lower back and right hip pain.   Pain History:  Patient reports a 3-year history of progressive lower back and right hip pain, which began without incident. Patient failed to obtain pain relief with conservative measures including oral analgesics and physical therapy.  Patient reports that for the past 2 weeks, his pain has resolved.  He reports that for the past week, he did not take any pain pills.  X-rays of the right hip revealed moderate osteoarthritis. Lumbar MRI revealed multilevel degenerative changes with evidence of chronic compression deformities. A More recent CT scan of the lumbar spine revealed a compression deformity at T12 without bony retropulsion.  Significant spondylosis particularly at L4-L5 where there is a circumferential disc bulge associated with significant facet hypertrophy.   Pain Description: Constant pain with intermittent exacerbation, described as aching, burning and throbbing sensation.   Radiation of Pain: The pain radiates into the right hip  Pain intensity today: 0/10  Average pain intensity last week: 0/10  Pain intensity ranges from: 0/10 to 0/10  Aggravating factors: Denies at this time. Prior to last week, pain increased with bending, twisting, standing, walking. Patient describes neurogenic claudication.  Patient uses a cane   Alleviating factors: N/A. Pain decreased with lying down   Associated Symptoms:   Patient denies pain, numbness, or weakness in the lower extremities.   Patient denies any new bladder or bowel problems.   Patient reports difficulties with his balance but denies recent falls.     Review of previous therapies and additional medical records:  Jet Floyd has already failed the " following measures, including:   Conservative Measures: Oral analgesics, opioids, topical analgesics, ice, heat, massage, physical therapy   Interventional Measures: None  Surgical Measures: No history of previous lumbar spine or hip surgery   Jet Floyd has not undergone orthopedic spine or neurosurgical consultation for chronic pain condition   Jet Floyd presents with significant comorbidities including hypertension, hyperlipidemia, alcohol abuse, former smoker, severe mitral valve regurgitation, CHF  In terms of current analgesics, Jet Floyd takes: Norco (not at this time)  I have reviewed [facility] Report #[id number] ([place] by Dr. Obi Soriano) consistent with medication reconciliation.  SOAPP: High Risk    Global Pain Scale 04-20  2021          Pain 6          Feelings 0          Clinical outcomes 4          Activities 0          GPS Total: 10            Review of Diagnostic Studies:    CT of the lumbar spine without contrast 3/22/2021.  I have reviewed the images and the radiology report.  Significant multilevel degenerative loss of vertebral body heights in addition to some deformity at the endplate of T12 compatible with a compression deformity.  Less than 25% loss of height without bony retropulsion.  There is minimal levocurvature centered at L2-L3.  Alignment is otherwise stable.  Multilevel spondylosis most advanced at L4-L5 with circumferential disc bulge and bilateral facet arthropathy.  MRI of the lumbar spine without contrast 5/25/2019.  I have reviewed the images and the radiology report.  Evidence of chronic compression of the superior endplate of L2.  Multilevel degenerative changes of the endplates at L2, L3, and L4.  No evidence of an acute compression deformity.  Vertebral body alignment is preserved.  Axial imaging:  L2-L3: Disc osteophyte complex with lateralization to the left creating left neuroforaminal stenosis.  Moderate central canal stenosis  L3-L4:  Broad-based disc bulge.  Mild canal and bilateral foraminal stenosis  L4-L5: Disc bulge, facet hypertrophy, bilateral neuroforaminal stenosis  L5-S1: No significant canal stenosis.  Bilateral neuroforaminal stenosis  Right hip x-rays 3/15/2021.  I have reviewed the images.  Moderate arthrosis with narrowing of the joint space and sclerosis.  X-rays of the lumbar spine 3/15/2021: Chronic compression of the L2 superior endplate.  Diffuse degenerative changes of the lumbar spine     Review of Systems   Cardiovascular: Positive for leg swelling.   Musculoskeletal: Positive for arthralgias and back pain.   All other systems reviewed and are negative.        Patient Active Problem List   Diagnosis   • Sepsis (CMS/HCC)   • Left lower lobe pneumonia   • Essential hypertension   • Hyperlipidemia   • Alcohol abuse, daily use   • Former smoker   • Dermatitis   • Balance problem   • Hypoglycemia   • Metabolic encephalopathy   • Lactic acidosis   • History of alcoholism (CMS/[facility])   • Hyperglycemia   • Pleural effusion   • CKD (chronic kidney disease) stage 3, GFR 30-59 ml/min (CMS/[facility])   • Cardiorenal syndrome   • Severe mitral valve regurgitation   • Chronic systolic heart failure (CMS/[facility])   • Spondylosis of lumbar region without myelopathy or radiculopathy   • DDD (degenerative disc disease), lumbar   • Osteoarthritis of right hip   • Polyneuropathy   • History of compression fracture of vertebral column       Past Medical History:   Diagnosis Date   • Cancer (CMS/HCC)    • CHF (congestive heart failure) (CMS/[facility])    • High blood pressure    • Hypertension    • Kidney disease    • Kidney disease    • Pneumonia          Past Surgical History:   Procedure Laterality Date   • CARDIAC DEFIBRILLATOR PLACEMENT Bilateral 1998   • CARDIAC DEFIBRILLATOR REMOVAL Bilateral 2003   • CARDIAC ELECTROPHYSIOLOGY PROCEDURE N/A 3/17/2020    Procedure: BIV ICD 28885;  Surgeon: Mynor Richard MD;  Location: [facility] INVASIVE LOCATION;   Service: Cardiology;  Laterality: N/A;   • CATARACT EXTRACTION Bilateral    • CERVICAL SPINE SURGERY Bilateral 2014         Family History   Problem Relation Age of Onset   • Dementia Mother    • Cancer Father         prostate   • No Known Problems Sister    • No Known Problems Brother    • No Known Problems Daughter          Social History     Socioeconomic History   • Marital status:      Spouse name: Not on file   • Number of children: Not on file   • Years of education: Not on file   • Highest education level: Not on file   Tobacco Use   • Smoking status: Former Smoker     Packs/day: 1.50     Years: 48.00     Pack years: 72.00     Types: Cigarettes   • Smokeless tobacco: Never Used   • Tobacco comment: quit 2010   Substance and Sexual Activity   • Alcohol use: Not Currently     Alcohol/week: 35.0 standard drinks     Types: 35 Shots of liquor per week     Comment: liquor daily 5-6   • Drug use: No   • Sexual activity: Defer           Current Outpatient Medications:   •  allopurinol (ZYLOPRIM) 100 MG tablet, Take 100 mg by mouth Daily., Disp: , Rfl:   •  aspirin 81 MG chewable tablet, Chew 81 mg daily., Disp: , Rfl:   •  atorvastatin (LIPITOR) 20 MG tablet, Take 20 mg by mouth Daily., Disp: , Rfl:   •  carvedilol (COREG) 3.125 MG tablet, Take 3.125 mg by mouth 2 (two) times a day with meals., Disp: , Rfl:   •  furosemide (LASIX) 40 MG tablet, Take 1 tablet by mouth Daily. For increased leg swelling may take up to 80mg, Disp: 30 tablet, Rfl: 1  •  HYDROcodone-acetaminophen (NORCO) 5-325 MG per tablet, Take 1 tablet by mouth Every 6 (Six) Hours As Needed for Moderate Pain ., Disp: 12 tablet, Rfl: 0  •  KLOR-CON 10 MEQ CR tablet, TAKE ONE TABLET BY MOUTH DAILY, Disp: 30 tablet, Rfl: 2  •  levothyroxine (SYNTHROID, LEVOTHROID) 50 MCG tablet, Take 50 mcg by mouth Daily., Disp: , Rfl:   •  tamsulosin (FLOMAX) 0.4 MG capsule 24 hr capsule, Take 0.4 capsules by mouth Daily., Disp: , Rfl:   •  vitamin B-12  "(CYANOCOBALAMIN) 1000 MCG tablet, Take 1,000 mcg by mouth Daily., Disp: , Rfl:       Allergies   Allergen Reactions   • Lactose Intolerance (Gi) Diarrhea   • Polysporin [Bacitracin-Polymyxin B] Itching and Rash         /64   Pulse 96   Resp 14   Ht 175.3 cm (69.02\")   Wt 57.2 kg (126 lb)   SpO2 98%   BMI 18.60 kg/m²       Physical Exam:  Constitutional: Patient appears well-developed, well-nourished, well-hydrated  HEENT: Head: Normocephalic and atraumatic  Eyes: Conjunctivae and lids are normal  Pupils: Equal, round, reactive to light  Neck: Trachea normal. Neck supple. No JVD present.   Pulmonary: No increased work of breathing or signs of respiratory distress. Auscultation of lungs: Clear to auscultation.   Cardiovascular: Normal rate and rhythm, normal S1 and S2, no murmurs.   Peripheral vascular exam: Femoral: right 2+, left 2+. Posterior tibialis: right 2+ and left 2+. Dorsalis pedis: right 2+ and left 2+. 1+ edema right leg.   Musculoskeletal   Gait and station: Gait evaluation demonstrated an antalgic gait. Walks with a cane  Lumbar spine: Passive and active range of motion are limited secondary to pain. Extension, lateral flexion, rotation of the lumbar spine increased and reproduced pain. Lumbar facet joint loading maneuvers are positive.  Darian test and Gaenslen's test are negative   Piriformis maneuvers are negative   Hip joints: The range of motion of the hip joints is limited to flexion and internal rotation bilaterally but without pain  Neurological:   Patient is alert and oriented to person, place, and time.   Speech: Normal.   Cortical function: Normal mental status.   Cranial nerves 2-12: intact.   Reflex Scores:  Right patellar: 2+  Left patellar: 2+  Right Achilles: 2+  Left Achilles: 2+  Motor strength: 5/5  Motor Tone: Normal .   Involuntary movements: None.   Superficial/Primitive Reflexes: Primitive reflexes were absent.   Right House: Absent  Left House: Absent  Right ankle " clonus: Absent  Left ankle clonus: Absent   Babinsky: Absent  Long tract signs: Negative. Straight leg raising test: Negative.   Sensory exam: Intact to light touch, intact pain and temperature sensation, intact vibration sensation and normal proprioception.   Skin and subcutaneous tissue: Skin is warm and intact. No rash noted. No cyanosis.   Psychiatric: Judgment and insight: Normal. Orientation to person, place and time: Normal. Recent and remote memory: Intact. Mood and affect: Normal.     ASSESSMENT:   1. Spondylosis of lumbar region without myelopathy or radiculopathy    2. DDD (degenerative disc disease), lumbar    3. History of compression fracture of vertebral column    4. Osteoarthritis of right hip, unspecified osteoarthritis type    5. Polyneuropathy    6. Severe mitral valve regurgitation    7. History of alcoholism (CMS/[facility])    8. Balance problem          PLAN/MEDICAL DECISION MAKING:  Patient reports a 3-year history of progressive lower back and right hip pain, which began without incident.  Pain has been mostly intermittent for the past 3 years.  He had a prior episode 3 years ago for which he underwent MRI of the lumbar spine.  More recently, he started experiencing severe lower back and right hip pain that resolved about 2 weeks ago.  Patient had previously failed to obtain pain relief with conservative measures including oral analgesics and physical therapy.  X-rays of the right hip revealed moderate osteoarthritis. Lumbar MRI revealed multilevel degenerative changes with evidence of chronic compression deformities. A More recent CT scan of the lumbar spine revealed a compression deformity at T12 without bony retropulsion.  Significant spondylosis particularly at L4-L5 where there is a circumferential disc bulge associated with significant facet hypertrophy.  As per patient's description of his pain, it appears that he was dealing mostly with mechanical lower back pain.  Since pain has resolved,  I have encouraged the patient to get back in touch with us if he experiences any difficulties by having direct access to [employer].  I have reviewed all available patient's medical records as well as previous therapies as referenced above. I had a lengthy conversation with . Jet Floyd regarding his chronic pain condition and potential therapeutic options including risks, benefits, alternative therapies, to name a few. Therefore, I have proposed the following plan:  1. Diagnostic studies: None indicated at this time.  We could consider lumbar myelogram followed by CT postmyelogram and electrodiagnostic studies of the lower extremities if pain recurs  2. Pharmacological measures: Reviewed and discussed;  Patient takes no analgesics at this time.  3. Interventional pain management measures: None indicated at this time.  If pain recurs, we could consider diagnostic and therapeutic lumbar facet joint injections versus diagnostic lumbar medial branch blocks versus diagnostic and therapeutic right hip joint injection versus diagnostic and therapeutic bilateral L4-5 transforaminal epidural steroid injections depending on patient's presentation of symptoms and physical examination at that point.  4. Long-term rehabilitation efforts:  A. The patient does not have a history of falls. I did complete a risk assessment for falls.  Patient has risk factors.  Patient has been instructed regarding universal fall precautions  B.  If pain recurs, then, will refer him for a comprehensive physical therapy program for therapeutic exercise, core strengthening, gait and balance training, neurodynamics, ultrasound, myofascial release, cupping and dry needling   C.Start an exercise program such as yoga, Romario Chi and water therapy  D. Contrast therapy: Apply ice-packs for 15-20 minutes, followed by heating pads for 15-20 minutes to affected area   5. The patient has been instructed to contact my office with any questions or  difficulties. The patient understands the plan and agrees to proceed accordingly.        Patient Care Team:  Obi Soriano MD as PCP - General  Obi Soriano MD as PCP - Family Medicine     No orders of the defined types were placed in this encounter.        No future appointments.      Fortino Garcias MD     EMR Dragon/Transcription disclaimer:  Much of this encounter note is an electronic transcription of spoken language to printed text. Electronic transcription of spoken language may permit erroneous, or at times, nonsensical words or phrases to be inadvertently transcribed. Although I have reviewed the note for such errors, some may still exist.

## 2021-04-16 PROBLEM — G62.9 POLYNEUROPATHY: Status: ACTIVE | Noted: 2021-04-16

## 2021-04-16 PROBLEM — M51.36 DDD (DEGENERATIVE DISC DISEASE), LUMBAR: Status: ACTIVE | Noted: 2021-04-16

## 2021-04-16 PROBLEM — M16.11 OSTEOARTHRITIS OF RIGHT HIP: Status: ACTIVE | Noted: 2021-04-16

## 2021-04-16 PROBLEM — Z87.81 HISTORY OF COMPRESSION FRACTURE OF VERTEBRAL COLUMN: Status: ACTIVE | Noted: 2021-04-16

## 2021-04-16 PROBLEM — M47.816 SPONDYLOSIS OF LUMBAR REGION WITHOUT MYELOPATHY OR RADICULOPATHY: Status: ACTIVE | Noted: 2021-04-16

## 2021-04-20 ENCOUNTER — OFFICE VISIT (OUTPATIENT)
Dept: PAIN MEDICINE | Facility: CLINIC | Age: 76
End: 2021-04-20

## 2021-04-20 VITALS
SYSTOLIC BLOOD PRESSURE: 112 MMHG | DIASTOLIC BLOOD PRESSURE: 64 MMHG | BODY MASS INDEX: 18.66 KG/M2 | RESPIRATION RATE: 14 BRPM | WEIGHT: 126 LBS | OXYGEN SATURATION: 98 % | HEIGHT: 69 IN | HEART RATE: 96 BPM

## 2021-04-20 DIAGNOSIS — M47.816 SPONDYLOSIS OF LUMBAR REGION WITHOUT MYELOPATHY OR RADICULOPATHY: Primary | ICD-10-CM

## 2021-04-20 DIAGNOSIS — R26.89 BALANCE PROBLEM: ICD-10-CM

## 2021-04-20 DIAGNOSIS — I34.0 SEVERE MITRAL VALVE REGURGITATION: ICD-10-CM

## 2021-04-20 DIAGNOSIS — Z87.81 HISTORY OF COMPRESSION FRACTURE OF VERTEBRAL COLUMN: ICD-10-CM

## 2021-04-20 DIAGNOSIS — F10.21 HISTORY OF ALCOHOLISM (HCC): ICD-10-CM

## 2021-04-20 DIAGNOSIS — M47.816 SPONDYLOSIS OF LUMBAR REGION WITHOUT MYELOPATHY OR RADICULOPATHY: ICD-10-CM

## 2021-04-20 DIAGNOSIS — M16.11 OSTEOARTHRITIS OF RIGHT HIP, UNSPECIFIED OSTEOARTHRITIS TYPE: ICD-10-CM

## 2021-04-20 DIAGNOSIS — M51.36 DDD (DEGENERATIVE DISC DISEASE), LUMBAR: ICD-10-CM

## 2021-04-20 DIAGNOSIS — G62.9 POLYNEUROPATHY: ICD-10-CM

## 2021-04-20 PROCEDURE — 99203 OFFICE O/P NEW LOW 30 MIN: CPT | Performed by: ANESTHESIOLOGY

## 2021-04-20 RX ORDER — TAMSULOSIN HYDROCHLORIDE 0.4 MG/1
0.4 CAPSULE ORAL DAILY
COMMUNITY
Start: 2021-01-23

## 2021-04-20 RX ORDER — LEVOTHYROXINE SODIUM 0.05 MG/1
50 TABLET ORAL DAILY
COMMUNITY
Start: 2021-04-06

## 2022-03-28 ENCOUNTER — HOSPITAL ENCOUNTER (INPATIENT)
Facility: HOSPITAL | Age: 77
LOS: 8 days | Discharge: NURSING, REHAB OR LONG-TERM CARE | End: 2022-04-05
Attending: EMERGENCY MEDICINE | Admitting: INTERNAL MEDICINE

## 2022-03-28 ENCOUNTER — APPOINTMENT (OUTPATIENT)
Dept: CT IMAGING | Facility: HOSPITAL | Age: 77
End: 2022-03-28

## 2022-03-28 ENCOUNTER — APPOINTMENT (OUTPATIENT)
Dept: GENERAL RADIOLOGY | Facility: HOSPITAL | Age: 77
End: 2022-03-28

## 2022-03-28 DIAGNOSIS — I50.22 CHRONIC SYSTOLIC HEART FAILURE: ICD-10-CM

## 2022-03-28 DIAGNOSIS — J18.9 MULTIFOCAL PNEUMONIA: ICD-10-CM

## 2022-03-28 DIAGNOSIS — R06.03 ACUTE RESPIRATORY DISTRESS: Primary | ICD-10-CM

## 2022-03-28 DIAGNOSIS — I50.9 ACUTE ON CHRONIC CONGESTIVE HEART FAILURE, UNSPECIFIED HEART FAILURE TYPE: ICD-10-CM

## 2022-03-28 DIAGNOSIS — R26.89 BALANCE PROBLEM: Chronic | ICD-10-CM

## 2022-03-28 DIAGNOSIS — N18.9 CHRONIC RENAL FAILURE, UNSPECIFIED CKD STAGE: ICD-10-CM

## 2022-03-28 DIAGNOSIS — R13.10 DYSPHAGIA, UNSPECIFIED TYPE: ICD-10-CM

## 2022-03-28 DIAGNOSIS — R09.02 HYPOXIA: ICD-10-CM

## 2022-03-28 DIAGNOSIS — J96.01 ACUTE RESPIRATORY FAILURE WITH HYPOXIA: ICD-10-CM

## 2022-03-28 DIAGNOSIS — R60.9 PERIPHERAL EDEMA: ICD-10-CM

## 2022-03-28 DIAGNOSIS — I34.0 SEVERE MITRAL VALVE REGURGITATION: Chronic | ICD-10-CM

## 2022-03-28 PROBLEM — J96.00 ACUTE RESPIRATORY FAILURE: Status: ACTIVE | Noted: 2022-03-28

## 2022-03-28 LAB
ALBUMIN SERPL-MCNC: 3.1 G/DL (ref 3.5–5.2)
ALBUMIN/GLOB SERPL: 1.1 G/DL
ALP SERPL-CCNC: 149 U/L (ref 39–117)
ALT SERPL W P-5'-P-CCNC: <5 U/L (ref 1–41)
ANION GAP SERPL CALCULATED.3IONS-SCNC: 15 MMOL/L (ref 5–15)
ARTERIAL PATENCY WRIST A: ABNORMAL
AST SERPL-CCNC: 16 U/L (ref 1–40)
ATMOSPHERIC PRESS: ABNORMAL MM[HG]
BASE EXCESS BLDA CALC-SCNC: -3 MMOL/L (ref 0–2)
BASOPHILS # BLD AUTO: 0.03 10*3/MM3 (ref 0–0.2)
BASOPHILS NFR BLD AUTO: 0.5 % (ref 0–1.5)
BDY SITE: ABNORMAL
BILIRUB SERPL-MCNC: 2 MG/DL (ref 0–1.2)
BODY TEMPERATURE: 37 C
BUN SERPL-MCNC: 23 MG/DL (ref 8–23)
BUN/CREAT SERPL: 12.6 (ref 7–25)
CALCIUM SPEC-SCNC: 7.5 MG/DL (ref 8.6–10.5)
CHLORIDE SERPL-SCNC: 95 MMOL/L (ref 98–107)
CO2 BLDA-SCNC: 23.5 MMOL/L (ref 22–33)
CO2 SERPL-SCNC: 21 MMOL/L (ref 22–29)
COHGB MFR BLD: 0.8 % (ref 0–2)
CREAT SERPL-MCNC: 1.82 MG/DL (ref 0.76–1.27)
D-LACTATE SERPL-SCNC: 4.6 MMOL/L (ref 0.5–2)
D-LACTATE SERPL-SCNC: 5.7 MMOL/L (ref 0.5–2)
DEPRECATED RDW RBC AUTO: 59.6 FL (ref 37–54)
EGFRCR SERPLBLD CKD-EPI 2021: 38 ML/MIN/1.73
EOSINOPHIL # BLD AUTO: 0.04 10*3/MM3 (ref 0–0.4)
EOSINOPHIL NFR BLD AUTO: 0.7 % (ref 0.3–6.2)
EPAP: 0
ERYTHROCYTE [DISTWIDTH] IN BLOOD BY AUTOMATED COUNT: 17.2 % (ref 12.3–15.4)
FLUAV RNA RESP QL NAA+PROBE: NOT DETECTED
FLUBV RNA RESP QL NAA+PROBE: NOT DETECTED
GLOBULIN UR ELPH-MCNC: 2.7 GM/DL
GLUCOSE SERPL-MCNC: 78 MG/DL (ref 65–99)
HCO3 BLDA-SCNC: 22.3 MMOL/L (ref 20–26)
HCT VFR BLD AUTO: 36.8 % (ref 37.5–51)
HCT VFR BLD CALC: 42.6 % (ref 38–51)
HGB BLD-MCNC: 12.6 G/DL (ref 13–17.7)
HGB BLDA-MCNC: 13.9 G/DL (ref 13.5–17.5)
HOLD SPECIMEN: NORMAL
IMM GRANULOCYTES # BLD AUTO: 0.06 10*3/MM3 (ref 0–0.05)
IMM GRANULOCYTES NFR BLD AUTO: 1 % (ref 0–0.5)
INHALED O2 CONCENTRATION: 80 %
IPAP: 0
LYMPHOCYTES # BLD AUTO: 0.55 10*3/MM3 (ref 0.7–3.1)
LYMPHOCYTES NFR BLD AUTO: 9.5 % (ref 19.6–45.3)
MCH RBC QN AUTO: 32.1 PG (ref 26.6–33)
MCHC RBC AUTO-ENTMCNC: 34.2 G/DL (ref 31.5–35.7)
MCV RBC AUTO: 93.6 FL (ref 79–97)
METHGB BLD QL: 0.2 % (ref 0–1.5)
MODALITY: ABNORMAL
MONOCYTES # BLD AUTO: 0.13 10*3/MM3 (ref 0.1–0.9)
MONOCYTES NFR BLD AUTO: 2.3 % (ref 5–12)
NEUTROPHILS NFR BLD AUTO: 4.95 10*3/MM3 (ref 1.7–7)
NEUTROPHILS NFR BLD AUTO: 86 % (ref 42.7–76)
NOTE: ABNORMAL
NRBC BLD AUTO-RTO: 0 /100 WBC (ref 0–0.2)
NT-PROBNP SERPL-MCNC: ABNORMAL PG/ML (ref 0–1800)
OXYHGB MFR BLDV: 81.9 % (ref 94–99)
PAW @ PEAK INSP FLOW SETTING VENT: 0 CMH2O
PCO2 BLDA: 40.2 MM HG (ref 35–45)
PCO2 TEMP ADJ BLD: 40.2 MM HG (ref 35–48)
PH BLDA: 7.35 PH UNITS (ref 7.35–7.45)
PH, TEMP CORRECTED: 7.35 PH UNITS
PLATELET # BLD AUTO: 95 10*3/MM3 (ref 140–450)
PMV BLD AUTO: 13.2 FL (ref 6–12)
PO2 BLDA: 54.6 MM HG (ref 83–108)
PO2 TEMP ADJ BLD: 54.6 MM HG (ref 83–108)
POTASSIUM SERPL-SCNC: 3.4 MMOL/L (ref 3.5–5.2)
PROCALCITONIN SERPL-MCNC: 0.52 NG/ML (ref 0–0.25)
PROT SERPL-MCNC: 5.8 G/DL (ref 6–8.5)
RBC # BLD AUTO: 3.93 10*6/MM3 (ref 4.14–5.8)
SARS-COV-2 RNA RESP QL NAA+PROBE: NOT DETECTED
SODIUM SERPL-SCNC: 131 MMOL/L (ref 136–145)
TOTAL RATE: 0 BREATHS/MINUTE
TROPONIN T SERPL-MCNC: 0.02 NG/ML (ref 0–0.03)
WBC NRBC COR # BLD: 5.76 10*3/MM3 (ref 3.4–10.8)
WHOLE BLOOD HOLD SPECIMEN: NORMAL
WHOLE BLOOD HOLD SPECIMEN: NORMAL

## 2022-03-28 PROCEDURE — 36600 WITHDRAWAL OF ARTERIAL BLOOD: CPT

## 2022-03-28 PROCEDURE — 71275 CT ANGIOGRAPHY CHEST: CPT

## 2022-03-28 PROCEDURE — 83880 ASSAY OF NATRIURETIC PEPTIDE: CPT | Performed by: EMERGENCY MEDICINE

## 2022-03-28 PROCEDURE — 36415 COLL VENOUS BLD VENIPUNCTURE: CPT

## 2022-03-28 PROCEDURE — 25010000002 FUROSEMIDE PER 20 MG: Performed by: EMERGENCY MEDICINE

## 2022-03-28 PROCEDURE — 83605 ASSAY OF LACTIC ACID: CPT | Performed by: EMERGENCY MEDICINE

## 2022-03-28 PROCEDURE — 25010000002 ONDANSETRON PER 1 MG: Performed by: FAMILY MEDICINE

## 2022-03-28 PROCEDURE — 99284 EMERGENCY DEPT VISIT MOD MDM: CPT

## 2022-03-28 PROCEDURE — 25010000002 CEFTRIAXONE PER 250 MG: Performed by: EMERGENCY MEDICINE

## 2022-03-28 PROCEDURE — 82805 BLOOD GASES W/O2 SATURATION: CPT

## 2022-03-28 PROCEDURE — 94799 UNLISTED PULMONARY SVC/PX: CPT

## 2022-03-28 PROCEDURE — 94660 CPAP INITIATION&MGMT: CPT

## 2022-03-28 PROCEDURE — 87636 SARSCOV2 & INF A&B AMP PRB: CPT | Performed by: EMERGENCY MEDICINE

## 2022-03-28 PROCEDURE — 93005 ELECTROCARDIOGRAM TRACING: CPT | Performed by: EMERGENCY MEDICINE

## 2022-03-28 PROCEDURE — 82375 ASSAY CARBOXYHB QUANT: CPT

## 2022-03-28 PROCEDURE — 0 IOPAMIDOL PER 1 ML: Performed by: EMERGENCY MEDICINE

## 2022-03-28 PROCEDURE — 80053 COMPREHEN METABOLIC PANEL: CPT | Performed by: EMERGENCY MEDICINE

## 2022-03-28 PROCEDURE — 99223 1ST HOSP IP/OBS HIGH 75: CPT | Performed by: FAMILY MEDICINE

## 2022-03-28 PROCEDURE — 85025 COMPLETE CBC W/AUTO DIFF WBC: CPT | Performed by: EMERGENCY MEDICINE

## 2022-03-28 PROCEDURE — 83050 HGB METHEMOGLOBIN QUAN: CPT

## 2022-03-28 PROCEDURE — 87040 BLOOD CULTURE FOR BACTERIA: CPT | Performed by: EMERGENCY MEDICINE

## 2022-03-28 PROCEDURE — 84145 PROCALCITONIN (PCT): CPT | Performed by: EMERGENCY MEDICINE

## 2022-03-28 PROCEDURE — 84484 ASSAY OF TROPONIN QUANT: CPT | Performed by: EMERGENCY MEDICINE

## 2022-03-28 PROCEDURE — 71045 X-RAY EXAM CHEST 1 VIEW: CPT

## 2022-03-28 RX ORDER — ACETAMINOPHEN 160 MG/5ML
650 SOLUTION ORAL EVERY 4 HOURS PRN
Status: DISCONTINUED | OUTPATIENT
Start: 2022-03-28 | End: 2022-04-05 | Stop reason: HOSPADM

## 2022-03-28 RX ORDER — IPRATROPIUM BROMIDE AND ALBUTEROL SULFATE 2.5; .5 MG/3ML; MG/3ML
3 SOLUTION RESPIRATORY (INHALATION) EVERY 4 HOURS PRN
Status: DISCONTINUED | OUTPATIENT
Start: 2022-03-28 | End: 2022-04-05 | Stop reason: HOSPADM

## 2022-03-28 RX ORDER — ASPIRIN 81 MG/1
81 TABLET, CHEWABLE ORAL DAILY
Status: DISCONTINUED | OUTPATIENT
Start: 2022-03-29 | End: 2022-04-05 | Stop reason: HOSPADM

## 2022-03-28 RX ORDER — LEVOTHYROXINE SODIUM 0.05 MG/1
50 TABLET ORAL
Status: DISCONTINUED | OUTPATIENT
Start: 2022-03-29 | End: 2022-04-05 | Stop reason: HOSPADM

## 2022-03-28 RX ORDER — TAMSULOSIN HYDROCHLORIDE 0.4 MG/1
0.4 CAPSULE ORAL DAILY
Status: DISCONTINUED | OUTPATIENT
Start: 2022-03-29 | End: 2022-04-05 | Stop reason: HOSPADM

## 2022-03-28 RX ORDER — HEPARIN SODIUM 5000 [USP'U]/ML
5000 INJECTION, SOLUTION INTRAVENOUS; SUBCUTANEOUS EVERY 12 HOURS SCHEDULED
Status: DISCONTINUED | OUTPATIENT
Start: 2022-03-29 | End: 2022-04-05 | Stop reason: HOSPADM

## 2022-03-28 RX ORDER — SODIUM CHLORIDE 0.9 % (FLUSH) 0.9 %
10 SYRINGE (ML) INJECTION AS NEEDED
Status: DISCONTINUED | OUTPATIENT
Start: 2022-03-28 | End: 2022-04-05 | Stop reason: HOSPADM

## 2022-03-28 RX ORDER — SODIUM CHLORIDE 0.9 % (FLUSH) 0.9 %
10 SYRINGE (ML) INJECTION EVERY 12 HOURS SCHEDULED
Status: DISCONTINUED | OUTPATIENT
Start: 2022-03-28 | End: 2022-04-05 | Stop reason: HOSPADM

## 2022-03-28 RX ORDER — LANOLIN ALCOHOL/MO/W.PET/CERES
1000 CREAM (GRAM) TOPICAL DAILY
Status: DISCONTINUED | OUTPATIENT
Start: 2022-03-29 | End: 2022-04-05 | Stop reason: HOSPADM

## 2022-03-28 RX ORDER — ACETAMINOPHEN 325 MG/1
650 TABLET ORAL EVERY 4 HOURS PRN
Status: DISCONTINUED | OUTPATIENT
Start: 2022-03-28 | End: 2022-04-05 | Stop reason: HOSPADM

## 2022-03-28 RX ORDER — CARVEDILOL 3.12 MG/1
3.12 TABLET ORAL 2 TIMES DAILY WITH MEALS
Status: DISCONTINUED | OUTPATIENT
Start: 2022-03-29 | End: 2022-04-05 | Stop reason: HOSPADM

## 2022-03-28 RX ORDER — ACETAMINOPHEN 650 MG/1
650 SUPPOSITORY RECTAL EVERY 4 HOURS PRN
Status: DISCONTINUED | OUTPATIENT
Start: 2022-03-28 | End: 2022-04-05 | Stop reason: HOSPADM

## 2022-03-28 RX ORDER — FUROSEMIDE 10 MG/ML
80 INJECTION INTRAMUSCULAR; INTRAVENOUS ONCE
Status: COMPLETED | OUTPATIENT
Start: 2022-03-28 | End: 2022-03-28

## 2022-03-28 RX ORDER — ONDANSETRON 2 MG/ML
4 INJECTION INTRAMUSCULAR; INTRAVENOUS EVERY 6 HOURS PRN
Status: DISCONTINUED | OUTPATIENT
Start: 2022-03-28 | End: 2022-04-05 | Stop reason: HOSPADM

## 2022-03-28 RX ORDER — ALLOPURINOL 100 MG/1
100 TABLET ORAL DAILY
Status: DISCONTINUED | OUTPATIENT
Start: 2022-03-29 | End: 2022-04-05 | Stop reason: HOSPADM

## 2022-03-28 RX ORDER — ATORVASTATIN CALCIUM 20 MG/1
20 TABLET, FILM COATED ORAL DAILY
Status: DISCONTINUED | OUTPATIENT
Start: 2022-03-29 | End: 2022-04-05 | Stop reason: HOSPADM

## 2022-03-28 RX ORDER — POTASSIUM CHLORIDE 1.5 G/1.77G
40 POWDER, FOR SOLUTION ORAL ONCE
Status: COMPLETED | OUTPATIENT
Start: 2022-03-28 | End: 2022-03-28

## 2022-03-28 RX ORDER — ONDANSETRON 4 MG/1
4 TABLET, FILM COATED ORAL EVERY 6 HOURS PRN
Status: DISCONTINUED | OUTPATIENT
Start: 2022-03-28 | End: 2022-04-05 | Stop reason: HOSPADM

## 2022-03-28 RX ADMIN — POTASSIUM CHLORIDE 40 MEQ: 1.5 POWDER, FOR SOLUTION ORAL at 23:25

## 2022-03-28 RX ADMIN — FUROSEMIDE 80 MG: 10 INJECTION, SOLUTION INTRAMUSCULAR; INTRAVENOUS at 20:56

## 2022-03-28 RX ADMIN — IOPAMIDOL 70 ML: 755 INJECTION, SOLUTION INTRAVENOUS at 19:45

## 2022-03-28 RX ADMIN — ONDANSETRON 4 MG: 2 INJECTION INTRAMUSCULAR; INTRAVENOUS at 23:34

## 2022-03-28 RX ADMIN — Medication 10 ML: at 23:25

## 2022-03-28 RX ADMIN — DOXYCYCLINE 100 MG: 100 INJECTION, POWDER, LYOPHILIZED, FOR SOLUTION INTRAVENOUS at 23:25

## 2022-03-28 RX ADMIN — SODIUM CHLORIDE 2 G: 900 INJECTION INTRAVENOUS at 21:49

## 2022-03-29 ENCOUNTER — APPOINTMENT (OUTPATIENT)
Dept: CARDIOLOGY | Facility: HOSPITAL | Age: 77
End: 2022-03-29

## 2022-03-29 ENCOUNTER — APPOINTMENT (OUTPATIENT)
Dept: GENERAL RADIOLOGY | Facility: HOSPITAL | Age: 77
End: 2022-03-29

## 2022-03-29 LAB
ANION GAP SERPL CALCULATED.3IONS-SCNC: 19 MMOL/L (ref 5–15)
BASOPHILS # BLD AUTO: 0.02 10*3/MM3 (ref 0–0.2)
BASOPHILS NFR BLD AUTO: 0.2 % (ref 0–1.5)
BH CV ECHO MEAS - AO ROOT AREA (BSA CORRECTED): 1.7
BH CV ECHO MEAS - AO ROOT AREA: 6.6 CM^2
BH CV ECHO MEAS - AO ROOT DIAM: 2.9 CM
BH CV ECHO MEAS - ASC AORTA: 3 CM
BH CV ECHO MEAS - BSA(HAYCOCK): 1.7 M^2
BH CV ECHO MEAS - BSA: 1.7 M^2
BH CV ECHO MEAS - BZI_BMI: 19.1 KILOGRAMS/M^2
BH CV ECHO MEAS - BZI_METRIC_HEIGHT: 175 CM
BH CV ECHO MEAS - BZI_METRIC_WEIGHT: 58.5 KG
BH CV ECHO MEAS - EDV(CUBED): 175.6 ML
BH CV ECHO MEAS - EDV(MOD-SP4): 193 ML
BH CV ECHO MEAS - EDV(TEICH): 153.7 ML
BH CV ECHO MEAS - EF(CUBED): 40.9 %
BH CV ECHO MEAS - EF(MOD-SP4): 30.6 %
BH CV ECHO MEAS - EF(TEICH): 33.4 %
BH CV ECHO MEAS - ESV(CUBED): 103.8 ML
BH CV ECHO MEAS - ESV(MOD-SP4): 134 ML
BH CV ECHO MEAS - ESV(TEICH): 102.4 ML
BH CV ECHO MEAS - FS: 16.1 %
BH CV ECHO MEAS - IVS/LVPW: 0.73
BH CV ECHO MEAS - IVSD: 0.8 CM
BH CV ECHO MEAS - LA DIMENSION: 3.6 CM
BH CV ECHO MEAS - LA/AO: 1.2
BH CV ECHO MEAS - LAD MAJOR: 6.8 CM
BH CV ECHO MEAS - LAT PEAK E' VEL: 3.5 CM/SEC
BH CV ECHO MEAS - LV DIASTOLIC VOL/BSA (35-75): 112.7 ML/M^2
BH CV ECHO MEAS - LV IVRT: 0.11 SEC
BH CV ECHO MEAS - LV MASS(C)D: 205.5 GRAMS
BH CV ECHO MEAS - LV MASS(C)DI: 120 GRAMS/M^2
BH CV ECHO MEAS - LV MAX PG: 1 MMHG
BH CV ECHO MEAS - LV MEAN PG: 1 MMHG
BH CV ECHO MEAS - LV SYSTOLIC VOL/BSA (12-30): 78.2 ML/M^2
BH CV ECHO MEAS - LV V1 MAX: 50.3 CM/SEC
BH CV ECHO MEAS - LV V1 MEAN: 32.5 CM/SEC
BH CV ECHO MEAS - LV V1 VTI: 10.3 CM
BH CV ECHO MEAS - LVIDD: 5.6 CM
BH CV ECHO MEAS - LVIDS: 4.7 CM
BH CV ECHO MEAS - LVLD AP4: 9.3 CM
BH CV ECHO MEAS - LVLS AP4: 8.9 CM
BH CV ECHO MEAS - LVOT AREA (M): 4.5 CM^2
BH CV ECHO MEAS - LVOT AREA: 4.5 CM^2
BH CV ECHO MEAS - LVOT DIAM: 2.4 CM
BH CV ECHO MEAS - LVPWD: 1.1 CM
BH CV ECHO MEAS - MED PEAK E' VEL: 3.3 CM/SEC
BH CV ECHO MEAS - MV A MAX VEL: 110.5 CM/SEC
BH CV ECHO MEAS - MV DEC SLOPE: 834 CM/SEC^2
BH CV ECHO MEAS - MV MAX PG: 6.4 MMHG
BH CV ECHO MEAS - MV MEAN PG: 2 MMHG
BH CV ECHO MEAS - MV P1/2T MAX VEL: 128 CM/SEC
BH CV ECHO MEAS - MV P1/2T: 45 MSEC
BH CV ECHO MEAS - MV V2 MAX: 126 CM/SEC
BH CV ECHO MEAS - MV V2 MEAN: 67.4 CM/SEC
BH CV ECHO MEAS - MV V2 VTI: 17.5 CM
BH CV ECHO MEAS - MVA P1/2T LCG: 1.7 CM^2
BH CV ECHO MEAS - MVA(P1/2T): 4.9 CM^2
BH CV ECHO MEAS - MVA(VTI): 2.7 CM^2
BH CV ECHO MEAS - PA ACC TIME: 0.12 SEC
BH CV ECHO MEAS - PA PR(ACCEL): 23.7 MMHG
BH CV ECHO MEAS - RAP SYSTOLE: 15 MMHG
BH CV ECHO MEAS - RVSP: 28 MMHG
BH CV ECHO MEAS - SI(CUBED): 41.9 ML/M^2
BH CV ECHO MEAS - SI(LVOT): 27.2 ML/M^2
BH CV ECHO MEAS - SI(MOD-SP4): 34.5 ML/M^2
BH CV ECHO MEAS - SI(TEICH): 30 ML/M^2
BH CV ECHO MEAS - SV(CUBED): 71.8 ML
BH CV ECHO MEAS - SV(LVOT): 46.6 ML
BH CV ECHO MEAS - SV(MOD-SP4): 59 ML
BH CV ECHO MEAS - SV(TEICH): 51.3 ML
BH CV ECHO MEAS - TAPSE (>1.6): 0.92 CM
BH CV ECHO MEAS - TR MAX PG: 13 MMHG
BH CV ECHO MEAS - TR MAX VEL: 179 CM/SEC
BH CV XLRA - RV BASE: 5.9 CM
BH CV XLRA - RV LENGTH: 8.1 CM
BH CV XLRA - RV MID: 3.6 CM
BH CV XLRA - TDI S': 7.6 CM/SEC
BUN SERPL-MCNC: 29 MG/DL (ref 8–23)
BUN/CREAT SERPL: 13.7 (ref 7–25)
CALCIUM SPEC-SCNC: 8.1 MG/DL (ref 8.6–10.5)
CHLORIDE SERPL-SCNC: 92 MMOL/L (ref 98–107)
CO2 SERPL-SCNC: 20 MMOL/L (ref 22–29)
CREAT SERPL-MCNC: 2.11 MG/DL (ref 0.76–1.27)
D-LACTATE SERPL-SCNC: 1.8 MMOL/L (ref 0.5–2)
DEPRECATED RDW RBC AUTO: 62.4 FL (ref 37–54)
EGFRCR SERPLBLD CKD-EPI 2021: 31.8 ML/MIN/1.73
EOSINOPHIL # BLD AUTO: 0 10*3/MM3 (ref 0–0.4)
EOSINOPHIL NFR BLD AUTO: 0 % (ref 0.3–6.2)
ERYTHROCYTE [DISTWIDTH] IN BLOOD BY AUTOMATED COUNT: 17.6 % (ref 12.3–15.4)
GLUCOSE SERPL-MCNC: 70 MG/DL (ref 65–99)
HCT VFR BLD AUTO: 36 % (ref 37.5–51)
HGB BLD-MCNC: 12.2 G/DL (ref 13–17.7)
IMM GRANULOCYTES # BLD AUTO: 0.07 10*3/MM3 (ref 0–0.05)
IMM GRANULOCYTES NFR BLD AUTO: 0.6 % (ref 0–0.5)
LYMPHOCYTES # BLD AUTO: 0.3 10*3/MM3 (ref 0.7–3.1)
LYMPHOCYTES NFR BLD AUTO: 2.4 % (ref 19.6–45.3)
MAGNESIUM SERPL-MCNC: 1.5 MG/DL (ref 1.6–2.4)
MAXIMAL PREDICTED HEART RATE: 144 BPM
MCH RBC QN AUTO: 32.6 PG (ref 26.6–33)
MCHC RBC AUTO-ENTMCNC: 33.9 G/DL (ref 31.5–35.7)
MCV RBC AUTO: 96.3 FL (ref 79–97)
MONOCYTES # BLD AUTO: 0.6 10*3/MM3 (ref 0.1–0.9)
MONOCYTES NFR BLD AUTO: 4.9 % (ref 5–12)
NEUTROPHILS NFR BLD AUTO: 11.3 10*3/MM3 (ref 1.7–7)
NEUTROPHILS NFR BLD AUTO: 91.9 % (ref 42.7–76)
NRBC BLD AUTO-RTO: 0 /100 WBC (ref 0–0.2)
PHOSPHATE SERPL-MCNC: 3.9 MG/DL (ref 2.5–4.5)
PLATELET # BLD AUTO: 78 10*3/MM3 (ref 140–450)
PMV BLD AUTO: 13.3 FL (ref 6–12)
POTASSIUM SERPL-SCNC: 3.8 MMOL/L (ref 3.5–5.2)
PROCALCITONIN SERPL-MCNC: 35.24 NG/ML (ref 0–0.25)
RBC # BLD AUTO: 3.74 10*6/MM3 (ref 4.14–5.8)
SODIUM SERPL-SCNC: 131 MMOL/L (ref 136–145)
STRESS TARGET HR: 122 BPM
TSH SERPL DL<=0.05 MIU/L-ACNC: 8.88 UIU/ML (ref 0.27–4.2)
WBC NRBC COR # BLD: 12.29 10*3/MM3 (ref 3.4–10.8)

## 2022-03-29 PROCEDURE — 94660 CPAP INITIATION&MGMT: CPT

## 2022-03-29 PROCEDURE — 25010000002 CEFTRIAXONE PER 250 MG: Performed by: FAMILY MEDICINE

## 2022-03-29 PROCEDURE — 25010000002 ONDANSETRON PER 1 MG: Performed by: FAMILY MEDICINE

## 2022-03-29 PROCEDURE — 83605 ASSAY OF LACTIC ACID: CPT | Performed by: FAMILY MEDICINE

## 2022-03-29 PROCEDURE — 25010000002 MAGNESIUM SULFATE IN D5W 1G/100ML (PREMIX) 1-5 GM/100ML-% SOLUTION: Performed by: INTERNAL MEDICINE

## 2022-03-29 PROCEDURE — 97116 GAIT TRAINING THERAPY: CPT

## 2022-03-29 PROCEDURE — 80048 BASIC METABOLIC PNL TOTAL CA: CPT | Performed by: FAMILY MEDICINE

## 2022-03-29 PROCEDURE — 25010000002 HEPARIN (PORCINE) PER 1000 UNITS: Performed by: FAMILY MEDICINE

## 2022-03-29 PROCEDURE — 83735 ASSAY OF MAGNESIUM: CPT | Performed by: FAMILY MEDICINE

## 2022-03-29 PROCEDURE — 84100 ASSAY OF PHOSPHORUS: CPT | Performed by: FAMILY MEDICINE

## 2022-03-29 PROCEDURE — 94799 UNLISTED PULMONARY SVC/PX: CPT

## 2022-03-29 PROCEDURE — 85025 COMPLETE CBC W/AUTO DIFF WBC: CPT | Performed by: FAMILY MEDICINE

## 2022-03-29 PROCEDURE — 84443 ASSAY THYROID STIM HORMONE: CPT | Performed by: FAMILY MEDICINE

## 2022-03-29 PROCEDURE — 97165 OT EVAL LOW COMPLEX 30 MIN: CPT

## 2022-03-29 PROCEDURE — 93306 TTE W/DOPPLER COMPLETE: CPT

## 2022-03-29 PROCEDURE — 92610 EVALUATE SWALLOWING FUNCTION: CPT

## 2022-03-29 PROCEDURE — 71045 X-RAY EXAM CHEST 1 VIEW: CPT

## 2022-03-29 PROCEDURE — 97161 PT EVAL LOW COMPLEX 20 MIN: CPT

## 2022-03-29 PROCEDURE — 84145 PROCALCITONIN (PCT): CPT | Performed by: FAMILY MEDICINE

## 2022-03-29 PROCEDURE — 99233 SBSQ HOSP IP/OBS HIGH 50: CPT | Performed by: INTERNAL MEDICINE

## 2022-03-29 RX ORDER — DOXEPIN HYDROCHLORIDE 10 MG/1
10 CAPSULE ORAL NIGHTLY
COMMUNITY

## 2022-03-29 RX ORDER — BUMETANIDE 2 MG/1
2 TABLET ORAL DAILY
COMMUNITY
End: 2022-04-05 | Stop reason: HOSPADM

## 2022-03-29 RX ORDER — MAGNESIUM SULFATE 1 G/100ML
1 INJECTION INTRAVENOUS AS NEEDED
Status: DISCONTINUED | OUTPATIENT
Start: 2022-03-29 | End: 2022-04-05 | Stop reason: HOSPADM

## 2022-03-29 RX ORDER — MAGNESIUM SULFATE HEPTAHYDRATE 40 MG/ML
4 INJECTION, SOLUTION INTRAVENOUS AS NEEDED
Status: DISCONTINUED | OUTPATIENT
Start: 2022-03-29 | End: 2022-04-05 | Stop reason: HOSPADM

## 2022-03-29 RX ORDER — BUMETANIDE 0.25 MG/ML
1 INJECTION INTRAMUSCULAR; INTRAVENOUS
Status: DISCONTINUED | OUTPATIENT
Start: 2022-03-29 | End: 2022-04-01

## 2022-03-29 RX ORDER — MAGNESIUM SULFATE HEPTAHYDRATE 40 MG/ML
2 INJECTION, SOLUTION INTRAVENOUS AS NEEDED
Status: DISCONTINUED | OUTPATIENT
Start: 2022-03-29 | End: 2022-04-05 | Stop reason: HOSPADM

## 2022-03-29 RX ADMIN — MAGNESIUM SULFATE HEPTAHYDRATE 1 G: 1 INJECTION, SOLUTION INTRAVENOUS at 14:16

## 2022-03-29 RX ADMIN — Medication 10 ML: at 20:56

## 2022-03-29 RX ADMIN — SODIUM CHLORIDE 1 G: 900 INJECTION INTRAVENOUS at 20:54

## 2022-03-29 RX ADMIN — DOXYCYCLINE 100 MG: 100 INJECTION, POWDER, LYOPHILIZED, FOR SOLUTION INTRAVENOUS at 20:55

## 2022-03-29 RX ADMIN — DOXYCYCLINE 100 MG: 100 INJECTION, POWDER, LYOPHILIZED, FOR SOLUTION INTRAVENOUS at 10:27

## 2022-03-29 RX ADMIN — HEPARIN SODIUM 5000 UNITS: 5000 INJECTION, SOLUTION INTRAVENOUS; SUBCUTANEOUS at 08:31

## 2022-03-29 RX ADMIN — CARVEDILOL 3.12 MG: 3.12 TABLET, FILM COATED ORAL at 08:32

## 2022-03-29 RX ADMIN — Medication 1000 MCG: at 08:32

## 2022-03-29 RX ADMIN — HEPARIN SODIUM 5000 UNITS: 5000 INJECTION, SOLUTION INTRAVENOUS; SUBCUTANEOUS at 20:55

## 2022-03-29 RX ADMIN — TAMSULOSIN HYDROCHLORIDE 0.4 MG: 0.4 CAPSULE ORAL at 08:32

## 2022-03-29 RX ADMIN — EMPAGLIFLOZIN 10 MG: 10 TABLET, FILM COATED ORAL at 10:50

## 2022-03-29 RX ADMIN — BUMETANIDE 1 MG: 0.25 INJECTION, SOLUTION INTRAMUSCULAR; INTRAVENOUS at 17:26

## 2022-03-29 RX ADMIN — BUMETANIDE 1 MG: 0.25 INJECTION, SOLUTION INTRAMUSCULAR; INTRAVENOUS at 10:27

## 2022-03-29 RX ADMIN — MAGNESIUM SULFATE HEPTAHYDRATE 1 G: 1 INJECTION, SOLUTION INTRAVENOUS at 17:26

## 2022-03-29 RX ADMIN — LEVOTHYROXINE SODIUM 50 MCG: 50 TABLET ORAL at 05:38

## 2022-03-29 RX ADMIN — CARVEDILOL 3.12 MG: 3.12 TABLET, FILM COATED ORAL at 17:26

## 2022-03-29 RX ADMIN — MAGNESIUM SULFATE HEPTAHYDRATE 1 G: 1 INJECTION, SOLUTION INTRAVENOUS at 12:42

## 2022-03-29 RX ADMIN — Medication 10 ML: at 08:33

## 2022-03-29 RX ADMIN — ALLOPURINOL 100 MG: 100 TABLET ORAL at 08:32

## 2022-03-29 RX ADMIN — ATORVASTATIN CALCIUM 20 MG: 20 TABLET, FILM COATED ORAL at 08:32

## 2022-03-29 RX ADMIN — ONDANSETRON 4 MG: 2 INJECTION INTRAMUSCULAR; INTRAVENOUS at 06:57

## 2022-03-30 ENCOUNTER — APPOINTMENT (OUTPATIENT)
Dept: GENERAL RADIOLOGY | Facility: HOSPITAL | Age: 77
End: 2022-03-30

## 2022-03-30 LAB
ANION GAP SERPL CALCULATED.3IONS-SCNC: 15 MMOL/L (ref 5–15)
BUN SERPL-MCNC: 33 MG/DL (ref 8–23)
BUN/CREAT SERPL: 14 (ref 7–25)
CALCIUM SPEC-SCNC: 8.2 MG/DL (ref 8.6–10.5)
CHLORIDE SERPL-SCNC: 94 MMOL/L (ref 98–107)
CO2 SERPL-SCNC: 22 MMOL/L (ref 22–29)
CREAT SERPL-MCNC: 2.36 MG/DL (ref 0.76–1.27)
EGFRCR SERPLBLD CKD-EPI 2021: 27.8 ML/MIN/1.73
GLUCOSE SERPL-MCNC: 104 MG/DL (ref 65–99)
POTASSIUM SERPL-SCNC: 3.8 MMOL/L (ref 3.5–5.2)
QT INTERVAL: 356 MS
QTC INTERVAL: 490 MS
SODIUM SERPL-SCNC: 131 MMOL/L (ref 136–145)
T4 FREE SERPL-MCNC: 1.45 NG/DL (ref 0.93–1.7)

## 2022-03-30 PROCEDURE — 92610 EVALUATE SWALLOWING FUNCTION: CPT

## 2022-03-30 PROCEDURE — 92611 MOTION FLUOROSCOPY/SWALLOW: CPT

## 2022-03-30 PROCEDURE — 84439 ASSAY OF FREE THYROXINE: CPT | Performed by: INTERNAL MEDICINE

## 2022-03-30 PROCEDURE — 25010000002 HEPARIN (PORCINE) PER 1000 UNITS: Performed by: FAMILY MEDICINE

## 2022-03-30 PROCEDURE — 94799 UNLISTED PULMONARY SVC/PX: CPT

## 2022-03-30 PROCEDURE — 71045 X-RAY EXAM CHEST 1 VIEW: CPT

## 2022-03-30 PROCEDURE — 74230 X-RAY XM SWLNG FUNCJ C+: CPT

## 2022-03-30 PROCEDURE — 25010000002 CEFTRIAXONE PER 250 MG: Performed by: FAMILY MEDICINE

## 2022-03-30 PROCEDURE — 99233 SBSQ HOSP IP/OBS HIGH 50: CPT | Performed by: INTERNAL MEDICINE

## 2022-03-30 PROCEDURE — 80048 BASIC METABOLIC PNL TOTAL CA: CPT | Performed by: INTERNAL MEDICINE

## 2022-03-30 RX ORDER — DOXEPIN HYDROCHLORIDE 10 MG/1
10 CAPSULE ORAL NIGHTLY PRN
Status: DISCONTINUED | OUTPATIENT
Start: 2022-03-30 | End: 2022-04-05 | Stop reason: HOSPADM

## 2022-03-30 RX ADMIN — CARVEDILOL 3.12 MG: 3.12 TABLET, FILM COATED ORAL at 19:20

## 2022-03-30 RX ADMIN — BARIUM SULFATE 20 ML: 400 PASTE ORAL at 14:10

## 2022-03-30 RX ADMIN — DOXYCYCLINE 100 MG: 100 INJECTION, POWDER, LYOPHILIZED, FOR SOLUTION INTRAVENOUS at 12:38

## 2022-03-30 RX ADMIN — HEPARIN SODIUM 5000 UNITS: 5000 INJECTION, SOLUTION INTRAVENOUS; SUBCUTANEOUS at 08:57

## 2022-03-30 RX ADMIN — HEPARIN SODIUM 5000 UNITS: 5000 INJECTION, SOLUTION INTRAVENOUS; SUBCUTANEOUS at 21:11

## 2022-03-30 RX ADMIN — ALLOPURINOL 100 MG: 100 TABLET ORAL at 08:58

## 2022-03-30 RX ADMIN — BUMETANIDE 1 MG: 0.25 INJECTION, SOLUTION INTRAMUSCULAR; INTRAVENOUS at 09:27

## 2022-03-30 RX ADMIN — BUMETANIDE 1 MG: 0.25 INJECTION, SOLUTION INTRAMUSCULAR; INTRAVENOUS at 19:20

## 2022-03-30 RX ADMIN — SODIUM CHLORIDE 1 G: 900 INJECTION INTRAVENOUS at 21:11

## 2022-03-30 RX ADMIN — Medication 1000 MCG: at 08:57

## 2022-03-30 RX ADMIN — TAMSULOSIN HYDROCHLORIDE 0.4 MG: 0.4 CAPSULE ORAL at 08:57

## 2022-03-30 RX ADMIN — LEVOTHYROXINE SODIUM 50 MCG: 50 TABLET ORAL at 07:26

## 2022-03-30 RX ADMIN — Medication 10 ML: at 21:13

## 2022-03-30 RX ADMIN — DOXEPIN HYDROCHLORIDE 10 MG: 10 CAPSULE ORAL at 22:58

## 2022-03-30 RX ADMIN — Medication 10 ML: at 08:58

## 2022-03-30 RX ADMIN — EMPAGLIFLOZIN 10 MG: 10 TABLET, FILM COATED ORAL at 09:27

## 2022-03-30 RX ADMIN — DOXYCYCLINE 100 MG: 100 INJECTION, POWDER, LYOPHILIZED, FOR SOLUTION INTRAVENOUS at 21:12

## 2022-03-30 RX ADMIN — CARVEDILOL 3.12 MG: 3.12 TABLET, FILM COATED ORAL at 08:58

## 2022-03-30 RX ADMIN — ASPIRIN 81 MG 81 MG: 81 TABLET ORAL at 08:58

## 2022-03-30 RX ADMIN — BARIUM SULFATE 100 ML: 0.81 POWDER, FOR SUSPENSION ORAL at 14:10

## 2022-03-30 RX ADMIN — ATORVASTATIN CALCIUM 20 MG: 20 TABLET, FILM COATED ORAL at 08:57

## 2022-03-31 PROCEDURE — 99232 SBSQ HOSP IP/OBS MODERATE 35: CPT | Performed by: INTERNAL MEDICINE

## 2022-03-31 PROCEDURE — 97535 SELF CARE MNGMENT TRAINING: CPT

## 2022-03-31 PROCEDURE — 25010000002 CEFTRIAXONE PER 250 MG: Performed by: FAMILY MEDICINE

## 2022-03-31 PROCEDURE — 25010000002 HEPARIN (PORCINE) PER 1000 UNITS: Performed by: FAMILY MEDICINE

## 2022-03-31 RX ADMIN — DOXEPIN HYDROCHLORIDE 10 MG: 10 CAPSULE ORAL at 22:23

## 2022-03-31 RX ADMIN — DOXYCYCLINE 100 MG: 100 INJECTION, POWDER, LYOPHILIZED, FOR SOLUTION INTRAVENOUS at 22:00

## 2022-03-31 RX ADMIN — ASPIRIN 81 MG 81 MG: 81 TABLET ORAL at 10:41

## 2022-03-31 RX ADMIN — TAMSULOSIN HYDROCHLORIDE 0.4 MG: 0.4 CAPSULE ORAL at 10:41

## 2022-03-31 RX ADMIN — DOXYCYCLINE 100 MG: 100 INJECTION, POWDER, LYOPHILIZED, FOR SOLUTION INTRAVENOUS at 10:59

## 2022-03-31 RX ADMIN — Medication 10 ML: at 10:42

## 2022-03-31 RX ADMIN — Medication 1000 MCG: at 10:41

## 2022-03-31 RX ADMIN — HEPARIN SODIUM 5000 UNITS: 5000 INJECTION, SOLUTION INTRAVENOUS; SUBCUTANEOUS at 22:00

## 2022-03-31 RX ADMIN — BUMETANIDE 1 MG: 0.25 INJECTION, SOLUTION INTRAMUSCULAR; INTRAVENOUS at 18:06

## 2022-03-31 RX ADMIN — BUMETANIDE 1 MG: 0.25 INJECTION, SOLUTION INTRAMUSCULAR; INTRAVENOUS at 10:59

## 2022-03-31 RX ADMIN — ATORVASTATIN CALCIUM 20 MG: 20 TABLET, FILM COATED ORAL at 10:41

## 2022-03-31 RX ADMIN — CARVEDILOL 3.12 MG: 3.12 TABLET, FILM COATED ORAL at 17:59

## 2022-03-31 RX ADMIN — CARVEDILOL 3.12 MG: 3.12 TABLET, FILM COATED ORAL at 10:41

## 2022-03-31 RX ADMIN — ALLOPURINOL 100 MG: 100 TABLET ORAL at 10:41

## 2022-03-31 RX ADMIN — HEPARIN SODIUM 5000 UNITS: 5000 INJECTION, SOLUTION INTRAVENOUS; SUBCUTANEOUS at 10:41

## 2022-03-31 RX ADMIN — LEVOTHYROXINE SODIUM 50 MCG: 50 TABLET ORAL at 06:19

## 2022-03-31 RX ADMIN — Medication 10 ML: at 22:01

## 2022-03-31 RX ADMIN — SODIUM CHLORIDE 1 G: 900 INJECTION INTRAVENOUS at 22:00

## 2022-03-31 RX ADMIN — EMPAGLIFLOZIN 10 MG: 10 TABLET, FILM COATED ORAL at 10:59

## 2022-04-01 ENCOUNTER — APPOINTMENT (OUTPATIENT)
Dept: GENERAL RADIOLOGY | Facility: HOSPITAL | Age: 77
End: 2022-04-01

## 2022-04-01 LAB
ANION GAP SERPL CALCULATED.3IONS-SCNC: 14 MMOL/L (ref 5–15)
BUN SERPL-MCNC: 34 MG/DL (ref 8–23)
BUN/CREAT SERPL: 13.1 (ref 7–25)
CALCIUM SPEC-SCNC: 8.3 MG/DL (ref 8.6–10.5)
CHLORIDE SERPL-SCNC: 95 MMOL/L (ref 98–107)
CO2 SERPL-SCNC: 22 MMOL/L (ref 22–29)
CREAT SERPL-MCNC: 2.6 MG/DL (ref 0.76–1.27)
EGFRCR SERPLBLD CKD-EPI 2021: 24.8 ML/MIN/1.73
GLUCOSE SERPL-MCNC: 87 MG/DL (ref 65–99)
POTASSIUM SERPL-SCNC: 3.2 MMOL/L (ref 3.5–5.2)
SODIUM SERPL-SCNC: 131 MMOL/L (ref 136–145)

## 2022-04-01 PROCEDURE — 25010000002 CEFTRIAXONE PER 250 MG: Performed by: FAMILY MEDICINE

## 2022-04-01 PROCEDURE — 25010000002 HEPARIN (PORCINE) PER 1000 UNITS: Performed by: FAMILY MEDICINE

## 2022-04-01 PROCEDURE — 80048 BASIC METABOLIC PNL TOTAL CA: CPT | Performed by: INTERNAL MEDICINE

## 2022-04-01 PROCEDURE — 71045 X-RAY EXAM CHEST 1 VIEW: CPT

## 2022-04-01 PROCEDURE — 99232 SBSQ HOSP IP/OBS MODERATE 35: CPT | Performed by: INTERNAL MEDICINE

## 2022-04-01 RX ORDER — POTASSIUM CHLORIDE 1.5 G/1.77G
20 POWDER, FOR SOLUTION ORAL ONCE
Status: COMPLETED | OUTPATIENT
Start: 2022-04-01 | End: 2022-04-01

## 2022-04-01 RX ORDER — FUROSEMIDE 40 MG/1
40 TABLET ORAL DAILY
Status: DISCONTINUED | OUTPATIENT
Start: 2022-04-01 | End: 2022-04-04

## 2022-04-01 RX ADMIN — BUMETANIDE 1 MG: 0.25 INJECTION, SOLUTION INTRAMUSCULAR; INTRAVENOUS at 08:46

## 2022-04-01 RX ADMIN — EMPAGLIFLOZIN 10 MG: 10 TABLET, FILM COATED ORAL at 08:45

## 2022-04-01 RX ADMIN — SODIUM CHLORIDE 1 G: 900 INJECTION INTRAVENOUS at 22:24

## 2022-04-01 RX ADMIN — POTASSIUM CHLORIDE 20 MEQ: 1.5 POWDER, FOR SOLUTION ORAL at 13:29

## 2022-04-01 RX ADMIN — HEPARIN SODIUM 5000 UNITS: 5000 INJECTION, SOLUTION INTRAVENOUS; SUBCUTANEOUS at 08:46

## 2022-04-01 RX ADMIN — DOXYCYCLINE 100 MG: 100 INJECTION, POWDER, LYOPHILIZED, FOR SOLUTION INTRAVENOUS at 22:24

## 2022-04-01 RX ADMIN — DOXEPIN HYDROCHLORIDE 10 MG: 10 CAPSULE ORAL at 22:22

## 2022-04-01 RX ADMIN — ATORVASTATIN CALCIUM 20 MG: 20 TABLET, FILM COATED ORAL at 08:45

## 2022-04-01 RX ADMIN — CARVEDILOL 3.12 MG: 3.12 TABLET, FILM COATED ORAL at 08:45

## 2022-04-01 RX ADMIN — LEVOTHYROXINE SODIUM 50 MCG: 50 TABLET ORAL at 05:28

## 2022-04-01 RX ADMIN — HEPARIN SODIUM 5000 UNITS: 5000 INJECTION, SOLUTION INTRAVENOUS; SUBCUTANEOUS at 22:24

## 2022-04-01 RX ADMIN — ASPIRIN 81 MG 81 MG: 81 TABLET ORAL at 08:45

## 2022-04-01 RX ADMIN — FUROSEMIDE 40 MG: 40 TABLET ORAL at 13:29

## 2022-04-01 RX ADMIN — TAMSULOSIN HYDROCHLORIDE 0.4 MG: 0.4 CAPSULE ORAL at 08:45

## 2022-04-01 RX ADMIN — DOXYCYCLINE 100 MG: 100 INJECTION, POWDER, LYOPHILIZED, FOR SOLUTION INTRAVENOUS at 09:42

## 2022-04-01 RX ADMIN — Medication 10 ML: at 22:23

## 2022-04-01 RX ADMIN — CARVEDILOL 3.12 MG: 3.12 TABLET, FILM COATED ORAL at 16:48

## 2022-04-01 RX ADMIN — Medication 1000 MCG: at 08:45

## 2022-04-01 RX ADMIN — Medication 10 ML: at 08:46

## 2022-04-01 RX ADMIN — ALLOPURINOL 100 MG: 100 TABLET ORAL at 08:45

## 2022-04-01 NOTE — CASE MANAGEMENT/SOCIAL WORK
Continued Stay Note  [facility]     Patient Name: Jet Floyd  MRN: [id number]  Today's Date: 4/1/2022    Admit Date: 3/28/2022     Discharge Plan     Row Name 04/01/22 1341       Plan    Plan update    Patient/Family in Agreement with Plan yes    Plan Comments Per cardiology note, patient may require home O2 at discharge.  Spoke with patient at bedside regarding discharge plan and advised that cardiology has noted he may need home oxygen, patient is not sure he wants to do that and wants to speak with his wife about it.  Per RN, patient room air O2 sat was 90-92% which would not qualify him for Oxygen to be covered by insurance.  CM following.  Patient plan is to discharge home with [facility] via car with his wife to transport.    Final Discharge Disposition Code 06 - home with home health care    Row Name 04/01/22 1337       Plan    Plan update    Patient/Family in Agreement with Plan yes    Final Discharge Disposition Code 06 - home with home health care    Row Name 04/01/22 1314       Plan    Plan update    Patient/Family in Agreement with Plan yes    Final Discharge Disposition Code 06 - home with home health care               Discharge Codes    No documentation.               Expected Discharge Date and Time     Expected Discharge Date Expected Discharge Time    Apr 2, 2022             Xiomara Goodwin, RN

## 2022-04-01 NOTE — PROGRESS NOTES
Spoke with spouse she is agreeable to [facility]. Verified PCP, Sofie CHILDRESS RN, [facility], Liaison

## 2022-04-01 NOTE — PROGRESS NOTES
[facility] Medicine Services  PROGRESS NOTE    Patient Name: Jet Floyd  : 1945  MRN: [id number]    Date of Admission: 3/28/2022  Primary Care Physician: Obi Soriano MD    Subjective   Subjective     CC:  F/u congestive heart failure    HPI:  Patient resting in bed. Feeling improved. Going to try to get out of bed today and sit in the chair. Oxygen requirements are improving. No other issues otherwise.    ROS:  Gen- No fevers, chills  CV- No chest pain, palpitations  Resp- + cough, dyspnea  GI- No N/V/D, abd pain    Objective   Objective     Vital Signs:   Temp:  [97.1 °F (36.2 °C)-98.3 °F (36.8 °C)] 98.2 °F (36.8 °C)  Heart Rate:  [69-77] 72  Resp:  [16-18] 16  BP: (110-121)/(59-74) 114/62  Flow (L/min):  [2] 2     Physical Exam:  Constitutional: No acute distress, awake, alert, chronically ill and frail appearing male  HENT: NCAT, mucous membranes moist  Respiratory: lungs are clear today, no wheezing, normal work of breathing on 3L  Cardiovascular: RRR, no murmurs, rubs, or gallops  Gastrointestinal: Positive bowel sounds, soft, nontender, nondistended  Musculoskeletal: No bilateral ankle edema, thin extremities  Psychiatric: Appropriate affect, cooperative  Neurologic: Oriented x 3, strength symmetric in all extremities, Cranial Nerves grossly intact to confrontation, speech clear  Skin: No rashes    Results Reviewed:  LAB RESULTS:      Lab 22  0510 22   WBC 12.29*  --  5.76   HEMOGLOBIN 12.2*  --  12.6*   HEMATOCRIT 36.0*  --  36.8*   PLATELETS 78*  --  95*   NEUTROS ABS 11.30*  --  4.95   IMMATURE GRANS (ABS) 0.07*  --  0.06*   LYMPHS ABS 0.30*  --  0.55*   MONOS ABS 0.60  --  0.13   EOS ABS 0.00  --  0.04   MCV 96.3  --  93.6   PROCALCITONIN 35.24* 0.52*  --    LACTATE 1.8 4.6* 5.7*         Lab 22  0610 22  0715 22  0510 22   SODIUM 131* 131* 131* 131*   POTASSIUM 3.2* 3.8 3.8 3.4*   CHLORIDE  95* 94* 92* 95*   CO2 22.0 22.0 20.0* 21.0*   ANION GAP 14.0 15.0 19.0* 15.0   BUN 34* 33* 29* 23   CREATININE 2.60* 2.36* 2.11* 1.82*   EGFR 24.8* 27.8* 31.8* 38.0*   GLUCOSE 87 104* 70 78   CALCIUM 8.3* 8.2* 8.1* 7.5*   MAGNESIUM  --   --  1.5*  --    PHOSPHORUS  --   --  3.9  --    TSH  --   --  8.880*  --          Lab 03/28/22 1953   TOTAL PROTEIN 5.8*   ALBUMIN 3.10*   GLOBULIN 2.7   ALT (SGPT) <5   AST (SGOT) 16   BILIRUBIN 2.0*   ALK PHOS 149*         Lab 03/28/22 1953   PROBNP 37,131.0*   TROPONIN T 0.018                 Lab 03/28/22 1930   PH, ARTERIAL 7.353   PCO2, ARTERIAL 40.2   PO2 ART 54.6*   FIO2 80   HCO3 ART 22.3   BASE EXCESS ART -3.0*   CARBOXYHEMOGLOBIN 0.8     Brief Urine Lab Results     None          Microbiology Results Abnormal     Procedure Component Value - Date/Time    Blood Culture - Blood, Hand, Right [[id number]]  (Normal) Collected: 03/28/22 2000    Lab Status: Preliminary result Specimen: Blood from Hand, Right Updated: 03/31/22 2103     Blood Culture No growth at 3 days    Blood Culture - Blood, Arm, Left [[id number]]  (Normal) Collected: 03/28/22 1950    Lab Status: Preliminary result Specimen: Blood from Arm, Left Updated: 03/31/22 2103     Blood Culture No growth at 3 days    COVID PRE-OP / PRE-PROCEDURE SCREENING ORDER (NO ISOLATION) - Swab, Nasopharynx [[id number]]  (Normal) Collected: 03/28/22 1914    Lab Status: Final result Specimen: Swab from Nasopharynx Updated: 03/28/22 1950    Narrative:      The following orders were created for panel order COVID PRE-OP / PRE-PROCEDURE SCREENING ORDER (NO ISOLATION) - Swab, Nasopharynx.  Procedure                               Abnormality         Status                     ---------                               -----------         ------                     COVID-19 and FLU A/B PCR...[[id number]]  Normal              Final result                 Please view results for these tests on the individual orders.    COVID-19 and FLU A/B PCR -  Swab, Nasopharynx [[id number]]  (Normal) Collected: 03/28/22 1914    Lab Status: Final result Specimen: Swab from Nasopharynx Updated: 03/28/22 1950     COVID19 Not Detected     Influenza A PCR Not Detected     Influenza B PCR Not Detected    Narrative:      Fact sheet for providers: https://www.fda.gov/media/379557/download    Fact sheet for patients: https://www.fda.gov/media/122996/download    Test performed by PCR.          FL Video Swallow With Speech Single Contrast    Result Date: 3/30/2022  EXAMINATION: FL VIDEO SWALLOW W SPEECH SINGLE-CONTRAST-  INDICATION: dysphagia; R06.03-Acute respiratory distress; R09.02-Hypoxemia; I50.9-Heart failure, unspecified; J18.9-Pneumonia, unspecified organism; N18.9-Chronic kidney disease, unspecified; R60.9-Edema, unspecified; R13.10-Dysphagia, unspecified  TECHNIQUE: 1 minute of fluoroscopic time was used for this exam. 7 associated fluoroscopic loops were saved. The patient was evaluated in the seated lateral position while taking a variety of consistencies of barium by mouth under the direction of speech pathology.  COMPARISON: NONE  FINDINGS: 1 minute of fluoroscopy provided for a modified barium swallow. Please see speech therapy report for full details and recommendations.       Impression: Fluoroscopy provided for a modified barium swallow. Please see speech therapy report for full details and recommendations.    This report was finalized on 3/30/2022 10:16 PM by Dr. Evens Oro MD.      XR Chest 1 View    Result Date: 4/1/2022  DATE OF EXAM: 4/1/2022 9:00 AM  PROCEDURE: XR CHEST 1 VW-  INDICATIONS: f/u CHF, pneumonia; R06.03-Acute respiratory distress; R09.02-Hypoxemia; I50.9-Heart failure, unspecified; J18.9-Pneumonia, unspecified organism; N18.9-Chronic kidney disease, unspecified; R60.9-Edema, unspecified; R13.10-Dysphagia, unspecified  COMPARISON: 3/30/2022  TECHNIQUE: Single radiographic AP view of the chest was obtained.  FINDINGS: Right-sided dual-lead ICD is  again noted. The heart remains enlarged and there is a persistent pulmonary edema pattern present, mildly improved from prior study. Bibasilar effusion and atelectasis appears similar as well. Upper lungs remain relatively clear. No pneumothorax is seen.      Impression: Persistent pattern of congestive heart failure, which appears mildly improved from 3/30/2022. Stable bibasilar atelectasis and effusion.  This report was finalized on 4/1/2022 10:12 AM by Dr. Evens Oro MD.        Results for orders placed during the hospital encounter of 03/28/22    Adult Transthoracic Echo Complete W/ Cont if Necessary Per Protocol    Interpretation Summary  · Left ventricular ejection fraction appears to be 31 - 35%. Left ventricular systolic function is moderately decreased.  · The following left ventricular wall segments are dyskinetic: apical septal, basal anteroseptal and mid anteroseptal.  · The right atrial cavity is dilated.  · Moderate tricuspid valve regurgitation is present.  · Estimated right ventricular systolic pressure from tricuspid regurgitation is normal (<35 mmHg).      I have reviewed the medications:  Scheduled Meds:allopurinol, 100 mg, Oral, Daily  aspirin, 81 mg, Oral, Daily  atorvastatin, 20 mg, Oral, Daily  carvedilol, 3.125 mg, Oral, BID With Meals  cefTRIAXone, 1 g, Intravenous, Q24H  doxycycline, 100 mg, Intravenous, Q12H  empagliflozin, 10 mg, Oral, Daily  furosemide, 40 mg, Oral, Daily  heparin (porcine), 5,000 Units, Subcutaneous, Q12H  levothyroxine, 50 mcg, Oral, Q AM  pharmacy consult - [place], , Does not apply, Daily  sodium chloride, 10 mL, Intravenous, Q12H  tamsulosin, 0.4 mg, Oral, Daily  vitamin B-12, 1,000 mcg, Oral, Daily      Continuous Infusions:   PRN Meds:.•  acetaminophen **OR** acetaminophen **OR** acetaminophen  •  doxepin  •  ipratropium-albuterol  •  magnesium sulfate **OR** magnesium sulfate in D5W 1g/100mL (PREMIX) **OR** magnesium sulfate  •  ondansetron **OR** ondansetron  •   sodium chloride  •  sodium chloride    Assessment/Plan   Assessment & Plan     Active Hospital Problems    Diagnosis  POA   • Acute respiratory failure (HCC) [J96.00]  Yes   • Chronic systolic heart failure (HCC) [I50.22]  Yes   • CKD (chronic kidney disease) stage 3, GFR 30-59 ml/min (HCC) [N18.30]  Yes   • History of alcoholism (HCC) [F10.21]  Not Applicable   • Pleural effusion [J90]  Yes   • Lactic acidosis [E87.2]  Yes   • Sepsis (HCC) [A41.9]  Yes   • CAP (community acquired pneumonia) [J18.9]  Unknown   • Essential hypertension [I10]  Yes   • Hyperlipidemia [E78.5]  Yes      Resolved Hospital Problems   No resolved problems to display.        Brief Hospital Course to date:  Jet Floyd is a 76 y.o. male with PMHx of Systolic CHF, HTN, CKD 3, history of BiV ICD 3/17/2020 by Dr Richard, DEBBIE who presents to the ED with CC of SOB.     Acute Hypoxic Respiratory Failure  A/C Systolic CHF (history of BiV ICD 2020, EF 20% )   Cardiac Ascites w/Pleural Effusion  Valvular Heart Disease (Mod-Severe Mitral valve regurg, Mod Tricuspid valve regurg)   -weaned off BIPAP, remains stable on 2L this morning  -Continue diuresis with bumex as tolerated. Renal function worsened slightly overnight, will monitor  -Strict I's/O's, daily weights  -repeat Echo showing left ventricular ejection fraction 31 - 35%.   -Dr. Richard following  - repeat CXR with ongoing pulmonary edema, small right pleural effusion, likely not large enough to drain  -CTA negative for PE    Multifocal PNA   Sepsis   -continue CTX/Doxy, Procal is 35  -Blood Cx x2 negative   -PNA may be due to vomiting episode and CT findings, SLP following, cleared for diet  -PRN DuoNebs     CKD 3 w/Elevated Creatinine  -appears baseline is around 2.4  -worsened slightly with diuresis, need to monitor closely  -Follows with [facility]    -repeat BMP in AM    Lactic Acidosis  -Secondary to above, now normal     Nausea, Vomiting, Diarrhea - improved  -Stomach fluid filled on CT  imaging  -Anti-emetics as needed  -tolerating diet     Normocytic Anemia  -H&H stable from prior  -Likely secondary to CKD      Hyponatremia  -Likely secondary to volume overload  -stable      Hypokalemia  - assess for need daily due to renal fxn     HTN  -Continue Coreg     HLD  -Continue Lipitor      BPH  -Continue Flomax      Hypothyroidism  -Continue Synthroid     DVT prophylaxis:  Medical DVT prophylaxis orders are present.     AM-PAC 6 Clicks Score (PT): 19 (03/30/22 2000)     Disposition: I expect the patient to be discharged TBD, will need to monitor renal function    CODE STATUS:   Code Status and Medical Interventions:   Ordered at: 03/28/22 3801     Level Of Support Discussed With:    Patient    Health Care Surrogate     Code Status (Patient has no pulse and is not breathing):    CPR (Attempt to Resuscitate)     Medical Interventions (Patient has pulse or is breathing):    Full Support       Radha Salazar, DO  04/01/22

## 2022-04-01 NOTE — PLAN OF CARE
Goal Outcome Evaluation:  Plan of Care Reviewed With: patient        Progress: improving  Outcome Evaluation: VSS. Patient A/Ox4. Continues on 2L NC overnight. Paced rhythm on telemetry. Patient rested well overnight. IV antibiotics continued. No acute events overnight. Continue POC.

## 2022-04-01 NOTE — PROGRESS NOTES
"                                 [facility] Progress Note      LOS: 4 days   Patient Care Team:  Obi Soriano MD as PCP - General  Obi Soriano MD as PCP - Family Medicine    Chief Complaint:    Chief Complaint   Patient presents with   • Shortness of Breath       Subjective     Interval History: Quiet night    Patient Complaints: No chest pain or dyspnea.  Appears comfortable in bed.  No nausea vomiting or abdominal pain      Review of Systems:   A 14 point review of systems was negative except as was stated in the HPI      Objective     Vital Sign Min/Max for last 24 hours  Temp  Min: 97.1 °F (36.2 °C)  Max: 98.3 °F (36.8 °C)   BP  Min: 110/72  Max: 121/68   Pulse  Min: 69  Max: 77   Resp  Min: 17  Max: 18   SpO2  Min: 94 %  Max: 99 %   Flow (L/min)  Min: 2  Max: 2   Weight  Min: 57.5 kg (126 lb 11.2 oz)  Max: 57.5 kg (126 lb 11.2 oz)     Flowsheet Rows    Flowsheet Row First Filed Value   Admission Height 175.3 cm (69\") Documented at 03/28/2022 1844   Admission Weight 58.5 kg (129 lb) Documented at 03/28/2022 1844          Physical Exam:  General Appearance: Alert, appears stated age and cooperative  Lungs: Few basilar Rales  Heart:: RRR   No Murmurs, Rubs or Gallops  Abdomen: Soft and nontender with adequate bowel sounds.  No organomegaly  Extremities: No cyanosis, clubbing or edema  Pulses: Pulses palpable and equal bilaterally  Skin: Warm and dry with no rash  Psych: Normal     Results Review:     I reviewed the patient's new clinical results.  Results from last 7 days   Lab Units 04/01/22  0610 03/30/22  0715 03/29/22  0510   SODIUM mmol/L 131* 131* 131*   POTASSIUM mmol/L 3.2* 3.8 3.8   CHLORIDE mmol/L 95* 94* 92*   CO2 mmol/L 22.0 22.0 20.0*   BUN mg/dL 34* 33* 29*   CREATININE mg/dL 2.60* 2.36* 2.11*   GLUCOSE mg/dL 87 104* 70   CALCIUM mg/dL 8.3* 8.2* 8.1*     Results from last 7 days   Lab Units 03/29/22  0510 03/28/22  1855   WBC 10*3/mm3 12.29* 5.76   HEMOGLOBIN " g/dL 12.2* 12.6*   HEMATOCRIT % 36.0* 36.8*   PLATELETS 10*3/mm3 78* 95*     Lab Results   Lab Value Date/Time    TROPONINT 0.018 03/28/2022 1953    TROPONINT <0.010 03/14/2020 2111    TROPONINT <0.010 02/15/2020 0721    TROPONINT <0.010 02/15/2020 0158    TROPONINT <0.010 02/14/2020 2013             Results from last 7 days   Lab Units 03/28/22  1930   PH, ARTERIAL pH units 7.353   PO2 ART mm Hg 54.6*   PCO2, ARTERIAL mm Hg 40.2   HCO3 ART mmol/L 22.3           Medication Review: yes  Current Facility-Administered Medications   Medication Dose Route Frequency Provider Last Rate Last Admin   • acetaminophen (TYLENOL) tablet 650 mg  650 mg Oral Q4H PRN Marily Russo DO        Or   • acetaminophen (TYLENOL) 160 MG/5ML solution 650 mg  650 mg Oral Q4H PRN Marily Russo DO        Or   • acetaminophen (TYLENOL) suppository 650 mg  650 mg Rectal Q4H PRN Marily Russo DO       • allopurinol (ZYLOPRIM) tablet 100 mg  100 mg Oral Daily Marily Russo DO   100 mg at 04/01/22 0845   • aspirin chewable tablet 81 mg  81 mg Oral Daily Marily Russo DO   81 mg at 04/01/22 0845   • atorvastatin (LIPITOR) tablet 20 mg  20 mg Oral Daily Marily Russo DO   20 mg at 04/01/22 0845   • bumetanide (BUMEX) injection 1 mg  1 mg Intravenous BID Mynor Richard MD   1 mg at 04/01/22 0846   • carvedilol (COREG) tablet 3.125 mg  3.125 mg Oral BID With Meals Marily Russo DO   3.125 mg at 04/01/22 0845   • cefTRIAXone (ROCEPHIN) 1 g/100 mL 0.9% NS (MBP)  1 g Intravenous Q24H Marily Russo DO   1 g at 03/31/22 2200   • doxepin (SINEquan) capsule 10 mg  10 mg Oral Nightly PRN Radha Salzaar DO   10 mg at 03/31/22 2223   • doxycycline (VIBRAMYCIN) 100 mg/100 mL 0.9% NS MBP  100 mg Intravenous Q12H Marily Russo DO   100 mg at 04/01/22 0942   • empagliflozin (JARDIANCE) tablet 10 mg  10 mg Oral Daily Mynor Richard MD   10 mg at 04/01/22 0845   • heparin (porcine) 5000 UNIT/ML  injection 5,000 Units  5,000 Units Subcutaneous Q12H Marily Russo, DO   5,000 Units at 04/01/22 0846   • ipratropium-albuterol (DUO-NEB) nebulizer solution 3 mL  3 mL Nebulization Q4H PRN Marily Russo, DO       • levothyroxine (SYNTHROID, LEVOTHROID) tablet 50 mcg  50 mcg Oral Q AM Marily Russo, DO   50 mcg at 04/01/22 0528   • magnesium sulfate 4 gram infusion - Mg less than or equal to 1mg/dL  4 g Intravenous PRN Radha Salazar, DO        Or   • magnesium sulfate 3 gram infusion (1gm x 3) - Mg 1.1 - 1.5 mg/dL  1 g Intravenous PRN Radha Salazar,  mL/hr at 03/29/22 1726 1 g at 03/29/22 1726    Or   • Magnesium Sulfate 2 gram infusion- Mg 1.6 - 1.9 mg/dL  2 g Intravenous PRN Radha Salazar, DO       • ondansetron (ZOFRAN) tablet 4 mg  4 mg Oral Q6H PRN Marily Russo DO        Or   • ondansetron (ZOFRAN) injection 4 mg  4 mg Intravenous Q6H PRN Marily Russo DO   4 mg at 03/29/22 0657   • Pharmacy Consult - MTM   Does not apply Daily [facility]   Given at 04/01/22 0846   • sodium chloride 0.9 % flush 10 mL  10 mL Intravenous PRN Juana Hamlin MD       • sodium chloride 0.9 % flush 10 mL  10 mL Intravenous Q12H Marily Russo, DO   10 mL at 04/01/22 0846   • sodium chloride 0.9 % flush 10 mL  10 mL Intravenous PRN Marily Russo DO       • tamsulosin (FLOMAX) 24 hr capsule 0.4 mg  0.4 mg Oral Daily Marily Russo DO   0.4 mg at 04/01/22 0845   • vitamin B-12 (CYANOCOBALAMIN) tablet 1,000 mcg  1,000 mcg Oral Daily Marily Russo DO   1,000 mcg at 04/01/22 0845         Sepsis (HCC)    CAP (community acquired pneumonia)    Essential hypertension    Hyperlipidemia    Lactic acidosis    History of alcoholism (HCC)    Pleural effusion    CKD (chronic kidney disease) stage 3, GFR 30-59 ml/min (HCC)    Chronic systolic heart failure (HCC)    Acute respiratory failure (HCC)        Impression      Chronic systolic heart failure  Chronic kidney  disease--worsening  Nonischemic cardiomyopathy  [place] biventricular pacemaker/ICD with reprogramming to multipoint pacing this admission  Hypertension  Hyperlipidemia  History EtOH abuse      Plan     Transition to oral diuretics  Monitor renal function  Blood pressure has chronically been too low for ARB/ACE/Entresto  I suspect he will need home oxygen for at least a short-term on discharge      Mynor Richard MD   04/01/22  10:50 EDT

## 2022-04-01 NOTE — CASE MANAGEMENT/SOCIAL WORK
Continued Stay Note  [facility]     Patient Name: Jet Floyd  MRN: [id number]  Today's Date: 4/1/2022    Admit Date: 3/28/2022     Discharge Plan     Row Name 04/01/22 1513       Plan    Plan update    Patient/Family in Agreement with Plan yes    Plan Comments Received a call from [facility] who has accepted patient in to service.  CM following.    Final Discharge Disposition Code 06 - home with home health care    Row Name 04/01/22 1341       Plan    Plan update    Patient/Family in Agreement with Plan yes    Plan Comments Per cardiology note, patient may require home O2 at discharge.  Spoke with patient at bedside regarding discharge plan and advised that cardiology has noted he may need home oxygen, patient is not sure he wants to do that and wants to speak with his wife about it.  Per RN, patient room air O2 sat was 90-92% which would not qualify him for Oxygen to be covered by insurance.  CM following.  Patient plan is to discharge home with [facility] via car with his wife to transport.    Final Discharge Disposition Code 06 - home with home health care    Row Name 04/01/22 1337       Plan    Plan update    Patient/Family in Agreement with Plan yes    Final Discharge Disposition Code 06 - home with home health care    Row Name 04/01/22 1314       Plan    Plan update    Patient/Family in Agreement with Plan yes    Final Discharge Disposition Code 06 - home with home health care               Discharge Codes    No documentation.               Expected Discharge Date and Time     Expected Discharge Date Expected Discharge Time    Apr 2, 2022             Xiomara Goodwin RN

## 2022-04-02 LAB
ANION GAP SERPL CALCULATED.3IONS-SCNC: 17 MMOL/L (ref 5–15)
BACTERIA SPEC AEROBE CULT: NORMAL
BACTERIA SPEC AEROBE CULT: NORMAL
BUN SERPL-MCNC: 33 MG/DL (ref 8–23)
BUN/CREAT SERPL: 13.1 (ref 7–25)
CALCIUM SPEC-SCNC: 8.4 MG/DL (ref 8.6–10.5)
CHLORIDE SERPL-SCNC: 96 MMOL/L (ref 98–107)
CO2 SERPL-SCNC: 20 MMOL/L (ref 22–29)
CREAT SERPL-MCNC: 2.51 MG/DL (ref 0.76–1.27)
DEPRECATED RDW RBC AUTO: 57.9 FL (ref 37–54)
EGFRCR SERPLBLD CKD-EPI 2021: 25.9 ML/MIN/1.73
ERYTHROCYTE [DISTWIDTH] IN BLOOD BY AUTOMATED COUNT: 17 % (ref 12.3–15.4)
GLUCOSE SERPL-MCNC: 83 MG/DL (ref 65–99)
HCT VFR BLD AUTO: 35.3 % (ref 37.5–51)
HGB BLD-MCNC: 12.2 G/DL (ref 13–17.7)
MCH RBC QN AUTO: 31.9 PG (ref 26.6–33)
MCHC RBC AUTO-ENTMCNC: 34.6 G/DL (ref 31.5–35.7)
MCV RBC AUTO: 92.2 FL (ref 79–97)
PLATELET # BLD AUTO: 79 10*3/MM3 (ref 140–450)
PMV BLD AUTO: 13 FL (ref 6–12)
POTASSIUM SERPL-SCNC: 3.4 MMOL/L (ref 3.5–5.2)
POTASSIUM SERPL-SCNC: 3.6 MMOL/L (ref 3.5–5.2)
RBC # BLD AUTO: 3.83 10*6/MM3 (ref 4.14–5.8)
SODIUM SERPL-SCNC: 133 MMOL/L (ref 136–145)
WBC NRBC COR # BLD: 4.45 10*3/MM3 (ref 3.4–10.8)

## 2022-04-02 PROCEDURE — 85027 COMPLETE CBC AUTOMATED: CPT

## 2022-04-02 PROCEDURE — 80048 BASIC METABOLIC PNL TOTAL CA: CPT | Performed by: INTERNAL MEDICINE

## 2022-04-02 PROCEDURE — 99232 SBSQ HOSP IP/OBS MODERATE 35: CPT | Performed by: INTERNAL MEDICINE

## 2022-04-02 PROCEDURE — 84132 ASSAY OF SERUM POTASSIUM: CPT | Performed by: INTERNAL MEDICINE

## 2022-04-02 PROCEDURE — 99232 SBSQ HOSP IP/OBS MODERATE 35: CPT | Performed by: NURSE PRACTITIONER

## 2022-04-02 PROCEDURE — 25010000002 HEPARIN (PORCINE) PER 1000 UNITS: Performed by: FAMILY MEDICINE

## 2022-04-02 PROCEDURE — 25010000002 CEFTRIAXONE PER 250 MG: Performed by: FAMILY MEDICINE

## 2022-04-02 RX ORDER — POTASSIUM CHLORIDE 1.5 G/1.77G
20 POWDER, FOR SOLUTION ORAL ONCE
Status: COMPLETED | OUTPATIENT
Start: 2022-04-02 | End: 2022-04-02

## 2022-04-02 RX ADMIN — FUROSEMIDE 40 MG: 40 TABLET ORAL at 09:02

## 2022-04-02 RX ADMIN — ALLOPURINOL 100 MG: 100 TABLET ORAL at 09:01

## 2022-04-02 RX ADMIN — POTASSIUM CHLORIDE 20 MEQ: 1.5 POWDER, FOR SOLUTION ORAL at 16:03

## 2022-04-02 RX ADMIN — LEVOTHYROXINE SODIUM 50 MCG: 50 TABLET ORAL at 06:24

## 2022-04-02 RX ADMIN — EMPAGLIFLOZIN 10 MG: 10 TABLET, FILM COATED ORAL at 09:02

## 2022-04-02 RX ADMIN — HEPARIN SODIUM 5000 UNITS: 5000 INJECTION, SOLUTION INTRAVENOUS; SUBCUTANEOUS at 09:02

## 2022-04-02 RX ADMIN — TAMSULOSIN HYDROCHLORIDE 0.4 MG: 0.4 CAPSULE ORAL at 09:02

## 2022-04-02 RX ADMIN — CARVEDILOL 3.12 MG: 3.12 TABLET, FILM COATED ORAL at 09:02

## 2022-04-02 RX ADMIN — HEPARIN SODIUM 5000 UNITS: 5000 INJECTION, SOLUTION INTRAVENOUS; SUBCUTANEOUS at 21:44

## 2022-04-02 RX ADMIN — Medication 1000 MCG: at 09:01

## 2022-04-02 RX ADMIN — CARVEDILOL 3.12 MG: 3.12 TABLET, FILM COATED ORAL at 17:36

## 2022-04-02 RX ADMIN — SODIUM CHLORIDE 1 G: 900 INJECTION INTRAVENOUS at 21:44

## 2022-04-02 RX ADMIN — ASPIRIN 81 MG 81 MG: 81 TABLET ORAL at 09:02

## 2022-04-02 RX ADMIN — DOXYCYCLINE 100 MG: 100 INJECTION, POWDER, LYOPHILIZED, FOR SOLUTION INTRAVENOUS at 09:01

## 2022-04-02 RX ADMIN — ATORVASTATIN CALCIUM 20 MG: 20 TABLET, FILM COATED ORAL at 09:02

## 2022-04-02 RX ADMIN — Medication 10 ML: at 21:49

## 2022-04-02 RX ADMIN — DOXEPIN HYDROCHLORIDE 10 MG: 10 CAPSULE ORAL at 21:49

## 2022-04-02 RX ADMIN — Medication 10 ML: at 09:03

## 2022-04-02 RX ADMIN — DOXYCYCLINE 100 MG: 100 INJECTION, POWDER, LYOPHILIZED, FOR SOLUTION INTRAVENOUS at 21:44

## 2022-04-02 NOTE — PLAN OF CARE
Goal Outcome Evaluation:  Plan of Care Reviewed With: patient        Progress: improving  Outcome Evaluation: VSS. Patient continues to be A/Ox4. On room air. Paced rhythm. No acute events overnight.

## 2022-04-02 NOTE — PROGRESS NOTES
[facility] Medicine Services  PROGRESS NOTE    Patient Name: Jet Floyd  : 1945  MRN: [id number]    Date of Admission: 3/28/2022  Primary Care Physician: Obi Soriano MD    Subjective   Subjective     CC:  F/u congestive heart failure    HPI:  Patient resting in bed. Has not had labs drawn yet. Breathing has significantly improved. Says he has not been out of bed since he has been here. No other issues otherwise. On room air.    ROS:  Gen- No fevers, chills  CV- No chest pain, palpitations  Resp- + cough, dyspnea  GI- No N/V/D, abd pain    Objective   Objective     Vital Signs:   Temp:  [97.9 °F (36.6 °C)-98.8 °F (37.1 °C)] 98.8 °F (37.1 °C)  Heart Rate:  [70-86] 78  Resp:  [18] 18  BP: (117-122)/(68-71) 119/68  Flow (L/min):  [2] 2     Physical Exam:  Constitutional: No acute distress, awake, alert, chronically ill and frail appearing male  HENT: NCAT, mucous membranes moist  Respiratory: lungs are clear today, no wheezing, normal work of breathing and has been weaned to room air with oxygen saturations around 92%  Cardiovascular: RRR, no murmurs, rubs, or gallops  Gastrointestinal: Positive bowel sounds, soft, nontender, nondistended  Musculoskeletal: No bilateral ankle edema, thin extremities  Psychiatric: Appropriate affect, cooperative  Neurologic: Oriented x 3, strength symmetric in all extremities, Cranial Nerves grossly intact to confrontation, speech clear  Skin: No rashes    Results Reviewed:  LAB RESULTS:      Lab 22  0510 22   WBC 12.29*  --  5.76   HEMOGLOBIN 12.2*  --  12.6*   HEMATOCRIT 36.0*  --  36.8*   PLATELETS 78*  --  95*   NEUTROS ABS 11.30*  --  4.95   IMMATURE GRANS (ABS) 0.07*  --  0.06*   LYMPHS ABS 0.30*  --  0.55*   MONOS ABS 0.60  --  0.13   EOS ABS 0.00  --  0.04   MCV 96.3  --  93.6   PROCALCITONIN 35.24* 0.52*  --    LACTATE 1.8 4.6* 5.7*         Lab 22  0610 22  0715 22  0510  03/28/22 1953   SODIUM 131* 131* 131* 131*   POTASSIUM 3.2* 3.8 3.8 3.4*   CHLORIDE 95* 94* 92* 95*   CO2 22.0 22.0 20.0* 21.0*   ANION GAP 14.0 15.0 19.0* 15.0   BUN 34* 33* 29* 23   CREATININE 2.60* 2.36* 2.11* 1.82*   EGFR 24.8* 27.8* 31.8* 38.0*   GLUCOSE 87 104* 70 78   CALCIUM 8.3* 8.2* 8.1* 7.5*   MAGNESIUM  --   --  1.5*  --    PHOSPHORUS  --   --  3.9  --    TSH  --   --  8.880*  --          Lab 03/28/22 1953   TOTAL PROTEIN 5.8*   ALBUMIN 3.10*   GLOBULIN 2.7   ALT (SGPT) <5   AST (SGOT) 16   BILIRUBIN 2.0*   ALK PHOS 149*         Lab 03/28/22 1953   PROBNP 37,131.0*   TROPONIN T 0.018                 Lab 03/28/22 1930   PH, ARTERIAL 7.353   PCO2, ARTERIAL 40.2   PO2 ART 54.6*   FIO2 80   HCO3 ART 22.3   BASE EXCESS ART -3.0*   CARBOXYHEMOGLOBIN 0.8     Brief Urine Lab Results     None          Microbiology Results Abnormal     Procedure Component Value - Date/Time    Blood Culture - Blood, Hand, Right [[id number]]  (Normal) Collected: 03/28/22 2000    Lab Status: Preliminary result Specimen: Blood from Hand, Right Updated: 04/01/22 2102     Blood Culture No growth at 4 days    Blood Culture - Blood, Arm, Left [[id number]]  (Normal) Collected: 03/28/22 1950    Lab Status: Preliminary result Specimen: Blood from Arm, Left Updated: 04/01/22 2102     Blood Culture No growth at 4 days    COVID PRE-OP / PRE-PROCEDURE SCREENING ORDER (NO ISOLATION) - Swab, Nasopharynx [[id number]]  (Normal) Collected: 03/28/22 1914    Lab Status: Final result Specimen: Swab from Nasopharynx Updated: 03/28/22 1950    Narrative:      The following orders were created for panel order COVID PRE-OP / PRE-PROCEDURE SCREENING ORDER (NO ISOLATION) - Swab, Nasopharynx.  Procedure                               Abnormality         Status                     ---------                               -----------         ------                     COVID-19 and FLU A/B PCR...[[id number]]  Normal              Final result                 Please  view results for these tests on the individual orders.    COVID-19 and FLU A/B PCR - Swab, Nasopharynx [[id number]]  (Normal) Collected: 03/28/22 1914    Lab Status: Final result Specimen: Swab from Nasopharynx Updated: 03/28/22 1950     COVID19 Not Detected     Influenza A PCR Not Detected     Influenza B PCR Not Detected    Narrative:      Fact sheet for providers: https://www.fda.gov/media/956664/download    Fact sheet for patients: https://www.fda.gov/media/958406/download    Test performed by PCR.          XR Chest 1 View    Result Date: 4/1/2022  DATE OF EXAM: 4/1/2022 9:00 AM  PROCEDURE: XR CHEST 1 VW-  INDICATIONS: f/u CHF, pneumonia; R06.03-Acute respiratory distress; R09.02-Hypoxemia; I50.9-Heart failure, unspecified; J18.9-Pneumonia, unspecified organism; N18.9-Chronic kidney disease, unspecified; R60.9-Edema, unspecified; R13.10-Dysphagia, unspecified  COMPARISON: 3/30/2022  TECHNIQUE: Single radiographic AP view of the chest was obtained.  FINDINGS: Right-sided dual-lead ICD is again noted. The heart remains enlarged and there is a persistent pulmonary edema pattern present, mildly improved from prior study. Bibasilar effusion and atelectasis appears similar as well. Upper lungs remain relatively clear. No pneumothorax is seen.      Impression: Persistent pattern of congestive heart failure, which appears mildly improved from 3/30/2022. Stable bibasilar atelectasis and effusion.  This report was finalized on 4/1/2022 10:12 AM by Dr. Evens Oro MD.        Results for orders placed during the hospital encounter of 03/28/22    Adult Transthoracic Echo Complete W/ Cont if Necessary Per Protocol    Interpretation Summary  · Left ventricular ejection fraction appears to be 31 - 35%. Left ventricular systolic function is moderately decreased.  · The following left ventricular wall segments are dyskinetic: apical septal, basal anteroseptal and mid anteroseptal.  · The right atrial cavity is dilated.  · Moderate  tricuspid valve regurgitation is present.  · Estimated right ventricular systolic pressure from tricuspid regurgitation is normal (<35 mmHg).      I have reviewed the medications:  Scheduled Meds:allopurinol, 100 mg, Oral, Daily  aspirin, 81 mg, Oral, Daily  atorvastatin, 20 mg, Oral, Daily  carvedilol, 3.125 mg, Oral, BID With Meals  cefTRIAXone, 1 g, Intravenous, Q24H  doxycycline, 100 mg, Intravenous, Q12H  empagliflozin, 10 mg, Oral, Daily  furosemide, 40 mg, Oral, Daily  heparin (porcine), 5,000 Units, Subcutaneous, Q12H  levothyroxine, 50 mcg, Oral, Q AM  pharmacy consult - [place], , Does not apply, Daily  sodium chloride, 10 mL, Intravenous, Q12H  tamsulosin, 0.4 mg, Oral, Daily  vitamin B-12, 1,000 mcg, Oral, Daily      Continuous Infusions:   PRN Meds:.•  acetaminophen **OR** acetaminophen **OR** acetaminophen  •  doxepin  •  ipratropium-albuterol  •  magnesium sulfate **OR** magnesium sulfate in D5W 1g/100mL (PREMIX) **OR** magnesium sulfate  •  ondansetron **OR** ondansetron  •  sodium chloride  •  sodium chloride    Assessment/Plan   Assessment & Plan     Active Hospital Problems    Diagnosis  POA   • Acute respiratory failure (HCC) [J96.00]  Yes   • Chronic systolic heart failure (HCC) [I50.22]  Yes   • CKD (chronic kidney disease) stage 3, GFR 30-59 ml/min (HCC) [N18.30]  Yes   • History of alcoholism (HCC) [F10.21]  Not Applicable   • Pleural effusion [J90]  Yes   • Lactic acidosis [E87.2]  Yes   • Sepsis (HCC) [A41.9]  Yes   • CAP (community acquired pneumonia) [J18.9]  Unknown   • Essential hypertension [I10]  Yes   • Hyperlipidemia [E78.5]  Yes      Resolved Hospital Problems   No resolved problems to display.        Brief Hospital Course to date:  Jet lFoyd is a 76 y.o. male with PMHx of Systolic CHF, HTN, CKD 3, history of BiV ICD 3/17/2020 by DEBBIE Conteh who presents to the ED with CC of SOB.     Acute Hypoxic Respiratory Failure  A/C Systolic CHF (history of BiV ICD 2020, EF 20% )    Cardiac Ascites w/Pleural Effusion  Valvular Heart Disease (Mod-Severe Mitral valve regurg, Mod Tricuspid valve regurg)   -weaned off BIPAP and now to room air this morning.  -s/p aggressive diuresis with IV bumex, now back on PO lasix, Strict I's/O's, daily weights  -repeat Echo showing left ventricular ejection fraction 31 - 35%.   -repeat CXR yesterday with improvement.   - Cardiology following  -CTA negative for PE    Multifocal PNA   Sepsis   -continue CTX/Doxy, Procal is 35  -Blood Cx x2 negative   -PNA may be due to vomiting episode and CT findings, SLP following, cleared for diet  -PRN DuoNebs     CKD 3 w/Elevated Creatinine  -appears baseline is around 2.4, awaiting AM labs, creatinine was trending up yesterday.  -Follows with NAL      Lactic Acidosis  -Secondary to above, now normal     Nausea, Vomiting, Diarrhea - improved  -Stomach fluid filled on CT imaging  -Anti-emetics as needed  -tolerating diet     Normocytic Anemia  -H&H stable from prior  -Likely secondary to CKD      Hyponatremia  -Likely secondary to volume overload  -stable      Hypokalemia  - assess for need daily due to renal fxn     HTN  -Continue Coreg     HLD  -Continue Lipitor      BPH  -Continue Flomax      Hypothyroidism  -Continue Synthroid     DVT prophylaxis:  Medical DVT prophylaxis orders are present.     AM-PAC 6 Clicks Score (PT): 19 (03/30/22 2000)     Disposition: I expect the patient to be discharged  1-2 days, awaiting renal fxn, if improved may be able to go home today.    CODE STATUS:   Code Status and Medical Interventions:   Ordered at: 03/28/22 6212     Level Of Support Discussed With:    Patient    Health Care Surrogate     Code Status (Patient has no pulse and is not breathing):    CPR (Attempt to Resuscitate)     Medical Interventions (Patient has pulse or is breathing):    Full Support       Radha Salazar, DO  04/02/22

## 2022-04-02 NOTE — PROGRESS NOTES
"  [facility] at [facility]   Inpatient Progress Note       LOS: 5 days   Patient Care Team:  Obi Soriano MD as PCP - General  Obi Soriano MD as PCP - Family Medicine    Chief Complaint:  Follow-up for dyspnea and acute on chronic CHF    Subjective     Interval History:   Patient in bed. No CV complaints. Wants to go home today. Labs have not yet been drawn. Per RN lab staff has been contacted to draw labs. Currently on RA      Review of Systems:   Pertinent positives noted in history, exam, and assessment. Otherwise reviewed and negative.      Objective     Vitals:  Blood pressure 119/68, pulse 86, temperature 98.8 °F (37.1 °C), temperature source Oral, resp. rate 18, height 175.3 cm (69\"), weight 58.5 kg (128 lb 14.4 oz), SpO2 93 %.     Intake/Output Summary (Last 24 hours) at 4/2/2022 0850  Last data filed at 4/2/2022 0625  Gross per 24 hour   Intake --   Output 550 ml   Net -550 ml     Vitals reviewed.   Constitutional:       Appearance: Well-developed.   Neck:      Vascular: No JVD.      Trachea: No tracheal deviation.   Pulmonary:      Effort: Pulmonary effort is normal.      Comments: Decreased bases  Cardiovascular:      Normal rate. Regular rhythm.   Pulses:     Intact distal pulses.   Edema:     Peripheral edema absent.   Abdominal:      General: Bowel sounds are normal.      Tenderness: There is no abdominal tenderness.   Musculoskeletal:         General: No deformity. Neurological:      Mental Status: Alert and oriented to person, place, and time.            Results Review:     I reviewed the patient's new clinical results.    Results from last 7 days   Lab Units 03/29/22  0510   WBC 10*3/mm3 12.29*   HEMOGLOBIN g/dL 12.2*   HEMATOCRIT % 36.0*   PLATELETS 10*3/mm3 78*     Results from last 7 days   Lab Units 04/01/22  0610 03/29/22  0510 03/28/22 1953   SODIUM mmol/L 131*   < > 131*   POTASSIUM mmol/L 3.2*   < > 3.4*   CHLORIDE mmol/L 95*   < > 95*   CO2 mmol/L 22.0  "  < > 21.0*   BUN mg/dL 34*   < > 23   CREATININE mg/dL 2.60*   < > 1.82*   CALCIUM mg/dL 8.3*   < > 7.5*   BILIRUBIN mg/dL  --   --  2.0*   ALK PHOS U/L  --   --  149*   ALT (SGPT) U/L  --   --  <5   AST (SGOT) U/L  --   --  16   GLUCOSE mg/dL 87   < > 78    < > = values in this interval not displayed.     Results from last 7 days   Lab Units 04/01/22  0610   SODIUM mmol/L 131*   POTASSIUM mmol/L 3.2*   CHLORIDE mmol/L 95*   CO2 mmol/L 22.0   BUN mg/dL 34*   CREATININE mg/dL 2.60*   GLUCOSE mg/dL 87   CALCIUM mg/dL 8.3*         No results found for: TROPONINI  Results from last 7 days   Lab Units 03/30/22  0715 03/29/22  0510   TSH uIU/mL  --  8.880*   FREE T4 ng/dL 1.45  --          Results from last 7 days   Lab Units 03/28/22  1953   PROBNP pg/mL 37,131.0*     ECHO:  · Left ventricular ejection fraction appears to be 31 - 35%. Left ventricular systolic function is moderately decreased.  · The following left ventricular wall segments are dyskinetic: apical septal, basal anteroseptal and mid anteroseptal.  · The right atrial cavity is dilated.  · Moderate tricuspid valve regurgitation is present.  · Estimated right ventricular systolic pressure from tricuspid regurgitation is normal (<35 mmHg).        Tele:  SR    Assessment/Plan       Sepsis (HCC)    CAP (community acquired pneumonia)    Essential hypertension    Hyperlipidemia    Lactic acidosis    History of alcoholism (HCC)    Pleural effusion    CKD (chronic kidney disease) stage 3, GFR 30-59 ml/min (HCC)    Chronic systolic heart failure (HCC)    Acute respiratory failure (HCC)      1. Acute on chronic systolic CHF  1. S/p previous BiV ICD  2. CAP, per attending service  1. Multifocal Pneumonia by CT scan 3/28  3. CKD  1. Renal function worsening since admission.  2. Labs for today pending  3. Appears to be near baseline for patient  4. Follows with NAL.   4. Dyslipidemia     Plan:  · Awaiting labs.  · Continue current CV regimen  · Overall stable from CV  standpoint.     FIONA Galicia   Dictated utilizing Dragon dictation

## 2022-04-03 LAB
ANION GAP SERPL CALCULATED.3IONS-SCNC: 16 MMOL/L (ref 5–15)
BUN SERPL-MCNC: 34 MG/DL (ref 8–23)
BUN/CREAT SERPL: 13.2 (ref 7–25)
CALCIUM SPEC-SCNC: 8.3 MG/DL (ref 8.6–10.5)
CHLORIDE SERPL-SCNC: 97 MMOL/L (ref 98–107)
CO2 SERPL-SCNC: 21 MMOL/L (ref 22–29)
CREAT SERPL-MCNC: 2.57 MG/DL (ref 0.76–1.27)
EGFRCR SERPLBLD CKD-EPI 2021: 25.1 ML/MIN/1.73
GLUCOSE SERPL-MCNC: 74 MG/DL (ref 65–99)
POTASSIUM SERPL-SCNC: 3.5 MMOL/L (ref 3.5–5.2)
SODIUM SERPL-SCNC: 134 MMOL/L (ref 136–145)

## 2022-04-03 PROCEDURE — 25010000002 HEPARIN (PORCINE) PER 1000 UNITS: Performed by: FAMILY MEDICINE

## 2022-04-03 PROCEDURE — 97116 GAIT TRAINING THERAPY: CPT

## 2022-04-03 PROCEDURE — 99232 SBSQ HOSP IP/OBS MODERATE 35: CPT | Performed by: FAMILY MEDICINE

## 2022-04-03 PROCEDURE — 97164 PT RE-EVAL EST PLAN CARE: CPT

## 2022-04-03 PROCEDURE — 97530 THERAPEUTIC ACTIVITIES: CPT

## 2022-04-03 PROCEDURE — 80048 BASIC METABOLIC PNL TOTAL CA: CPT | Performed by: INTERNAL MEDICINE

## 2022-04-03 RX ORDER — DOXYCYCLINE 100 MG/1
100 CAPSULE ORAL EVERY 12 HOURS SCHEDULED
Status: COMPLETED | OUTPATIENT
Start: 2022-04-03 | End: 2022-04-04

## 2022-04-03 RX ADMIN — HEPARIN SODIUM 5000 UNITS: 5000 INJECTION, SOLUTION INTRAVENOUS; SUBCUTANEOUS at 08:08

## 2022-04-03 RX ADMIN — CARVEDILOL 3.12 MG: 3.12 TABLET, FILM COATED ORAL at 18:30

## 2022-04-03 RX ADMIN — CARVEDILOL 3.12 MG: 3.12 TABLET, FILM COATED ORAL at 08:08

## 2022-04-03 RX ADMIN — Medication 10 ML: at 22:14

## 2022-04-03 RX ADMIN — DOXEPIN HYDROCHLORIDE 10 MG: 10 CAPSULE ORAL at 22:22

## 2022-04-03 RX ADMIN — DOXYCYCLINE 100 MG: 100 CAPSULE ORAL at 15:00

## 2022-04-03 RX ADMIN — FUROSEMIDE 40 MG: 40 TABLET ORAL at 08:08

## 2022-04-03 RX ADMIN — EMPAGLIFLOZIN 10 MG: 10 TABLET, FILM COATED ORAL at 14:59

## 2022-04-03 RX ADMIN — ALLOPURINOL 100 MG: 100 TABLET ORAL at 08:08

## 2022-04-03 RX ADMIN — ATORVASTATIN CALCIUM 20 MG: 20 TABLET, FILM COATED ORAL at 08:08

## 2022-04-03 RX ADMIN — ACETAMINOPHEN 650 MG: 325 TABLET ORAL at 08:08

## 2022-04-03 RX ADMIN — Medication 1000 MCG: at 08:08

## 2022-04-03 RX ADMIN — HEPARIN SODIUM 5000 UNITS: 5000 INJECTION, SOLUTION INTRAVENOUS; SUBCUTANEOUS at 22:13

## 2022-04-03 RX ADMIN — Medication 10 ML: at 08:09

## 2022-04-03 RX ADMIN — TAMSULOSIN HYDROCHLORIDE 0.4 MG: 0.4 CAPSULE ORAL at 08:08

## 2022-04-03 RX ADMIN — LEVOTHYROXINE SODIUM 50 MCG: 50 TABLET ORAL at 06:29

## 2022-04-03 RX ADMIN — DOXYCYCLINE 100 MG: 100 CAPSULE ORAL at 22:13

## 2022-04-03 RX ADMIN — ASPIRIN 81 MG 81 MG: 81 TABLET ORAL at 08:08

## 2022-04-03 NOTE — PLAN OF CARE
Goal Outcome Evaluation:  Plan of Care Reviewed With: patient        Progress: improving  Outcome Evaluation: VSS. Patient A/Ox4. On room air while awake. Placed on 2L NC while asleep to maintain O2 sats. Paced rhythm on telemetry. Patient rested well overnight. No acute events.

## 2022-04-03 NOTE — PROGRESS NOTES
"  [facility] at [facility]   Inpatient Progress Note       LOS: 6 days   Patient Care Team:  Obi Soriano MD as PCP - General  Obi Soriano MD as PCP - Family Medicine    Chief Complaint:  Follow-up for dyspnea and acute on chronic CHF    Subjective     Interval History:   Patient in bed. No CV complaints. Renal function overall stable.       Review of Systems:   Pertinent positives noted in history, exam, and assessment. Otherwise reviewed and negative.      Objective     Vitals:  Blood pressure 126/82, pulse 79, temperature 97.7 °F (36.5 °C), temperature source Oral, resp. rate 18, height 175.3 cm (69\"), weight 54.9 kg (121 lb), SpO2 92 %.     Intake/Output Summary (Last 24 hours) at 4/3/2022 0892  Last data filed at 4/3/2022 0630  Gross per 24 hour   Intake --   Output 1300 ml   Net -1300 ml     Vitals reviewed.   Constitutional:       Appearance: Well-developed.   Neck:      Vascular: No JVD.      Trachea: No tracheal deviation.   Pulmonary:      Effort: Pulmonary effort is normal.      Comments: Decreased bases  Cardiovascular:      Normal rate. Regular rhythm.   Pulses:     Intact distal pulses.   Edema:     Peripheral edema absent.   Abdominal:      General: Bowel sounds are normal.      Tenderness: There is no abdominal tenderness.   Musculoskeletal:         General: No deformity. Neurological:      Mental Status: Alert and oriented to person, place, and time.       Examined and unchanged.      Results Review:     I reviewed the patient's new clinical results.    Results from last 7 days   Lab Units 04/02/22  1201   WBC 10*3/mm3 4.45   HEMOGLOBIN g/dL 12.2*   HEMATOCRIT % 35.3*   PLATELETS 10*3/mm3 79*     Results from last 7 days   Lab Units 04/03/22  0713 03/29/22  0510 03/28/22  1953   SODIUM mmol/L 134*   < > 131*   POTASSIUM mmol/L 3.5   < > 3.4*   CHLORIDE mmol/L 97*   < > 95*   CO2 mmol/L 21.0*   < > 21.0*   BUN mg/dL 34*   < > 23   CREATININE mg/dL 2.57*   < > " 1.82*   CALCIUM mg/dL 8.3*   < > 7.5*   BILIRUBIN mg/dL  --   --  2.0*   ALK PHOS U/L  --   --  149*   ALT (SGPT) U/L  --   --  <5   AST (SGOT) U/L  --   --  16   GLUCOSE mg/dL 74   < > 78    < > = values in this interval not displayed.     Results from last 7 days   Lab Units 04/03/22  0713   SODIUM mmol/L 134*   POTASSIUM mmol/L 3.5   CHLORIDE mmol/L 97*   CO2 mmol/L 21.0*   BUN mg/dL 34*   CREATININE mg/dL 2.57*   GLUCOSE mg/dL 74   CALCIUM mg/dL 8.3*         No results found for: TROPONINI  Results from last 7 days   Lab Units 03/30/22  0715 03/29/22  0510   TSH uIU/mL  --  8.880*   FREE T4 ng/dL 1.45  --          Results from last 7 days   Lab Units 03/28/22  1953   PROBNP pg/mL 37,131.0*     ECHO:  · Left ventricular ejection fraction appears to be 31 - 35%. Left ventricular systolic function is moderately decreased.  · The following left ventricular wall segments are dyskinetic: apical septal, basal anteroseptal and mid anteroseptal.  · The right atrial cavity is dilated.  · Moderate tricuspid valve regurgitation is present.  · Estimated right ventricular systolic pressure from tricuspid regurgitation is normal (<35 mmHg).        Tele:  SR    Assessment/Plan       Sepsis (HCC)    CAP (community acquired pneumonia)    Essential hypertension    Hyperlipidemia    Lactic acidosis    History of alcoholism (HCC)    Pleural effusion    CKD (chronic kidney disease) stage 3, GFR 30-59 ml/min (HCC)    Chronic systolic heart failure (HCC)    Acute respiratory failure (HCC)      1. Acute on chronic systolic CHF  1. S/p previous BiV ICD  2. CAP, per attending service  1. Multifocal Pneumonia by CT scan 3/28  3. CKD  1. Renal function today overall stable  2. Appears to be near baseline for patient  3. Follows with NAL.   4. Dyslipidemia     Plan:    · Continue current CV regimen  · Overall stable from CV standpoint.       FIONA Galicia   Dictated utilizing Dragon dictation

## 2022-04-03 NOTE — THERAPY RE-EVALUATION
Patient Name: Jet Floyd  : 1945    MRN: [id number]                              Today's Date: 4/3/2022       Admit Date: 3/28/2022    Visit Dx:     ICD-10-CM ICD-9-CM   1. Acute respiratory distress  R06.03 518.82   2. Hypoxia  R09.02 799.02   3. Acute on chronic congestive heart failure, unspecified heart failure type (Formerly [facility])  I50.9 428.0   4. Multifocal pneumonia  J18.9 486   5. Chronic renal failure, unspecified CKD stage  N18.9 585.9   6. Peripheral edema  R60.9 782.3   7. Dysphagia, unspecified type  R13.10 787.20   8. Acute respiratory failure with hypoxia (Formerly [facility])  J96.01 518.81   9. Chronic systolic heart failure (Formerly [facility])  I50.22 428.22   10. Severe mitral valve regurgitation  I34.0 424.0   11. Balance problem  R26.89 781.99     Patient Active Problem List   Diagnosis   • Sepsis (Formerly [facility])   • CAP (community acquired pneumonia)   • Essential hypertension   • Hyperlipidemia   • Alcohol abuse, daily use   • Former smoker   • Dermatitis   • Balance problem   • Hypoglycemia   • Metabolic encephalopathy   • Lactic acidosis   • History of alcoholism (Formerly [facility])   • Hyperglycemia   • Pleural effusion   • CKD (chronic kidney disease) stage 3, GFR 30-59 ml/min (Formerly [facility])   • Cardiorenal syndrome   • Severe mitral valve regurgitation   • Chronic systolic heart failure (Formerly [facility])   • Spondylosis of lumbar region without myelopathy or radiculopathy   • DDD (degenerative disc disease), lumbar   • Osteoarthritis of right hip   • Polyneuropathy   • History of compression fracture of vertebral column   • Acute respiratory failure (Formerly [facility])     Past Medical History:   Diagnosis Date   • Cancer (Formerly [facility])    • CHF (congestive heart failure) (Formerly [facility])    • High blood pressure    • Hypertension    • Kidney disease    • Kidney disease    • Pneumonia      Past Surgical History:   Procedure Laterality Date   • CARDIAC DEFIBRILLATOR PLACEMENT Bilateral    • CARDIAC DEFIBRILLATOR REMOVAL Bilateral    • CARDIAC ELECTROPHYSIOLOGY PROCEDURE N/A  3/17/2020    Procedure: BIV ICD 72131;  Surgeon: Mynor Richard MD;  Location: [facility] INVASIVE LOCATION;  Service: Cardiology;  Laterality: N/A;   • CATARACT EXTRACTION Bilateral    • CERVICAL SPINE SURGERY Bilateral 2014      General Information     Row Name 04/03/22 1441          Physical Therapy Time and Intention    Document Type re-evaluation  -     Mode of Treatment physical therapy  -     Row Name 04/03/22 1441          General Information    Patient Profile Reviewed yes  -LS     Prior Level of Function --  see IE  -LS     Existing Precautions/Restrictions fall;oxygen therapy device and L/min  -     Barriers to Rehab medically complex  -     Row Name 04/03/22 1441          Living Environment    People in Home spouse  -     Row Name 04/03/22 1441          Home Main Entrance    Number of Stairs, Main Entrance one  -     Row Name 04/03/22 1441          Stairs Within Home, Primary    Stairs, Within Home, Primary 15 steps to get to main level of home (split level with landing after initial 7 steps)  -     Row Name 04/03/22 1441          Cognition    Orientation Status (Cognition) oriented x 4  -     Row Name 04/03/22 1441          Safety Issues, Functional Mobility    Safety Issues Affecting Function (Mobility) awareness of need for assistance;safety precaution awareness  -     Impairments Affecting Function (Mobility) balance;coordination;endurance/activity tolerance;motor planning;postural/trunk control;shortness of breath;strength  -           User Key  (r) = Recorded By, (t) = Taken By, (c) = Cosigned By    Initials Name Provider Type     Keyla Kaufman, PT Physical Therapist               Mobility     Row Name 04/03/22 1443          Bed Mobility    Supine-Sit [place] (Bed Mobility) supervision;verbal cues  -     Row Name 04/03/22 1443          Transfers    Comment, (Transfers) STS x3: x1 from EOB and x2 from w/c. Cues for hand placement and sequencing.  -     Row Name  04/03/22 1443          Sit-Stand Transfer    Sit-Stand [place] (Transfers) minimum assist (75% patient effort);verbal cues  -LS     Assistive Device (Sit-Stand Transfers) walker, front-wheeled  -LS     Row Name 04/03/22 1443          Gait/Stairs (Locomotion)    [place] Level (Gait) minimum assist (75% patient effort);verbal cues  -LS     Assistive Device (Gait) walker, front-wheeled  -LS     Distance in Feet (Gait) 90  -LS     Deviations/Abnormal Patterns (Gait) base of support, narrow;sade decreased;gait speed decreased;stride length decreased  -LS     Bilateral Gait Deviations forward flexed posture;heel strike decreased  -LS     [place] Level (Stairs) minimum assist (75% patient effort);verbal cues  -LS     Handrail Location (Stairs) left side (ascending)  -LS     Number of Steps (Stairs) 7  -LS     Ascending Technique (Stairs) step-to-step  -LS     Stairs, Safety Issues balance decreased during turns;sequencing ability decreased  -     Comment, (Gait/Stairs) 90 total: 50 + 40 with seated rest break and stair ambulation in between distances. VC for upright posture and increasing step length with gait; followed with w/c for safety. Distance limited by fatigue. VC for safety with stair sequencing.  -           User Key  (r) = Recorded By, (t) = Taken By, (c) = Cosigned By    Initials Name Provider Type    Keyla Mckeon, PT Physical Therapist               Obj/Interventions     Row Name 04/03/22 1445          Range of Motion Comprehensive    General Range of Motion bilateral lower extremity ROM WFL  -     Row Name 04/03/22 1445          Strength Comprehensive (MMT)    General Manual Muscle Testing (MMT) Assessment lower extremity strength deficits identified  -     Comment, General Manual Muscle Testing (MMT) Assessment BLE grossly 3+/5  -     Row Name 04/03/22 1445          Balance    Balance Assessment sitting static balance;standing static balance  -     Static Sitting Balance  standby assist  -LS     Position, Sitting Balance unsupported;sitting edge of bed  -LS     Static Standing Balance contact guard  -LS     Position/Device Used, Standing Balance walker, front-wheeled  -LS           User Key  (r) = Recorded By, (t) = Taken By, (c) = Cosigned By    Initials Name Provider Type    Keyla Mckeon, PT Physical Therapist               Goals/Plan     Row Name 04/03/22 1451          Bed Mobility Goal 1 (PT)    Activity/Assistive Device (Bed Mobility Goal 1, PT) sit to supine/supine to sit  -LS     [place] Level/Cues Needed (Bed Mobility Goal 1, PT) independent  -LS     Time Frame (Bed Mobility Goal 1, PT) 2 weeks  -LS     Progress/Outcomes (Bed Mobility Goal 1, PT) goal ongoing;goal revised this date  -     Row Name 04/03/22 1451          Transfer Goal 1 (PT)    Activity/Assistive Device (Transfer Goal 1, PT) sit-to-stand/stand-to-sit;walker, rolling  -LS     [place] Level/Cues Needed (Transfer Goal 1, PT) modified independence  -LS     Time Frame (Transfer Goal 1, PT) 2 weeks  -LS     Progress/Outcome (Transfer Goal 1, PT) goal ongoing;goal revised this date  -     Row Name 04/03/22 1451          Gait Training Goal 1 (PT)    Activity/Assistive Device (Gait Training Goal 1, PT) gait (walking locomotion);walker, rolling  -LS     [place] Level (Gait Training Goal 1, PT) modified independence  -LS     Distance (Gait Training Goal 1, PT) 150  -LS     Time Frame (Gait Training Goal 1, PT) 2 weeks  -LS     Progress/Outcome (Gait Training Goal 1, PT) goal ongoing;goal revised this date  -     Row Name 04/03/22 1451          Stairs Goal 1 (PT)    Activity/Assistive Device (Stairs Goal 1, PT) ascending stairs;descending stairs  -LS     [place] Level/Cues Needed (Stairs Goal 1, PT) supervision required  -LS     Number of Stairs (Stairs Goal 1, PT) 15  -LS     Time Frame (Stairs Goal 1, PT) 2 weeks  -LS     Progress/Outcome (Stairs Goal 1, PT) goal ongoing;goal revised this  date  -     Row Name 04/03/22 1451          Patient Education Goal (PT)    Activity (Patient Education Goal, PT) HEP  -LS     [place]/Cues/Accuracy (Memory Goal 2, PT) demonstrates adequately  -LS     Time Frame (Patient Education Goal, PT) 1 week  -LS     Progress/Outcome (Patient Education Goal, PT) goal ongoing  -LS           User Key  (r) = Recorded By, (t) = Taken By, (c) = Cosigned By    Initials Name Provider Type    LS Keyla Kaufman, PT Physical Therapist               Clinical Impression     Row Name 04/03/22 1446          Pain    Pretreatment Pain Rating 0/10 - no pain  -LS     Posttreatment Pain Rating 0/10 - no pain  -LS     Row Name 04/03/22 1446          Plan of Care Review    Plan of Care Reviewed With patient;spouse  -LS     Progress no change  -LS     Outcome Evaluation PT re-evaluation completed. Pt cont to demonstrate generalized weakness and decreased indep, balance, and functional endurance re: mobility tasks, warranting further skilled PT services to promote PLOF. Limited today by fatigue, requiring min A for gait/ stair ambulation and multiple rest breaks. Able to ambulate 90 ft with RW and performed stair ambulation with noted balance/strength deficits. Goals addressed and modified to reflect current status. Recommend IP rehab at d/c based on current status/ safety. Discussed this recommendation with pt/spouse/ RN.  -     Row Name 04/03/22 1446          Therapy Assessment/Plan (PT)    Patient/Family Therapy Goals Statement (PT) return to PLOF; go to rehab  -     Rehab Potential (PT) good, to achieve stated therapy goals  -LS     Criteria for Skilled Interventions Met (PT) yes;meets criteria;skilled treatment is necessary  -     Row Name 04/03/22 1446          Vital Signs    Pre Systolic BP Rehab 114  -LS     Pre Treatment Diastolic BP 57  -LS     Pretreatment Heart Rate (beats/min) 76  -LS     Pre SpO2 (%) 93  -LS     O2 Delivery Pre Treatment nasal cannula  -LS     O2 Delivery  Intra Treatment nasal cannula  -LS     Post SpO2 (%) 94  -LS     O2 Delivery Post Treatment nasal cannula  -LS     Pre Patient Position Supine  -LS     Intra Patient Position Standing  -LS     Post Patient Position Sitting  -LS     Row Name 04/03/22 1446          Positioning and Restraints    Pre-Treatment Position in bed  -LS     Post Treatment Position chair  -LS     In Chair notified nsg;reclined;call light within reach;encouraged to call for assist;exit alarm on;waffle cushion;heels elevated;with family/caregiver  -           User Key  (r) = Recorded By, (t) = Taken By, (c) = Cosigned By    Initials Name Provider Type    Keyla Mckeon, PT Physical Therapist               Outcome Measures     Row Name 04/03/22 1452          How much help from another person do you currently need...    Turning from your back to your side while in flat bed without using bedrails? 3  -LS     Moving from lying on back to sitting on the side of a flat bed without bedrails? 3  -LS     Moving to and from a bed to a chair (including a wheelchair)? 3  -LS     Standing up from a chair using your arms (e.g., wheelchair, bedside chair)? 3  -LS     Climbing 3-5 steps with a railing? 3  -LS     To walk in hospital room? 3  -LS     AM-PAC 6 Clicks Score (PT) 18  -LS           User Key  (r) = Recorded By, (t) = Taken By, (c) = Cosigned By    Initials Name Provider Type    Keyla Mckeon, PT Physical Therapist                             Physical Therapy Education                 Title: PT OT SLP Therapies (In Progress)     Topic: Physical Therapy (Done)     Point: Mobility training (Done)     Learning Progress Summary           Patient Acceptance, E,D, VU,NR by IVORY at 4/3/2022 1453    Acceptance, E,D, VU by MB at 3/29/2022 1341   Significant Other Acceptance, E,D, VU,NR by IVORY at 4/3/2022 1453                   Point: Home exercise program (Done)     Learning Progress Summary           Patient Acceptance, E,D, VU by MB at 3/29/2022 1341                    Point: Body mechanics (Done)     Learning Progress Summary           Patient Acceptance, E,D, VU,NR by LS at 4/3/2022 1453    Acceptance, E,D, VU by MB at 3/29/2022 1341   Significant Other Acceptance, E,D, VU,NR by LS at 4/3/2022 1453                   Point: Precautions (Done)     Learning Progress Summary           Patient Acceptance, E,D, VU,NR by LS at 4/3/2022 1453    Acceptance, E,D, VU by MB at 3/29/2022 1341   Significant Other Acceptance, E,D, VU,NR by LS at 4/3/2022 1453                               User Key     Initials Effective Dates Name Provider Type Discipline     06/16/21 -  Keyla Kaufman, PT Physical Therapist PT    MB 06/16/21 -  Kalyani Khoury, PT Physical Therapist PT              PT Recommendation and Plan  Planned Therapy Interventions (PT): balance training, bed mobility training, gait training, home exercise program, patient/family education, strengthening, stair training, transfer training  Plan of Care Reviewed With: patient, spouse  Progress: no change  Outcome Evaluation: PT re-evaluation completed. Pt cont to demonstrate generalized weakness and decreased indep, balance, and functional endurance re: mobility tasks, warranting further skilled PT services to promote PLOF. Limited today by fatigue, requiring min A for gait/ stair ambulation and multiple rest breaks. Able to ambulate 90 ft with RW and performed stair ambulation with noted balance/strength deficits. Goals addressed and modified to reflect current status. Recommend IP rehab at d/c based on current status/ safety. Discussed this recommendation with pt/spouse/ RN.     Time Calculation:    PT Charges     Row Name 04/03/22 1454             Time Calculation    Start Time 1402  -LS      PT Received On 04/03/22  -      PT Goal Re-Cert Due Date 04/13/22  -              Timed Charges    69627 - Gait Training Minutes  16  -LS      84144 - PT Therapeutic Activity Minutes 8  -LS              Untimed  Charges    PT Eval/Re-eval Minutes 25  -LS              Total Minutes    Timed Charges Total Minutes 24  -LS      Untimed Charges Total Minutes 25  -LS       Total Minutes 49  -LS            User Key  (r) = Recorded By, (t) = Taken By, (c) = Cosigned By    Initials Name Provider Type    Keyla Mckeon, PT Physical Therapist              Therapy Charges for Today     Code Description Service Date Service Provider Modifiers Qty    66438591962 HC PT RE-EVAL ESTABLISHED PLAN 2 4/3/2022 Keyla Kaufman, PT GP 1    54242556360 HC PT THERAPEUTIC ACT EA 15 MIN 4/3/2022 Keyla Kaufman, PT GP 1    46756306047 HC GAIT TRAINING EA 15 MIN 4/3/2022 Keyla Kaufman, PT GP 1    [id number]  PT THER SUPP EA 15 MIN 4/3/2022 Keyla Kaufman, PT GP 2          PT G-Codes  Outcome Measure Options: AM-PAC 6 Clicks Daily Activity (OT)  AM-PAC 6 Clicks Score (PT): 18  AM-PAC 6 Clicks Score (OT): 16    Keyla Kaufman, PT  4/3/2022

## 2022-04-03 NOTE — PLAN OF CARE
Goal Outcome Evaluation:  Plan of Care Reviewed With: patient, spouse        Progress: no change  Outcome Evaluation: PT re-evaluation completed. Pt cont to demonstrate generalized weakness and decreased indep, balance, and functional endurance re: mobility tasks, warranting further skilled PT services to promote PLOF. Limited today by fatigue, requiring min A for gait/ stair ambulation and multiple rest breaks. Able to ambulate 90 ft with RW and performed stair ambulation with noted balance/strength deficits. Goals addressed and modified to reflect current status. Recommend IP rehab at d/c based on current status/ safety. Discussed this recommendation with pt/spouse/ RN.

## 2022-04-03 NOTE — PROGRESS NOTES
[facility]  PROGRESS NOTE    Patient Name: Jet Floyd  : 1945  MRN: [id number]    Date of Admission: 3/28/2022  Primary Care Physician: Obi Soriano MD    Subjective   Subjective     CC:  F/u congestive heart failure    HPI:  4/3/2022-patient denies new complaints and states he feels better than he did previously.  He has been off of oxygen within 24 hours now.  He reports he has not been out of bed to ambulate in days.  I requested PT evaluate him and encouraged him to get up out of bed today.  He is agreeable.  He would like to be discharged home.    ROS:  Gen- No fevers, chills  CV- No chest pain, palpitations  Resp- + cough, dyspnea  GI- No N/V/D, abd pain    Objective   Objective     Vital Signs:   Temp:  [97.7 °F (36.5 °C)-98.4 °F (36.9 °C)] 98 °F (36.7 °C)  Heart Rate:  [68-86] 71  Resp:  [16-18] 16  BP: (114-126)/(55-82) 114/55  Flow (L/min):  [2] 2     Physical Exam:  Constitutional: No acute distress, awake, alert, chronically ill and frail appearing male  HENT: NCAT, mucous membranes moist  Respiratory: Decreased breath sounds with faint expiratory crackles, no wheezing, normal work of breathing and has been weaned to room air with oxygen saturations around 92%  Cardiovascular: RRR  Gastrointestinal: Positive bowel sounds, soft, nontender, nondistended  Musculoskeletal: No bilateral ankle edema, thin extremities  Psychiatric: Appropriate affect, cooperative  Neurologic: Oriented x 3, strength symmetric in all extremities, Cranial Nerves grossly intact to confrontation, speech clear  Skin: No rashes    Results Reviewed:  LAB RESULTS:      Lab 22  1201 22  0510 22  1855   WBC 4.45 12.29*  --  5.76   HEMOGLOBIN 12.2* 12.2*  --  12.6*   HEMATOCRIT 35.3* 36.0*  --  36.8*   PLATELETS 79* 78*  --  95*   NEUTROS ABS  --  11.30*  --  4.95   IMMATURE GRANS (ABS)  --  0.07*  --  0.06*   LYMPHS ABS  --  0.30*  --   0.55*   MONOS ABS  --  0.60  --  0.13   EOS ABS  --  0.00  --  0.04   MCV 92.2 96.3  --  93.6   PROCALCITONIN  --  35.24* 0.52*  --    LACTATE  --  1.8 4.6* 5.7*         Lab 04/03/22  0713 04/02/22  1817 04/02/22  1201 04/01/22  0610 03/30/22  0715 03/29/22  0510   SODIUM 134*  --  133* 131* 131* 131*   POTASSIUM 3.5 3.6 3.4* 3.2* 3.8 3.8   CHLORIDE 97*  --  96* 95* 94* 92*   CO2 21.0*  --  20.0* 22.0 22.0 20.0*   ANION GAP 16.0*  --  17.0* 14.0 15.0 19.0*   BUN 34*  --  33* 34* 33* 29*   CREATININE 2.57*  --  2.51* 2.60* 2.36* 2.11*   EGFR 25.1*  --  25.9* 24.8* 27.8* 31.8*   GLUCOSE 74  --  83 87 104* 70   CALCIUM 8.3*  --  8.4* 8.3* 8.2* 8.1*   MAGNESIUM  --   --   --   --   --  1.5*   PHOSPHORUS  --   --   --   --   --  3.9   TSH  --   --   --   --   --  8.880*         Lab 03/28/22 1953   TOTAL PROTEIN 5.8*   ALBUMIN 3.10*   GLOBULIN 2.7   ALT (SGPT) <5   AST (SGOT) 16   BILIRUBIN 2.0*   ALK PHOS 149*         Lab 03/28/22 1953   PROBNP 37,131.0*   TROPONIN T 0.018                 Lab 03/28/22 1930   PH, ARTERIAL 7.353   PCO2, ARTERIAL 40.2   PO2 ART 54.6*   FIO2 80   HCO3 ART 22.3   BASE EXCESS ART -3.0*   CARBOXYHEMOGLOBIN 0.8     Brief Urine Lab Results     None          Microbiology Results Abnormal     Procedure Component Value - Date/Time    Blood Culture - Blood, Arm, Left [[id number]]  (Normal) Collected: 03/28/22 1950    Lab Status: Final result Specimen: Blood from Arm, Left Updated: 04/02/22 2102     Blood Culture No growth at 5 days    Blood Culture - Blood, Hand, Right [[id number]]  (Normal) Collected: 03/28/22 2000    Lab Status: Final result Specimen: Blood from Hand, Right Updated: 04/02/22 2102     Blood Culture No growth at 5 days    COVID PRE-OP / PRE-PROCEDURE SCREENING ORDER (NO ISOLATION) - Swab, Nasopharynx [[id number]]  (Normal) Collected: 03/28/22 1914    Lab Status: Final result Specimen: Swab from Nasopharynx Updated: 03/28/22 1950    Narrative:      The following orders were created  for panel order COVID PRE-OP / PRE-PROCEDURE SCREENING ORDER (NO ISOLATION) - Swab, Nasopharynx.  Procedure                               Abnormality         Status                     ---------                               -----------         ------                     COVID-19 and FLU A/B PCR...[[id number]]  Normal              Final result                 Please view results for these tests on the individual orders.    COVID-19 and FLU A/B PCR - Swab, Nasopharynx [[id number]]  (Normal) Collected: 03/28/22 1914    Lab Status: Final result Specimen: Swab from Nasopharynx Updated: 03/28/22 1950     COVID19 Not Detected     Influenza A PCR Not Detected     Influenza B PCR Not Detected    Narrative:      Fact sheet for providers: https://www.fda.gov/media/111438/download    Fact sheet for patients: https://www.fda.gov/media/547581/download    Test performed by PCR.          No radiology results from the last 24 hrs    Results for orders placed during the hospital encounter of 03/28/22    Adult Transthoracic Echo Complete W/ Cont if Necessary Per Protocol    Interpretation Summary  · Left ventricular ejection fraction appears to be 31 - 35%. Left ventricular systolic function is moderately decreased.  · The following left ventricular wall segments are dyskinetic: apical septal, basal anteroseptal and mid anteroseptal.  · The right atrial cavity is dilated.  · Moderate tricuspid valve regurgitation is present.  · Estimated right ventricular systolic pressure from tricuspid regurgitation is normal (<35 mmHg).      I have reviewed the medications:  Scheduled Meds:allopurinol, 100 mg, Oral, Daily  aspirin, 81 mg, Oral, Daily  atorvastatin, 20 mg, Oral, Daily  carvedilol, 3.125 mg, Oral, BID With Meals  cefTRIAXone, 1 g, Intravenous, Q24H  doxycycline, 100 mg, Intravenous, Q12H  empagliflozin, 10 mg, Oral, Daily  furosemide, 40 mg, Oral, Daily  heparin (porcine), 5,000 Units, Subcutaneous, Q12H  levothyroxine, 50 mcg, Oral,  Q AM  pharmacy consult - MTM, , Does not apply, Daily  sodium chloride, 10 mL, Intravenous, Q12H  tamsulosin, 0.4 mg, Oral, Daily  vitamin B-12, 1,000 mcg, Oral, Daily      Continuous Infusions:   PRN Meds:.•  acetaminophen **OR** acetaminophen **OR** acetaminophen  •  doxepin  •  ipratropium-albuterol  •  magnesium sulfate **OR** magnesium sulfate in D5W 1g/100mL (PREMIX) **OR** magnesium sulfate  •  ondansetron **OR** ondansetron  •  sodium chloride  •  sodium chloride    Assessment/Plan   Assessment & Plan     Active Hospital Problems    Diagnosis  POA   • Acute respiratory failure (HCC) [J96.00]  Yes   • Chronic systolic heart failure (HCC) [I50.22]  Yes   • CKD (chronic kidney disease) stage 3, GFR 30-59 ml/min (HCC) [N18.30]  Yes   • History of alcoholism (HCC) [F10.21]  Not Applicable   • Pleural effusion [J90]  Yes   • Lactic acidosis [E87.2]  Yes   • Sepsis (HCC) [A41.9]  Yes   • CAP (community acquired pneumonia) [J18.9]  Unknown   • Essential hypertension [I10]  Yes   • Hyperlipidemia [E78.5]  Yes      Resolved Hospital Problems   No resolved problems to display.        Brief Hospital Course to date:  Jet Floyd is a 76 y.o. male with PMHx of Systolic CHF, HTN, CKD 3, history of BiV ICD 3/17/2020 by DEBBIE Conteh who presents to the ED with CC of SOB.     Acute Hypoxic Respiratory Failure  A/C Systolic CHF (history of BiV ICD 2020, EF 20% )   Cardiac Ascites w/Pleural Effusion  Valvular Heart Disease (Mod-Severe Mitral valve regurg, Mod Tricuspid valve regurg)   -weaned off BIPAP and now to room air  -s/p aggressive diuresis with IV bumex, now back on PO lasix, Strict I's/O's, daily weights  -repeat Echo showing left ventricular ejection fraction 31 - 35%.   -repeat CXR yesterday with improvement.   - Cardiology following  -CTA negative for PE    Multifocal PNA   Sepsis   -continue CTX/Doxy, Procal is 35  -Blood Cx x2 negative   -PNA may be due to vomiting episode and CT findings, SLP following,  cleared for diet  -PRN DuoNebs     CKD 3 w/Elevated Creatinine  -appears baseline is around 2.4, awaiting AM labs, creatinine was trending up yesterday.  -Follows with [facility]      Lactic Acidosis  -Secondary to above, now normal     Nausea, Vomiting, Diarrhea - improved  -Stomach fluid filled on CT imaging  -Anti-emetics as needed  -tolerating diet     Normocytic Anemia  -H&H stable from prior  -Likely secondary to CKD      Hyponatremia  -Likely secondary to volume overload  -stable      Hypokalemia  - assess for need daily due to renal fxn     HTN  -Continue Coreg     HLD  -Continue Lipitor      BPH  -Continue Flomax      Hypothyroidism  -Continue Synthroid     DVT prophylaxis:  Medical DVT prophylaxis orders are present.     AM-PAC 6 Clicks Score (PT): 19 (03/30/22 2000)     Disposition: I expect the patient to be discharged  1-2 days, awaiting physical therapy evaluation and cardiology clearance.  Also transitioning antibiotics to p.o.   CODE STATUS:   Code Status and Medical Interventions:   Ordered at: 03/28/22 6836     Level Of Support Discussed With:    Patient    Health Care Surrogate     Code Status (Patient has no pulse and is not breathing):    CPR (Attempt to Resuscitate)     Medical Interventions (Patient has pulse or is breathing):    Full Support       Oneida Varma MD  04/03/22

## 2022-04-04 LAB
ANION GAP SERPL CALCULATED.3IONS-SCNC: 15 MMOL/L (ref 5–15)
BUN SERPL-MCNC: 35 MG/DL (ref 8–23)
BUN/CREAT SERPL: 13.1 (ref 7–25)
CALCIUM SPEC-SCNC: 8.3 MG/DL (ref 8.6–10.5)
CHLORIDE SERPL-SCNC: 96 MMOL/L (ref 98–107)
CO2 SERPL-SCNC: 23 MMOL/L (ref 22–29)
CREAT SERPL-MCNC: 2.67 MG/DL (ref 0.76–1.27)
EGFRCR SERPLBLD CKD-EPI 2021: 24 ML/MIN/1.73
GLUCOSE SERPL-MCNC: 85 MG/DL (ref 65–99)
MAGNESIUM SERPL-MCNC: 1.3 MG/DL (ref 1.6–2.4)
POTASSIUM SERPL-SCNC: 3.1 MMOL/L (ref 3.5–5.2)
SODIUM SERPL-SCNC: 134 MMOL/L (ref 136–145)

## 2022-04-04 PROCEDURE — 25010000002 HEPARIN (PORCINE) PER 1000 UNITS: Performed by: FAMILY MEDICINE

## 2022-04-04 PROCEDURE — 25010000002 MAGNESIUM SULFATE IN D5W 1G/100ML (PREMIX) 1-5 GM/100ML-% SOLUTION: Performed by: INTERNAL MEDICINE

## 2022-04-04 PROCEDURE — 97530 THERAPEUTIC ACTIVITIES: CPT | Performed by: OCCUPATIONAL THERAPIST

## 2022-04-04 PROCEDURE — 92526 ORAL FUNCTION THERAPY: CPT

## 2022-04-04 PROCEDURE — 83735 ASSAY OF MAGNESIUM: CPT | Performed by: NURSE PRACTITIONER

## 2022-04-04 PROCEDURE — 25010000002 ONDANSETRON PER 1 MG: Performed by: FAMILY MEDICINE

## 2022-04-04 PROCEDURE — 80048 BASIC METABOLIC PNL TOTAL CA: CPT | Performed by: INTERNAL MEDICINE

## 2022-04-04 PROCEDURE — 97535 SELF CARE MNGMENT TRAINING: CPT | Performed by: OCCUPATIONAL THERAPIST

## 2022-04-04 PROCEDURE — 99232 SBSQ HOSP IP/OBS MODERATE 35: CPT | Performed by: NURSE PRACTITIONER

## 2022-04-04 RX ORDER — FUROSEMIDE 40 MG/1
40 TABLET ORAL DAILY
Status: DISCONTINUED | OUTPATIENT
Start: 2022-04-04 | End: 2022-04-05 | Stop reason: HOSPADM

## 2022-04-04 RX ORDER — POTASSIUM CHLORIDE 750 MG/1
40 CAPSULE, EXTENDED RELEASE ORAL EVERY 4 HOURS
Status: COMPLETED | OUTPATIENT
Start: 2022-04-04 | End: 2022-04-04

## 2022-04-04 RX ORDER — FUROSEMIDE 40 MG/1
40 TABLET ORAL DAILY
Status: DISCONTINUED | OUTPATIENT
Start: 2022-04-05 | End: 2022-04-04

## 2022-04-04 RX ADMIN — MAGNESIUM SULFATE HEPTAHYDRATE 1 G: 1 INJECTION, SOLUTION INTRAVENOUS at 08:57

## 2022-04-04 RX ADMIN — FUROSEMIDE 40 MG: 40 TABLET ORAL at 11:12

## 2022-04-04 RX ADMIN — HEPARIN SODIUM 5000 UNITS: 5000 INJECTION, SOLUTION INTRAVENOUS; SUBCUTANEOUS at 08:57

## 2022-04-04 RX ADMIN — LEVOTHYROXINE SODIUM 50 MCG: 50 TABLET ORAL at 06:08

## 2022-04-04 RX ADMIN — HEPARIN SODIUM 5000 UNITS: 5000 INJECTION, SOLUTION INTRAVENOUS; SUBCUTANEOUS at 20:52

## 2022-04-04 RX ADMIN — ONDANSETRON 4 MG: 2 INJECTION INTRAMUSCULAR; INTRAVENOUS at 20:52

## 2022-04-04 RX ADMIN — CARVEDILOL 3.12 MG: 3.12 TABLET, FILM COATED ORAL at 18:56

## 2022-04-04 RX ADMIN — Medication 10 ML: at 08:56

## 2022-04-04 RX ADMIN — DOXYCYCLINE 100 MG: 100 CAPSULE ORAL at 20:52

## 2022-04-04 RX ADMIN — DOXYCYCLINE 100 MG: 100 CAPSULE ORAL at 08:58

## 2022-04-04 RX ADMIN — TAMSULOSIN HYDROCHLORIDE 0.4 MG: 0.4 CAPSULE ORAL at 08:58

## 2022-04-04 RX ADMIN — DOXEPIN HYDROCHLORIDE 10 MG: 10 CAPSULE ORAL at 22:38

## 2022-04-04 RX ADMIN — MAGNESIUM SULFATE HEPTAHYDRATE 1 G: 1 INJECTION, SOLUTION INTRAVENOUS at 09:59

## 2022-04-04 RX ADMIN — EMPAGLIFLOZIN 10 MG: 10 TABLET, FILM COATED ORAL at 08:56

## 2022-04-04 RX ADMIN — ASPIRIN 81 MG 81 MG: 81 TABLET ORAL at 08:57

## 2022-04-04 RX ADMIN — CARVEDILOL 3.12 MG: 3.12 TABLET, FILM COATED ORAL at 08:58

## 2022-04-04 RX ADMIN — MAGNESIUM SULFATE HEPTAHYDRATE 1 G: 1 INJECTION, SOLUTION INTRAVENOUS at 11:10

## 2022-04-04 RX ADMIN — ALLOPURINOL 100 MG: 100 TABLET ORAL at 08:57

## 2022-04-04 RX ADMIN — POTASSIUM CHLORIDE 40 MEQ: 750 CAPSULE, EXTENDED RELEASE ORAL at 08:57

## 2022-04-04 RX ADMIN — POTASSIUM CHLORIDE 40 MEQ: 750 CAPSULE, EXTENDED RELEASE ORAL at 11:11

## 2022-04-04 RX ADMIN — Medication 1000 MCG: at 08:58

## 2022-04-04 RX ADMIN — ATORVASTATIN CALCIUM 20 MG: 20 TABLET, FILM COATED ORAL at 08:57

## 2022-04-04 RX ADMIN — Medication 10 ML: at 20:52

## 2022-04-04 NOTE — PROGRESS NOTES
"                                 [facility] Progress Note      LOS: 7 days   Patient Care Team:  Obi Soriano MD as PCP - General  Obi Soriano MD as PCP - Family Medicine    Chief Complaint:    Chief Complaint   Patient presents with   • Shortness of Breath       Subjective     Interval History: Quiet night    Patient Complaints: No chest pain or dyspnea.  Appears comfortable in bed.  No nausea vomiting or abdominal pain  Requesting discharge      Review of Systems:   A 14 point review of systems was negative except as was stated in the HPI      Objective     Vital Sign Min/Max for last 24 hours  Temp  Min: 97.1 °F (36.2 °C)  Max: 98.2 °F (36.8 °C)   BP  Min: 122/67  Max: 127/70   Pulse  Min: 69  Max: 82   Resp  Min: 15  Max: 18   SpO2  Min: 90 %  Max: 98 %   Flow (L/min)  Min: 2  Max: 2   Weight  Min: 56.4 kg (124 lb 6.4 oz)  Max: 56.4 kg (124 lb 6.4 oz)     Flowsheet Rows    Flowsheet Row First Filed Value   Admission Height 175.3 cm (69\") Documented at 03/28/2022 1844   Admission Weight 58.5 kg (129 lb) Documented at 03/28/2022 1844          Physical Exam:  General Appearance: Alert, appears stated age and cooperative  Lungs: Few basilar Rales  Heart:: RRR   No Murmurs, Rubs or Gallops  Abdomen: Soft and nontender with adequate bowel sounds.  No organomegaly  Extremities: No cyanosis, clubbing or edema  Pulses: Pulses palpable and equal bilaterally  Skin: Warm and dry with no rash  Psych: Normal     Results Review:     I reviewed the patient's new clinical results.  Results from last 7 days   Lab Units 04/04/22  0323 04/03/22  0713 04/02/22  1817 04/02/22  1201   SODIUM mmol/L 134* 134*  --  133*   POTASSIUM mmol/L 3.1* 3.5 3.6 3.4*   CHLORIDE mmol/L 96* 97*  --  96*   CO2 mmol/L 23.0 21.0*  --  20.0*   BUN mg/dL 35* 34*  --  33*   CREATININE mg/dL 2.67* 2.57*  --  2.51*   GLUCOSE mg/dL 85 74  --  83   CALCIUM mg/dL 8.3* 8.3*  --  8.4*     Results from last 7 days   Lab " Units 04/02/22  1201 03/29/22  0510 03/28/22  1855   WBC 10*3/mm3 4.45 12.29* 5.76   HEMOGLOBIN g/dL 12.2* 12.2* 12.6*   HEMATOCRIT % 35.3* 36.0* 36.8*   PLATELETS 10*3/mm3 79* 78* 95*     Lab Results   Lab Value Date/Time    TROPONINT 0.018 03/28/2022 1953    TROPONINT <0.010 03/14/2020 2111    TROPONINT <0.010 02/15/2020 0721    TROPONINT <0.010 02/15/2020 0158    TROPONINT <0.010 02/14/2020 2013             Results from last 7 days   Lab Units 03/28/22  1930   PH, ARTERIAL pH units 7.353   PO2 ART mm Hg 54.6*   PCO2, ARTERIAL mm Hg 40.2   HCO3 ART mmol/L 22.3           Medication Review: yes  Current Facility-Administered Medications   Medication Dose Route Frequency Provider Last Rate Last Admin   • acetaminophen (TYLENOL) tablet 650 mg  650 mg Oral Q4H PRN Marily Russo DO   650 mg at 04/03/22 0808    Or   • acetaminophen (TYLENOL) 160 MG/5ML solution 650 mg  650 mg Oral Q4H PRN Marily Russo DO        Or   • acetaminophen (TYLENOL) suppository 650 mg  650 mg Rectal Q4H PRN Marily Russo DO       • allopurinol (ZYLOPRIM) tablet 100 mg  100 mg Oral Daily Marily Russo DO   100 mg at 04/04/22 0857   • aspirin chewable tablet 81 mg  81 mg Oral Daily Marily Russo DO   81 mg at 04/04/22 0857   • atorvastatin (LIPITOR) tablet 20 mg  20 mg Oral Daily Marily Russo DO   20 mg at 04/04/22 0857   • carvedilol (COREG) tablet 3.125 mg  3.125 mg Oral BID With Meals Marily Russo DO   3.125 mg at 04/04/22 0858   • doxepin (SINEquan) capsule 10 mg  10 mg Oral Nightly PRN Radha Salazar DO   10 mg at 04/03/22 2222   • doxycycline (MONODOX) capsule 100 mg  100 mg Oral Q12H Oneida Varma MD   100 mg at 04/04/22 0858   • empagliflozin (JARDIANCE) tablet 10 mg  10 mg Oral Daily Mynor Richard MD   10 mg at 04/04/22 0856   • [START ON 4/5/2022] furosemide (LASIX) tablet 40 mg  40 mg Oral Daily Keri Saleem, APRN       • heparin (porcine) 5000 UNIT/ML injection 5,000  Units  5,000 Units Subcutaneous Q12H Marily Russo, DO   5,000 Units at 04/04/22 0857   • ipratropium-albuterol (DUO-NEB) nebulizer solution 3 mL  3 mL Nebulization Q4H PRN Marily Russo, DO       • levothyroxine (SYNTHROID, LEVOTHROID) tablet 50 mcg  50 mcg Oral Q AM Marily Russo, DO   50 mcg at 04/04/22 0608   • magnesium sulfate 4 gram infusion - Mg less than or equal to 1mg/dL  4 g Intravenous PRN Radha Salazar, DO        Or   • magnesium sulfate 3 gram infusion (1gm x 3) - Mg 1.1 - 1.5 mg/dL  1 g Intravenous PRN Radha Salazar,  mL/hr at 04/04/22 0857 1 g at 04/04/22 0857    Or   • Magnesium Sulfate 2 gram infusion- Mg 1.6 - 1.9 mg/dL  2 g Intravenous PRN Radha Salazar, DO       • ondansetron (ZOFRAN) tablet 4 mg  4 mg Oral Q6H PRN Marily Russo DO        Or   • ondansetron (ZOFRAN) injection 4 mg  4 mg Intravenous Q6H PRN Marily Russo DO   4 mg at 03/29/22 0657   • Pharmacy Consult - MTM   Does not apply Daily Ramakrishna Ignacio [facility]   Given at 04/02/22 0902   • potassium chloride (MICRO-K) CR capsule 40 mEq  40 mEq Oral Q4H Keri Saleem APRN   40 mEq at 04/04/22 0857   • sodium chloride 0.9 % flush 10 mL  10 mL Intravenous PRN Juana Hamlin MD       • sodium chloride 0.9 % flush 10 mL  10 mL Intravenous Q12H Marily Russo, DO   10 mL at 04/04/22 0856   • sodium chloride 0.9 % flush 10 mL  10 mL Intravenous PRN Marily Russo DO       • tamsulosin (FLOMAX) 24 hr capsule 0.4 mg  0.4 mg Oral Daily Marily Russo, DO   0.4 mg at 04/04/22 0858   • vitamin B-12 (CYANOCOBALAMIN) tablet 1,000 mcg  1,000 mcg Oral Daily Marily Russo DO   1,000 mcg at 04/04/22 0858         Sepsis (HCC)    CAP (community acquired pneumonia)    Essential hypertension    Hyperlipidemia    Lactic acidosis    History of alcoholism (HCC)    Pleural effusion    CKD (chronic kidney disease) stage 3, GFR 30-59 ml/min (HCC)    Chronic systolic heart failure (HCC)     Acute respiratory failure (HCC)        Impression      Chronic systolic heart failure  Chronic kidney disease--worsening  Nonischemic cardiomyopathy  [place] biventricular pacemaker/ICD with reprogramming to multipoint pacing this admission  Hypertension  Hyperlipidemia  History EtOH abuse  Room air O2 sat is 90% this morning    Plan       Monitor renal function--relatively stable  Blood pressure has chronically been too low for ARB/ACE/Entresto  Okay for discharge from cardiology standpoint.  We will see in the office in 3 to 4 weeks    Mynor Richard MD   04/04/22  09:55 EDT

## 2022-04-04 NOTE — PROGRESS NOTES
[facility] Medicine Services  PROGRESS NOTE    Patient Name: eJt Floyd  : 1945  MRN: [id number]    Date of Admission: 3/28/2022  Primary Care Physician: Obi Soriano MD    Subjective   Subjective     CC:  F/u CHF    HPI:  Patient is resting in bed in NAD. He states he feels well and wants to go home but wife wants rehab. They will discuss today and decide     ROS:  Gen- No fevers, chills  CV- No chest pain, palpitations  Resp- No cough, dyspnea  GI- No N/V/D, abd pain         Objective   Objective     Vital Signs:   Temp:  [97.1 °F (36.2 °C)-98 °F (36.7 °C)] 97.9 °F (36.6 °C)  Heart Rate:  [71-82] 80  Resp:  [15-18] 16  BP: (122-127)/(62-70) 127/70  Flow (L/min):  [2] 2     Physical Exam:  Constitutional: No acute distress, awake, alert, chronically ill and frail appearing male  HENT: NCAT, mucous membranes moist  Respiratory: Decreased breath sounds with faint expiratory crackles, no wheezing, normal work of breathing and has been weaned to room air with oxygen saturations around 92%  Cardiovascular: RRR  Gastrointestinal: Positive bowel sounds, soft, nontender, nondistended  Musculoskeletal: No bilateral ankle edema, thin extremities  Psychiatric: Appropriate affect, cooperative  Neurologic: Oriented x 3, strength symmetric in all extremities, Cranial Nerves grossly intact to confrontation, speech clear  Skin: No rashes    Results Reviewed:  LAB RESULTS:      Lab 22  1201 22  0510 223 22  1855   WBC 4.45 12.29*  --  5.76   HEMOGLOBIN 12.2* 12.2*  --  12.6*   HEMATOCRIT 35.3* 36.0*  --  36.8*   PLATELETS 79* 78*  --  95*   NEUTROS ABS  --  11.30*  --  4.95   IMMATURE GRANS (ABS)  --  0.07*  --  0.06*   LYMPHS ABS  --  0.30*  --  0.55*   MONOS ABS  --  0.60  --  0.13   EOS ABS  --  0.00  --  0.04   MCV 92.2 96.3  --  93.6   PROCALCITONIN  --  35.24* 0.52*  --    LACTATE  --  1.8 4.6* 5.7*         Lab 22  0323 22  0713  04/02/22  1817 04/02/22  1201 04/01/22  0610 03/30/22  0715 03/29/22  0510   SODIUM 134* 134*  --  133* 131* 131* 131*   POTASSIUM 3.1* 3.5 3.6 3.4* 3.2* 3.8 3.8   CHLORIDE 96* 97*  --  96* 95* 94* 92*   CO2 23.0 21.0*  --  20.0* 22.0 22.0 20.0*   ANION GAP 15.0 16.0*  --  17.0* 14.0 15.0 19.0*   BUN 35* 34*  --  33* 34* 33* 29*   CREATININE 2.67* 2.57*  --  2.51* 2.60* 2.36* 2.11*   EGFR 24.0* 25.1*  --  25.9* 24.8* 27.8* 31.8*   GLUCOSE 85 74  --  83 87 104* 70   CALCIUM 8.3* 8.3*  --  8.4* 8.3* 8.2* 8.1*   MAGNESIUM 1.3*  --   --   --   --   --  1.5*   PHOSPHORUS  --   --   --   --   --   --  3.9   TSH  --   --   --   --   --   --  8.880*         Lab 03/28/22 1953   TOTAL PROTEIN 5.8*   ALBUMIN 3.10*   GLOBULIN 2.7   ALT (SGPT) <5   AST (SGOT) 16   BILIRUBIN 2.0*   ALK PHOS 149*         Lab 03/28/22 1953   PROBNP 37,131.0*   TROPONIN T 0.018                 Lab 03/28/22 1930   PH, ARTERIAL 7.353   PCO2, ARTERIAL 40.2   PO2 ART 54.6*   FIO2 80   HCO3 ART 22.3   BASE EXCESS ART -3.0*   CARBOXYHEMOGLOBIN 0.8     Brief Urine Lab Results     None          Microbiology Results Abnormal     Procedure Component Value - Date/Time    Blood Culture - Blood, Arm, Left [[id number]]  (Normal) Collected: 03/28/22 1950    Lab Status: Final result Specimen: Blood from Arm, Left Updated: 04/02/22 2102     Blood Culture No growth at 5 days    Blood Culture - Blood, Hand, Right [[id number]]  (Normal) Collected: 03/28/22 2000    Lab Status: Final result Specimen: Blood from Hand, Right Updated: 04/02/22 2102     Blood Culture No growth at 5 days    COVID PRE-OP / PRE-PROCEDURE SCREENING ORDER (NO ISOLATION) - Swab, Nasopharynx [[id number]]  (Normal) Collected: 03/28/22 1914    Lab Status: Final result Specimen: Swab from Nasopharynx Updated: 03/28/22 1950    Narrative:      The following orders were created for panel order COVID PRE-OP / PRE-PROCEDURE SCREENING ORDER (NO ISOLATION) - Swab, Nasopharynx.  Procedure                                Abnormality         Status                     ---------                               -----------         ------                     COVID-19 and FLU A/B PCR...[[id number]]  Normal              Final result                 Please view results for these tests on the individual orders.    COVID-19 and FLU A/B PCR - Swab, Nasopharynx [[id number]]  (Normal) Collected: 03/28/22 1914    Lab Status: Final result Specimen: Swab from Nasopharynx Updated: 03/28/22 1950     COVID19 Not Detected     Influenza A PCR Not Detected     Influenza B PCR Not Detected    Narrative:      Fact sheet for providers: https://www.fda.gov/media/422962/download    Fact sheet for patients: https://www.fda.gov/media/207372/download    Test performed by PCR.          No radiology results from the last 24 hrs    Results for orders placed during the hospital encounter of 03/28/22    Adult Transthoracic Echo Complete W/ Cont if Necessary Per Protocol    Interpretation Summary  · Left ventricular ejection fraction appears to be 31 - 35%. Left ventricular systolic function is moderately decreased.  · The following left ventricular wall segments are dyskinetic: apical septal, basal anteroseptal and mid anteroseptal.  · The right atrial cavity is dilated.  · Moderate tricuspid valve regurgitation is present.  · Estimated right ventricular systolic pressure from tricuspid regurgitation is normal (<35 mmHg).      I have reviewed the medications:  Scheduled Meds:allopurinol, 100 mg, Oral, Daily  aspirin, 81 mg, Oral, Daily  atorvastatin, 20 mg, Oral, Daily  carvedilol, 3.125 mg, Oral, BID With Meals  doxycycline, 100 mg, Oral, Q12H  empagliflozin, 10 mg, Oral, Daily  furosemide, 40 mg, Oral, Daily  heparin (porcine), 5,000 Units, Subcutaneous, Q12H  levothyroxine, 50 mcg, Oral, Q AM  pharmacy consult - MTM, , Does not apply, Daily  sodium chloride, 10 mL, Intravenous, Q12H  tamsulosin, 0.4 mg, Oral, Daily  vitamin B-12, 1,000 mcg, Oral,  Daily      Continuous Infusions:   PRN Meds:.•  acetaminophen **OR** acetaminophen **OR** acetaminophen  •  doxepin  •  ipratropium-albuterol  •  magnesium sulfate **OR** magnesium sulfate in D5W 1g/100mL (PREMIX) **OR** magnesium sulfate  •  ondansetron **OR** ondansetron  •  sodium chloride  •  sodium chloride    Assessment/Plan   Assessment & Plan     Active Hospital Problems    Diagnosis  POA   • Acute respiratory failure (HCC) [J96.00]  Yes   • Chronic systolic heart failure (HCC) [I50.22]  Yes   • CKD (chronic kidney disease) stage 3, GFR 30-59 ml/min (HCC) [N18.30]  Yes   • History of alcoholism (HCC) [F10.21]  Not Applicable   • Pleural effusion [J90]  Yes   • Lactic acidosis [E87.2]  Yes   • Sepsis (HCC) [A41.9]  Yes   • CAP (community acquired pneumonia) [J18.9]  Unknown   • Essential hypertension [I10]  Yes   • Hyperlipidemia [E78.5]  Yes      Resolved Hospital Problems   No resolved problems to display.        Brief Hospital Course to date:  Jet Floyd is a 76 y.o. male  with PMHx of Systolic CHF, HTN, CKD 3, history of BiV ICD 3/17/2020 by DEBBIE Conteh who presents to the ED with CC of SOB. Patient was treated with IV diuretics with improvement. Patient mobility improved and he will be discharged home with outpt therapy. Patient was treated for pneumonia      Acute Hypoxic Respiratory Failure  A/C Systolic CHF (history of BiV ICD 2020, EF 20% )   Cardiac Ascites w/Pleural Effusion  Valvular Heart Disease (Mod-Severe Mitral valve regurg, Mod Tricuspid valve regurg)   -weaned off BIPAP and now to room air  -s/p aggressive diuresis with IV bumex, now back on PO lasix,  -repeat Echo showing left ventricular ejection fraction 31 - 35%.   -repeat CXR yesterday with improvement.   - Cardiology following and has cleared for discharge and follow up in 3-4 weeks   -CTA negative for PE     Multifocal PNA   Sepsis   -s/p CTX/Doxy, Procal is 35  -Blood Cx x2 negative   -PNA may be due to vomiting episode  and CT findings, SLP following, cleared for diet  -PRN DuoNebs   -- assess for oxygen need and will dc with oxygen if needed      CKD 3 w/Elevated Creatinine  -appears baseline is around 2.4-2.6  -Follows with [facility] schedule to see in 2 weeks      Lactic Acidosis  -Secondary to above, now normal     Nausea, Vomiting, Diarrhea - improved  -Stomach fluid filled on CT imaging  -Anti-emetics as needed  -tolerating diet     Normocytic Anemia  -H&H stable from prior  -Likely secondary to CKD      Hyponatremia  -Likely secondary to volume overload  -stable      Hypokalemia  - assess for need daily due to renal fxn  -- 3.1 today will give 80 meq   -- mag also low 1.3 was replaced, recheck in am       HTN  -Continue Coreg     HLD  -Continue Lipitor      BPH  -Continue Flomax      Hypothyroidism  -Continue Synthroid     DVT prophylaxis:  Medical DVT prophylaxis orders are present.       AM-PAC 6 Clicks Score (PT): 18 (04/03/22 6162)    Disposition: I expect the patient to be discharged to rehab if patient agrees.    CODE STATUS:   Code Status and Medical Interventions:   Ordered at: 03/28/22 1258     Level Of Support Discussed With:    Patient    Health Care Surrogate     Code Status (Patient has no pulse and is not breathing):    CPR (Attempt to Resuscitate)     Medical Interventions (Patient has pulse or is breathing):    Full Support       Keri Saleem, APRN  04/04/22

## 2022-04-04 NOTE — PLAN OF CARE
Goal Outcome Evaluation:  Plan of Care Reviewed With: patient        Progress: no change  Outcome Evaluation: Pt currently on 2L NC. AV paced on monitor No complaints from patient overnight. PRN doxipen given for sleep per pt request. Pt rested well. VSS. Will continue to monitor.

## 2022-04-04 NOTE — PLAN OF CARE
Problem: Adult Inpatient Plan of Care  Goal: Plan of Care Review  Recent Flowsheet Documentation  Taken 4/4/2022 1030 by Kajal Corley OT  Progress: improving  Plan of Care Reviewed With: patient  Outcome Evaluation: Pt. eager to get OOB & move around. Pt. completed bed mobility with SUP & v/c's, STS with CGA using RW, ambulated in room to door & back 2x with CGA using RW, and chair t/f with CGA. Pt.'s O2 sats supine in bed @91% on 1LO2NC, and improved to 96% on RA after ambulating in room. Pt. incontinent of bladder & min A for UBD. Will cont to progress as able per POC. Recommend home with assist & HHOT upon d/c.   Goal Outcome Evaluation:  Plan of Care Reviewed With: patient        Progress: improving  Outcome Evaluation: Pt. eager to get OOB & move around. Pt. completed bed mobility with SUP & v/c's, STS with CGA using RW, ambulated in room to door & back 2x with CGA using RW, and chair t/f with CGA. Pt.'s O2 sats supine in bed @91% on 1LO2NC, and improved to 96% on RA after ambulating in room. Pt. incontinent of bladder & min A for UBD. Will cont to progress as able per POC. Recommend home with assist & HHOT upon d/c.

## 2022-04-04 NOTE — THERAPY TREATMENT NOTE
Acute Care - Speech Language Pathology   Swallow Treatment Note [facility]     Patient Name: Jet Floyd  : 1945  MRN: [id number]  Today's Date: 2022               Admit Date: 3/28/2022    Visit Dx:     ICD-10-CM ICD-9-CM   1. Acute respiratory distress  R06.03 518.82   2. Hypoxia  R09.02 799.02   3. Acute on chronic congestive heart failure, unspecified heart failure type ([facility])  I50.9 428.0   4. Multifocal pneumonia  J18.9 486   5. Chronic renal failure, unspecified CKD stage  N18.9 585.9   6. Peripheral edema  R60.9 782.3   7. Dysphagia, unspecified type  R13.10 787.20   8. Acute respiratory failure with hypoxia ([facility])  J96.01 518.81   9. Chronic systolic heart failure ([facility])  I50.22 428.22   10. Severe mitral valve regurgitation  I34.0 424.0   11. Balance problem  R26.89 781.99     Patient Active Problem List   Diagnosis   • Sepsis ([facility])   • CAP (community acquired pneumonia)   • Essential hypertension   • Hyperlipidemia   • Alcohol abuse, daily use   • Former smoker   • Dermatitis   • Balance problem   • Hypoglycemia   • Metabolic encephalopathy   • Lactic acidosis   • History of alcoholism ([facility])   • Hyperglycemia   • Pleural effusion   • CKD (chronic kidney disease) stage 3, GFR 30-59 ml/min ([facility])   • Cardiorenal syndrome   • Severe mitral valve regurgitation   • Chronic systolic heart failure ([facility])   • Spondylosis of lumbar region without myelopathy or radiculopathy   • DDD (degenerative disc disease), lumbar   • Osteoarthritis of right hip   • Polyneuropathy   • History of compression fracture of vertebral column   • Acute respiratory failure (HCC)     Past Medical History:   Diagnosis Date   • Cancer ([facility])    • CHF (congestive heart failure) ([facility])    • High blood pressure    • Hypertension    • Kidney disease    • Kidney disease    • Pneumonia      Past Surgical History:   Procedure Laterality Date   • CARDIAC DEFIBRILLATOR PLACEMENT Bilateral    • CARDIAC DEFIBRILLATOR REMOVAL  Bilateral 2003   • CARDIAC ELECTROPHYSIOLOGY PROCEDURE N/A 3/17/2020    Procedure: BIV ICD 74356;  Surgeon: Mynor Richard MD;  Location: [facility] INVASIVE LOCATION;  Service: Cardiology;  Laterality: N/A;   • CATARACT EXTRACTION Bilateral    • CERVICAL SPINE SURGERY Bilateral 2014       SLP Recommendation and Plan  SLP Swallowing Diagnosis: suspected pharyngeal dysphagia (04/04/22 0945)  SLP Diet Recommendation: regular textures, thin liquids (04/04/22 0945)  Recommended Precautions and Strategies: upright posture during/after eating, small bites of food and sips of liquid, multiple swallows per bite of food, multiple swallows per sip of liquid, alternate between small bites of food and sips of liquid, volitional throat clear, fatigue precautions (04/04/22 0945)  SLP Rec. for Method of Medication Administration: meds whole, meds crushed, with pudding or applesauce, as tolerated (04/04/22 0945)     Monitor for Signs of Aspiration: yes, notify SLP if any concerns (04/04/22 0945)     Swallow Criteria for Skilled Therapeutic Interventions Met: demonstrates skilled criteria (04/04/22 0945)  Anticipated Discharge Disposition (SLP): anticipate therapy at next level of care (04/04/22 0945)  Rehab Potential/Prognosis, Swallowing: good, to achieve stated therapy goals (04/04/22 0945)  Therapy Frequency (Swallow): 5 days per week (04/04/22 0945)  Predicted Duration Therapy Intervention (Days): until discharge (04/04/22 0945)     Daily Summary of Progress (SLP): progress toward functional goals as expected (04/04/22 0945)  Treatment Assessment (SLP): Pt reluctant to use swallow compensations during meal intake. Able to take small bites and sips during session; one cough after thin liquids via cup -- suspect d/t positioning. No other s/s during session. Will continue to monitor. (04/04/22 0945)  Plan for Continued Treatment (SLP): continue treatment per plan of care (04/04/22 0945)         Plan of Care Reviewed With:  patient  Progress: improving      SWALLOW EVALUATION (last 72 hours)     SLP Adult Swallow Evaluation     Row Name 04/04/22 0945                   Rehab Evaluation    Document Type therapy note (daily note)  -EN        Subjective Information no complaints  -EN        Patient Observations alert;cooperative;agree to therapy  -EN        Patient/Family/Caregiver Comments/Observations no family present  -EN        Patient Effort good  -EN        Symptoms Noted During/After Treatment none  -EN                  General Information    Patient Profile Reviewed yes  -EN                  Pain    Additional Documentation Pain Scale: FACES Pre/Post-Treatment (Group)  -EN                  Pain Scale: FACES Pre/Post-Treatment    Pain: FACES Scale, Pretreatment 0-->no hurt  -EN        Posttreatment Pain Rating 0-->no hurt  -EN                  SLP Evaluation Clinical Impression    SLP Swallowing Diagnosis suspected pharyngeal dysphagia  -EN        Functional Impact risk of aspiration/pneumonia  -EN        Rehab Potential/Prognosis, Swallowing good, to achieve stated therapy goals  -EN        Swallow Criteria for Skilled Therapeutic Interventions Met demonstrates skilled criteria  -EN                  SLP Treatment Clinical Impressions    Treatment Assessment (SLP) Pt reluctant to use swallow compensations during meal intake. Able to take small bites and sips during session; one cough after thin liquids via cup -- suspect d/t positioning. No other s/s during session. Will continue to monitor.  -EN        Daily Summary of Progress (SLP) progress toward functional goals as expected  -EN        Plan for Continued Treatment (SLP) continue treatment per plan of care  -EN        Care Plan Review care plan/treatment goals reviewed  -EN                  Recommendations    Therapy Frequency (Swallow) 5 days per week  -EN        Predicted Duration Therapy Intervention (Days) until discharge  -EN        SLP Diet Recommendation regular  textures;thin liquids  -EN        Recommended Precautions and Strategies upright posture during/after eating;small bites of food and sips of liquid;multiple swallows per bite of food;multiple swallows per sip of liquid;alternate between small bites of food and sips of liquid;volitional throat clear;fatigue precautions  -EN        Oral Care Recommendations Oral Care BID/PRN  -EN        SLP Rec. for Method of Medication Administration meds whole;meds crushed;with pudding or applesauce;as tolerated  -EN        Monitor for Signs of Aspiration yes;notify SLP if any concerns  -EN        Anticipated Discharge Disposition (SLP) anticipate therapy at next level of care  -EN              User Key  (r) = Recorded By, (t) = Taken By, (c) = Cosigned By    Initials Name Effective Dates    EN Ely May MS, CFY-SLP 05/20/21 -                 EDUCATION  The patient has been educated in the following areas:   Dysphagia (Swallowing Impairment).        SLP GOALS     Row Name 04/04/22 0945             Oral Nutrition/Hydration Goal 1 (SLP)    Oral Nutrition/Hydration Goal 1, SLP LTG: Pt will tolerate regular diet and thin liquids w/o s/sxs aspiration 100% of the time w/o cues.  -EN      Time Frame (Oral Nutrition/Hydration Goal 1, SLP) by discharge  -EN      Progress/Outcomes (Oral Nutrition/Hydration Goal 1, SLP) continuing progress toward goal  -EN              Lingual Strengthening Goal 1 (SLP)    Activity (Lingual Strengthening Goal 1, SLP) increase tongue back strength  -EN      Increase Tongue Back Strength lingual resistance exercises  -EN      [place]/Accuracy (Lingual Strengthening Goal 1, SLP) independently (over 90% accuracy)  -EN      Time Frame (Lingual Strengthening Goal 1, SLP) short term goal (STG)  -EN      Progress/Outcomes (Lingual Strengthening Goal 1, SLP) continuing progress toward goal  -EN              Pharyngeal Strengthening Exercise Goal 1 (SLP)    Activity (Pharyngeal Strengthening Goal 1, SLP)  increase timing;increase superior movement of the hyolaryngeal complex;increase anterior movement of the hyolaryngeal complex;increase squeeze/positive pressure generation;increase tongue base retraction;increase closure at entrance to airway/closure of airway at glottis;increase PES opening  -EN      Increase Timing prepping - 3 second prep or suck swallow or 3-step swallow  -EN      Increase Superior Movement of the Hyolaryngeal Complex Mendelsohn;effortful pitch glide (falsetto + pharyngeal squeeze)  -EN      Increase Anterior Movement of the Hyolaryngeal Complex chin tuck against resistance (CTAR)  -EN      Increase Closure at Entrance to Airway/Closure of Airway at Glottis super-supraglottic swallow  -EN      Increase Squeeze/Positive Pressure Generation jarvis  -EN      Increase Tongue Base Retraction hard effortful swallow  -EN      Increase PES Opening EMST  -EN      [place]/Accuracy (Pharyngeal Strengthening Goal 1, SLP) with minimal cues (75-90% accuracy)  -EN      Time Frame (Pharyngeal Strengthening Goal 1, SLP) short term goal (STG)  -EN      Progress/Outcomes (Pharyngeal Strengthening Goal 1, SLP) continuing progress toward goal  -EN              Swallow Compensatory Strategies Goal 1 (SLP)    Activity (Swallow Compensatory Strategies/Techniques Goal 1, SLP) compensatory strategies;during meal intake;during p.o. trials;small bites;small cup sips;small straw sips;alternate food/liquid intake;throat clear/extra swallow  -EN      [place]/Accuracy (Swallow Compensatory Strategies/Techniques Goal 1, SLP) independently (over 90% accuracy)  -EN      Time Frame (Swallow Compensatory Strategies/Techniques Goal 1, SLP) short term goal (STG)  -EN      Progress/Outcomes (Swallow Compensatory Strategies/Techniques Goal 1, SLP) continuing progress toward goal  -EN            User Key  (r) = Recorded By, (t) = Taken By, (c) = Cosigned By    Initials Name Provider Type    EN Ely May, MS, CFY-SLP  Speech and Language Pathologist                   Time Calculation:    Time Calculation- SLP     Row Name 04/04/22 1028             Time Calculation- SLP    SLP Start Time 0945  -EN      SLP Received On 04/04/22  -EN              Untimed Charges    39992-[id number] Treatment Swallow Minutes 30  -EN              Total Minutes    Untimed Charges Total Minutes 30  -EN       Total Minutes 30  -EN            User Key  (r) = Recorded By, (t) = Taken By, (c) = Cosigned By    Initials Name Provider Type    EN Ely May MS, CFY-SLP Speech and Language Pathologist                Therapy Charges for Today     Code Description Service Date Service Provider Modifiers Qty    [id number]  ST TREATMENT SWALLOW 2 4/4/2022 Ely May MS, CFY-SLP GN 1               Ely May MS, JONATHAN-SLP  4/4/2022

## 2022-04-04 NOTE — CASE MANAGEMENT/SOCIAL WORK
Continued Stay Note   Madh[facility]     Patient Name: Jet Floyd  MRN: [id number]  Today's Date: 4/4/2022    Admit Date: 3/28/2022     Discharge Plan     Row Name 04/04/22 1159       Plan    Plan update    Patient/Family in Agreement with Plan yes    Plan Comments Spoke with patient at bedside regarding discharge plan and discussed PT/OT notes.  Patient indicates he would like to go home with OP PT as he has done before.  No other needs, no family at bedside.  Spoke with patients wife via telephone to discuss where he has been to OP who reports that over the weekend they had both been in agreement with him going to rehab and would like a referral to [facility] placed.  She reports she will discuss this with him when she arrives but that they had agreeed upon this when PT saw him.  No other discharge needs verbalized.  Called [facility] liaison who accepted referral for review.  CM following.  Patient plan is to discharge to [facility] if bed offered.    Final Discharge Disposition Code 62 - inpatient rehab facility               Discharge Codes    No documentation.               Expected Discharge Date and Time     Expected Discharge Date Expected Discharge Time    Apr 4, 2022             Xiomara Goodwin, IWONA

## 2022-04-04 NOTE — THERAPY TREATMENT NOTE
Patient Name: Jet Floyd  : 1945    MRN: [id number]                              Today's Date: 2022       Admit Date: 3/28/2022    Visit Dx:     ICD-10-CM ICD-9-CM   1. Acute respiratory distress  R06.03 518.82   2. Hypoxia  R09.02 799.02   3. Acute on chronic congestive heart failure, unspecified heart failure type ([facility])  I50.9 428.0   4. Multifocal pneumonia  J18.9 486   5. Chronic renal failure, unspecified CKD stage  N18.9 585.9   6. Peripheral edema  R60.9 782.3   7. Dysphagia, unspecified type  R13.10 787.20   8. Acute respiratory failure with hypoxia ([facility])  J96.01 518.81   9. Chronic systolic heart failure ([facility])  I50.22 428.22   10. Severe mitral valve regurgitation  I34.0 424.0   11. Balance problem  R26.89 781.99     Patient Active Problem List   Diagnosis   • Sepsis ([facility])   • CAP (community acquired pneumonia)   • Essential hypertension   • Hyperlipidemia   • Alcohol abuse, daily use   • Former smoker   • Dermatitis   • Balance problem   • Hypoglycemia   • Metabolic encephalopathy   • Lactic acidosis   • History of alcoholism ([facility])   • Hyperglycemia   • Pleural effusion   • CKD (chronic kidney disease) stage 3, GFR 30-59 ml/min ([facility])   • Cardiorenal syndrome   • Severe mitral valve regurgitation   • Chronic systolic heart failure ([facility])   • Spondylosis of lumbar region without myelopathy or radiculopathy   • DDD (degenerative disc disease), lumbar   • Osteoarthritis of right hip   • Polyneuropathy   • History of compression fracture of vertebral column   • Acute respiratory failure ([facility])     Past Medical History:   Diagnosis Date   • Cancer ([facility])    • CHF (congestive heart failure) ([facility])    • High blood pressure    • Hypertension    • Kidney disease    • Kidney disease    • Pneumonia      Past Surgical History:   Procedure Laterality Date   • CARDIAC DEFIBRILLATOR PLACEMENT Bilateral    • CARDIAC DEFIBRILLATOR REMOVAL Bilateral    • CARDIAC ELECTROPHYSIOLOGY PROCEDURE N/A  3/17/2020    Procedure: BIV ICD 79983;  Surgeon: Mynor Richard MD;  Location: [facility] INVASIVE LOCATION;  Service: Cardiology;  Laterality: N/A;   • CATARACT EXTRACTION Bilateral    • CERVICAL SPINE SURGERY Bilateral 2014      General Information     Row Name 04/04/22 1000          OT Time and Intention    Document Type therapy note (daily note)  -SD     Mode of Treatment occupational therapy  -SD     Row Name 04/04/22 1000          General Information    Patient Profile Reviewed yes  -SD     Existing Precautions/Restrictions fall;oxygen therapy device and L/min  -SD     Barriers to Rehab medically complex  -SD     Row Name 04/04/22 1000          Occupational Profile    Occupational History/Life Experiences (Occupational Profile) worked at [employer] as  for 12 years  -SD     Row Name 04/04/22 1000          Cognition    Orientation Status (Cognition) oriented x 4  -SD     Row Name 04/04/22 1000          Safety Issues, Functional Mobility    Safety Issues Affecting Function (Mobility) insight into deficits/self-awareness;safety precaution awareness;safety precautions follow-through/compliance  -SD     Impairments Affecting Function (Mobility) balance;endurance/activity tolerance;strength  -SD           User Key  (r) = Recorded By, (t) = Taken By, (c) = Cosigned By    Initials Name Provider Type    SD Kajal Corley, OT Occupational Therapist                 Mobility/ADL's     Row Name 04/04/22 1026          Bed Mobility    Bed Mobility rolling right;scooting/bridging;supine-sit  -SD     Rolling Right [place] (Bed Mobility) supervision;verbal cues  -SD     Scooting/Bridging [place] (Bed Mobility) supervision;verbal cues  -SD     Supine-Sit [place] (Bed Mobility) supervision;verbal cues  -SD     Assistive Device (Bed Mobility) head of bed elevated;bed rails  -SD     Row Name 04/04/22 1026          Transfers    Transfers bed-chair transfer;sit-stand transfer;stand-sit  transfer  -SD     Bed-Chair [place] (Transfers) contact guard;verbal cues;1 person assist  -SD     Assistive Device (Bed-Chair Transfers) walker, front-wheeled  -SD     Sit-Stand [place] (Transfers) contact guard;verbal cues;1 person assist  -SD     Stand-Sit [place] (Transfers) contact guard;verbal cues  -SD     [facility] Name 04/04/22 1026          Sit-Stand Transfer    Assistive Device (Sit-Stand Transfers) walker, front-wheeled  -SD     Row Name 04/04/22 1026          Functional Mobility    Functional Mobility- Ind. Level verbal cues required;contact guard assist  -SD     Functional Mobility- Device rolling walker  -SD     Functional Mobility-Distance (Feet) 50  -SD     Functional Mobility- Safety Issues balance decreased during turns  -SD     Row Name 04/04/22 1026          Activities of Daily Living    BADL Assessment/Intervention upper body dressing  -SD     [facility] Name 04/04/22 1026          Stand-Sit Transfer    Assistive Device (Stand-Sit Transfers) walker, front-wheeled  -SD     [facility] Name 04/04/22 1026          Toileting Assessment/Training    Comment, (Toileting) chucks under patient in bed were soaked with urine  -[facility] Name 04/04/22 1026          Grooming Assessment/Training    Comment, (Grooming) pt. declined tasks  -SD     [facility] Name 04/04/22 1026          Upper Body Dressing Assessment/Training    [place] Level (Upper Body Dressing) doff;don;pajama/robe;minimum assist (75% patient effort)  -SD     Position (Upper Body Dressing) edge of bed sitting  -SD           User Key  (r) = Recorded By, (t) = Taken By, (c) = Cosigned By    Initials Name Provider Type    Kajal Zelaya OT Occupational Therapist               Obj/Interventions     Row Name 04/04/22 1030          Balance    Static Sitting Balance supervision  -SD     Dynamic Sitting Balance supervision  -SD     Position, Sitting Balance unsupported;sitting edge of bed  -SD     Sit to Stand Dynamic Balance contact guard  -SD      Static Standing Balance contact guard  -SD     Dynamic Standing Balance contact guard  -SD     Position/Device Used, Standing Balance supported;walker, front-wheeled  -SD           User Key  (r) = Recorded By, (t) = Taken By, (c) = Cosigned By    Initials Name Provider Type    Kajal Zelaya, MATHEUS Occupational Therapist               Goals/Plan    No documentation.                Clinical Impression     Row Name 04/04/22 1030          Pain Assessment    Pretreatment Pain Rating 0/10 - no pain  -SD     Posttreatment Pain Rating 0/10 - no pain  -SD     Row Name 04/04/22 1030          Plan of Care Review    Plan of Care Reviewed With patient  -SD     Progress improving  -SD     Outcome Evaluation Pt. eager to get OOB & move around. Pt. completed bed mobility with SUP & v/c's, STS with CGA using RW, ambulated in room to door & back 2x with CGA using RW, and chair t/f with CGA. Pt.'s O2 sats supine in bed @91% on 1LO2NC, and improved to 96% on RA after ambulating in room. Pt. incontinent of bladder & min A for UBD. Will cont to progress as able per POC. Recommend home with assist & HHOT upon d/c.  -SD     Row Name 04/04/22 1030          Therapy Assessment/Plan (OT)    Rehab Potential (OT) good, to achieve stated therapy goals  -SD     Criteria for Skilled Therapeutic Interventions Met (OT) yes;meets criteria;skilled treatment is necessary  -SD     Therapy Frequency (OT) daily  -SD     Row Name 04/04/22 1207 04/04/22 1030       Therapy Plan Review/Discharge Plan (OT)    Anticipated Discharge Disposition (OT) --  -SD inpatient rehabilitation facility  -SD    Row Name 04/04/22 1030          Vital Signs    Pre Systolic BP Rehab 116  -SD     Pre Treatment Diastolic BP 63  -SD     Post Systolic BP Rehab 131  -SD     Post Treatment Diastolic BP 68  -SD     Posttreatment Heart Rate (beats/min) 75  -SD     Pre SpO2 (%) 91  -SD     O2 Delivery Pre Treatment nasal cannula  -SD     Intra SpO2 (%) 95  -SD     O2 Delivery  Intra Treatment room air  -SD     Post SpO2 (%) 96  -SD     O2 Delivery Post Treatment room air  -SD     Pre Patient Position Supine  -SD     Intra Patient Position Standing  -SD     Post Patient Position Sitting  -SD     Row Name 04/04/22 1030          Positioning and Restraints    Pre-Treatment Position in bed  -SD     Post Treatment Position chair  -SD     In Chair notified nsg;reclined;call light within reach;encouraged to call for assist;exit alarm on;with nsg;waffle cushion;legs elevated  -SD           User Key  (r) = Recorded By, (t) = Taken By, (c) = Cosigned By    Initials Name Provider Type    Kajal Zelaya OT Occupational Therapist               Outcome Measures     Row Name 04/04/22 1000          How much help from another is currently needed...    Putting on and taking off regular lower body clothing? 2  -SD     Bathing (including washing, rinsing, and drying) 2  -SD     Toileting (which includes using toilet bed pan or urinal) 2  -SD     Putting on and taking off regular upper body clothing 3  -SD     Taking care of personal grooming (such as brushing teeth) 3  -SD     Eating meals 4  -SD     AM-PAC 6 Clicks Score (OT) 16  -SD     Row Name 04/04/22 1000          Functional Assessment    Outcome Measure Options AM-PAC 6 Clicks Daily Activity (OT)  -SD           User Key  (r) = Recorded By, (t) = Taken By, (c) = Cosigned By    Initials Name Provider Type    Kajal Zelaya OT Occupational Therapist                Occupational Therapy Education                 Title: PT OT SLP Therapies (In Progress)     Topic: Occupational Therapy (In Progress)     Point: ADL training (Done)     Description:   Instruct learner(s) on proper safety adaptation and remediation techniques during self care or transfers.   Instruct in proper use of assistive devices.              Learning Progress Summary           Patient Acceptance, E, VU by SENIA at 3/31/2022 1542    Acceptance, E, VU by BENITO at 3/29/2022  1042    Comment: Role of OT; benefits of activity; OT POC   Family Acceptance, E, VU by AN at 3/31/2022 1542                   Point: Home exercise program (Not Started)     Description:   Instruct learner(s) on appropriate technique for monitoring, assisting and/or progressing therapeutic exercises/activities.              Learner Progress:  Not documented in this visit.          Point: Precautions (Done)     Description:   Instruct learner(s) on prescribed precautions during self-care and functional transfers.              Learning Progress Summary           Patient Acceptance, E, VU by AN at 3/31/2022 1542    Acceptance, E, VU by BENITO at 3/29/2022 1042    Comment: Role of OT; benefits of activity; OT POC   Family Acceptance, E, VU by AN at 3/31/2022 1542                   Point: Body mechanics (Done)     Description:   Instruct learner(s) on proper positioning and spine alignment during self-care, functional mobility activities and/or exercises.              Learning Progress Summary           Patient Acceptance, E, VU by AN at 3/31/2022 1542    Acceptance, E, VU by BENITO at 3/29/2022 1042    Comment: Role of OT; benefits of activity; OT POC   Family Acceptance, E, VU by AN at 3/31/2022 1542                               User Key     Initials Effective Dates Name Provider Type Discipline     06/16/21 -  Rosa Eid OT Occupational Therapist OT    SENIA 09/21/21 -  Shabana West OT Occupational Therapist OT              OT Recommendation and Plan  Therapy Frequency (OT): daily  Plan of Care Review  Plan of Care Reviewed With: patient  Progress: improving  Outcome Evaluation: Pt. eager to get OOB & move around. Pt. completed bed mobility with SUP & v/c's, STS with CGA using RW, ambulated in room to door & back 2x with CGA using RW, and chair t/f with CGA. Pt.'s O2 sats supine in bed @91% on 1LO2NC, and improved to 96% on RA after ambulating in room. Pt. incontinent of bladder & min A for UBD. Will cont to  progress as able per POC. Recommend home with assist & HHOT upon d/c.     Time Calculation:    Time Calculation- OT     Row Name 04/04/22 1035             Time Calculation- OT    OT Received On 04/04/22  -SD      OT Goal Re-Cert Due Date 04/08/22  -SD              Timed Charges    46830 - OT Therapeutic Activity Minutes 12  -SD      49172 - OT Self Care/Mgmt Minutes 12  -SD              Total Minutes    Timed Charges Total Minutes 24  -SD       Total Minutes 24  -SD            User Key  (r) = Recorded By, (t) = Taken By, (c) = Cosigned By    Initials Name Provider Type    Kajal Zelaya OT Occupational Therapist              Therapy Charges for Today     Code Description Service Date Service Provider Modifiers Qty    39498153197 HC OT THERAPEUTIC ACT EA 15 MIN 4/4/2022 Kajal Corley OT GO 1    34415526972 HC OT SELF CARE/MGMT/TRAIN EA 15 MIN 4/4/2022 Kajal Corley OT GO 1               Kajal Corley OT  4/4/2022

## 2022-04-05 ENCOUNTER — HOME HEALTH ADMISSION (OUTPATIENT)
Dept: HOME HEALTH SERVICES | Facility: HOME HEALTHCARE | Age: 77
End: 2022-04-05

## 2022-04-05 VITALS
SYSTOLIC BLOOD PRESSURE: 113 MMHG | HEART RATE: 71 BPM | RESPIRATION RATE: 16 BRPM | OXYGEN SATURATION: 96 % | HEIGHT: 69 IN | BODY MASS INDEX: 18.41 KG/M2 | WEIGHT: 124.3 LBS | DIASTOLIC BLOOD PRESSURE: 57 MMHG | TEMPERATURE: 97.9 F

## 2022-04-05 LAB
ANION GAP SERPL CALCULATED.3IONS-SCNC: 13 MMOL/L (ref 5–15)
BUN SERPL-MCNC: 32 MG/DL (ref 8–23)
BUN/CREAT SERPL: 13 (ref 7–25)
CALCIUM SPEC-SCNC: 8.2 MG/DL (ref 8.6–10.5)
CHLORIDE SERPL-SCNC: 97 MMOL/L (ref 98–107)
CO2 SERPL-SCNC: 23 MMOL/L (ref 22–29)
CREAT SERPL-MCNC: 2.46 MG/DL (ref 0.76–1.27)
EGFRCR SERPLBLD CKD-EPI 2021: 26.5 ML/MIN/1.73
GLUCOSE SERPL-MCNC: 108 MG/DL (ref 65–99)
MAGNESIUM SERPL-MCNC: 1.9 MG/DL (ref 1.6–2.4)
POTASSIUM SERPL-SCNC: 3.7 MMOL/L (ref 3.5–5.2)
SODIUM SERPL-SCNC: 133 MMOL/L (ref 136–145)

## 2022-04-05 PROCEDURE — 83735 ASSAY OF MAGNESIUM: CPT | Performed by: NURSE PRACTITIONER

## 2022-04-05 PROCEDURE — 97110 THERAPEUTIC EXERCISES: CPT

## 2022-04-05 PROCEDURE — 80048 BASIC METABOLIC PNL TOTAL CA: CPT | Performed by: INTERNAL MEDICINE

## 2022-04-05 PROCEDURE — 25010000002 HEPARIN (PORCINE) PER 1000 UNITS: Performed by: FAMILY MEDICINE

## 2022-04-05 PROCEDURE — 99239 HOSP IP/OBS DSCHRG MGMT >30: CPT | Performed by: NURSE PRACTITIONER

## 2022-04-05 RX ORDER — FUROSEMIDE 40 MG/1
40 TABLET ORAL DAILY
Start: 2022-04-06

## 2022-04-05 RX ORDER — CARVEDILOL 3.12 MG/1
3.12 TABLET ORAL 2 TIMES DAILY WITH MEALS
Start: 2022-04-05

## 2022-04-05 RX ORDER — ASPIRIN 81 MG/1
81 TABLET, CHEWABLE ORAL DAILY
Start: 2022-04-06

## 2022-04-05 RX ADMIN — HEPARIN SODIUM 5000 UNITS: 5000 INJECTION, SOLUTION INTRAVENOUS; SUBCUTANEOUS at 08:47

## 2022-04-05 RX ADMIN — MICONAZOLE NITRATE 1 APPLICATION: 20 CREAM TOPICAL at 13:15

## 2022-04-05 RX ADMIN — EMPAGLIFLOZIN 10 MG: 10 TABLET, FILM COATED ORAL at 13:15

## 2022-04-05 RX ADMIN — Medication 10 ML: at 09:31

## 2022-04-05 RX ADMIN — ASPIRIN 81 MG 81 MG: 81 TABLET ORAL at 08:47

## 2022-04-05 RX ADMIN — ATORVASTATIN CALCIUM 20 MG: 20 TABLET, FILM COATED ORAL at 08:47

## 2022-04-05 RX ADMIN — TAMSULOSIN HYDROCHLORIDE 0.4 MG: 0.4 CAPSULE ORAL at 08:47

## 2022-04-05 RX ADMIN — LEVOTHYROXINE SODIUM 50 MCG: 50 TABLET ORAL at 06:43

## 2022-04-05 RX ADMIN — CARVEDILOL 3.12 MG: 3.12 TABLET, FILM COATED ORAL at 08:47

## 2022-04-05 RX ADMIN — FUROSEMIDE 40 MG: 40 TABLET ORAL at 06:43

## 2022-04-05 RX ADMIN — Medication 1000 MCG: at 08:47

## 2022-04-05 RX ADMIN — ALLOPURINOL 100 MG: 100 TABLET ORAL at 08:47

## 2022-04-05 NOTE — PROGRESS NOTES
"                                 [facility] Progress Note      LOS: 8 days   Patient Care Team:  Obi Soriano MD as PCP - General  Obi Soriano MD as PCP - Family Medicine    Chief Complaint:    Chief Complaint   Patient presents with   • Shortness of Breath       Subjective     Interval History: Quiet night, no new events    Patient Complaints: No chest pain or dyspnea.  Appears comfortable in bed.  No nausea vomiting or abdominal pain  CM looking into rehab placement.      Review of Systems:   A 14 point review of systems was negative except as was stated in the HPI      Objective     Vital Sign Min/Max for last 24 hours  Temp  Min: 97.4 °F (36.3 °C)  Max: 98 °F (36.7 °C)   BP  Min: 116/58  Max: 130/76   Pulse  Min: 76  Max: 103   Resp  Min: 16  Max: 18   SpO2  Min: 90 %  Max: 99 %   Flow (L/min)  Min: 2  Max: 2   Weight  Min: 56.4 kg (124 lb 4.8 oz)  Max: 56.4 kg (124 lb 4.8 oz)     Flowsheet Rows    Flowsheet Row First Filed Value   Admission Height 175.3 cm (69\") Documented at 03/28/2022 1844   Admission Weight 58.5 kg (129 lb) Documented at 03/28/2022 1844          Physical Exam:  General Appearance: Alert, appears stated age and cooperative  Lungs: Few basilar Rales  Heart:: RRR   No Murmurs, Rubs or Gallops  Abdomen: Soft and nontender with adequate bowel sounds.  No organomegaly  Extremities: No cyanosis, clubbing or edema  Pulses: Pulses palpable and equal bilaterally  Skin: Warm and dry with no rash  Psych: Normal     Results Review:     I reviewed the patient's new clinical results.  Results from last 7 days   Lab Units 04/05/22  0553 04/04/22  0323 04/03/22  0713   SODIUM mmol/L 133* 134* 134*   POTASSIUM mmol/L 3.7 3.1* 3.5   CHLORIDE mmol/L 97* 96* 97*   CO2 mmol/L 23.0 23.0 21.0*   BUN mg/dL 32* 35* 34*   CREATININE mg/dL 2.46* 2.67* 2.57*   GLUCOSE mg/dL 108* 85 74   CALCIUM mg/dL 8.2* 8.3* 8.3*     Results from last 7 days   Lab Units 04/02/22  1201   WBC " 10*3/mm3 4.45   HEMOGLOBIN g/dL 12.2*   HEMATOCRIT % 35.3*   PLATELETS 10*3/mm3 79*     Lab Results   Lab Value Date/Time    TROPONINT 0.018 03/28/2022 1953    TROPONINT <0.010 03/14/2020 2111    TROPONINT <0.010 02/15/2020 0721    TROPONINT <0.010 02/15/2020 0158    TROPONINT <0.010 02/14/2020 2013                       Medication Review: yes  Current Facility-Administered Medications   Medication Dose Route Frequency Provider Last Rate Last Admin   • acetaminophen (TYLENOL) tablet 650 mg  650 mg Oral Q4H PRN Marily Russo DO   650 mg at 04/03/22 0808    Or   • acetaminophen (TYLENOL) 160 MG/5ML solution 650 mg  650 mg Oral Q4H PRN Marily Russo DO        Or   • acetaminophen (TYLENOL) suppository 650 mg  650 mg Rectal Q4H PRN Marily Russo DO       • allopurinol (ZYLOPRIM) tablet 100 mg  100 mg Oral Daily Marily Russo DO   100 mg at 04/05/22 0847   • aspirin chewable tablet 81 mg  81 mg Oral Daily Marily Russo DO   81 mg at 04/05/22 0847   • atorvastatin (LIPITOR) tablet 20 mg  20 mg Oral Daily Marily Russo DO   20 mg at 04/05/22 0847   • carvedilol (COREG) tablet 3.125 mg  3.125 mg Oral BID With Meals Marily Russo DO   3.125 mg at 04/05/22 0847   • doxepin (SINEquan) capsule 10 mg  10 mg Oral Nightly PRN Radha Salazar DO   10 mg at 04/04/22 2238   • empagliflozin (JARDIANCE) tablet 10 mg  10 mg Oral Daily Mynor Richard MD   10 mg at 04/04/22 0856   • furosemide (LASIX) tablet 40 mg  40 mg Oral Daily Keri Saleem APRN   40 mg at 04/05/22 0643   • heparin (porcine) 5000 UNIT/ML injection 5,000 Units  5,000 Units Subcutaneous Q12H Marily Russo DO   5,000 Units at 04/05/22 0847   • ipratropium-albuterol (DUO-NEB) nebulizer solution 3 mL  3 mL Nebulization Q4H PRN Marily Russo DO       • levothyroxine (SYNTHROID, LEVOTHROID) tablet 50 mcg  50 mcg Oral Q AM Marily Russo DO   50 mcg at 04/05/22 0643   • magnesium sulfate 4 gram  infusion - Mg less than or equal to 1mg/dL  4 g Intravenous PRN Radha Salazar, DO        Or   • magnesium sulfate 3 gram infusion (1gm x 3) - Mg 1.1 - 1.5 mg/dL  1 g Intravenous PRN Radha Salazar,  mL/hr at 04/04/22 1110 1 g at 04/04/22 1110    Or   • Magnesium Sulfate 2 gram infusion- Mg 1.6 - 1.9 mg/dL  2 g Intravenous PRN Radha Salazar, DO       • miconazole (MICOTIN) 2 % cream 1 application  1 application Topical Q12H Keri Saleem, APRN       • ondansetron (ZOFRAN) tablet 4 mg  4 mg Oral Q6H PRN Marily Russo DO        Or   • ondansetron (ZOFRAN) injection 4 mg  4 mg Intravenous Q6H PRN Marily Russo DO   4 mg at 04/04/22 2052   • Pharmacy Consult - MTM   Does not apply Daily Ramakrishna Ignacio [facility]   Given at 04/02/22 0902   • sodium chloride 0.9 % flush 10 mL  10 mL Intravenous PRN Juana Hamlin MD       • sodium chloride 0.9 % flush 10 mL  10 mL Intravenous Q12H Marily Russo DO   10 mL at 04/05/22 0931   • sodium chloride 0.9 % flush 10 mL  10 mL Intravenous PRN Marily Russo DO       • tamsulosin (FLOMAX) 24 hr capsule 0.4 mg  0.4 mg Oral Daily Marily Russo DO   0.4 mg at 04/05/22 0847   • vitamin B-12 (CYANOCOBALAMIN) tablet 1,000 mcg  1,000 mcg Oral Daily Marily Russo DO   1,000 mcg at 04/05/22 0847         Sepsis (HCC)    CAP (community acquired pneumonia)    Essential hypertension    Hyperlipidemia    Lactic acidosis    History of alcoholism (HCC)    Pleural effusion    CKD (chronic kidney disease) stage 3, GFR 30-59 ml/min (HCC)    Chronic systolic heart failure (HCC)    Acute respiratory failure (HCC)        Impression      Chronic systolic heart failure  Chronic kidney disease--worsening  Nonischemic cardiomyopathy  [place] biventricular pacemaker/ICD with reprogramming to multipoint pacing this admission  Hypertension  Hyperlipidemia  History EtOH abuse  Room air O2 sat is 90% this morning    Plan       Monitor renal  function--relatively stable  Blood pressure has chronically been too low for ARB/ACE/Entresto  Okay for discharge from cardiology standpoint.  We will see in the office in 3 to 4 weeks    Beena Oreilly PA-C working with Mynor Richard MD   04/05/22  11:32 EDT

## 2022-04-05 NOTE — CASE MANAGEMENT/SOCIAL WORK
Case Management Discharge Note      Final Note: Received a call from Tiffanie with [facility] who advises patient has a bed today on the Med Unit.  Hospitalist FIONA notified.  Contacted tranportation scheduling who has made arrangements for the [facility] van to transport today at 1430.  Spoke with patient at bedside to advise who verbalizes understanding and agreement with plan.  No other discharge needs verbalized.  Called patient wife, Heather, and left  with name, title and callback number.  RN notified of bed and to have patient in front of the [facility] entrance by 1415.  Patient plan is to discharge to [facility] Med Unit today via [facility] medical van scheduled for 1430.         Selected Continued Care - Admitted Since 3/28/2022     Destination Coordination complete.    Service Provider Selected Services Address Phone Fax Patient Preferred    [facility]  Inpatient Rehabilitation [address] [place] [address] [phone] --          Durable Medical Equipment    No services have been selected for the patient.              Dialysis/Infusion    No services have been selected for the patient.              Home Medical Care Coordination complete.    Service Provider Selected Services Address Phone Fax Patient Preferred    [facility]  Home Health Services ,  Home Nursing ,  Home Rehabilitation [address] [address] [phone] --          Therapy    No services have been selected for the patient.              Community Resources    No services have been selected for the patient.              Community & DME    No services have been selected for the patient.                       Final Discharge Disposition Code: 62 - inpatient rehab facility

## 2022-04-05 NOTE — THERAPY DISCHARGE NOTE
Patient Name: Jet Floyd  : 1945    MRN: [id number]                              Today's Date: 2022       Admit Date: 3/28/2022    Visit Dx:     ICD-10-CM ICD-9-CM   1. Acute respiratory distress  R06.03 518.82   2. Hypoxia  R09.02 799.02   3. Acute on chronic congestive heart failure, unspecified heart failure type ([facility])  I50.9 428.0   4. Multifocal pneumonia  J18.9 486   5. Chronic renal failure, unspecified CKD stage  N18.9 585.9   6. Peripheral edema  R60.9 782.3   7. Dysphagia, unspecified type  R13.10 787.20   8. Acute respiratory failure with hypoxia ([facility])  J96.01 518.81   9. Chronic systolic heart failure ([facility])  I50.22 428.22   10. Severe mitral valve regurgitation  I34.0 424.0   11. Balance problem  R26.89 781.99     Patient Active Problem List   Diagnosis   • Sepsis ([facility])   • CAP (community acquired pneumonia)   • Essential hypertension   • Hyperlipidemia   • Alcohol abuse, daily use   • Former smoker   • Dermatitis   • Balance problem   • Hypoglycemia   • Metabolic encephalopathy   • Lactic acidosis   • History of alcoholism ([facility])   • Hyperglycemia   • Pleural effusion   • CKD (chronic kidney disease) stage 3, GFR 30-59 ml/min ([facility])   • Cardiorenal syndrome   • Severe mitral valve regurgitation   • Chronic systolic heart failure ([facility])   • Spondylosis of lumbar region without myelopathy or radiculopathy   • DDD (degenerative disc disease), lumbar   • Osteoarthritis of right hip   • Polyneuropathy   • History of compression fracture of vertebral column   • Acute respiratory failure ([facility])     Past Medical History:   Diagnosis Date   • Cancer ([facility])    • CHF (congestive heart failure) ([facility])    • High blood pressure    • Hypertension    • Kidney disease    • Kidney disease    • Pneumonia      Past Surgical History:   Procedure Laterality Date   • CARDIAC DEFIBRILLATOR PLACEMENT Bilateral    • CARDIAC DEFIBRILLATOR REMOVAL Bilateral    • CARDIAC ELECTROPHYSIOLOGY PROCEDURE N/A  3/17/2020    Procedure: BIV ICD 51979;  Surgeon: Mynor Richard MD;  Location: [facility] INVASIVE LOCATION;  Service: Cardiology;  Laterality: N/A;   • CATARACT EXTRACTION Bilateral    • CERVICAL SPINE SURGERY Bilateral 2014      General Information     Row Name 04/05/22 1343          Physical Therapy Time and Intention    Document Type discharge treatment  -DM     Mode of Treatment physical therapy  -DM     Row Name 04/05/22 1343          General Information    Existing Precautions/Restrictions fall  O2 d/c'd 4-5; DJD & DDD L. Spine;4/'21: comp.fx spine  -DM           User Key  (r) = Recorded By, (t) = Taken By, (c) = Cosigned By    Initials Name Provider Type    Marleen Lloyd, PT Physical Therapist               Mobility     Row Name 04/05/22 1343          Bed Mobility    Comment, (Bed Mobility) UIC;Nsg d/c'd O2 this AM;leaving for [facility] this PM  -DM     Row Name 04/05/22 1343          Transfers    Comment, (Transfers) decl. STS; c/o fat. from being awakened  from sleep several times during night  -DM     Row Name 04/05/22 1343          Bed-Chair Transfer    Bed-Chair [place] (Transfers) not tested  -DM     Row Name 04/05/22 1343          Sit-Stand Transfer    Sit-Stand [place] (Transfers) not tested  -DM     Row Name 04/05/22 1343          Gait/Stairs (Locomotion)    [place] Level (Gait) not tested  -DM     Comment, (Gait/Stairs) decl.  -DM           User Key  (r) = Recorded By, (t) = Taken By, (c) = Cosigned By    Initials Name Provider Type    DM Marleen Winchester, PT Physical Therapist               Obj/Interventions     Row Name 04/05/22 1343          Motor Skills    Therapeutic Exercise shoulder;hip;knee;ankle  issued HEP & instructed  -DM     Row Name 04/05/22 1343          Shoulder (Therapeutic Exercise)    Shoulder (Therapeutic Exercise) AROM (active range of motion)  -DM     Shoulder AROM (Therapeutic Exercise) bilateral;flexion;extension;aBduction;aDduction;sitting;10  repetitions;other (see comments)  biceps curls  -DM     Row Name 04/05/22 1343          Hip (Therapeutic Exercise)    Hip (Therapeutic Exercise) AROM (active range of motion);isometric exercises  -DM     Hip AROM (Therapeutic Exercise) bilateral;flexion;extension;aBduction;aDduction;external rotation;internal rotation;sitting;10 repetitions;other (see comments)  B BKFO  -DM     Hip Isometrics (Therapeutic Exercise) bilateral;aDduction;gluteal sets;sitting;10 repetitions  -DM     Row Name 04/05/22 1343          Knee (Therapeutic Exercise)    Knee (Therapeutic Exercise) AROM (active range of motion);isometric exercises;AAROM (active assistive range of motion)  -DM     Knee AROM (Therapeutic Exercise) bilateral;flexion;extension;SAQ (short arc quad);SLR (straight leg raise);LAQ (long arc quad);heel slides;sitting;10 repetitions  -DM     Knee AAROM (Therapeutic Exercise) bilateral;flexion;sitting;10 repetitions;extension  min A for H.slides d/t tennis shoes catching on vinyl legrest  -DM     Knee Isometrics (Therapeutic Exercise) bilateral;hamstring sets;quad sets;sitting;10 repetitions  only did 5 reps HS  -DM     Row Name 04/05/22 1343          Ankle (Therapeutic Exercise)    Ankle (Therapeutic Exercise) AROM (active range of motion)  -DM     Ankle AROM (Therapeutic Exercise) bilateral;dorsiflexion;plantarflexion;sitting;10 repetitions;other (see comments)  AC  -DM     Row Name 04/05/22 1343          Balance    Balance Assessment sitting static balance;sitting dynamic balance  -DM     Static Sitting Balance independent  Sat forw. in chair for O[place],LAQ  -DM     Dynamic Sitting Balance supervision  recip scooting; did not want to reach/retrieve bag off bed (asked PT to do so, removed items, & return to EOB)  -DM     Position, Sitting Balance unsupported;sitting in chair  -DM           User Key  (r) = Recorded By, (t) = Taken By, (c) = Cosigned By    Initials Name Provider Type    Marleen Lloyd, PT Physical  Therapist               Goals/Plan     Row Name 04/05/22 1343          Bed Mobility Goal 1 (PT)    Activity/Assistive Device (Bed Mobility Goal 1, PT) sit to supine/supine to sit  -DM     [place] Level/Cues Needed (Bed Mobility Goal 1, PT) independent  -DM     Time Frame (Bed Mobility Goal 1, PT) 2 weeks  -DM     Progress/Outcomes (Bed Mobility Goal 1, PT) goal partially met  -DM     Row Name 04/05/22 1343          Transfer Goal 1 (PT)    Activity/Assistive Device (Transfer Goal 1, PT) sit-to-stand/stand-to-sit;walker, rolling  -DM     [place] Level/Cues Needed (Transfer Goal 1, PT) modified independence  -DM     Time Frame (Transfer Goal 1, PT) 2 weeks  -DM     Progress/Outcome (Transfer Goal 1, PT) goal not met  -DM     Row Name 04/05/22 1343          Gait Training Goal 1 (PT)    Activity/Assistive Device (Gait Training Goal 1, PT) gait (walking locomotion);walker, rolling  -DM     [place] Level (Gait Training Goal 1, PT) modified independence  -DM     Distance (Gait Training Goal 1, PT) 150  -DM     Time Frame (Gait Training Goal 1, PT) 2 weeks  -DM     Progress/Outcome (Gait Training Goal 1, PT) goal not met  -DM     Row Name 04/05/22 1343          Stairs Goal 1 (PT)    Activity/Assistive Device (Stairs Goal 1, PT) ascending stairs;descending stairs  -DM     [place] Level/Cues Needed (Stairs Goal 1, PT) supervision required  -DM     Number of Stairs (Stairs Goal 1, PT) 15  -DM     Time Frame (Stairs Goal 1, PT) 2 weeks  -DM     Progress/Outcome (Stairs Goal 1, PT) goal not met  -DM     Row Name 04/05/22 1343          Patient Education Goal (PT)    Activity (Patient Education Goal, PT) HEP  -DM     [place]/Cues/Accuracy (Memory Goal 2, PT) demonstrates adequately  -DM     Time Frame (Patient Education Goal, PT) 1 week  -DM     Progress/Outcome (Patient Education Goal, PT) goal met  -DM           User Key  (r) = Recorded By, (t) = Taken By, (c) = Cosigned By    Initials Name Provider Type     Marleen Lloyd, PT Physical Therapist               Clinical Impression     Row Name 04/05/22 1343          Pain    Pretreatment Pain Rating 0/10 - no pain  -DM     Posttreatment Pain Rating 0/10 - no pain  -DM     Additional Documentation Pain Scale: Numbers Pre/Post-Treatment (Group)  -DM     Row Name 04/05/22 1343          Plan of Care Review    Plan of Care Reviewed With patient  -DM     Progress no change  -DM     Outcome Evaluation Pt decl. mobil d/t fatigue from interrupted sleep last night, but agreed to sit.bal. activ & ther exer per issued HEP. Mobil goals not met. VSS & now on RA. Will d/c to [facility] this pm.  -DM     Row Name 04/05/22 1343          Vital Signs    Pre Systolic BP Rehab 113  -DM     Pre Treatment Diastolic BP 57  -DM     Post Systolic BP Rehab 126  -DM     Post Treatment Diastolic BP 81  -DM     Pretreatment Heart Rate (beats/min) 71  -DM     Intratreatment Heart Rate (beats/min) 77  -DM     Posttreatment Heart Rate (beats/min) 65  -DM     Pre SpO2 (%) 96  -DM     O2 Delivery Pre Treatment room air  -DM     O2 Delivery Intra Treatment room air  -DM     O2 Delivery Post Treatment room air  -DM     Pre Patient Position Sitting  -DM     Intra Patient Position Sitting  -DM     Post Patient Position Sitting  -DM     Row Name 04/05/22 1343          Positioning and Restraints    Pre-Treatment Position sitting in chair/recliner  -DM     Post Treatment Position chair  -DM     In Chair notified nsg;reclined;call light within reach;encouraged to call for assist;exit alarm on;waffle cushion;legs elevated  -DM           User Key  (r) = Recorded By, (t) = Taken By, (c) = Cosigned By    Initials Name Provider Type    DM Marleen Winchester, PT Physical Therapist               Outcome Measures     Row Name 04/05/22 1343          How much help from another person do you currently need...    Turning from your back to your side while in flat bed without using bedrails? 3  -DM     Moving from lying on back  to sitting on the side of a flat bed without bedrails? 3  -DM     Moving to and from a bed to a chair (including a wheelchair)? 3  -DM     Standing up from a chair using your arms (e.g., wheelchair, bedside chair)? 3  -DM     Climbing 3-5 steps with a railing? 3  -DM     To walk in hospital room? 3  -DM     AM-PAC 6 Clicks Score (PT) 18  -DM           User Key  (r) = Recorded By, (t) = Taken By, (c) = Cosigned By    Initials Name Provider Type    Marleen Lloyd, PT Physical Therapist              Physical Therapy Education                 Title: PT OT SLP Therapies (In Progress)     Topic: Physical Therapy (Done)     Point: Mobility training (Done)     Learning Progress Summary           Patient Acceptance, E,D,H, DU,VU by DM at 4/5/2022 1452    Acceptance, E,D, VU,NR by LS at 4/3/2022 1453    Acceptance, E,D, VU by MB at 3/29/2022 1341   Significant Other Acceptance, E,D, VU,NR by LS at 4/3/2022 1453                   Point: Home exercise program (Done)     Learning Progress Summary           Patient Acceptance, E,D,H, DU,VU by DM at 4/5/2022 1452    Acceptance, E,D, VU by MB at 3/29/2022 1341                   Point: Body mechanics (Done)     Learning Progress Summary           Patient Acceptance, E,D,H, DU,VU by DM at 4/5/2022 1452    Acceptance, E,D, VU,NR by LS at 4/3/2022 1453    Acceptance, E,D, VU by MB at 3/29/2022 1341   Significant Other Acceptance, E,D, VU,NR by LS at 4/3/2022 1453                   Point: Precautions (Done)     Learning Progress Summary           Patient Acceptance, E,D,H, DU,VU by DM at 4/5/2022 1452    Acceptance, E,D, VU,NR by LS at 4/3/2022 1453    Acceptance, E,D, VU by MB at 3/29/2022 1341   Significant Other Acceptance, E,D, VU,NR by LS at 4/3/2022 1453                               User Key     Initials Effective Dates Name Provider Type Discipline    DM 06/16/21 -  Marleen Winchester, PT Physical Therapist PT    LS 06/16/21 -  Keyla Kaufman, PT Physical Therapist PT    MB  06/16/21 -  Kalyani Khoury, PT Physical Therapist PT              PT Recommendation and Plan     Plan of Care Reviewed With: patient  Progress: no change  Outcome Evaluation: Pt decl. mobil d/t fatigue from interrupted sleep last night, but agreed to sit.bal. activ & ther exer per issued HEP. Mobil goals not met. VSS & now on RA. Will d/c to [facility] this pm.     Time Calculation:    PT Charges     Row Name 04/05/22 1452             Time Calculation    Start Time 1343  -DM      PT Received On 04/05/22  -DM      PT Goal Re-Cert Due Date 04/13/22  -DM              Time Calculation- PT    Total Timed Code Minutes- PT 20 minute(s)  -DM              Timed Charges    75900 - PT Therapeutic Exercise Minutes 20  -DM              Total Minutes    Timed Charges Total Minutes 20  -DM       Total Minutes 20  -DM            User Key  (r) = Recorded By, (t) = Taken By, (c) = Cosigned By    Initials Name Provider Type    DM Marleen Winchester, PT Physical Therapist              Therapy Charges for Today     Code Description Service Date Service Provider Modifiers Qty    [id number] HC PT THER PROC EA 15 MIN 4/5/2022 Marleen Winchester, PT GP 1          PT G-Codes  Outcome Measure Options: AM-PAC 6 Clicks Daily Activity (OT)  AM-PAC 6 Clicks Score (PT): 18  AM-PAC 6 Clicks Score (OT): 16         Marleen Winchester, PT  4/5/2022

## 2022-04-05 NOTE — CASE MANAGEMENT/SOCIAL WORK
Continued Stay Note   Madhuri     Patient Name: Jet Floyd  MRN: [id number]  Today's Date: 4/5/2022    Admit Date: 3/28/2022     Discharge Plan     Row Name 04/05/22 0927       Plan    Plan update    Patient/Family in Agreement with Plan yes    Plan Comments Called [facility] liaison to inquire of referral and left VM with name and callback number.  CM following.    Final Discharge Disposition Code 62 - inpatient rehab facility               Discharge Codes    No documentation.               Expected Discharge Date and Time     Expected Discharge Date Expected Discharge Time    Apr 5, 2022             Xiomara Goodwin RN

## 2022-04-05 NOTE — CASE MANAGEMENT/SOCIAL WORK
Continued Stay Note   Madhuri     Patient Name: Jet Floyd  MRN: [id number]  Today's Date: 4/5/2022    Admit Date: 3/28/2022     Discharge Plan     Row Name 04/05/22 0943       Plan    Plan update    Patient/Family in Agreement with Plan yes    Plan Comments Spoke with [facility] liasion who is working on the referral and will send it to MD for review and hopefully accept.  CM following.    Final Discharge Disposition Code 62 - inpatient rehab facility    Row Name 04/05/22 0927       Plan    Plan update    Patient/Family in Agreement with Plan yes    Plan Comments Called [facility] liaison to inquire of referral and left VM with name and callback number.  CM following.    Final Discharge Disposition Code 62 - inpatient rehab facility               Discharge Codes    No documentation.               Expected Discharge Date and Time     Expected Discharge Date Expected Discharge Time    Apr 5, 2022             Xiomara Goodwin RN

## 2022-04-05 NOTE — DISCHARGE INSTR - APPOINTMENTS
Mynor Richard MD Cardiology  [phone]  [address] [address]  Appointment: Instructions: please call office and make hospital follow up with Dr. Richard 3-4 weeks.  Dr. Richard's office  will call to schedule appointment.        Obi Soriano MD PCP - General  PCP - Family [facility]  [phone]  [address] [place] [address] [address] [address]  Appointment: Instructions: follow up with pcp after dc from rehab.

## 2022-04-05 NOTE — NURSING NOTE
WOC consult:     Rectum and scrotum       Patient presents with MASD and yeast rash to this perirectal region, perineum, and posterior scrotum. Small skin tear to gluteal cleft, pink, and blanching.     He has been having loose stools and is incontinent.   Dry flow pads are currently in place.     -MICOTIN cream BID and barrier cream to affected areas.     -Clean areas with foam cleanser only. This will minimize discomfort and reduce needed friction when cleaning. Apply a small dusting of stoma powder (this will allow for better topical therapy adherence). Once clean apply MICOTIN and barrier cream BID.     See order for care needs.     Heels intact and no other skin issues at this time.     Discussed POC with patient.  WOC will continue to follow at this time.     Thanks

## 2022-04-05 NOTE — PLAN OF CARE
Goal Outcome Evaluation:  Patient A&O, paced rhythm on tele. Placed on 2L nasal cannula while sleeping. One episode of nausea overnight, PRN zofran given with relief. PRN doxepin given for sleep. Will continue to monitor.

## 2022-04-05 NOTE — PROGRESS NOTES
[facility] Medicine Services  PROGRESS NOTE    Patient Name: Jet Floyd  : 1945  MRN: [id number]    Date of Admission: 3/28/2022  Primary Care Physician: Obi Soriano MD    Subjective   Subjective     CC:  F/u CHF    HPI:  Patient is resting in bed in NAD. He is standing at bedside with help. Declines getting in chair. States he slept well. Voiding without difficulty. Notes, labs and vitals checked.     ROS:  Gen- No fevers, chills  CV- No chest pain, palpitations  Resp- No cough, dyspnea  GI- No N/V/D, abd pain         Objective   Objective     Vital Signs:   Temp:  [97.4 °F (36.3 °C)-98 °F (36.7 °C)] 97.9 °F (36.6 °C)  Heart Rate:  [] 71  Resp:  [16-18] 16  BP: (113-130)/(57-76) 113/57  Flow (L/min):  [2] 2     Physical Exam:  Constitutional: No acute distress, awake, alert, chronically ill and frail appearing male  HENT: NCAT, mucous membranes moist  Respiratory: Decreased breath sounds with faint expiratory crackles, no wheezing, normal work of breathing and has been weaned 2L   Cardiovascular: RRR  Gastrointestinal: Positive bowel sounds, soft, nontender, nondistended  Musculoskeletal: No bilateral ankle edema, thin extremities  Psychiatric: Appropriate affect, cooperative  Neurologic: Oriented x 3, strength symmetric in all extremities, Cranial Nerves grossly intact to confrontation, speech clear  Skin: No rashes    Results Reviewed:  LAB RESULTS:      Lab 22  1201   WBC 4.45   HEMOGLOBIN 12.2*   HEMATOCRIT 35.3*   PLATELETS 79*   MCV 92.2         Lab 22  0553 22  0323 22  0713 22  1817 22  1201 22  0610   SODIUM 133* 134* 134*  --  133* 131*   POTASSIUM 3.7 3.1* 3.5 3.6 3.4* 3.2*   CHLORIDE 97* 96* 97*  --  96* 95*   CO2 23.0 23.0 21.0*  --  20.0* 22.0   ANION GAP 13.0 15.0 16.0*  --  17.0* 14.0   BUN 32* 35* 34*  --  33* 34*   CREATININE 2.46* 2.67* 2.57*  --  2.51* 2.60*   EGFR 26.5* 24.0* 25.1*  --  25.9*  24.8*   GLUCOSE 108* 85 74  --  83 87   CALCIUM 8.2* 8.3* 8.3*  --  8.4* 8.3*   MAGNESIUM 1.9 1.3*  --   --   --   --                          Brief Urine Lab Results     None          Microbiology Results Abnormal     Procedure Component Value - Date/Time    Blood Culture - Blood, Arm, Left [[id number]]  (Normal) Collected: 03/28/22 1950    Lab Status: Final result Specimen: Blood from Arm, Left Updated: 04/02/22 2102     Blood Culture No growth at 5 days    Blood Culture - Blood, Hand, Right [[id number]]  (Normal) Collected: 03/28/22 2000    Lab Status: Final result Specimen: Blood from Hand, Right Updated: 04/02/22 2102     Blood Culture No growth at 5 days    COVID PRE-OP / PRE-PROCEDURE SCREENING ORDER (NO ISOLATION) - Swab, Nasopharynx [[id number]]  (Normal) Collected: 03/28/22 1914    Lab Status: Final result Specimen: Swab from Nasopharynx Updated: 03/28/22 1950    Narrative:      The following orders were created for panel order COVID PRE-OP / PRE-PROCEDURE SCREENING ORDER (NO ISOLATION) - Swab, Nasopharynx.  Procedure                               Abnormality         Status                     ---------                               -----------         ------                     COVID-19 and FLU A/B PCR...[[id number]]  Normal              Final result                 Please view results for these tests on the individual orders.    COVID-19 and FLU A/B PCR - Swab, Nasopharynx [[id number]]  (Normal) Collected: 03/28/22 1914    Lab Status: Final result Specimen: Swab from Nasopharynx Updated: 03/28/22 1950     COVID19 Not Detected     Influenza A PCR Not Detected     Influenza B PCR Not Detected    Narrative:      Fact sheet for providers: https://www.fda.gov/media/827422/download    Fact sheet for patients: https://www.fda.gov/media/357764/download    Test performed by PCR.          No radiology results from the last 24 hrs    Results for orders placed during the hospital encounter of 03/28/22    Adult  Transthoracic Echo Complete W/ Cont if Necessary Per Protocol    Interpretation Summary  · Left ventricular ejection fraction appears to be 31 - 35%. Left ventricular systolic function is moderately decreased.  · The following left ventricular wall segments are dyskinetic: apical septal, basal anteroseptal and mid anteroseptal.  · The right atrial cavity is dilated.  · Moderate tricuspid valve regurgitation is present.  · Estimated right ventricular systolic pressure from tricuspid regurgitation is normal (<35 mmHg).      I have reviewed the medications:  Scheduled Meds:allopurinol, 100 mg, Oral, Daily  aspirin, 81 mg, Oral, Daily  atorvastatin, 20 mg, Oral, Daily  carvedilol, 3.125 mg, Oral, BID With Meals  empagliflozin, 10 mg, Oral, Daily  furosemide, 40 mg, Oral, Daily  heparin (porcine), 5,000 Units, Subcutaneous, Q12H  levothyroxine, 50 mcg, Oral, Q AM  miconazole, 1 application, Topical, Q12H  pharmacy consult - MTM, , Does not apply, Daily  sodium chloride, 10 mL, Intravenous, Q12H  tamsulosin, 0.4 mg, Oral, Daily  vitamin B-12, 1,000 mcg, Oral, Daily      Continuous Infusions:   PRN Meds:.•  acetaminophen **OR** acetaminophen **OR** acetaminophen  •  doxepin  •  ipratropium-albuterol  •  magnesium sulfate **OR** magnesium sulfate in D5W 1g/100mL (PREMIX) **OR** magnesium sulfate  •  ondansetron **OR** ondansetron  •  sodium chloride  •  sodium chloride    Assessment/Plan   Assessment & Plan     Active Hospital Problems    Diagnosis  POA   • Acute respiratory failure (HCC) [J96.00]  Yes   • Chronic systolic heart failure (HCC) [I50.22]  Yes   • CKD (chronic kidney disease) stage 3, GFR 30-59 ml/min (HCC) [N18.30]  Yes   • History of alcoholism (HCC) [F10.21]  Not Applicable   • Pleural effusion [J90]  Yes   • Lactic acidosis [E87.2]  Yes   • Sepsis (HCC) [A41.9]  Yes   • CAP (community acquired pneumonia) [J18.9]  Unknown   • Essential hypertension [I10]  Yes   • Hyperlipidemia [E78.5]  Yes      Resolved  Hospital Problems   No resolved problems to display.        Brief Hospital Course to date:  Jet Floyd is a 76 y.o. male  with PMHx of Systolic CHF, HTN, CKD 3, history of BiV ICD 3/17/2020 by DEBBIE Conteh who presents to the ED with CC of SOB. Patient was treated with IV diuretics with improvement. Patient mobility improved and he will be discharged home with outpt therapy. Patient was treated for pneumonia     Plan was partially entered by my partner and I have reviewed and updated as appropriate on 4/5/22     Acute Hypoxic Respiratory Failure  A/C Systolic CHF (history of BiV ICD 2020, EF 20% )   Cardiac Ascites w/Pleural Effusion  Valvular Heart Disease (Mod-Severe Mitral valve regurg, Mod Tricuspid valve regurg)   -weaned off BIPAP and now to room air  -s/p aggressive diuresis with IV bumex, now back on PO lasix,  -repeat Echo showing left ventricular ejection fraction 31 - 35%.   -repeat CXR with improvement.   - Cardiology following and has cleared for discharge and follow up in 3-4 weeks   -CTA negative for PE  -- cardiology added SGLT2 inhibitor      Multifocal PNA   Sepsis   -s/p CTX/Doxy, Procal is 35  -Blood Cx x2 negative   -PNA may be due to vomiting episode and CT findings, SLP following, cleared for diet  -PRN DuoNebs   -- assess for oxygen need and will dc with oxygen if needed      CKD 3 w/Elevated Creatinine  -appears baseline is around 2.4-2.6  -Follows with [facility] schedule to see in 2 weeks      Lactic Acidosis  -Secondary to above, now normal     Nausea, Vomiting, Diarrhea - improved  -Stomach fluid filled on CT imaging  -Anti-emetics as needed  -tolerating diet     Normocytic Anemia  -H&H stable from prior  -Likely secondary to CKD      Hyponatremia  -Likely secondary to volume overload  -stable      Hypokalemia  - assess for need daily due to renal fxn     HTN  -Continue Coreg     HLD  -Continue Lipitor      BPH  -Continue Flomax      Hypothyroidism  -Continue Synthroid     DVT  prophylaxis:  Medical DVT prophylaxis orders are present.       AM-PAC 6 Clicks Score (PT): 18 (04/04/22 0810)    Disposition: I expect the patient to be discharged to [facility] when accepted     CODE STATUS:   Code Status and Medical Interventions:   Ordered at: 03/28/22 4895     Level Of Support Discussed With:    Patient    Health Care Surrogate     Code Status (Patient has no pulse and is not breathing):    CPR (Attempt to Resuscitate)     Medical Interventions (Patient has pulse or is breathing):    Full Support       Keri Saleem, FIONA  04/05/22

## 2022-04-05 NOTE — DISCHARGE SUMMARY
[facility] Medicine Services  DISCHARGE SUMMARY    Patient Name: Jte Floyd  : 1945  MRN: [id number]    Date of Admission: 3/28/2022  6:43 PM  Date of Discharge:  2022  Primary Care Physician: Obi Soriano MD    Consults     Date and Time Order Name Status Description    3/29/2022 12:33 AM Inpatient Cardiology Consult Completed           Hospital Course     Presenting Problem:   Respiratory failure (HCC) [J96.90]  Acute respiratory failure (HCC) [J96.00]    Active Hospital Problems    Diagnosis  POA   • Acute respiratory failure (HCC) [J96.00]  Yes   • Chronic systolic heart failure (HCC) [I50.22]  Yes   • CKD (chronic kidney disease) stage 3, GFR 30-59 ml/min (HCC) [N18.30]  Yes   • History of alcoholism (HCC) [F10.21]  Not Applicable   • Pleural effusion [J90]  Yes   • Lactic acidosis [E87.2]  Yes   • Sepsis (HCC) [A41.9]  Yes   • CAP (community acquired pneumonia) [J18.9]  Unknown   • Essential hypertension [I10]  Yes   • Hyperlipidemia [E78.5]  Yes      Resolved Hospital Problems   No resolved problems to display.          Hospital Course:  Jet Floyd is a 76 y.o. male  with PMHx of Systolic CHF, HTN, CKD 3, history of BiV ICD 3/17/2020 by DEBBIE Conteh who presents to the ED with CC of SOB. Patient was treated with IV diuretics with improvement. Patient mobility improved and he will be discharged home with outpt therapy. Patient was treated for pneumonia      Acute Hypoxic Respiratory Failure  A/C Systolic CHF (history of BiV ICD , EF 20% )   Cardiac Ascites w/Pleural Effusion  Valvular Heart Disease (Mod-Severe Mitral valve regurg, Mod Tricuspid valve regurg)   -weaned off BIPAP and now to room air  -s/p aggressive diuresis with IV bumex, now back on PO lasix,  -repeat Echo showing left ventricular ejection fraction 31 - 35%.   -repeat CXR with improvement.   - Cardiology following and has cleared for discharge and follow up in 3-4  weeks   -CTA negative for PE  -- cardiology added SGLT2 inhibitor      Multifocal PNA   Sepsis   -s/p CTX/Doxy, Procal is 35  -Blood Cx x2 negative   -PNA may be due to vomiting episode and CT findings, SLP following, cleared for diet  -PRN DuoNebs   -- assess for oxygen need and will dc with oxygen if needed      CKD 3 w/Elevated Creatinine  -appears baseline is around 2.4-2.6  -Follows with NAL schedule to see in 2 weeks      Lactic Acidosis  -Secondary to above, now normal     Nausea, Vomiting, Diarrhea - improved  -Stomach fluid filled on CT imaging  -Anti-emetics as needed  -tolerating diet     Normocytic Anemia  -H&H stable from prior  -Likely secondary to CKD      Hyponatremia  -Likely secondary to volume overload  -stable      Hypokalemia  - assess for need daily due to renal fxn     HTN  -Continue Coreg     HLD  -Continue Lipitor      BPH  -Continue Flomax      Hypothyroidism  -Continue Synthroid      Patient has remained clinically stable and will be discharged to rehab today.     Discharge Follow Up Recommendations for outpatient labs/diagnostics:   follow up with pcp after dc from rehab  Follow up with Dr. Richard 3-4 weeks   Follow up with NAL in 2 weeks      Day of Discharge     HPI:   Patient is in  NAD. He is standing at bedside with help. Declines getting in chair. States he slept well. Voiding without difficulty. Notes, labs and vitals checked.     Review of Systems  Gen- No fevers, chills  CV- No chest pain, palpitations  Resp- No cough, dyspnea  GI- No N/V/D, abd pain        Vital Signs:   Temp:  [97.4 °F (36.3 °C)-98 °F (36.7 °C)] 97.9 °F (36.6 °C)  Heart Rate:  [] 71  Resp:  [16-18] 16  BP: (113-130)/(57-76) 113/57  Flow (L/min):  [2] 2      Physical Exam:  Constitutional: No acute distress, awake, alert, chronically ill and frail appearing male  HENT: NCAT, mucous membranes moist  Respiratory: Decreased breath sounds with faint expiratory crackles, no wheezing, normal work of breathing  and has been weaned 2L   Cardiovascular: RRR  Gastrointestinal: Positive bowel sounds, soft, nontender, nondistended  Musculoskeletal: No bilateral ankle edema, thin extremities  Psychiatric: Appropriate affect, cooperative  Neurologic: Oriented x 3, strength symmetric in all extremities, Cranial Nerves grossly intact to confrontation, speech clear  Skin: No rashes    Pertinent  and/or Most Recent Results     LAB RESULTS:      Lab 04/02/22  1201   WBC 4.45   HEMOGLOBIN 12.2*   HEMATOCRIT 35.3*   PLATELETS 79*   MCV 92.2         Lab 04/05/22  0553 04/04/22  0323 04/03/22  0713 04/02/22  1817 04/02/22  1201 04/01/22  0610   SODIUM 133* 134* 134*  --  133* 131*   POTASSIUM 3.7 3.1* 3.5 3.6 3.4* 3.2*   CHLORIDE 97* 96* 97*  --  96* 95*   CO2 23.0 23.0 21.0*  --  20.0* 22.0   ANION GAP 13.0 15.0 16.0*  --  17.0* 14.0   BUN 32* 35* 34*  --  33* 34*   CREATININE 2.46* 2.67* 2.57*  --  2.51* 2.60*   EGFR 26.5* 24.0* 25.1*  --  25.9* 24.8*   GLUCOSE 108* 85 74  --  83 87   CALCIUM 8.2* 8.3* 8.3*  --  8.4* 8.3*   MAGNESIUM 1.9 1.3*  --   --   --   --                          Brief Urine Lab Results     None        Microbiology Results (last 10 days)     Procedure Component Value - Date/Time    Blood Culture - Blood, Hand, Right [[id number]]  (Normal) Collected: 03/28/22 2000    Lab Status: Final result Specimen: Blood from Hand, Right Updated: 04/02/22 2102     Blood Culture No growth at 5 days    Blood Culture - Blood, Arm, Left [[id number]]  (Normal) Collected: 03/28/22 1950    Lab Status: Final result Specimen: Blood from Arm, Left Updated: 04/02/22 2102     Blood Culture No growth at 5 days    COVID PRE-OP / PRE-PROCEDURE SCREENING ORDER (NO ISOLATION) - Swab, Nasopharynx [[id number]]  (Normal) Collected: 03/28/22 1914    Lab Status: Final result Specimen: Swab from Nasopharynx Updated: 03/28/22 1950    Narrative:      The following orders were created for panel order COVID PRE-OP / PRE-PROCEDURE SCREENING ORDER (NO  ISOLATION) - Swab, Nasopharynx.  Procedure                               Abnormality         Status                     ---------                               -----------         ------                     COVID-19 and FLU A/B PCR...[[id number]]  Normal              Final result                 Please view results for these tests on the individual orders.    COVID-19 and FLU A/B PCR - Swab, Nasopharynx [[id number]]  (Normal) Collected: 03/28/22 1914    Lab Status: Final result Specimen: Swab from Nasopharynx Updated: 03/28/22 1950     COVID19 Not Detected     Influenza A PCR Not Detected     Influenza B PCR Not Detected    Narrative:      Fact sheet for providers: https://www.fda.gov/media/817684/download    Fact sheet for patients: https://www.fda.gov/media/388044/download    Test performed by PCR.          Adult Transthoracic Echo Complete W/ Cont if Necessary Per Protocol    Result Date: 3/29/2022  · Left ventricular ejection fraction appears to be 31 - 35%. Left ventricular systolic function is moderately decreased. · The following left ventricular wall segments are dyskinetic: apical septal, basal anteroseptal and mid anteroseptal. · The right atrial cavity is dilated. · Moderate tricuspid valve regurgitation is present. · Estimated right ventricular systolic pressure from tricuspid regurgitation is normal (<35 mmHg).      FL Video Swallow With Speech Single Contrast    Result Date: 3/30/2022  EXAMINATION: FL VIDEO SWALLOW W SPEECH SINGLE-CONTRAST-  INDICATION: dysphagia; R06.03-Acute respiratory distress; R09.02-Hypoxemia; I50.9-Heart failure, unspecified; J18.9-Pneumonia, unspecified organism; N18.9-Chronic kidney disease, unspecified; R60.9-Edema, unspecified; R13.10-Dysphagia, unspecified  TECHNIQUE: 1 minute of fluoroscopic time was used for this exam. 7 associated fluoroscopic loops were saved. The patient was evaluated in the seated lateral position while taking a variety of consistencies of barium by  mouth under the direction of speech pathology.  COMPARISON: NONE  FINDINGS: 1 minute of fluoroscopy provided for a modified barium swallow. Please see speech therapy report for full details and recommendations.       Fluoroscopy provided for a modified barium swallow. Please see speech therapy report for full details and recommendations.    This report was finalized on 3/30/2022 10:16 PM by Dr. Evens Oro MD.      XR Chest 1 View    Result Date: 4/1/2022  DATE OF EXAM: 4/1/2022 9:00 AM  PROCEDURE: XR CHEST 1 VW-  INDICATIONS: f/u CHF, pneumonia; R06.03-Acute respiratory distress; R09.02-Hypoxemia; I50.9-Heart failure, unspecified; J18.9-Pneumonia, unspecified organism; N18.9-Chronic kidney disease, unspecified; R60.9-Edema, unspecified; R13.10-Dysphagia, unspecified  COMPARISON: 3/30/2022  TECHNIQUE: Single radiographic AP view of the chest was obtained.  FINDINGS: Right-sided dual-lead ICD is again noted. The heart remains enlarged and there is a persistent pulmonary edema pattern present, mildly improved from prior study. Bibasilar effusion and atelectasis appears similar as well. Upper lungs remain relatively clear. No pneumothorax is seen.      Persistent pattern of congestive heart failure, which appears mildly improved from 3/30/2022. Stable bibasilar atelectasis and effusion.  This report was finalized on 4/1/2022 10:12 AM by Dr. Evens Oro MD.      XR Chest 1 View    Result Date: 3/30/2022  DATE OF EXAM: 3/30/2022 10:59 AM  PROCEDURE: XR CHEST 1 VW-  INDICATIONS: chf; R06.03-Acute respiratory distress; R09.02-Hypoxemia; I50.9-Heart failure, unspecified; J18.9-Pneumonia, unspecified organism; N18.9-Chronic kidney disease, unspecified; R60.9-Edema, unspecified; R13.10-Dysphagia, unspecified  COMPARISON: One day prior  TECHNIQUE: Single radiographic AP view of the chest was obtained.  FINDINGS: Right chest wall ICD projects unchanged. Minimal interval improvement noted in otherwise persisting small right  pleural effusion and findings likely related to cardiogenic edema. No distinct pneumothorax. Unchanged cardiac enlargement. No new focal consolidation.      Right chest wall ICD projects unchanged. Minimal interval improvement noted in otherwise persisting small right pleural effusion and findings likely related to cardiogenic edema. No distinct pneumothorax. Unchanged cardiac enlargement. No new focal consolidation.  This report was finalized on 3/30/2022 11:12 AM by Jed Issa.      XR Chest 1 View    Result Date: 3/29/2022  XR CHEST 1 VW-  Date of Exam: 3/29/2022 1:55 PM  Indication: evaluate pleural effusion; R06.03-Acute respiratory distress; R09.02-Hypoxemia; I50.9-Heart failure, unspecified; J18.9-Pneumonia, unspecified organism; N18.9-Chronic kidney disease, unspecified; R60.9-Edema, unspecified; R13.10-Dysphagia, unspecified.  Comparison Exams: March 28, 2022  Technique: Single AP chest radiograph  FINDINGS: A right subclavian pacemaker is in place. Hazy bilateral airspace opacities appear slightly improved on the left. There are moderate right and small left pleural effusions. The heart is enlarged. The osseous structures appear intact.      1.  Hazy bilateral airspace opacities appear slightly improved on the left, which could represent pneumonia or pulmonary edema. 2.  Cardiomegaly with moderate right and small left pleural effusions.  This report was finalized on 3/29/2022 2:46 PM by Kyle Faria MD.      XR Chest 1 View    Result Date: 3/28/2022   DATE OF EXAM: 3/28/2022 6:52 PM  PROCEDURE: XR CHEST 1 VW-  INDICATIONS: SOA triage protocol  COMPARISON: 03/18/2020 and prior  TECHNIQUE: Portable Chest  FINDINGS:  Study is limited secondary to overlying support and monitoring apparatus. Right sided approach pacemaker is in place. Lung volumes are low. The heart size is enlarged. Pulmonary vascularity is congested and indistinct.  Increased groundglass and interstitial opacities in a perihilar and  basilar predominance noted. There is a small to moderate size right-sided pleural effusion. Osseous structures demonstrate no acute abnormality      IMPRESSION : Cardiomegaly and mild pulmonary vascular congestion.  Perihilar and dependent airspace opacities may represent pulmonary edema and/or pneumonia.  Findings are new compared to the previous study.[  This report was finalized on 3/28/2022 7:15 PM by Wolfgang Arthur.      CT Angiogram Chest    Result Date: 3/28/2022   CT ANGIOGRAM CHEST-  Date of Exam: 3/28/2022 7:37 PM  Indication: dyspnea/hypoxia.  Comparison: 03/20/2018 and prior  Technique: Serial and axial CT images of the chest were obtained following the uneventful intravenous administration of 70 mL Isovue-370 contrast. Reconstructions in the coronal and sagittal planes were also performed.  Automated exposure control and iterative reconstruction methods were used.  Radiation audit for number of CT and nuclear cardiology exams performed in the last year:  0..   FINDINGS:  Study is limited by streak artifact from the patient's arms as well as a pacemaker. Study is also motion compromise.  Pulmonary Arteries: There is an adequate bolus for the evaluation of pulmonary arterial system. Evaluation beyond the proximal segmental levels is limited by streak artifact and motion. The central vessels opacify unremarkably. Main pulmonary artery is normal in size.  Mediastinum: Heart size is enlarged. Pacemaker is in place. No suspicious pericardial effusion is noted. Evaluation of hilar adenopathy on the left is limited secondary to widespread airspace disease. Mild adenopathy on the right is noted measuring approximately 1 cm short axis diameter likely reactive. There is mild distention of the esophagus with air without wall thickening. Limited imaging of the base of the neck is grossly unremarkable. There is reflux into the IVC and hepatic veins compatible with right heart dysfunction. The aorta and origin of  the great vessels is grossly unremarkable in appearance on limited imaging.  Lungs/pleura: There is a moderate size right-sided pleural effusion.  Multifocal groundglass and patchy opacities with a perihilar and basilar distribution is compatible with pneumonia. Emphysematous changes are noted with an upper lung zone predominance.  Upper Abdomen: Reflux of contrast into the IVC and hepatic veins compatible with right heart dysfunction as mentioned above. There is a moderate degree of ascites. Stomach is distended.  Soft tissues/Bones: Remote compression deformity lower thoracic vertebral body again noted. No destructive bone lesion.       1. Limited study due to motion. 2. No evidence of central pulmonary embolus. Evaluation beyond the proximal segmental levels is nondiagnostic due to motion and streak artifact. 3. Multifocal pneumonia 4. Cardiomegaly and right heart dysfunction 5. There is a moderate amount of ascites 6. Stomach is distended and filled with fluid. It the patient is at risk for aspiration NG tube can always be considered.     This report was finalized on 3/28/2022 8:33 PM by Wolfgang Arthur.                Results for orders placed during the hospital encounter of 03/28/22    Adult Transthoracic Echo Complete W/ Cont if Necessary Per Protocol    Interpretation Summary  · Left ventricular ejection fraction appears to be 31 - 35%. Left ventricular systolic function is moderately decreased.  · The following left ventricular wall segments are dyskinetic: apical septal, basal anteroseptal and mid anteroseptal.  · The right atrial cavity is dilated.  · Moderate tricuspid valve regurgitation is present.  · Estimated right ventricular systolic pressure from tricuspid regurgitation is normal (<35 mmHg).      Plan for Follow-up of Pending Labs/Results:     Discharge Details        Discharge Medications      New Medications      Instructions Start Date   empagliflozin 10 MG tablet tablet  Commonly known as:  JARDIANCE   10 mg, Oral, Daily      furosemide 40 MG tablet  Commonly known as: LASIX   40 mg, Oral, Daily   Start Date: April 6, 2022     miconazole 2 % cream  Commonly known as: MICOTIN   1 application, Topical, Every 12 Hours Scheduled         Continue These Medications      Instructions Start Date   allopurinol 100 MG tablet  Commonly known as: ZYLOPRIM   100 mg, Oral, Daily      aspirin 81 MG chewable tablet   81 mg, Oral, Daily   Start Date: April 6, 2022     atorvastatin 20 MG tablet  Commonly known as: LIPITOR   20 mg, Oral, Daily      carvedilol 3.125 MG tablet  Commonly known as: COREG   3.125 mg, Oral, 2 Times Daily With Meals      doxepin 10 MG capsule  Commonly known as: SINEquan   10 mg, Oral, Nightly      levothyroxine 50 MCG tablet  Commonly known as: SYNTHROID, LEVOTHROID   50 mcg, Oral, Daily      tamsulosin 0.4 MG capsule 24 hr capsule  Commonly known as: FLOMAX   0.4 capsules, Oral, Daily         Stop These Medications    bumetanide 2 MG tablet  Commonly known as: BUMEX     HYDROcodone-acetaminophen 5-325 MG per tablet  Commonly known as: NORCO     KLOR-CON 10 MEQ CR tablet  Generic drug: potassium chloride            Allergies   Allergen Reactions   • Lactose Intolerance (Gi) Diarrhea   • Polysporin [Bacitracin-Polymyxin B] Itching and Rash         Discharge Disposition:  Rehab Facility or Unit (DC - External)    Diet:  Hospital:  Diet Order   Procedures   • Diet Regular; Cardiac       Activity:  Activity Instructions     Measure Blood Pressure      Measure Weight      Up WIth Assist            Restrictions or Other Recommendations:         CODE STATUS:    Code Status and Medical Interventions:   Ordered at: 03/28/22 3161     Level Of Support Discussed With:    Patient    Health Care Surrogate     Code Status (Patient has no pulse and is not breathing):    CPR (Attempt to Resuscitate)     Medical Interventions (Patient has pulse or is breathing):    Full Support       No future  appointments.    Additional Instructions for the Follow-ups that You Need to Schedule     Ambulatory Referral to Home Health   As directed      Face to Face Visit Date: 4/1/2022    Follow-up provider for Plan of Care?: I treated the patient in an acute care facility and will not continue treatment after discharge.    Follow-up provider: LILLY SORIANO [[id number]]    Reason/Clinical Findings: acute respiratory failure    Describe mobility limitations that make leaving home difficult: weakness, SOA, impaired physical mobility, impaired functional mobility    Nursing/Therapeutic Services Requested: Skilled Nursing Physical Therapy Occupational Therapy    Skilled nursing orders: O2 instruction Medication education Cardiopulmonary assessments CHF management    PT orders: Therapeutic exercise Gait Training Transfer training Strengthening Home safety assessment    Weight Bearing Status: As Tolerated    Occupational orders: Activities of daily living Energy conservation Strengthening Home safety assessment         Discharge Follow-up with PCP   As directed       Currently Documented PCP:    Lilly Soriano MD    PCP Phone Number:    [phone]     Follow Up Details: follow up with pcp after dc from rehab         Discharge Follow-up with Specified Provider: Follow up with Dr. Richard 3-4 weeks   As directed      To: Follow up with Dr. Richard 3-4 weeks         Discharge Follow-up with Specified Provider: follow up with NAL 2-3 weeks   As directed      To: follow up with NAL 2-3 weeks                     Keri Saleem, FIONA  04/05/22      Time Spent on Discharge:  I spent  45  minutes on this discharge activity which included: face-to-face encounter with the patient, reviewing the data in the system, coordination of the care with the nursing staff as well as consultants, documentation, and entering orders.

## 2022-04-05 NOTE — PLAN OF CARE
Goal Outcome Evaluation:  Plan of Care Reviewed With: patient        Progress: no change  Outcome Evaluation: Pt decl. mobil d/t fatigue from interrupted sleep last night, but agreed to sit.bal. activ & ther exer per issued HEP. Mobil goals not met. VSS & now on RA. Will d/c to [facility] this pm.

## 2022-11-16 ENCOUNTER — LAB (OUTPATIENT)
Dept: LAB | Facility: HOSPITAL | Age: 77
End: 2022-11-16

## 2022-11-16 DIAGNOSIS — N18.9 ANEMIA OF CHRONIC RENAL FAILURE, UNSPECIFIED CKD STAGE: ICD-10-CM

## 2022-11-16 DIAGNOSIS — N18.30 STAGE 3 CHRONIC KIDNEY DISEASE, UNSPECIFIED WHETHER STAGE 3A OR 3B CKD: ICD-10-CM

## 2022-11-16 DIAGNOSIS — D63.1 ANEMIA OF CHRONIC RENAL FAILURE, UNSPECIFIED CKD STAGE: ICD-10-CM

## 2022-11-16 LAB
CREAT SERPL-MCNC: 1.99 MG/DL (ref 0.76–1.27)
EGFRCR SERPLBLD CKD-EPI 2021: 33.9 ML/MIN/1.73
FERRITIN SERPL-MCNC: 273 NG/ML (ref 30–400)
HCT VFR BLD AUTO: 31.5 % (ref 37.5–51)
HGB BLD-MCNC: 10.1 G/DL (ref 13–17.7)
IRON 24H UR-MRATE: 41 MCG/DL (ref 59–158)
IRON SATN MFR SERPL: 11 % (ref 20–50)
POTASSIUM SERPL-SCNC: 4 MMOL/L (ref 3.5–5.2)
TIBC SERPL-MCNC: 374 MCG/DL (ref 298–536)
TRANSFERRIN SERPL-MCNC: 251 MG/DL (ref 200–360)

## 2022-11-16 PROCEDURE — 82728 ASSAY OF FERRITIN: CPT

## 2022-11-16 PROCEDURE — 84466 ASSAY OF TRANSFERRIN: CPT

## 2022-11-16 PROCEDURE — 84132 ASSAY OF SERUM POTASSIUM: CPT

## 2022-11-16 PROCEDURE — 83540 ASSAY OF IRON: CPT

## 2022-11-16 PROCEDURE — 82565 ASSAY OF CREATININE: CPT

## 2022-11-16 PROCEDURE — 85014 HEMATOCRIT: CPT

## 2022-11-16 PROCEDURE — 85018 HEMOGLOBIN: CPT

## 2022-11-17 PROBLEM — D63.1 ANEMIA OF CHRONIC RENAL FAILURE: Status: ACTIVE | Noted: 2022-11-17

## 2022-11-17 PROBLEM — N18.9 ANEMIA OF CHRONIC RENAL FAILURE: Status: ACTIVE | Noted: 2022-11-17

## 2022-11-18 ENCOUNTER — APPOINTMENT (OUTPATIENT)
Dept: ONCOLOGY | Facility: HOSPITAL | Age: 77
End: 2022-11-18

## 2022-12-13 ENCOUNTER — LAB (OUTPATIENT)
Dept: LAB | Facility: HOSPITAL | Age: 77
End: 2022-12-13

## 2022-12-13 DIAGNOSIS — N18.30 STAGE 3 CHRONIC KIDNEY DISEASE, UNSPECIFIED WHETHER STAGE 3A OR 3B CKD: ICD-10-CM

## 2022-12-13 DIAGNOSIS — D63.1 ANEMIA OF CHRONIC RENAL FAILURE, UNSPECIFIED CKD STAGE: ICD-10-CM

## 2022-12-13 DIAGNOSIS — N18.9 ANEMIA OF CHRONIC RENAL FAILURE, UNSPECIFIED CKD STAGE: ICD-10-CM

## 2022-12-13 LAB
CREAT SERPL-MCNC: 1.89 MG/DL (ref 0.76–1.27)
EGFRCR SERPLBLD CKD-EPI 2021: 36.1 ML/MIN/1.73
FERRITIN SERPL-MCNC: 253.7 NG/ML (ref 30–400)
HCT VFR BLD AUTO: 32.9 % (ref 37.5–51)
HGB BLD-MCNC: 10.5 G/DL (ref 13–17.7)
IRON 24H UR-MRATE: 48 MCG/DL (ref 59–158)
IRON SATN MFR SERPL: 12 % (ref 20–50)
POTASSIUM SERPL-SCNC: 4.2 MMOL/L (ref 3.5–5.2)
TIBC SERPL-MCNC: 392 MCG/DL (ref 298–536)
TRANSFERRIN SERPL-MCNC: 263 MG/DL (ref 200–360)

## 2022-12-13 PROCEDURE — 85018 HEMOGLOBIN: CPT

## 2022-12-13 PROCEDURE — 84466 ASSAY OF TRANSFERRIN: CPT

## 2022-12-13 PROCEDURE — 85014 HEMATOCRIT: CPT

## 2022-12-13 PROCEDURE — 82728 ASSAY OF FERRITIN: CPT

## 2022-12-13 PROCEDURE — 83540 ASSAY OF IRON: CPT

## 2022-12-13 PROCEDURE — 84132 ASSAY OF SERUM POTASSIUM: CPT

## 2022-12-13 PROCEDURE — 82565 ASSAY OF CREATININE: CPT

## 2022-12-16 ENCOUNTER — APPOINTMENT (OUTPATIENT)
Dept: ONCOLOGY | Facility: HOSPITAL | Age: 77
End: 2022-12-16

## 2023-01-10 ENCOUNTER — TRANSCRIBE ORDERS (OUTPATIENT)
Dept: LAB | Facility: HOSPITAL | Age: 78
End: 2023-01-10
Payer: MEDICARE

## 2023-01-10 ENCOUNTER — LAB (OUTPATIENT)
Dept: LAB | Facility: HOSPITAL | Age: 78
End: 2023-01-10
Payer: MEDICARE

## 2023-01-10 DIAGNOSIS — N18.9 ANEMIA OF CHRONIC RENAL FAILURE, UNSPECIFIED CKD STAGE: Primary | ICD-10-CM

## 2023-01-10 DIAGNOSIS — D63.1 ANEMIA OF CHRONIC RENAL FAILURE, UNSPECIFIED CKD STAGE: ICD-10-CM

## 2023-01-10 DIAGNOSIS — N18.30 STAGE 3 CHRONIC KIDNEY DISEASE, UNSPECIFIED WHETHER STAGE 3A OR 3B CKD: ICD-10-CM

## 2023-01-10 DIAGNOSIS — N18.9 ANEMIA OF CHRONIC RENAL FAILURE, UNSPECIFIED CKD STAGE: ICD-10-CM

## 2023-01-10 DIAGNOSIS — D63.1 ANEMIA OF CHRONIC RENAL FAILURE, UNSPECIFIED CKD STAGE: Primary | ICD-10-CM

## 2023-01-10 PROCEDURE — 85014 HEMATOCRIT: CPT

## 2023-01-10 PROCEDURE — 85018 HEMOGLOBIN: CPT

## 2023-01-10 PROCEDURE — 82728 ASSAY OF FERRITIN: CPT

## 2023-01-10 PROCEDURE — 84466 ASSAY OF TRANSFERRIN: CPT

## 2023-01-10 PROCEDURE — 84132 ASSAY OF SERUM POTASSIUM: CPT

## 2023-01-10 PROCEDURE — 83540 ASSAY OF IRON: CPT

## 2023-01-10 PROCEDURE — 36415 COLL VENOUS BLD VENIPUNCTURE: CPT

## 2023-01-10 PROCEDURE — 82565 ASSAY OF CREATININE: CPT

## 2023-01-11 LAB
CREAT SERPL-MCNC: 1.85 MG/DL (ref 0.76–1.27)
EGFRCR SERPLBLD CKD-EPI 2021: 37.1 ML/MIN/1.73
FERRITIN SERPL-MCNC: 224 NG/ML (ref 30–400)
HCT VFR BLD AUTO: 33 % (ref 37.5–51)
HGB BLD-MCNC: 10.3 G/DL (ref 13–17.7)
IRON 24H UR-MRATE: 40 MCG/DL (ref 59–158)
IRON SATN MFR SERPL: 11 % (ref 20–50)
POTASSIUM SERPL-SCNC: 4.3 MMOL/L (ref 3.5–5.2)
TIBC SERPL-MCNC: 374 MCG/DL (ref 298–536)
TRANSFERRIN SERPL-MCNC: 251 MG/DL (ref 200–360)

## 2023-05-17 NOTE — PROGRESS NOTES
"    [facility] Medicine Services  Short Stay Unit  PROGRESS NOTE    Patient Name: Jet Floyd  : 1945  MRN: [id number]    Admission Date and Time: 2020  8:31 PM  Primary Care Physician: Obi Soriano MD    Subjective     CC: f/u hyponatremia    HPI:  Sitting up in chair, wife at bedside. Edema is \"gone\" and he is feeling well. Hopeful that he can go home soon.     Review of Systems  Gen- No fevers, chills  CV- No chest pain, palpitations  Resp- No cough, dyspnea  GI- No N/V/D, abd pain    Objective     Vital Signs:   Temp:  [97.5 °F (36.4 °C)-98.2 °F (36.8 °C)] 97.9 °F (36.6 °C)  Heart Rate:  [74-92] 78  Resp:  [14-18] 18  BP: ()/(55-74) 94/65        Physical Exam:  Constitutional: No acute distress, awake, alert and conversant. Sitting up in chair   HENT: NCAT, mucous membranes moist  Respiratory: Clear to auscultation bilaterally, respiratory effort normal on room air   Cardiovascular: RRR, no murmurs, rubs, or gallops, palpable pedal pulses bilaterally  Gastrointestinal: Positive bowel sounds, soft, nontender, nondistended  Musculoskeletal: No bilateral ankle edema  Psychiatric: Appropriate affect, cooperative  Neurologic: Oriented x 3, strength symmetric in all extremities, Cranial Nerves grossly intact to confrontation, speech clear  Skin: No rashes    Results Reviewed:  Results from last 7 days   Lab Units 20  0240   WBC 10*3/mm3 5.11   HEMOGLOBIN g/dL 12.0*   HEMATOCRIT % 35.4*   PLATELETS 10*3/mm3 163     Results from last 7 days   Lab Units 20  0821   SODIUM mmol/L 124*   POTASSIUM mmol/L 3.4*   CHLORIDE mmol/L 81*   CO2 mmol/L 27.0   BUN mg/dL 54*   CREATININE mg/dL 2.75*   GLUCOSE mg/dL 154*   CALCIUM mg/dL 8.0*     Assessment / Plan     Active Hospital Problems    Diagnosis  POA   • **Acute on chronic congestive heart failure (CMS/HCC) [I50.9]  Yes   • History of alcoholism (CMS/HCC) [F10.21]  Not Applicable   • GABRIELLE (acute kidney " injury) (CMS/HCC) [N17.9]  Yes   • Elevated liver enzymes [R74.8]  Yes   • Hyperglycemia [R73.9]  Yes   • Hyponatremia [E87.1]  Yes   • Pleural effusion [J90]  Yes   • Essential hypertension [I10]  Yes   • Hyperlipidemia [E78.5]  Yes      Resolved Hospital Problems   No resolved problems to display.     Brief course to date:  Jet Floyd is a 74 y.o. male with past medical history of hypertension, dilated DM, systolic CHF, alcoholism, hyperlipidemia who presents to the ER with progressively worsening shortness of breath over the last 3 days, found to have acute on chronic congestive heart failure exacerbation.    Acute on chronic systolic congestive heart failure, resolved  - 2/15 ECHO showed EF 20% with mild concentric LV hypertrophy, mild RV dilation, moderately reduced RV systolic function, borderline dilation of LA and moderate to severe MR   - Appreciate nephrology and Dr. Richard assistance  - s/p diuresis with IV Bumex  - Holding further diuretic due to elevated creatinine  - Continue Carvedilol, holding Aldactone   - Discussed LifeVest with Dr. Richard on 2/17, no LifeVest at [place]     GABRIELLE on CKD   - Creatinine 2.75 today   - Baseline creatinine ~1.5-1.8 per nephrology - follows with Dr[facility]  - Holding nephrotoxins  - Nephrology giving Tolvaptan 30mg x 1 today in hopes it will improve Na and Cr    Hyponatremia  - s/p Tolvaptan 15mg on 2/17 & 2/18, 30mg today   - Na 124 this AM   - 1800 mL fluid restriction    Hypokalemia  - Monitoring due to low GFR   - KCL 20mEq today     Hyperglycemia:  - no history of DM. A1c 5.5     Elevated liver enzymes, resolved  - Suspect congestive hepatopathy worsened by ETOH abuse, improved with diuresis  - Hepatitis panel negative      History of alcoholism:  - continue with plan above  - CIWA has been normal     DVT prophylaxis: DAVID    Discharge Blueprint:   1. Improvement in volume status - GOAL MET   2. Improvement in renal function and  electrolytes  3. Cleared for DC by cardiology and nephrology     Electronically signed by Rachel Velázquez PA-C, 02/19/20, .     [As Noted in HPI] : as noted in HPI [Negative] : Heme/Lymph [[email or web address]] : rectal bleeding

## 2023-06-25 PROBLEM — N18.9 ACUTE KIDNEY INJURY SUPERIMPOSED ON CHRONIC KIDNEY DISEASE: Status: ACTIVE | Noted: 2020-02-14

## 2023-06-25 PROBLEM — E03.8 OTHER SPECIFIED HYPOTHYROIDISM: Status: ACTIVE | Noted: 2023-06-25

## 2023-06-25 PROBLEM — K43.6 SPIGELIAN HERNIA WITH BOWEL OBSTRUCTION: Status: ACTIVE | Noted: 2023-06-25

## 2023-06-28 ENCOUNTER — HOME HEALTH ADMISSION (OUTPATIENT)
Dept: HOME HEALTH SERVICES | Facility: HOME HEALTHCARE | Age: 78
End: 2023-06-28
Payer: MEDICARE

## 2023-06-28 PROBLEM — K43.6 SPIGELIAN HERNIA WITH BOWEL OBSTRUCTION: Status: RESOLVED | Noted: 2023-06-25 | Resolved: 2023-06-28

## 2023-07-24 ENCOUNTER — HOME CARE VISIT (OUTPATIENT)
Dept: HOME HEALTH SERVICES | Facility: HOME HEALTHCARE | Age: 78
End: 2023-07-24
Payer: MEDICARE

## 2023-07-24 VITALS
HEART RATE: 67 BPM | OXYGEN SATURATION: 100 % | SYSTOLIC BLOOD PRESSURE: 132 MMHG | RESPIRATION RATE: 18 BRPM | TEMPERATURE: 98.4 F | DIASTOLIC BLOOD PRESSURE: 72 MMHG

## 2023-07-24 PROCEDURE — G0299 HHS/HOSPICE OF RN EA 15 MIN: HCPCS

## 2023-07-24 NOTE — CASE COMMUNICATION
Also send to Dr. Brody Soriano     Patient discharged from  on 7/24/23. All goals met and incisions healed. Patien agreeable with plan.    Thanks,  IWONA Escalera

## 2023-07-27 ENCOUNTER — HOME CARE VISIT (OUTPATIENT)
Dept: HOME HEALTH SERVICES | Facility: HOME HEALTHCARE | Age: 78
End: 2023-07-27
Payer: MEDICARE

## 2023-07-27 PROCEDURE — G0151 HHCP-SERV OF PT,EA 15 MIN: HCPCS

## 2023-07-31 VITALS
HEART RATE: 66 BPM | OXYGEN SATURATION: 96 % | DIASTOLIC BLOOD PRESSURE: 56 MMHG | SYSTOLIC BLOOD PRESSURE: 118 MMHG | RESPIRATION RATE: 16 BRPM | TEMPERATURE: 97.9 F

## 2023-08-04 ENCOUNTER — HOME CARE VISIT (OUTPATIENT)
Dept: HOME HEALTH SERVICES | Facility: HOME HEALTHCARE | Age: 78
End: 2023-08-04
Payer: MEDICARE

## 2023-08-04 PROCEDURE — G0151 HHCP-SERV OF PT,EA 15 MIN: HCPCS

## 2023-08-11 VITALS
RESPIRATION RATE: 16 BRPM | HEART RATE: 72 BPM | DIASTOLIC BLOOD PRESSURE: 64 MMHG | TEMPERATURE: 97.8 F | SYSTOLIC BLOOD PRESSURE: 128 MMHG | OXYGEN SATURATION: 95 %

## 2023-08-25 ENCOUNTER — LAB (OUTPATIENT)
Dept: LAB | Facility: HOSPITAL | Age: 78
End: 2023-08-25
Payer: MEDICARE

## 2023-08-25 ENCOUNTER — OFFICE VISIT (OUTPATIENT)
Dept: GASTROENTEROLOGY | Facility: CLINIC | Age: 78
End: 2023-08-25
Payer: MEDICARE

## 2023-08-25 VITALS
HEART RATE: 79 BPM | WEIGHT: 129.8 LBS | DIASTOLIC BLOOD PRESSURE: 70 MMHG | TEMPERATURE: 97.7 F | SYSTOLIC BLOOD PRESSURE: 123 MMHG | HEIGHT: 69 IN | BODY MASS INDEX: 19.23 KG/M2

## 2023-08-25 DIAGNOSIS — K74.69 OTHER CIRRHOSIS OF LIVER: Primary | ICD-10-CM

## 2023-08-25 DIAGNOSIS — N18.30 STAGE 3 CHRONIC KIDNEY DISEASE, UNSPECIFIED WHETHER STAGE 3A OR 3B CKD: ICD-10-CM

## 2023-08-25 DIAGNOSIS — Z11.59 ENCOUNTER FOR SCREENING FOR OTHER VIRAL DISEASES: ICD-10-CM

## 2023-08-25 DIAGNOSIS — N18.9 ANEMIA OF CHRONIC RENAL FAILURE, UNSPECIFIED CKD STAGE: ICD-10-CM

## 2023-08-25 DIAGNOSIS — K74.69 OTHER CIRRHOSIS OF LIVER: ICD-10-CM

## 2023-08-25 DIAGNOSIS — D63.1 ANEMIA OF CHRONIC RENAL FAILURE, UNSPECIFIED CKD STAGE: ICD-10-CM

## 2023-08-25 LAB
ALPHA-FETOPROTEIN: 2.21 NG/ML (ref 0–8.3)
FERRITIN SERPL-MCNC: 268.7 NG/ML (ref 30–400)
HBV CORE IGM SERPL QL IA: NORMAL
INR PPP: 1.4 (ref 0.89–1.12)
IRON 24H UR-MRATE: 24 MCG/DL (ref 59–158)
IRON SATN MFR SERPL: 8 % (ref 20–50)
PROTHROMBIN TIME: 17.3 SECONDS (ref 12.2–14.5)
TIBC SERPL-MCNC: 302 MCG/DL (ref 298–536)
TRANSFERRIN SERPL-MCNC: 203 MG/DL (ref 200–360)

## 2023-08-25 PROCEDURE — 80053 COMPREHEN METABOLIC PANEL: CPT

## 2023-08-25 PROCEDURE — 86376 MICROSOMAL ANTIBODY EACH: CPT

## 2023-08-25 PROCEDURE — 86708 HEPATITIS A ANTIBODY: CPT

## 2023-08-25 PROCEDURE — 36415 COLL VENOUS BLD VENIPUNCTURE: CPT

## 2023-08-25 PROCEDURE — 86704 HEP B CORE ANTIBODY TOTAL: CPT

## 2023-08-25 PROCEDURE — 86015 ACTIN ANTIBODY EACH: CPT

## 2023-08-25 PROCEDURE — 85610 PROTHROMBIN TIME: CPT

## 2023-08-25 PROCEDURE — 82105 ALPHA-FETOPROTEIN SERUM: CPT

## 2023-08-25 PROCEDURE — 83540 ASSAY OF IRON: CPT

## 2023-08-25 PROCEDURE — 82728 ASSAY OF FERRITIN: CPT

## 2023-08-25 PROCEDURE — 86381 MITOCHONDRIAL ANTIBODY EACH: CPT

## 2023-08-25 PROCEDURE — 85025 COMPLETE CBC W/AUTO DIFF WBC: CPT

## 2023-08-25 PROCEDURE — 82784 ASSAY IGA/IGD/IGG/IGM EACH: CPT

## 2023-08-25 PROCEDURE — 86705 HEP B CORE ANTIBODY IGM: CPT

## 2023-08-25 PROCEDURE — 84466 ASSAY OF TRANSFERRIN: CPT

## 2023-08-25 RX ORDER — HYDROCODONE BITARTRATE AND ACETAMINOPHEN 5; 325 MG/1; MG/1
TABLET ORAL
COMMUNITY

## 2023-08-25 RX ORDER — BUMETANIDE 2 MG/1
2 TABLET ORAL AS NEEDED
COMMUNITY

## 2023-08-25 NOTE — PROGRESS NOTES
GASTROENTEROLOGY OFFICE NOTE  Jet Floyd  [id number]  1945    CARE TEAM  Patient Care Team:  Obi Soriano MD as PCP - General  Obi Soriano MD as PCP - Family Medicine  Steve Massey MD as Surgeon (General Surgery)    Referring Provider: Obi Soriano MD    Chief Complaint   Patient presents with    Hepatic Disease        HISTORY OF PRESENT ILLNESS:  Patient is a 78-year-old male with chronic CHF s/p ICD placement, hypertension, hyperlipidemia, cervical stenosis, chronic kidney disease, history of alcohol abuse, recently diagnosed with cirrhosis per CT abdomen/pelvis which also found an incarcerated Spigelian hernia with bowel obstruction status post surgical intervention on 6/25/2023.    He reports the long history of alcohol use drinking daily 3-4 drinks.  Reports he last drank alcohol well over a year ago.    Liver enzymes while hospitalized in June for incarcerated hernia are noted to be normal AST 27/ALT 13 with an elevated alkaline phosphatase 156 and T. bili 3.3.    He has not exhibited any signs of decompensation.  He is currently on carvedilol as prescribed by cardiology as well as furosemide.    PAST MEDICAL HISTORY  Past Medical History:   Diagnosis Date    Cancer     CHF (congestive heart failure)     High blood pressure     Hypertension     Kidney disease     Kidney disease     Pneumonia         PAST SURGICAL HISTORY  Past Surgical History:   Procedure Laterality Date    CARDIAC DEFIBRILLATOR PLACEMENT Bilateral 1998    CARDIAC DEFIBRILLATOR REMOVAL Bilateral 2003    CARDIAC ELECTROPHYSIOLOGY PROCEDURE N/A 03/17/2020    Procedure: BIV ICD 58003;  Surgeon: Mynor Richard MD;  Location: [facility] INVASIVE LOCATION;  Service: Cardiology;  Laterality: N/A;    CATARACT EXTRACTION Bilateral     CERVICAL SPINE SURGERY Bilateral 2014    COLONOSCOPY      DIAGNOSTIC LAPAROSCOPY N/A 06/25/2023    Procedure: EXPLORATORY LAPAROTOMY, RIGHT SPIGELIAN HERNIA  REPAIR;  Surgeon: Steve Massey MD;  Location: [facility];  Service: General;  Laterality: N/A;    HERNIA REPAIR          MEDICATIONS:    Current Outpatient Medications:     allopurinol (ZYLOPRIM) 100 MG tablet, Take 1 tablet by mouth Daily. Indications: Disorder of Excessive Uric Acid in the Blood, Disp: , Rfl:     atorvastatin (LIPITOR) 20 MG tablet, Take 1 tablet by mouth Daily. Indications: High Amount of Fats in the Blood, Disp: , Rfl:     bumetanide (BUMEX) 2 MG tablet, Take 1 tablet by mouth As Needed., Disp: , Rfl:     carvedilol (COREG) 3.125 MG tablet, Take 1 tablet by mouth 2 (Two) Times a Day With Meals., Disp: , Rfl:     doxepin (SINEquan) 10 MG capsule, Take 1 capsule by mouth Every Night. Indications: Feeling Anxious, Disp: , Rfl:     furosemide (LASIX) 40 MG tablet, Take 1 tablet by mouth Daily., Disp: , Rfl:     HYDROcodone-acetaminophen (NORCO) 5-325 MG per tablet, , Disp: , Rfl:     levothyroxine (SYNTHROID, LEVOTHROID) 50 MCG tablet, Take 1 tablet by mouth Daily. Indications: Underactive Thyroid, Disp: , Rfl:     pantoprazole (PROTONIX) 40 MG EC tablet, Take 1 tablet by mouth Every Morning., Disp: 30 tablet, Rfl: 1    tamsulosin (FLOMAX) 0.4 MG capsule 24 hr capsule, Take 0.4 capsules by mouth Daily. Indications: Chronic Prostate Gland Inflammation, Disp: , Rfl:     ALLERGIES  Allergies   Allergen Reactions    Lactose Intolerance (Gi) Diarrhea    Milk (Cow) Unknown - High Severity    Polysporin [Bacitracin-Polymyxin B] Itching and Rash       FAMILY HISTORY:  Family History   Problem Relation Age of Onset    Dementia Mother     Colon cancer Father     Cancer Father         prostate    No Known Problems Sister     No Known Problems Brother     No Known Problems Daughter        SOCIAL HISTORY  Social History     Socioeconomic History    Marital status:    Tobacco Use    Smoking status: Former     Packs/day: 1.50     Years: 48.00     Pack years: 72.00     Types: Cigarettes    Smokeless  "tobacco: Never    Tobacco comments:     quit 2010   Vaping Use    Vaping Use: Never used   Substance and Sexual Activity    Alcohol use: Not Currently     Alcohol/week: 35.0 standard drinks     Types: 35 Shots of liquor per week     Comment: liquor daily 5-6    Drug use: No    Sexual activity: Defer         PHYSICAL EXAM   /70 (BP Location: Left arm, Patient Position: Sitting, Cuff Size: Adult)   Pulse 79   Temp 97.7 øF (36.5 øC) (Temporal)   Ht 175.3 cm (69\")   Wt 58.9 kg (129 lb 12.8 oz)   BMI 19.17 kg/mý   Physical Exam  Vitals and nursing note reviewed.   Constitutional:       Appearance: Normal appearance. He is well-developed.   HENT:      Head: Normocephalic and atraumatic.      Nose: Nose normal.      Mouth/Throat:      Mouth: Mucous membranes are moist.      Pharynx: Oropharynx is clear.   Eyes:      Pupils: Pupils are equal, round, and reactive to light.   Cardiovascular:      Rate and Rhythm: Normal rate and regular rhythm.   Pulmonary:      Effort: Pulmonary effort is normal.      Breath sounds: Normal breath sounds. No wheezing or rales.   Abdominal:      General: Bowel sounds are normal.      Palpations: Abdomen is soft. There is no mass.      Tenderness: There is no abdominal tenderness. There is no guarding or rebound.      Hernia: No hernia is present.   Musculoskeletal:         General: Normal range of motion.      Cervical back: Normal range of motion and neck supple.      Right lower le+ Edema present.      Left lower le+ Edema present.   Skin:     General: Skin is warm and dry.   Neurological:      Mental Status: He is alert and oriented to person, place, and time.      Cranial Nerves: No cranial nerve deficit.   Psychiatric:         Behavior: Behavior normal.         Judgment: Judgment normal.         Results Review:  Narrative & Impression   CT ABDOMEN PELVIS WO CONTRAST     Date of Exam: 2023 12:19 PM EDT     Indication: Excessiove vomiting/nausea.     Comparison: " 3/14/2020     Technique: Axial CT images were obtained of the abdomen and pelvis without the administration of contrast. Reconstructed coronal and sagittal images were also obtained. Automated exposure control and iterative construction methods were used.     Findings:     Liver: Liver appears cirrhotic in morphology. Evaluation for focal liver lesions is limited due to lack of IV contrast administration. No biliary dilation is seen.     Gallbladder: Cholelithiasis. The gallbladder is otherwise grossly unremarkable.     Pancreas: Unremarkable.     Spleen: Unremarkable.     Adrenal glands: Unremarkable.     Genitourinary tract: Right kidney appears atrophic. The left kidney is unremarkable. No hydronephrosis is seen. The ureters are unremarkable. The urinary bladder and prostate gland appear unremarkable.     Gastrointestinal tract: There are dilated small bowel loops throughout the abdomen measuring up to approximately 3.6 cm in caliber and demonstrating scattered air-fluid levels. Transition point appears to be associated with a right Sided Spigelian Hernia   series 900, image 34). Distal small bowel is decompressed. There are additional bilateral inguinal hernias. The right inguinal hernia appears to contains fluid versus decompressed small bowel. The left inguinal hernia contains fluid. Moderate stool is   seen throughout the colon.     Appendix: No findings to suggest acute appendicitis.     Other findings: Mild perihepatic and perisplenic ascites. No free air is identified. Moderate vascular calcifications are seen.     Bones: Bones are demineralized. There is a compression fracture of L2, similar since 3/14/2020. There are degenerative changes within the spine. Bilateral inguinal hernias and a right-sided spigelian hernia.     Lung bases: Cardiomegaly. Scattered bibasilar atelectasis. Cardiac pacemaker leads are noted.     IMPRESSION:  Impression:  1.Findings concerning for small bowel obstruction. Small bowel  measures up to 3.6 cm in caliber. Probable transition point associated with a right-sided Spigelian Hernia (series 900, image 34).  2.Additional bilateral inguinal hernias. Right inguinal hernia contains mild fluid versus less likely decompressed small bowel. The left inguinal hernia contains moderate fluid.  3.Liver morphology suggestive of cirrhosis with mild upper abdominal ascites. Please correlate with patient's medical history and liver function tests.  4.Cholelithiasis.  5.Additional findings as detailed above.        Electronically Signed: Hidalgo Shabana    6/25/2023 12:43 PM EDT        ASSESSMENT / PLAN  Diagnoses and all orders for this visit:    1. Other cirrhosis of liver (Primary)  -     AFP Tumor Marker; Future  -     Comprehensive Metabolic Panel; Future  -     Protime-INR; Future  -     CBC & Differential; Future  -     Anti-Smooth Muscle Antibody Titer; Future  -     Anti-microsomal Antibody; Future  -     IgG, IgA, IgM; Future  -     Mitochondrial Antibodies, M2; Future  -     Ferritin; Future  -     Hepatitis A Antibody, Total; Future  -     Hepatitis B Core Antibody, IgM; Future  -     Hepatitis B Core Antibody, Total; Future  -     Hepatitis B Surf Antibody Quant; Future  -     Hepatitis B Surface Antibody; Future  -     Hepatitis B Surface Antigen; Future  -     Hepatitis C Antibody; Future  -     Protime-INR; Future    2. Encounter for screening for other viral diseases  -     Hepatitis B Core Antibody, Total; Future  -     Hepatitis B Surface Antibody; Future  -     Hepatitis B Surface Antigen; Future       1) Cirrhosis   - Etiology likely ETOH  - Differentials include but are not limited to: metabolic, infectious and viral, autoimmune, and genetic etiologies  Plan:  - Check: AYSHA, ASMA, LKM, AMA, Ig profile, viral hepatitis panel,   - Check routine labs (CBC, CMP, INR)  - No herbal or hepatotoxic drugs (i.e. amiodarone, methotrexate, long-term steroid use, etc.)  - No alcohol or NSAID use.  Discussed may take Tylenol <2grams/day as needed  - Discussed MELD score and implications regarding utility of consulting transplant  - Discussed need for protein rich diet (1.5g/kg/day), and late night protein snack    # Esophageal varix and HCC surveillance:   - No history of: VH, GI bleed, EVL, liver lesions, elevated AFP  - Last EGD showed , last abdominal US showed  - Imaging due in December 2023.  Plan  - Discussed if any signs of hemorrhage, to treat as emergency and go to nearest ED  -Currently on nonselective beta-blocker, Coreg 3.125 mg twice daily.  Will defer EGD.    # Ascites:  -No history of ascites  - Recommend patient weigh self daily and report persistent weight gain of 3 pounds daily for 2 days.    # Hepatic encephalopathy:  -No history of hepatic encephalopathy  - Discussed signs and symptoms of hepatic encephalopathy and report to emergency department if signs appear.       Return in about 3 months (around 11/25/2023).    I discussed the patients findings and my recommendations with patient    FIONA Najera

## 2023-08-26 LAB
ALBUMIN SERPL-MCNC: 4.2 G/DL (ref 3.5–5.2)
ALBUMIN/GLOB SERPL: 1.3 G/DL
ALP SERPL-CCNC: 143 U/L (ref 39–117)
ALT SERPL W P-5'-P-CCNC: 11 U/L (ref 1–41)
ANION GAP SERPL CALCULATED.3IONS-SCNC: 13 MMOL/L (ref 5–15)
AST SERPL-CCNC: 18 U/L (ref 1–40)
BASOPHILS # BLD AUTO: 0.06 10*3/MM3 (ref 0–0.2)
BASOPHILS NFR BLD AUTO: 1.6 % (ref 0–1.5)
BILIRUB SERPL-MCNC: 1.3 MG/DL (ref 0–1.2)
BUN SERPL-MCNC: 28 MG/DL (ref 8–23)
BUN/CREAT SERPL: 13.9 (ref 7–25)
CALCIUM SPEC-SCNC: 8.8 MG/DL (ref 8.6–10.5)
CHLORIDE SERPL-SCNC: 102 MMOL/L (ref 98–107)
CO2 SERPL-SCNC: 22 MMOL/L (ref 22–29)
CREAT SERPL-MCNC: 2.02 MG/DL (ref 0.76–1.27)
DEPRECATED RDW RBC AUTO: 53.4 FL (ref 37–54)
EGFRCR SERPLBLD CKD-EPI 2021: 33.1 ML/MIN/1.73
EOSINOPHIL # BLD AUTO: 0.1 10*3/MM3 (ref 0–0.4)
EOSINOPHIL NFR BLD AUTO: 2.7 % (ref 0.3–6.2)
ERYTHROCYTE [DISTWIDTH] IN BLOOD BY AUTOMATED COUNT: 16.5 % (ref 12.3–15.4)
GLOBULIN UR ELPH-MCNC: 3.2 GM/DL
GLUCOSE SERPL-MCNC: 88 MG/DL (ref 65–99)
HAV AB SER QL IA: NEGATIVE
HBV CORE AB SERPL QL IA: NEGATIVE
HCT VFR BLD AUTO: 31.8 % (ref 37.5–51)
HGB BLD-MCNC: 9.7 G/DL (ref 13–17.7)
IGA1 MFR SER: 292 MG/DL (ref 70–400)
IGG1 SER-MCNC: 1528 MG/DL (ref 700–1600)
IGM SERPL-MCNC: 95 MG/DL (ref 40–230)
LKM-1 AB SER-ACNC: 1.4 UNITS (ref 0–20)
LYMPHOCYTES # BLD AUTO: 0.51 10*3/MM3 (ref 0.7–3.1)
LYMPHOCYTES NFR BLD AUTO: 13.9 % (ref 19.6–45.3)
MCH RBC QN AUTO: 27.3 PG (ref 26.6–33)
MCHC RBC AUTO-ENTMCNC: 30.5 G/DL (ref 31.5–35.7)
MCV RBC AUTO: 89.6 FL (ref 79–97)
MITOCHONDRIA M2 IGG SER-ACNC: <20 UNITS (ref 0–20)
MONOCYTES # BLD AUTO: 0.33 10*3/MM3 (ref 0.1–0.9)
MONOCYTES NFR BLD AUTO: 9 % (ref 5–12)
NEUTROPHILS NFR BLD AUTO: 2.64 10*3/MM3 (ref 1.7–7)
NEUTROPHILS NFR BLD AUTO: 72.3 % (ref 42.7–76)
PLATELET # BLD AUTO: 138 10*3/MM3 (ref 140–450)
PMV BLD AUTO: ABNORMAL FL
POTASSIUM SERPL-SCNC: 4.8 MMOL/L (ref 3.5–5.2)
PROT SERPL-MCNC: 7.4 G/DL (ref 6–8.5)
RBC # BLD AUTO: 3.55 10*6/MM3 (ref 4.14–5.8)
SMA IGG SER-ACNC: 9 UNITS (ref 0–19)
SODIUM SERPL-SCNC: 137 MMOL/L (ref 136–145)
WBC NRBC COR # BLD: 3.66 10*3/MM3 (ref 3.4–10.8)

## 2023-10-25 ENCOUNTER — OFFICE VISIT (OUTPATIENT)
Dept: GASTROENTEROLOGY | Facility: CLINIC | Age: 78
End: 2023-10-25
Payer: MEDICARE

## 2023-10-25 VITALS
HEART RATE: 66 BPM | WEIGHT: 127.2 LBS | BODY MASS INDEX: 18.84 KG/M2 | SYSTOLIC BLOOD PRESSURE: 133 MMHG | HEIGHT: 69 IN | TEMPERATURE: 97.5 F | DIASTOLIC BLOOD PRESSURE: 64 MMHG

## 2023-10-25 DIAGNOSIS — K74.69 OTHER CIRRHOSIS OF LIVER: Primary | ICD-10-CM

## 2023-10-25 NOTE — PROGRESS NOTES
GASTROENTEROLOGY OFFICE NOTE  Jet Floyd  [id number]  1945    CARE TEAM  Patient Care Team:  Obi Soriano MD as PCP - General  Obi Soriano MD as PCP - Family Medicine  Steve Massey MD as Surgeon (General Surgery)    Referring Provider: Obi Soriano MD    Chief Complaint   Patient presents with    Hepatic Disease        HISTORY OF PRESENT ILLNESS:  Patient is a 78-year-old male with history of chronic CHF s/p ICD placement, hypertension, hyperlipidemia, cervical stenosis, chronic kidney disease, history of alcohol abuse, recently diagnosed with cirrhosis.  He returns in follow-up after serologic work-up for etiology of cirrhosis.  Work-up was unremarkable.  Etiology of cirrhosis presumed EtOH related.    He continues to do well not exhibiting any signs of decompensation.  He is currently on carvedilol and furosemide as prescribed by cardiology.    He continues to be abstinent from alcohol for well over a year now.    PAST MEDICAL HISTORY  Past Medical History:   Diagnosis Date    Cancer     CHF (congestive heart failure)     High blood pressure     Hypertension     Kidney disease     Kidney disease     Pneumonia         PAST SURGICAL HISTORY  Past Surgical History:   Procedure Laterality Date    CARDIAC DEFIBRILLATOR PLACEMENT Bilateral 1998    CARDIAC DEFIBRILLATOR REMOVAL Bilateral 2003    CARDIAC ELECTROPHYSIOLOGY PROCEDURE N/A 03/17/2020    Procedure: BIV ICD 14387;  Surgeon: Mynor Richard MD;  Location: [facility] CATH INVASIVE LOCATION;  Service: Cardiology;  Laterality: N/A;    CATARACT EXTRACTION Bilateral     CERVICAL SPINE SURGERY Bilateral 2014    COLONOSCOPY      DIAGNOSTIC LAPAROSCOPY N/A 06/25/2023    Procedure: EXPLORATORY LAPAROTOMY, RIGHT SPIGELIAN HERNIA REPAIR;  Surgeon: Steve Massey MD;  Location: [facility] OR;  Service: General;  Laterality: N/A;    HERNIA REPAIR          MEDICATIONS:    Current Outpatient Medications:     allopurinol  (ZYLOPRIM) 100 MG tablet, Take 1 tablet by mouth Daily. Indications: Disorder of Excessive Uric Acid in the Blood, Disp: , Rfl:     atorvastatin (LIPITOR) 20 MG tablet, Take 1 tablet by mouth Daily. Indications: High Amount of Fats in the Blood, Disp: , Rfl:     bumetanide (BUMEX) 2 MG tablet, Take 1 tablet by mouth As Needed., Disp: , Rfl:     carvedilol (COREG) 3.125 MG tablet, Take 1 tablet by mouth 2 (Two) Times a Day With Meals., Disp: , Rfl:     doxepin (SINEquan) 10 MG capsule, Take 1 capsule by mouth Every Night. Indications: Feeling Anxious, Disp: , Rfl:     levothyroxine (SYNTHROID, LEVOTHROID) 50 MCG tablet, Take 1 tablet by mouth Daily. Indications: Underactive Thyroid, Disp: , Rfl:     tamsulosin (FLOMAX) 0.4 MG capsule 24 hr capsule, Take 0.4 capsules by mouth Daily. Indications: Chronic Prostate Gland Inflammation, Disp: , Rfl:     furosemide (LASIX) 40 MG tablet, Take 1 tablet by mouth Daily. (Patient not taking: Reported on 10/25/2023), Disp: , Rfl:     HYDROcodone-acetaminophen (NORCO) 5-325 MG per tablet, , Disp: , Rfl:     pantoprazole (PROTONIX) 40 MG EC tablet, Take 1 tablet by mouth Every Morning. (Patient not taking: Reported on 10/25/2023), Disp: 30 tablet, Rfl: 1    ALLERGIES  Allergies   Allergen Reactions    Lactose Intolerance (Gi) Diarrhea    Milk (Cow) Unknown - High Severity    Polysporin [Bacitracin-Polymyxin B] Itching and Rash       FAMILY HISTORY:  Family History   Problem Relation Age of Onset    Dementia Mother     Colon cancer Father     Cancer Father         prostate    No Known Problems Sister     No Known Problems Brother     No Known Problems Daughter        SOCIAL HISTORY  Social History     Socioeconomic History    Marital status:    Tobacco Use    Smoking status: Former     Packs/day: 1.50     Years: 48.00     Additional pack years: 0.00     Total pack years: 72.00     Types: Cigarettes    Smokeless tobacco: Never    Tobacco comments:     quit 2010   Vaping Use     "Vaping Use: Never used   Substance and Sexual Activity    Alcohol use: Not Currently     Alcohol/week: 35.0 standard drinks of alcohol     Types: 35 Shots of liquor per week     Comment: liquor daily 5-6    Drug use: No    Sexual activity: Defer         PHYSICAL EXAM   /64 (BP Location: Right arm, Patient Position: Sitting, Cuff Size: Adult)   Pulse 66   Temp 97.5 °F (36.4 °C) (Temporal)   Ht 175.3 cm (69\")   Wt 57.7 kg (127 lb 3.2 oz)   BMI 18.78 kg/m²   Physical Exam  Constitutional:       Appearance: Normal appearance.   HENT:      Head: Normocephalic and atraumatic.   Pulmonary:      Effort: Pulmonary effort is normal.   Abdominal:      General: There is no distension.      Palpations: Abdomen is soft.      Tenderness: There is no abdominal tenderness.   Neurological:      Mental Status: He is alert and oriented to person, place, and time.   Psychiatric:         Mood and Affect: Mood normal.         Thought Content: Thought content normal.           Results Review:   Latest Reference Range & Units 08/25/23 12:05   Sodium 136 - 145 mmol/L 137   Potassium 3.5 - 5.2 mmol/L 4.8   Chloride 98 - 107 mmol/L 102   CO2 22.0 - 29.0 mmol/L 22.0   Anion Gap 5.0 - 15.0 mmol/L 13.0   BUN 8 - 23 mg/dL 28 (H)   Creatinine 0.76 - 1.27 mg/dL 2.02 (H)   BUN/Creatinine Ratio 7.0 - 25.0  13.9   eGFR >60.0 mL/min/1.73 33.1 (L)   Glucose 65 - 99 mg/dL 88   Calcium 8.6 - 10.5 mg/dL 8.8   Alkaline Phosphatase 39 - 117 U/L 143 (H)   Total Protein 6.0 - 8.5 g/dL 7.4   Albumin 3.5 - 5.2 g/dL 4.2   Globulin gm/dL 3.2   A/G Ratio g/dL 1.3   AST (SGOT) 1 - 40 U/L 18   ALT (SGPT) 1 - 41 U/L 11   Total Bilirubin 0.0 - 1.2 mg/dL 1.3 (H)   Iron 59 - 158 mcg/dL 24 (L)   Ferritin 30.00 - 400.00 ng/mL 268.70   Iron Saturation (TSAT) 20 - 50 % 8 (L)   Transferrin 200 - 360 mg/dL 203   TIBC 298 - 536 mcg/dL 302   Liver Fraction: 0.0 - 20.0 Units 1.4   Protime 12.2 - 14.5 Seconds 17.3 (H)   INR 0.89 - 1.12  1.40 (H)   WBC 3.40 - 10.80 " 10*3/mm3 3.66   RBC 4.14 - 5.80 10*6/mm3 3.55 (L)   Hemoglobin 13.0 - 17.7 g/dL 9.7 (L)   Hematocrit 37.5 - 51.0 % 31.8 (L)   Platelets 140 - 450 10*3/mm3 138 (L)   RDW 12.3 - 15.4 % 16.5 (H)   MCV 79.0 - 97.0 fL 89.6   MCH 26.6 - 33.0 pg 27.3   MCHC 31.5 - 35.7 g/dL 30.5 (L)   MPV  See Comment   RDW-SD 37.0 - 54.0 fl 53.4   Neutrophil Rel % 42.7 - 76.0 % 72.3   Lymphocyte Rel % 19.6 - 45.3 % 13.9 (L)   Monocyte Rel % 5.0 - 12.0 % 9.0   Eosinophil Rel % 0.3 - 6.2 % 2.7   Basophil Rel % 0.0 - 1.5 % 1.6 (H)   Neutrophils Absolute 1.70 - 7.00 10*3/mm3 2.64   Lymphocytes Absolute 0.70 - 3.10 10*3/mm3 0.51 (L)   Monocytes Absolute 0.10 - 0.90 10*3/mm3 0.33   Eosinophils Absolute 0.00 - 0.40 10*3/mm3 0.10   Basophils Absolute 0.00 - 0.20 10*3/mm3 0.06   Hep A Total Ab Negative  Negative   Hep B Core Total Ab Negative  Negative   Hep B Core IgM Non-Reactive  Non-Reactive   IgA 70 - 400 mg/dL 292   IgG 700 - 1,600 mg/dL 1,528   IgM 40 - 230 mg/dL 95   Mitochondrial Ab 0.0 - 20.0 Units <20.0   Smooth Muscle Ab 0 - 19 Units 9   ALPHA-FETOPROTEIN 0 - 8.3 ng/mL 2.21   (H): Data is abnormally high  (L): Data is abnormally low    ASSESSMENT / PLAN  1) Cirrhosis   - Etiology ETOH vs cardiogenic  - No herbal or hepatotoxic drugs (i.e. amiodarone, methotrexate, long-term steroid use, etc.)  - No alcohol or NSAID use. Discussed may take Tylenol <2grams/day as needed  - Discussed MELD score and implications regarding utility of consulting transplant  - Discussed need for protein rich diet (1.5g/kg/day), and late night protein snack     # Esophageal varix and HCC surveillance:   - No history of: VH, GI bleed, EVL, liver lesions, elevated AFP  - Imaging due in December 2023.  Plan  - Discussed if any signs of hemorrhage, to treat as emergency and go to nearest ED  -Currently on nonselective beta-blocker, Coreg 3.125 mg twice daily.  Will defer EGD.  -US abdomen in December 2023     # Ascites:  -No history of ascites  - Recommend patient  weigh self daily and report persistent weight gain of 3 pounds daily for 2 days.     # Hepatic encephalopathy:  -No history of hepatic encephalopathy  - Discussed signs and symptoms of hepatic encephalopathy and report to emergency department if signs appear.       Return in about 6 months (around 4/25/2024).    I discussed the patients findings and my recommendations with patient    Timothy Devries, APRN

## 2023-11-13 ENCOUNTER — ANESTHESIA EVENT CONVERTED (OUTPATIENT)
Dept: ANESTHESIOLOGY | Facility: HOSPITAL | Age: 78
End: 2023-11-13
Payer: MEDICARE

## 2023-11-13 ENCOUNTER — ANESTHESIA (OUTPATIENT)
Dept: PERIOP | Facility: HOSPITAL | Age: 78
End: 2023-11-13
Payer: MEDICARE

## 2023-11-13 ENCOUNTER — HOSPITAL ENCOUNTER (OUTPATIENT)
Facility: HOSPITAL | Age: 78
Setting detail: SURGERY ADMIT
End: 2023-11-13
Attending: SURGERY | Admitting: SURGERY
Payer: MEDICARE

## 2023-11-13 ENCOUNTER — APPOINTMENT (OUTPATIENT)
Dept: CT IMAGING | Facility: HOSPITAL | Age: 78
End: 2023-11-13
Payer: MEDICARE

## 2023-11-13 ENCOUNTER — HOSPITAL ENCOUNTER (INPATIENT)
Facility: HOSPITAL | Age: 78
LOS: 2 days | Discharge: HOME | End: 2023-11-15
Attending: EMERGENCY MEDICINE | Admitting: PEDIATRICS
Payer: MEDICARE

## 2023-11-13 ENCOUNTER — ANESTHESIA EVENT (OUTPATIENT)
Dept: PERIOP | Facility: HOSPITAL | Age: 78
End: 2023-11-13
Payer: MEDICARE

## 2023-11-13 DIAGNOSIS — D72.829 LEUKOCYTOSIS, UNSPECIFIED TYPE: ICD-10-CM

## 2023-11-13 DIAGNOSIS — N17.9 AKI (ACUTE KIDNEY INJURY): ICD-10-CM

## 2023-11-13 DIAGNOSIS — K40.90 INGUINAL HERNIA: ICD-10-CM

## 2023-11-13 DIAGNOSIS — K40.30 STRANGULATED INGUINAL HERNIA: Primary | ICD-10-CM

## 2023-11-13 LAB
ALBUMIN SERPL-MCNC: 4.3 G/DL (ref 3.5–5.2)
ALBUMIN/GLOB SERPL: 1 G/DL
ALP SERPL-CCNC: 158 U/L (ref 39–117)
ALT SERPL W P-5'-P-CCNC: 17 U/L (ref 1–41)
ANION GAP SERPL CALCULATED.3IONS-SCNC: 19 MMOL/L (ref 5–15)
AST SERPL-CCNC: 31 U/L (ref 1–40)
BASOPHILS # BLD AUTO: 0.06 10*3/MM3 (ref 0–0.2)
BASOPHILS NFR BLD AUTO: 0.5 % (ref 0–1.5)
BILIRUB SERPL-MCNC: 3.2 MG/DL (ref 0–1.2)
BUN SERPL-MCNC: 44 MG/DL (ref 8–23)
BUN/CREAT SERPL: 11.8 (ref 7–25)
CALCIUM SPEC-SCNC: 9.5 MG/DL (ref 8.6–10.5)
CHLORIDE SERPL-SCNC: 90 MMOL/L (ref 98–107)
CO2 SERPL-SCNC: 29 MMOL/L (ref 22–29)
CREAT BLDA-MCNC: 4.5 MG/DL
CREAT BLDA-MCNC: 4.5 MG/DL (ref 0.6–1.3)
CREAT SERPL-MCNC: 3.73 MG/DL (ref 0.76–1.27)
D-LACTATE SERPL-SCNC: 1.1 MMOL/L (ref 0.5–2)
D-LACTATE SERPL-SCNC: 2.3 MMOL/L (ref 0.5–2)
DEPRECATED RDW RBC AUTO: 53.4 FL (ref 37–54)
EGFRCR SERPLBLD CKD-EPI 2021: 15.9 ML/MIN/1.73
EOSINOPHIL # BLD AUTO: 0.04 10*3/MM3 (ref 0–0.4)
EOSINOPHIL NFR BLD AUTO: 0.3 % (ref 0.3–6.2)
ERYTHROCYTE [DISTWIDTH] IN BLOOD BY AUTOMATED COUNT: 18.6 % (ref 12.3–15.4)
GLOBULIN UR ELPH-MCNC: 4.3 GM/DL
GLUCOSE SERPL-MCNC: 134 MG/DL (ref 65–99)
HCT VFR BLD AUTO: 36.7 % (ref 37.5–51)
HGB BLD-MCNC: 11.8 G/DL (ref 13–17.7)
HOLD SPECIMEN: NORMAL
IMM GRANULOCYTES # BLD AUTO: 0.08 10*3/MM3 (ref 0–0.05)
IMM GRANULOCYTES NFR BLD AUTO: 0.6 % (ref 0–0.5)
LIPASE SERPL-CCNC: 21 U/L (ref 13–60)
LYMPHOCYTES # BLD AUTO: 0.53 10*3/MM3 (ref 0.7–3.1)
LYMPHOCYTES NFR BLD AUTO: 4.2 % (ref 19.6–45.3)
MCH RBC QN AUTO: 26 PG (ref 26.6–33)
MCHC RBC AUTO-ENTMCNC: 32.2 G/DL (ref 31.5–35.7)
MCV RBC AUTO: 81 FL (ref 79–97)
MONOCYTES # BLD AUTO: 0.54 10*3/MM3 (ref 0.1–0.9)
MONOCYTES NFR BLD AUTO: 4.2 % (ref 5–12)
NEUTROPHILS NFR BLD AUTO: 11.52 10*3/MM3 (ref 1.7–7)
NEUTROPHILS NFR BLD AUTO: 90.2 % (ref 42.7–76)
NRBC BLD AUTO-RTO: 0 /100 WBC (ref 0–0.2)
PLATELET # BLD AUTO: 170 10*3/MM3 (ref 140–450)
POTASSIUM SERPL-SCNC: 4.2 MMOL/L (ref 3.5–5.2)
PROT SERPL-MCNC: 8.6 G/DL (ref 6–8.5)
RBC # BLD AUTO: 4.53 10*6/MM3 (ref 4.14–5.8)
SODIUM SERPL-SCNC: 138 MMOL/L (ref 136–145)
WBC NRBC COR # BLD: 12.77 10*3/MM3 (ref 3.4–10.8)
WHOLE BLOOD HOLD COAG: NORMAL
WHOLE BLOOD HOLD SPECIMEN: NORMAL

## 2023-11-13 PROCEDURE — 36415 COLL VENOUS BLD VENIPUNCTURE: CPT

## 2023-11-13 PROCEDURE — 3E0T3BZ INTRODUCTION OF ANESTHETIC AGENT INTO PERIPHERAL NERVES AND PLEXI, PERCUTANEOUS APPROACH: ICD-10-PCS | Performed by: SURGERY

## 2023-11-13 PROCEDURE — 85025 COMPLETE CBC W/AUTO DIFF WBC: CPT | Performed by: EMERGENCY MEDICINE

## 2023-11-13 PROCEDURE — 82565 ASSAY OF CREATININE: CPT

## 2023-11-13 PROCEDURE — 80053 COMPREHEN METABOLIC PANEL: CPT | Performed by: EMERGENCY MEDICINE

## 2023-11-13 PROCEDURE — 25010000002 SUGAMMADEX 200 MG/2ML SOLUTION

## 2023-11-13 PROCEDURE — 74176 CT ABD & PELVIS W/O CONTRAST: CPT

## 2023-11-13 PROCEDURE — 0YQ60ZZ REPAIR LEFT INGUINAL REGION, OPEN APPROACH: ICD-10-PCS | Performed by: SURGERY

## 2023-11-13 PROCEDURE — 99285 EMERGENCY DEPT VISIT HI MDM: CPT

## 2023-11-13 PROCEDURE — 25010000002 DEXAMETHASONE PER 1 MG

## 2023-11-13 PROCEDURE — 25010000002 DEXAMETHASONE SODIUM PHOSPHATE 10 MG/ML SOLUTION

## 2023-11-13 PROCEDURE — 25810000003 SODIUM CHLORIDE 0.9 % SOLUTION: Performed by: HOSPITALIST

## 2023-11-13 PROCEDURE — 25810000003 SODIUM CHLORIDE 0.9 % SOLUTION

## 2023-11-13 PROCEDURE — 25010000002 HYDROMORPHONE PER 4 MG: Performed by: EMERGENCY MEDICINE

## 2023-11-13 PROCEDURE — 25010000002 CEFAZOLIN PER 500 MG: Performed by: SURGERY

## 2023-11-13 PROCEDURE — 25010000002 BUPIVACAINE (PF) 0.25 % SOLUTION

## 2023-11-13 PROCEDURE — 25010000002 FENTANYL CITRATE (PF) 100 MCG/2ML SOLUTION

## 2023-11-13 PROCEDURE — 25810000003 SODIUM CHLORIDE 0.9 % SOLUTION: Performed by: EMERGENCY MEDICINE

## 2023-11-13 PROCEDURE — 88302 TISSUE EXAM BY PATHOLOGIST: CPT | Performed by: SURGERY

## 2023-11-13 PROCEDURE — 25010000002 ONDANSETRON PER 1 MG: Performed by: EMERGENCY MEDICINE

## 2023-11-13 PROCEDURE — 25010000002 PROPOFOL 10 MG/ML EMULSION

## 2023-11-13 PROCEDURE — 99223 1ST HOSP IP/OBS HIGH 75: CPT | Performed by: HOSPITALIST

## 2023-11-13 PROCEDURE — 25810000003 LACTATED RINGERS PER 1000 ML: Performed by: ANESTHESIOLOGY

## 2023-11-13 PROCEDURE — 25010000002 PHENYLEPHRINE 10 MG/ML SOLUTION 1 ML VIAL

## 2023-11-13 PROCEDURE — C1781 MESH (IMPLANTABLE): HCPCS | Performed by: SURGERY

## 2023-11-13 PROCEDURE — 83690 ASSAY OF LIPASE: CPT | Performed by: EMERGENCY MEDICINE

## 2023-11-13 PROCEDURE — 83605 ASSAY OF LACTIC ACID: CPT | Performed by: EMERGENCY MEDICINE

## 2023-11-13 DEVICE — MESH ONFLEX W/PCKT ONLAY 8.6X14.2CM MD: Type: IMPLANTABLE DEVICE | Site: ABDOMEN | Status: FUNCTIONAL

## 2023-11-13 RX ORDER — SODIUM CHLORIDE 0.9 % (FLUSH) 0.9 %
10 SYRINGE (ML) INJECTION EVERY 12 HOURS SCHEDULED
Status: CANCELLED | OUTPATIENT
Start: 2023-11-13

## 2023-11-13 RX ORDER — SODIUM CHLORIDE 0.9 % (FLUSH) 0.9 %
10 SYRINGE (ML) INJECTION AS NEEDED
Status: CANCELLED | OUTPATIENT
Start: 2023-11-13

## 2023-11-13 RX ORDER — SODIUM CHLORIDE, SODIUM LACTATE, POTASSIUM CHLORIDE, CALCIUM CHLORIDE 600; 310; 30; 20 MG/100ML; MG/100ML; MG/100ML; MG/100ML
9 INJECTION, SOLUTION INTRAVENOUS CONTINUOUS
Status: DISCONTINUED | OUTPATIENT
Start: 2023-11-13 | End: 2023-11-14

## 2023-11-13 RX ORDER — HYDROMORPHONE HYDROCHLORIDE 1 MG/ML
0.5 INJECTION, SOLUTION INTRAMUSCULAR; INTRAVENOUS; SUBCUTANEOUS
Status: DISCONTINUED | OUTPATIENT
Start: 2023-11-13 | End: 2023-11-15 | Stop reason: HOSPADM

## 2023-11-13 RX ORDER — PROCHLORPERAZINE EDISYLATE 5 MG/ML
5 INJECTION INTRAMUSCULAR; INTRAVENOUS EVERY 6 HOURS PRN
Status: DISCONTINUED | OUTPATIENT
Start: 2023-11-13 | End: 2023-11-15 | Stop reason: HOSPADM

## 2023-11-13 RX ORDER — FAMOTIDINE 10 MG/ML
20 INJECTION, SOLUTION INTRAVENOUS ONCE
Status: CANCELLED | OUTPATIENT
Start: 2023-11-13 | End: 2023-11-13

## 2023-11-13 RX ORDER — PROPOFOL 10 MG/ML
VIAL (ML) INTRAVENOUS AS NEEDED
Status: DISCONTINUED | OUTPATIENT
Start: 2023-11-13 | End: 2023-11-13 | Stop reason: SURG

## 2023-11-13 RX ORDER — LIDOCAINE HYDROCHLORIDE 10 MG/ML
0.5 INJECTION, SOLUTION EPIDURAL; INFILTRATION; INTRACAUDAL; PERINEURAL ONCE AS NEEDED
Status: DISCONTINUED | OUTPATIENT
Start: 2023-11-13 | End: 2023-11-13 | Stop reason: HOSPADM

## 2023-11-13 RX ORDER — HYDROCODONE BITARTRATE AND ACETAMINOPHEN 5; 325 MG/1; MG/1
1 TABLET ORAL ONCE AS NEEDED
Status: DISCONTINUED | OUTPATIENT
Start: 2023-11-13 | End: 2023-11-13 | Stop reason: HOSPADM

## 2023-11-13 RX ORDER — DROPERIDOL 2.5 MG/ML
0.62 INJECTION, SOLUTION INTRAMUSCULAR; INTRAVENOUS
Status: DISCONTINUED | OUTPATIENT
Start: 2023-11-13 | End: 2023-11-13 | Stop reason: HOSPADM

## 2023-11-13 RX ORDER — OXYCODONE AND ACETAMINOPHEN 7.5; 325 MG/1; MG/1
1 TABLET ORAL EVERY 6 HOURS PRN
Status: DISCONTINUED | OUTPATIENT
Start: 2023-11-13 | End: 2023-11-14

## 2023-11-13 RX ORDER — LEVOTHYROXINE SODIUM 0.05 MG/1
50 TABLET ORAL
Status: DISCONTINUED | OUTPATIENT
Start: 2023-11-13 | End: 2023-11-15 | Stop reason: HOSPADM

## 2023-11-13 RX ORDER — DOXEPIN HYDROCHLORIDE 10 MG/1
10 CAPSULE ORAL NIGHTLY
Status: DISCONTINUED | OUTPATIENT
Start: 2023-11-13 | End: 2023-11-15 | Stop reason: HOSPADM

## 2023-11-13 RX ORDER — CARVEDILOL 3.12 MG/1
3.12 TABLET ORAL 2 TIMES DAILY
Status: DISCONTINUED | OUTPATIENT
Start: 2023-11-13 | End: 2023-11-15 | Stop reason: HOSPADM

## 2023-11-13 RX ORDER — MIDAZOLAM HYDROCHLORIDE 1 MG/ML
0.5 INJECTION INTRAMUSCULAR; INTRAVENOUS
Status: DISCONTINUED | OUTPATIENT
Start: 2023-11-13 | End: 2023-11-13 | Stop reason: HOSPADM

## 2023-11-13 RX ORDER — FAMOTIDINE 20 MG/1
20 TABLET, FILM COATED ORAL ONCE
Status: COMPLETED | OUTPATIENT
Start: 2023-11-13 | End: 2023-11-13

## 2023-11-13 RX ORDER — MAGNESIUM HYDROXIDE 1200 MG/15ML
LIQUID ORAL AS NEEDED
Status: DISCONTINUED | OUTPATIENT
Start: 2023-11-13 | End: 2023-11-13 | Stop reason: HOSPADM

## 2023-11-13 RX ORDER — SODIUM CHLORIDE 0.9 % (FLUSH) 0.9 %
3 SYRINGE (ML) INJECTION EVERY 12 HOURS SCHEDULED
Status: DISCONTINUED | OUTPATIENT
Start: 2023-11-13 | End: 2023-11-13 | Stop reason: HOSPADM

## 2023-11-13 RX ORDER — HYDROMORPHONE HYDROCHLORIDE 1 MG/ML
0.5 INJECTION, SOLUTION INTRAMUSCULAR; INTRAVENOUS; SUBCUTANEOUS
Status: DISCONTINUED | OUTPATIENT
Start: 2023-11-13 | End: 2023-11-13 | Stop reason: SDUPTHER

## 2023-11-13 RX ORDER — HYDROMORPHONE HYDROCHLORIDE 1 MG/ML
0.5 INJECTION, SOLUTION INTRAMUSCULAR; INTRAVENOUS; SUBCUTANEOUS ONCE
Status: COMPLETED | OUTPATIENT
Start: 2023-11-13 | End: 2023-11-13

## 2023-11-13 RX ORDER — PROMETHAZINE HYDROCHLORIDE 25 MG/1
25 SUPPOSITORY RECTAL ONCE AS NEEDED
Status: DISCONTINUED | OUTPATIENT
Start: 2023-11-13 | End: 2023-11-13 | Stop reason: HOSPADM

## 2023-11-13 RX ORDER — HYDROMORPHONE HYDROCHLORIDE 1 MG/ML
0.5 INJECTION, SOLUTION INTRAMUSCULAR; INTRAVENOUS; SUBCUTANEOUS
Status: DISCONTINUED | OUTPATIENT
Start: 2023-11-13 | End: 2023-11-13 | Stop reason: HOSPADM

## 2023-11-13 RX ORDER — AMOXICILLIN 250 MG
2 CAPSULE ORAL 2 TIMES DAILY
Status: DISCONTINUED | OUTPATIENT
Start: 2023-11-13 | End: 2023-11-15 | Stop reason: HOSPADM

## 2023-11-13 RX ORDER — ATORVASTATIN CALCIUM 20 MG/1
20 TABLET, FILM COATED ORAL DAILY
Status: DISCONTINUED | OUTPATIENT
Start: 2023-11-13 | End: 2023-11-15 | Stop reason: HOSPADM

## 2023-11-13 RX ORDER — SODIUM CHLORIDE 9 MG/ML
40 INJECTION, SOLUTION INTRAVENOUS AS NEEDED
Status: DISCONTINUED | OUTPATIENT
Start: 2023-11-13 | End: 2023-11-13 | Stop reason: HOSPADM

## 2023-11-13 RX ORDER — SODIUM CHLORIDE 9 MG/ML
10 INJECTION INTRAVENOUS AS NEEDED
Status: DISCONTINUED | OUTPATIENT
Start: 2023-11-13 | End: 2023-11-15 | Stop reason: HOSPADM

## 2023-11-13 RX ORDER — SODIUM CHLORIDE 0.9 % (FLUSH) 0.9 %
10 SYRINGE (ML) INJECTION AS NEEDED
Status: DISCONTINUED | OUTPATIENT
Start: 2023-11-13 | End: 2023-11-15 | Stop reason: HOSPADM

## 2023-11-13 RX ORDER — ONDANSETRON 2 MG/ML
4 INJECTION INTRAMUSCULAR; INTRAVENOUS ONCE AS NEEDED
Status: DISCONTINUED | OUTPATIENT
Start: 2023-11-13 | End: 2023-11-13 | Stop reason: HOSPADM

## 2023-11-13 RX ORDER — IPRATROPIUM BROMIDE AND ALBUTEROL SULFATE 2.5; .5 MG/3ML; MG/3ML
3 SOLUTION RESPIRATORY (INHALATION) ONCE AS NEEDED
Status: DISCONTINUED | OUTPATIENT
Start: 2023-11-13 | End: 2023-11-13 | Stop reason: HOSPADM

## 2023-11-13 RX ORDER — TAMSULOSIN HYDROCHLORIDE 0.4 MG/1
0.4 CAPSULE ORAL NIGHTLY
Status: DISCONTINUED | OUTPATIENT
Start: 2023-11-13 | End: 2023-11-15 | Stop reason: HOSPADM

## 2023-11-13 RX ORDER — POLYETHYLENE GLYCOL 3350 17 G/17G
17 POWDER, FOR SOLUTION ORAL DAILY PRN
Status: DISCONTINUED | OUTPATIENT
Start: 2023-11-13 | End: 2023-11-15 | Stop reason: HOSPADM

## 2023-11-13 RX ORDER — BUPIVACAINE HYDROCHLORIDE 2.5 MG/ML
INJECTION, SOLUTION EPIDURAL; INFILTRATION; INTRACAUDAL
Status: COMPLETED | OUTPATIENT
Start: 2023-11-13 | End: 2023-11-13

## 2023-11-13 RX ORDER — DEXAMETHASONE SODIUM PHOSPHATE 4 MG/ML
INJECTION, SOLUTION INTRA-ARTICULAR; INTRALESIONAL; INTRAMUSCULAR; INTRAVENOUS; SOFT TISSUE AS NEEDED
Status: DISCONTINUED | OUTPATIENT
Start: 2023-11-13 | End: 2023-11-13 | Stop reason: SURG

## 2023-11-13 RX ORDER — SODIUM CHLORIDE 9 MG/ML
40 INJECTION, SOLUTION INTRAVENOUS AS NEEDED
Status: CANCELLED | OUTPATIENT
Start: 2023-11-13

## 2023-11-13 RX ORDER — BISACODYL 5 MG/1
5 TABLET, DELAYED RELEASE ORAL DAILY PRN
Status: DISCONTINUED | OUTPATIENT
Start: 2023-11-13 | End: 2023-11-15 | Stop reason: HOSPADM

## 2023-11-13 RX ORDER — ALLOPURINOL 100 MG/1
100 TABLET ORAL DAILY
Status: DISCONTINUED | OUTPATIENT
Start: 2023-11-13 | End: 2023-11-15 | Stop reason: HOSPADM

## 2023-11-13 RX ORDER — SODIUM CHLORIDE 0.9 % (FLUSH) 0.9 %
10 SYRINGE (ML) INJECTION EVERY 12 HOURS SCHEDULED
Status: DISCONTINUED | OUTPATIENT
Start: 2023-11-13 | End: 2023-11-15 | Stop reason: HOSPADM

## 2023-11-13 RX ORDER — BISACODYL 10 MG
10 SUPPOSITORY, RECTAL RECTAL DAILY PRN
Status: DISCONTINUED | OUTPATIENT
Start: 2023-11-13 | End: 2023-11-15 | Stop reason: HOSPADM

## 2023-11-13 RX ORDER — SODIUM CHLORIDE 9 MG/ML
40 INJECTION, SOLUTION INTRAVENOUS AS NEEDED
Status: DISCONTINUED | OUTPATIENT
Start: 2023-11-13 | End: 2023-11-15 | Stop reason: HOSPADM

## 2023-11-13 RX ORDER — NALOXONE HCL 0.4 MG/ML
0.4 VIAL (ML) INJECTION AS NEEDED
Status: DISCONTINUED | OUTPATIENT
Start: 2023-11-13 | End: 2023-11-13 | Stop reason: HOSPADM

## 2023-11-13 RX ORDER — DROPERIDOL 2.5 MG/ML
0.62 INJECTION, SOLUTION INTRAMUSCULAR; INTRAVENOUS ONCE AS NEEDED
Status: DISCONTINUED | OUTPATIENT
Start: 2023-11-13 | End: 2023-11-13 | Stop reason: HOSPADM

## 2023-11-13 RX ORDER — LIDOCAINE HYDROCHLORIDE 10 MG/ML
INJECTION, SOLUTION EPIDURAL; INFILTRATION; INTRACAUDAL; PERINEURAL AS NEEDED
Status: DISCONTINUED | OUTPATIENT
Start: 2023-11-13 | End: 2023-11-13 | Stop reason: SURG

## 2023-11-13 RX ORDER — DEXAMETHASONE SODIUM PHOSPHATE 10 MG/ML
INJECTION, SOLUTION INTRAMUSCULAR; INTRAVENOUS
Status: COMPLETED | OUTPATIENT
Start: 2023-11-13 | End: 2023-11-13

## 2023-11-13 RX ORDER — PANTOPRAZOLE SODIUM 40 MG/1
40 TABLET, DELAYED RELEASE ORAL DAILY
Status: DISCONTINUED | OUTPATIENT
Start: 2023-11-13 | End: 2023-11-15 | Stop reason: HOSPADM

## 2023-11-13 RX ORDER — HYDRALAZINE HYDROCHLORIDE 20 MG/ML
5 INJECTION INTRAMUSCULAR; INTRAVENOUS
Status: DISCONTINUED | OUTPATIENT
Start: 2023-11-13 | End: 2023-11-13 | Stop reason: HOSPADM

## 2023-11-13 RX ORDER — PROMETHAZINE HYDROCHLORIDE 25 MG/1
25 TABLET ORAL ONCE AS NEEDED
Status: DISCONTINUED | OUTPATIENT
Start: 2023-11-13 | End: 2023-11-13 | Stop reason: HOSPADM

## 2023-11-13 RX ORDER — ROCURONIUM BROMIDE 10 MG/ML
INJECTION, SOLUTION INTRAVENOUS AS NEEDED
Status: DISCONTINUED | OUTPATIENT
Start: 2023-11-13 | End: 2023-11-13 | Stop reason: SURG

## 2023-11-13 RX ORDER — SODIUM CHLORIDE 9 MG/ML
INJECTION, SOLUTION INTRAVENOUS CONTINUOUS PRN
Status: DISCONTINUED | OUTPATIENT
Start: 2023-11-13 | End: 2023-11-13 | Stop reason: SURG

## 2023-11-13 RX ORDER — SODIUM CHLORIDE 0.9 % (FLUSH) 0.9 %
3-10 SYRINGE (ML) INJECTION AS NEEDED
Status: DISCONTINUED | OUTPATIENT
Start: 2023-11-13 | End: 2023-11-13 | Stop reason: HOSPADM

## 2023-11-13 RX ORDER — FENTANYL CITRATE 50 UG/ML
INJECTION, SOLUTION INTRAMUSCULAR; INTRAVENOUS AS NEEDED
Status: DISCONTINUED | OUTPATIENT
Start: 2023-11-13 | End: 2023-11-13 | Stop reason: SURG

## 2023-11-13 RX ORDER — ONDANSETRON 2 MG/ML
4 INJECTION INTRAMUSCULAR; INTRAVENOUS EVERY 6 HOURS PRN
Status: DISCONTINUED | OUTPATIENT
Start: 2023-11-13 | End: 2023-11-15 | Stop reason: HOSPADM

## 2023-11-13 RX ORDER — SODIUM CHLORIDE 9 MG/ML
75 INJECTION, SOLUTION INTRAVENOUS CONTINUOUS
Status: DISCONTINUED | OUTPATIENT
Start: 2023-11-13 | End: 2023-11-15 | Stop reason: HOSPADM

## 2023-11-13 RX ORDER — ONDANSETRON 2 MG/ML
4 INJECTION INTRAMUSCULAR; INTRAVENOUS ONCE
Status: COMPLETED | OUTPATIENT
Start: 2023-11-13 | End: 2023-11-13

## 2023-11-13 RX ORDER — PROCHLORPERAZINE 25 MG
25 SUPPOSITORY, RECTAL RECTAL EVERY 12 HOURS PRN
Status: DISCONTINUED | OUTPATIENT
Start: 2023-11-13 | End: 2023-11-15 | Stop reason: HOSPADM

## 2023-11-13 RX ORDER — SUCCINYLCHOLINE/SOD CL,ISO/PF 200MG/10ML
SYRINGE (ML) INTRAVENOUS AS NEEDED
Status: DISCONTINUED | OUTPATIENT
Start: 2023-11-13 | End: 2023-11-13 | Stop reason: SURG

## 2023-11-13 RX ORDER — FENTANYL CITRATE 50 UG/ML
50 INJECTION, SOLUTION INTRAMUSCULAR; INTRAVENOUS
Status: DISCONTINUED | OUTPATIENT
Start: 2023-11-13 | End: 2023-11-13 | Stop reason: HOSPADM

## 2023-11-13 RX ORDER — PROCHLORPERAZINE MALEATE 5 MG/1
5 TABLET ORAL EVERY 6 HOURS PRN
Status: DISCONTINUED | OUTPATIENT
Start: 2023-11-13 | End: 2023-11-15 | Stop reason: HOSPADM

## 2023-11-13 RX ORDER — LABETALOL HYDROCHLORIDE 5 MG/ML
5 INJECTION, SOLUTION INTRAVENOUS
Status: DISCONTINUED | OUTPATIENT
Start: 2023-11-13 | End: 2023-11-13 | Stop reason: HOSPADM

## 2023-11-13 RX ADMIN — ONDANSETRON 4 MG: 2 INJECTION INTRAMUSCULAR; INTRAVENOUS at 11:48

## 2023-11-13 RX ADMIN — PROPOFOL 80 MG: 10 INJECTION, EMULSION INTRAVENOUS at 14:40

## 2023-11-13 RX ADMIN — DEXAMETHASONE SODIUM PHOSPHATE 4 MG: 4 INJECTION, SOLUTION INTRAMUSCULAR; INTRAVENOUS at 14:47

## 2023-11-13 RX ADMIN — DEXAMETHASONE SODIUM PHOSPHATE 4 MG: 10 INJECTION, SOLUTION INTRAMUSCULAR; INTRAVENOUS at 14:43

## 2023-11-13 RX ADMIN — ROCURONIUM BROMIDE 50 MG: 10 SOLUTION INTRAVENOUS at 14:50

## 2023-11-13 RX ADMIN — SUGAMMADEX 200 MG: 100 INJECTION, SOLUTION INTRAVENOUS at 15:41

## 2023-11-13 RX ADMIN — Medication 120 MG: at 14:40

## 2023-11-13 RX ADMIN — SODIUM CHLORIDE: 9 INJECTION, SOLUTION INTRAVENOUS at 14:33

## 2023-11-13 RX ADMIN — LIDOCAINE HYDROCHLORIDE 50 MG: 10 INJECTION, SOLUTION EPIDURAL; INFILTRATION; INTRACAUDAL; PERINEURAL at 14:40

## 2023-11-13 RX ADMIN — SODIUM CHLORIDE 500 ML: 9 INJECTION, SOLUTION INTRAVENOUS at 18:31

## 2023-11-13 RX ADMIN — SODIUM CHLORIDE 2000 MG: 900 INJECTION INTRAVENOUS at 14:45

## 2023-11-13 RX ADMIN — CARVEDILOL 3.12 MG: 3.12 TABLET, FILM COATED ORAL at 21:10

## 2023-11-13 RX ADMIN — HYDROMORPHONE HYDROCHLORIDE 0.5 MG: 1 INJECTION, SOLUTION INTRAMUSCULAR; INTRAVENOUS; SUBCUTANEOUS at 11:48

## 2023-11-13 RX ADMIN — SODIUM CHLORIDE 500 ML: 9 INJECTION, SOLUTION INTRAVENOUS at 13:18

## 2023-11-13 RX ADMIN — PHENYLEPHRINE HYDROCHLORIDE 0.3 MCG/KG/MIN: 10 INJECTION INTRAVENOUS at 14:55

## 2023-11-13 RX ADMIN — Medication 10 ML: at 21:10

## 2023-11-13 RX ADMIN — SODIUM CHLORIDE 2000 MG: 900 INJECTION INTRAVENOUS at 21:10

## 2023-11-13 RX ADMIN — BUPIVACAINE HYDROCHLORIDE 50 ML: 2.5 INJECTION, SOLUTION EPIDURAL; INFILTRATION; INTRACAUDAL; PERINEURAL at 14:43

## 2023-11-13 RX ADMIN — PHENYLEPHRINE HYDROCHLORIDE 0.5 MCG/KG/MIN: 10 INJECTION INTRAVENOUS at 14:58

## 2023-11-13 RX ADMIN — SODIUM CHLORIDE 500 ML: 9 INJECTION, SOLUTION INTRAVENOUS at 11:48

## 2023-11-13 RX ADMIN — SODIUM CHLORIDE 75 ML/HR: 9 INJECTION, SOLUTION INTRAVENOUS at 19:35

## 2023-11-13 RX ADMIN — ATORVASTATIN CALCIUM 20 MG: 20 TABLET, FILM COATED ORAL at 18:30

## 2023-11-13 RX ADMIN — ALLOPURINOL 100 MG: 100 TABLET ORAL at 18:30

## 2023-11-13 RX ADMIN — FAMOTIDINE 20 MG: 20 TABLET, FILM COATED ORAL at 14:14

## 2023-11-13 RX ADMIN — TAMSULOSIN HYDROCHLORIDE 0.4 MG: 0.4 CAPSULE ORAL at 21:10

## 2023-11-13 RX ADMIN — DOXEPIN HYDROCHLORIDE 10 MG: 10 CAPSULE ORAL at 21:10

## 2023-11-13 RX ADMIN — FENTANYL CITRATE 25 MCG: 50 INJECTION, SOLUTION INTRAMUSCULAR; INTRAVENOUS at 14:36

## 2023-11-13 NOTE — ANESTHESIA PREPROCEDURE EVALUATION
Anesthesia Evaluation     Patient summary reviewed and Nursing notes reviewed                Airway   Mallampati: II  TM distance: >3 FB  Neck ROM: full  No difficulty expected  Dental - normal exam     Pulmonary - normal exam   (+) a smoker Former,  Cardiovascular - normal exam    (+) pacemaker ICD, hypertension, hyperlipidemia    ROS comment:   Echo 3/22: EF 30-35%, mod TR    Neuro/Psych- negative ROS  GI/Hepatic/Renal/Endo    (+) GERD, renal disease- CRI, thyroid problem hypothyroidism    Musculoskeletal (-) negative ROS    Abdominal  - normal exam    Bowel sounds: normal.   Substance History   (+) alcohol use     OB/GYN negative ob/gyn ROS         Other                      Anesthesia Plan    ASA 4 - emergent     general with block     intravenous induction     Anesthetic plan, risks, benefits, and alternatives have been provided, discussed and informed consent has been obtained with: patient.    Plan discussed with CRNA.    CODE STATUS:    Code Status (Patient has no pulse and is not breathing): CPR (Attempt to Resuscitate)  Medical Interventions (Patient has pulse or is breathing): Full Support

## 2023-11-13 NOTE — H&P
[facility] Medicine Services  HISTORY AND PHYSICAL    Patient Name: Jet Floyd  : 1945  MRN: [id number]  Primary Care Physician: Obi Soriano MD  Date of admission: 2023      Subjective   Subjective     Chief Complaint: Nausea/LLQ pain    HPI:  Jet Floyd is a 78 y.o. 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 Last BM yesterday. No f/c/sweats. No PO intake since .     Recently noted R neck mass s/p CT and PET with findings suspicious for primary neoplasm, with metastatic disease, has follow up with [facility]. Had non-diagnostic, but suspicious FNA.       Personal History     Past Medical History:   Diagnosis Date    Cancer     CHF (congestive heart failure)     High blood pressure     Hypertension     Kidney disease     Kidney disease     Pneumonia              Past Surgical History:   Procedure Laterality Date    CARDIAC DEFIBRILLATOR PLACEMENT Bilateral     CARDIAC DEFIBRILLATOR REMOVAL Bilateral     CARDIAC ELECTROPHYSIOLOGY PROCEDURE N/A 2020    Procedure: BIV ICD 41103;  Surgeon: Mynor Richard MD;  Location:  [facility];  Service: Cardiology;  Laterality: N/A;    CATARACT EXTRACTION Bilateral     CERVICAL SPINE SURGERY Bilateral     COLONOSCOPY      DIAGNOSTIC LAPAROSCOPY N/A 2023    Procedure: EXPLORATORY LAPAROTOMY, RIGHT SPIGELIAN HERNIA REPAIR;  Surgeon: Steve Massey MD;  Location: [facility] OR;  Service: General;  Laterality: N/A;    HERNIA REPAIR         Family History: family history includes Cancer in his father; Colon cancer in his father; Dementia in his mother; No Known Problems in his brother, daughter, and sister.     Social History:  reports that he has quit smoking. His smoking use included cigarettes. He  has a 72.00 pack-year smoking history. He has never used smokeless tobacco. He reports that he does not currently use alcohol after a past usage of about 35.0 standard drinks of alcohol per week. He reports that he does not use drugs.  Social History     Social History Narrative    Not on file       Medications:  Available home medication information reviewed.  (Not in a hospital admission)      Allergies   Allergen Reactions    Lactose Intolerance (Gi) Diarrhea    Milk (Cow) Unknown - High Severity    Polysporin [Bacitracin-Polymyxin B] Itching and Rash       Objective   Objective     Vital Signs:   Heart Rate:  [] 76  Resp:  [18] 18  BP: (103-132)/(55-67) 132/67       Physical Exam   NAD, alert and oriented  OP clear, dry MM  R neck fullness/mass  RRR  CTAB  Decreased BS, soft, TTP LLQ  No c/c  EPPS  Normal affect    Result Review:  I have personally reviewed the results from the time of this admission to 11/13/2023 13:25 EST and agree with these findings:  []  Laboratory list / accordion  []  Microbiology  []  Radiology  []  EKG/Telemetry   []  Cardiology/Vascular   []  Pathology  []  Old records  []  Other:  Most notable findings include: creatinine 3.73, LA 2.3, WBC 12        LAB RESULTS:      Lab 11/13/23  1111   WBC 12.77*   HEMOGLOBIN 11.8*   HEMATOCRIT 36.7*   PLATELETS 170   NEUTROS ABS 11.52*   IMMATURE GRANS (ABS) 0.08*   LYMPHS ABS 0.53*   MONOS ABS 0.54   EOS ABS 0.04   MCV 81.0   LACTATE 2.3*         Lab 11/13/23  1125 11/13/23  1111   SODIUM  --  138   POTASSIUM  --  4.2   CHLORIDE  --  90*   CO2  --  29.0   ANION GAP  --  19.0*   BUN  --  44*   CREATININE 4.50*  4.50 3.73*   EGFR  --  15.9*   GLUCOSE  --  134*   CALCIUM  --  9.5         Lab 11/13/23  1111   TOTAL PROTEIN 8.6*   ALBUMIN 4.3   GLOBULIN 4.3   ALT (SGPT) 17   AST (SGOT) 31   BILIRUBIN 3.2*   ALK PHOS 158*   LIPASE 21                         Microbiology Results (last 10 days)       ** No results found for the last 240 hours. **             CT Abdomen Pelvis Without Contrast    Result Date: 11/13/2023  CT ABDOMEN PELVIS WO CONTRAST Date of Exam: 11/13/2023 11:24 AM CST Indication: lower abd pain, L ing hernia, concern for incarceration/strangulation. Comparison: 6/25/2023 Technique: Axial CT images were obtained of the abdomen and pelvis without the administration of contrast. Reconstructed coronal and sagittal images were also obtained. Automated exposure control and iterative construction methods were used. Findings: LUNG BASES: Enlarged heart. There is patchy bibasilar airspace disease which may represent developing infection in the proper clinical setting. LIVER:  Unremarkable parenchyma without focal lesion. BILIARY/GALLBLADDER: There is a calcified gallstone. SPLEEN:  Unremarkable PANCREAS:  Unremarkable ADRENAL:  Unremarkable KIDNEYS: There is advanced right renal cortical atrophy. There is more mild left renal cortical atrophy with contrast staining of the cortex consistent with persistent nephrogram typically related to nephropathy. Correlate for recent contrasted study. No  calculus identified. GASTROINTESTINAL/MESENTERY: Generalized distention of the stomach and small intestine to the level of a left inguinal hernia containing a segment of inflamed small intestine with surrounding fluid. Findings are suggestive of obstructed and strangulated left inguinal hernia. Small intestine proximal to the left inguinal hernia measures up to 3.7 cm in diameter. Distal to the hernia it is decompressed. AORTA/IVC:  Normal caliber. RETROPERITONEUM/LYMPH NODES:  Unremarkable REPRODUCTIVE:  Unremarkable BLADDER:  Unremarkable OSSEUS STRUCTURES: No acute osseous process is identified.     Impression: Impression: 1.Left inguinal hernia with evidence of obstructed and strangulated small intestine. Small intestine proximal to the hernia is dilated and distal to the hernia is decompressed. 2.Vague patchy lower lung airspace disease may represent  pneumonia in the proper clinical setting. 3.Small to moderate volume ascites. 4.Persistent left nephrogram from recent contrasted study. Correlate with patient history. 5.Other incidental findings as detailed above. Electronically Signed: Michele Garber MD  11/13/2023 11:44 AM CST  Workstation ID: [id number]    CT Chest With Contrast Diagnostic    Result Date: 11/11/2023  CLINICAL INDICATION: Head/neck cancer, staging TECHNIQUE: Multiple CT helical images were obtained from thoracic inlet through upper abdomen with administration of IV contrast. 100  mL of Omnipaque-300 were administered intravenously.   Total DLP (Dose-Length Product): 70.61 mGy.cm. Please note: The reported value represents the total of one or more individual components during the CT acquisition on this date and at this time, and as such, the same value may appear in more than one CT report depending on the interpreting/reporting physicians. COMPARISON: PET/CT 11/10/2023 CT soft tissue neck 10/31/2023 FINDINGS: Mediastinum and Pleura: No mediastinal or hilar adenopathy. Trace right pleural effusion. No pericardial effusion. Marked cardiomegaly with reflux of contrast of the hepatic veins and IVC concerning for increased right heart pressure. Right chest wall ICD in place. Aortic arch and coronary artery calcifications. Partially imaged mass in the right lower neck, better characterized on recent CT neck. Please refer to the recent CT neck and PET/CT or report for details of head and neck findings. Redemonstrated thickening and luminal narrowing of the proximal esophagus at the level of T1 and T2, previously FDG avid on the prior PET/CT (series 4 image 60). Lungs: Moderate upper lobe predominant centrilobular and paraseptal emphysema. Mild amount of debris within the trachea and bilateral mainstem bronchi. Bibasilar atelectasis or scarring. No focal consolidation. No suspicious pulmonary nodules. Upper Abdomen: Abdominal aortic atherosclerosis.  Asymmetric atrophy of the right kidney. Mild to moderate upper abdominal ascites. Cholelithiasis. Musculoskeletal: No suspicious lytic or sclerotic lesion. Unchanged compression deformities of T12 and L2. Degenerative changes of the spine.     Impression: 1.No suspicious pulmonary nodules or enlarged thoracic adenopathy to suggest metastatic disease.  Partially imaged right lower neck mass consistent with malignancy/metastatic disease. Please refer to recent CT neck and PET/CT reports. 2.Redemonstrated focal soft tissue thickening with luminal narrowing of the upper thoracic esophagus, corresponding to FDG avid lesion characterized on recent PET/CT, most concerning for malignancy until proven otherwise. Recommend upper endoscopy. 3.Marked cardiomegaly with reflux of contrast into the hepatic veins and IVC indicative of increase right heart pressure. Correlation with echocardiogram is recommended. 4.Other findings as above. CRITICAL RESULT: No. COMMUNICATION: Per this written report. By electronically signing this report, I, the attending physician, attest that I have personally reviewed the images/data for the above examination(s) and agree with the final edited report. Drafted by Charan Arnold MD on 11/11/2023 2:23 PM Final report signed by Chace Hagen MD on 11/11/2023 6:38 PM     Results for orders placed during the hospital encounter of 03/28/22    Adult Transthoracic Echo Complete W/ Cont if Necessary Per Protocol    Interpretation Summary  · Left ventricular ejection fraction appears to be 31 - 35%. Left ventricular systolic function is moderately decreased.  · The following left ventricular wall segments are dyskinetic: apical septal, basal anteroseptal and mid anteroseptal.  · The right atrial cavity is dilated.  · Moderate tricuspid valve regurgitation is present.  · Estimated right ventricular systolic pressure from tricuspid regurgitation is normal (<35 mmHg).      Assessment & Plan   Assessment & Plan      Active Hospital Problems    Diagnosis  POA    **Inguinal hernia [K40.90]  Yes     N/V/Abdominal pain  Inguinal hernia with strangulation/obstruction  -NPO  -IVF  -pain meds  -surgery consulted by ED and surgeon aware    GABRIELLE/CKD III  -IVF  -monitor renal function    Hx of NICM, HFrEF  -continue BB, hold bumex    GERD  -PPI    Hypothyroidism  -synthroid    Neck mass  Suspected neoplasm  -PET and CT chest with:  1.No suspicious pulmonary nodules or enlarged thoracic adenopathy to suggest metastatic disease.  Partially imaged right lower neck mass consistent with malignancy/metastatic disease. Please refer to recent CT neck and PET/CT reports.   2.Redemonstrated focal soft tissue thickening with luminal narrowing of the upper thoracic esophagus, corresponding to FDG avid lesion characterized on recent PET/CT, most concerning for malignancy until proven otherwise. Recommend upper endoscopy.   3.Marked cardiomegaly with reflux of contrast into the hepatic veins and IVC indicative of increase right heart pressure. Correlation with echocardiogram is recommended.   4.Other findings as above.   1. Intensely hypermetabolic mass involving the epiglottis, possible site of primary malignancy.   2. Large FDG avid central necrotic negar conglomerate in the right neck consistent with negar metastatic disease.   3. FDG avid circumferential thickening of proximal esophagus at the cervicothoracic junction is highly concerning for second primary. Further evaluation with endoscopy and tissue sampling is recommended.   4. No evidence of FDG avid distant metastasis in chest, abdomen and pelvis.   5. Focal hypermetabolic activity of the ascending colon may represents a small polyp. If clinically warranted further evaluation with colonoscopy.   5. Moderate amount of ascites and right-sided pleural effusion are sequela of congestive heart disease.   6. Disproportionate enlargement of the ventricles in relation to the sulci is nonspecific  but may represent normal pressure hydrocephalus. Recommend clinical correlation.   Needs follow up with [facility]    DVT prophylaxis:  SCDS      CODE STATUS:  Full/CPR  Code Status and Medical Interventions:   Ordered at: 11/13/23 1324     Code Status (Patient has no pulse and is not breathing):    CPR (Attempt to Resuscitate)     Medical Interventions (Patient has pulse or is breathing):    Full Support       Evens Capps MD  11/13/23

## 2023-11-13 NOTE — OP NOTE
Operative Note    Jet Floyd  [id number]   1945     Date of Surgery:  11/13/2023    Pre-Operative Diagnosis: Incarcerated left inguinal hernia                                              Small bowel obstruction        Post-Operative Diagnosis: Same    Procedure: Repair of incarcerated left inguinal hernia with modified unflexed mesh    Anesthesia:  General          Surgeon:  Kathi Dow MD    Circulator: Abeba Houston RN; Kathy Smith RN  Scrub Person: Rosario Adams  Nursing Assistant: Papi Perkins PCT  Assistant: Fredo Ortiz PA-C     Assistant: Fredo Ortiz PA-C    Estimated Blood Loss: Very minimal    Findings: Large hernia sac with small bowel incarceration.  Small bowel was viable                   Ascitic fluid noted in the abdomen    Complications: None      Indication for Procedure: Mr. Floyd is a 78-year-old gentleman with multiple comorbidities.  He presented emergency room with incarcerated left inguinal hernia with nausea and vomiting for 2 days.  Work-up in the emergency room was remarkable for white blood count of 12.7 thousand, lactic acid of 2.3, creatinine of 3.73, alkaline phosphatase of 158 and bilirubin of 3.2.  CT scan of the abdomen and pelvis was remarkable for incarcerated abdominal hernia with concern about strangulation and small bowel obstruction with ascitic fluid.  Patient is taken emergently to the operating room for repair of the incarcerated femoral hernia with possible abdominal exploration and small bowel resection.    Procedure: Patient was taken to the operating room by anesthesia and placed supine on the table.  Following induction of general endotracheal anesthesia, SCDs were placed.  He received 2 g of Kefzol IV.  Note that the nasogastric tube was placed prior to induction.  Anesthesia placed ultrasound-guided TAP blocks bilaterally.  The abdomen and genitalia were then prepped and draped in a sterile fashion.  A Trevizo  catheter was anchored.  Timeout was observed.  A left inguinal incision was made shelter between the anterior iliac spine pubic tubercle.  Dissection was carried through very thin subtenons tissue to expose the external aponeurosis which was incised in the direction the fibers.  The oblique muscle was then split to expose the transversalis fascia which was incised under the preperitoneal space.  The patient was placed in Trendelenburg position.  The infra epigastric vessels were identified and retracted medially.  A large indirect inguinal hernia sac was noted.  The sac was entered and the small bowel inspected.  The internal ring was easily palpated.  Follow instructions and tolerating, I was able to reduce the small bowel into the preperitoneal space.  It was dusky however viable.  The small bowel was observed with warm soaks for at least 10 minutes.  It pinked up nicely.  As such we decided not to proceed with any bowel resection.  The small bowel was reduced back into the abdominal cavity and the ascitic fluid was suctioned.  The cord was then brought up to the field with a Penrose drain.  Hernia sac was then dissected away from the vas deferens and the spermatic vessels down to where they diverged.  The sac was then ligated with multiple 2-0 silk sutures, transected and sent to pathology for gross section.  The floor was inspected there was no evidence of a direct fascial defect or femoral hernia.  The pubic tubercle and Gael's ligament well visualized.  A medium size modified unflexed mesh was then placed covering the pubic tubercle, Gael's ligament and Hesselbach triangle nicely.  It was anchored anteriorly to the transversalis fascia with 2-0 Vicryl suture.  The external aponeurosis was approximated with running 2-0 Vicryl suture.  Tierra's fascia was approximated with interrupted 2 Vicryl suture and the skin incision was approximated with 4-0 Monocryl subcuticular suture.  Steri-Strips and sterile  dressing was applied.  The patient tolerated the procedure well with no complications.  He was extubated and taken to the recovery room in a stable condition.  Sponge count and needle count were correct at the end of the procedure.    Assistant: Fredo Ortiz PA-C  was responsible for performing the following activities: Retraction, Suction, and Placing Dressing and their skilled assistance was necessary for the success of this case.      Kathi Dow MD  11/13/23  15:46 EST

## 2023-11-13 NOTE — ANESTHESIA PROCEDURE NOTES
"Peripheral Block      Patient reassessed immediately prior to procedure    Patient location during procedure: OR  Reason for block: at surgeon's request and post-op pain management  Performed by  CRNA/CAA: Morgan Vann, CRNA  Preanesthetic Checklist  Completed: patient identified, IV checked, site marked, risks and benefits discussed, surgical consent, monitors and equipment checked, pre-op evaluation and timeout performed  Prep:  Pt Position: supine  Sterile barriers:cap, gloves, mask and washed/disinfected hands  Prep: ChloraPrep  Patient monitoring: blood pressure monitoring, continuous pulse oximetry and EKG  Procedure    Sedation: yes  Performed under: general  Guidance:ultrasound guided  Images:still images obtained, printed/placed on chart    Laterality:Bilateral  Block Type:TAP  Injection Technique:single-shot  Needle Type:short-bevel and echogenic  Needle Gauge:20 G  Resistance on Injection: none    Medications Used: bupivacaine PF (MARCAINE) 0.25 % injection - Injection   50 mL - 11/13/2023 2:43:00 PM  dexamethasone sodium phosphate injection - Injection   4 mg - 11/13/2023 2:43:00 PM      Medications  Comment:Block Injection:  LA dose divided between Right and Left block        Post Assessment  Injection Assessment: negative aspiration for heme, incremental injection and no paresthesia on injection  Patient Tolerance:comfortable throughout block  Complications:no  Additional Notes    Mid-Axillary/Lateral TAPs    A high-frequency linear transducer, with sterile cover, was placed in the midaxillary line between the ASIS and costal margin. The External Oblique Muscle (EOM), Internal Oblique Muscle (IOM), Transverse Abdominus Muscle (LANE), and Peritoneum were identified. The insertion site was prepped in sterile fashion and then localized with 2-5 ml of 1% Lidocaine. Using ultrasound-guidance, a 20-gauge B-Mandujano 4\" Ultraplex 360 non-stimulating echogenic needle was advanced in plane, from medial to " lateral, until the tip of the needle was in the fascial plane between the IOM and LANE. 1-3ml of preservative free normal saline was used to hydro-dissect the fascial planes. After the fascial plane was verified, the local anesthetic (LA) was injected. The procedure was repeated on the opposite side for bilateral coverage. Aspiration every 5 ml to prevent intravascular injection. Injection was completed with negative aspiration of blood and negative intravascular injection. Injection pressures were normal with minimal resistance. Midaxillary TAPs should reach intercostal nerves T10- T11 and the subcostal nerve T12.

## 2023-11-13 NOTE — ANESTHESIA PROCEDURE NOTES
Airway  Urgency: elective    Date/Time: 11/13/2023 2:41 PM  Airway not difficult    General Information and Staff    Patient location during procedure: OR    Indications and Patient Condition  Indications for airway management: airway protection    Preoxygenated: yes  MILS not maintained throughout  Mask difficulty assessment: 0 - not attempted    Final Airway Details  Final airway type: endotracheal airway      Successful airway: ETT  Cuffed: yes   Successful intubation technique: RSI and video laryngoscopy  Facilitating devices/methods: cricoid pressure and intubating stylet  Endotracheal tube insertion site: oral  Blade: Villaseñor  Blade size: 3  ETT size (mm): 7.5  Cormack-Lehane Classification: grade IIa - partial view of glottis  Placement verified by: chest auscultation and capnometry   Cuff volume (mL): 5  Measured from: lips  ETT/EBT  to lips (cm): 21  Number of attempts at approach: 1  Assessment: lips, teeth, and gum same as pre-op and atraumatic intubation    Additional Comments  Negative epigastric sounds, Breath sound equal bilaterally with symmetric chest rise and fall

## 2023-11-13 NOTE — CASE MANAGEMENT/SOCIAL WORK
Discharge Planning Assessment  [facility]     Patient Name: Jet Floyd  MRN: [id number]  Today's Date: 11/13/2023    Admit Date: 11/13/2023    Plan: IDP   Discharge Needs Assessment    No documentation.                  Discharge Plan       Row Name 11/13/23 8315       Plan    Plan IDP    Plan Comments MSW met with pt and wife at bedside to complete IDP. Pt confirmed demographics and reports PCP is Geraldo Soriano. Pt has Medicare and [facility] insurance coverage. Pt is independent with ADLs; Pt reports having a cane and rollator. Pt denied current HH services, but did have  HH in the past. Pt has no home O2. Pt’s preferred pharmacy is [facility]. Pt’s wife reports she is able to transport when medically ready. Pt denied needs or concerns. CM will continue to follow and assist with discharge planning.    Final Discharge Disposition Code 30 - still a patient                  Continued Care and Services - Admitted Since 11/13/2023    Coordination has not been started for this encounter.          Demographic Summary       Row Name 11/13/23 1335       General Information    Arrived From home    Referral Source admission list;emergency department    Reason for Consult discharge planning    Preferred Language English                   Functional Status       Row Name 11/13/23 2285       Functional Status    Usual Activity Tolerance good    Current Activity Tolerance good       Functional Status, IADL    Medications independent    Meal Preparation independent    Housekeeping independent    Laundry independent    Shopping independent                            Patient Forms    No documentation.                     KARINA Landis

## 2023-11-13 NOTE — ANESTHESIA POSTPROCEDURE EVALUATION
Patient: Jet Floyd    Procedure Summary       Date: 11/13/23 Room / Location:  [facility] 09 /  [facility]    Anesthesia Start: 1433 Anesthesia Stop: 1558    Procedure: REPAIR OF LEFT INCARCERATED INGUINAL HERNIA (Left: Abdomen) Diagnosis: Incarcerated left inguinal hernia    Surgeons: Kathi Dow MD Provider: Tobias Sparks MD    Anesthesia Type: general with block ASA Status: 4 - Emergent            Anesthesia Type: general with block    Vitals  No vitals data found for the desired time range.          Post Anesthesia Care and Evaluation    Patient location during evaluation: PACU  Patient participation: waiting for patient participation  Level of consciousness: awake and alert  Pain management: adequate    Airway patency: patent  Anesthetic complications: No anesthetic complications  PONV Status: none  Cardiovascular status: hemodynamically stable and acceptable  Respiratory status: nonlabored ventilation, acceptable and nasal cannula  Hydration status: acceptable

## 2023-11-13 NOTE — Clinical Note
Level of Care: Telemetry [5]   Diagnosis: Inguinal hernia [764491]   Admitting Physician: NARESH ALFARO [[id number]]   Attending Physician: NARESH ALFARO [[id number]]   Certification: I Certify That Inpatient Hospital Services Are Medically Necessary For Greater Than 2 Midnights

## 2023-11-13 NOTE — ED PROVIDER NOTES
Subjective   History of Present Illness  78-year-old male presents for evaluation of abdominal pain and nausea.  Of note, earlier this summer the patient had a right inguinal hernia repair done here at our facility by Dr. Massey of general surgery.  He notes that he had been doing well until 2 days ago when he began experiencing lower abdominal pain in his left inguinal region accompanied by nausea that has persisted since that time.  He also notes a painful bulge to his left inguinal region as well.  He notes that his current symptoms feel similar to those that he experienced in the past when he had a prior strangulated inguinal hernia on the right.  He currently rates his pain at 7 out of 10 in severity.  He is having bowel movements.  No rash.  No urinary symptoms.      Review of Systems   Gastrointestinal:  Positive for abdominal pain, nausea and vomiting.   All other systems reviewed and are negative.      Past Medical History:   Diagnosis Date    Cancer     CHF (congestive heart failure)     High blood pressure     Hypertension     Kidney disease     Kidney disease     Pneumonia        Allergies   Allergen Reactions    Lactose Intolerance (Gi) Diarrhea    Milk (Cow) Unknown - High Severity    Polysporin [Bacitracin-Polymyxin B] Itching and Rash       Past Surgical History:   Procedure Laterality Date    CARDIAC DEFIBRILLATOR PLACEMENT Bilateral 1998    CARDIAC DEFIBRILLATOR REMOVAL Bilateral 2003    CARDIAC ELECTROPHYSIOLOGY PROCEDURE N/A 03/17/2020    Procedure: BIV ICD 33269;  Surgeon: Mynor Richard MD;  Location:  [facility];  Service: Cardiology;  Laterality: N/A;    CATARACT EXTRACTION Bilateral     CERVICAL SPINE SURGERY Bilateral 2014    COLONOSCOPY      DIAGNOSTIC LAPAROSCOPY N/A 06/25/2023    Procedure: EXPLORATORY LAPAROTOMY, RIGHT SPIGELIAN HERNIA REPAIR;  Surgeon: Steve Massey MD;  Location: [facility];  Service: General;  Laterality: N/A;    HERNIA REPAIR      NECK  SURGERY         Family History   Problem Relation Age of Onset    Dementia Mother     Colon cancer Father     Cancer Father         prostate    No Known Problems Sister     No Known Problems Brother     No Known Problems Daughter        Social History     Socioeconomic History    Marital status:    Tobacco Use    Smoking status: Former     Packs/day: 1.50     Years: 48.00     Additional pack years: 0.00     Total pack years: 72.00     Types: Cigarettes    Smokeless tobacco: Never    Tobacco comments:     quit 2010   Vaping Use    Vaping Use: Never used   Substance and Sexual Activity    Alcohol use: Not Currently     Alcohol/week: 35.0 standard drinks of alcohol     Types: 35 Shots of liquor per week     Comment: liquor daily 5-6    Drug use: No    Sexual activity: Defer           Objective   Physical Exam  Vitals and nursing note reviewed.   Constitutional:       General: He is not in acute distress.     Appearance: He is well-developed. He is not diaphoretic.      Comments: Nontoxic-appearing male   HENT:      Head: Normocephalic and atraumatic.   Neck:      Vascular: No JVD.   Cardiovascular:      Rate and Rhythm: Normal rate and regular rhythm.      Heart sounds: Normal heart sounds. No murmur heard.     No friction rub. No gallop.   Pulmonary:      Effort: Pulmonary effort is normal. No respiratory distress.      Breath sounds: Normal breath sounds. No wheezing or rales.   Abdominal:      General: Bowel sounds are normal. There is no distension.      Palpations: Abdomen is soft. There is no mass.      Tenderness: There is abdominal tenderness. There is guarding.      Comments: Firm, tender, large palpable left inguinal hernia present, no overlying skin changes noted   Musculoskeletal:         General: Normal range of motion.      Cervical back: Normal range of motion.   Skin:     General: Skin is warm and dry.      Coloration: Skin is not pale.      Findings: No erythema or rash.   Neurological:       Mental Status: He is alert and oriented to person, place, and time.   Psychiatric:         Mood and Affect: Mood normal.         Thought Content: Thought content normal.         Judgment: Judgment normal.         Procedures           ED Course  ED Course as of 11/13/23 1850 Mon Nov 13, 2023   1303 78-year-old male presents for evaluation of abdominal pain and nausea.  Of note, earlier this summer the patient had a right inguinal hernia repair done here by Dr. Massey of general surgery. [DD]   1303 He notes that he had been doing well until 2 days ago when he began experiencing lower abdominal pain in his left inguinal region accompanied by nausea that has persisted since that time.  He notes a painful bulge to his left inguinal region [DD]   1304 As well. [DD]   1304 On arrival to the ED, the patient is nontoxic-appearing.  On exam, he has a large, firm, tender, palpable hernia noted to his left inguinal region.  No overlying skin changes.  Labs remarkable for white blood cell count of 12,000 and acute kidney injury when compared to baseline.  IV fluids given. [DD]   1304 I personally and independently reviewed the patient's CT images and findings, and I am in agreement with the radiologist regarding CT interpretation--particularly regarding strangulated inguinal hernia.    After reviewing the patient's imaging, I discussed the patient's case with our general surgeon, Dr. JOHNSON, who recommended keeping the patient n.p.o. in preparation for a trip to the OR this afternoon.  He recommended admission to the hospitalist service given the patient's acute kidney injury.  I am in agreement.  I discussed the patient's case with our hospitalist, Dr. Salazar, and the patient will be admitted under her care for further evaluation and treatment.  He is aware/agreeable with the plan at this time. [DD]      ED Course User Index  [DD] Kyle Allen MD                                      Recent Results (from the past 24  hour(s))   Comprehensive Metabolic Panel    Collection Time: 11/13/23 11:11 AM    Specimen: Blood   Result Value Ref Range    Glucose 134 (H) 65 - 99 mg/dL    BUN 44 (H) 8 - 23 mg/dL    Creatinine 3.73 (H) 0.76 - 1.27 mg/dL    Sodium 138 136 - 145 mmol/L    Potassium 4.2 3.5 - 5.2 mmol/L    Chloride 90 (L) 98 - 107 mmol/L    CO2 29.0 22.0 - 29.0 mmol/L    Calcium 9.5 8.6 - 10.5 mg/dL    Total Protein 8.6 (H) 6.0 - 8.5 g/dL    Albumin 4.3 3.5 - 5.2 g/dL    ALT (SGPT) 17 1 - 41 U/L    AST (SGOT) 31 1 - 40 U/L    Alkaline Phosphatase 158 (H) 39 - 117 U/L    Total Bilirubin 3.2 (H) 0.0 - 1.2 mg/dL    Globulin 4.3 gm/dL    A/G Ratio 1.0 g/dL    BUN/Creatinine Ratio 11.8 7.0 - 25.0    Anion Gap 19.0 (H) 5.0 - 15.0 mmol/L    eGFR 15.9 (L) >60.0 mL/min/1.73   Lipase    Collection Time: 11/13/23 11:11 AM    Specimen: Blood   Result Value Ref Range    Lipase 21 13 - 60 U/L   Lactic Acid, Plasma    Collection Time: 11/13/23 11:11 AM    Specimen: Blood   Result Value Ref Range    Lactate 2.3 (C) 0.5 - 2.0 mmol/L   Green Top (Gel)    Collection Time: 11/13/23 11:11 AM   Result Value Ref Range    Extra Tube Hold for add-ons.    Lavender Top    Collection Time: 11/13/23 11:11 AM   Result Value Ref Range    Extra Tube hold for add-on    Gold Top - SST    Collection Time: 11/13/23 11:11 AM   Result Value Ref Range    Extra Tube Hold for add-ons.    Gray Top    Collection Time: 11/13/23 11:11 AM   Result Value Ref Range    Extra Tube Hold for add-ons.    Light Blue Top    Collection Time: 11/13/23 11:11 AM   Result Value Ref Range    Extra Tube Hold for add-ons.    CBC Auto Differential    Collection Time: 11/13/23 11:11 AM    Specimen: Blood   Result Value Ref Range    WBC 12.77 (H) 3.40 - 10.80 10*3/mm3    RBC 4.53 4.14 - 5.80 10*6/mm3    Hemoglobin 11.8 (L) 13.0 - 17.7 g/dL    Hematocrit 36.7 (L) 37.5 - 51.0 %    MCV 81.0 79.0 - 97.0 fL    MCH 26.0 (L) 26.6 - 33.0 pg    MCHC 32.2 31.5 - 35.7 g/dL    RDW 18.6 (H) 12.3 - 15.4 %     RDW-SD 53.4 37.0 - 54.0 fl    Platelets 170 140 - 450 10*3/mm3    Neutrophil % 90.2 (H) 42.7 - 76.0 %    Lymphocyte % 4.2 (L) 19.6 - 45.3 %    Monocyte % 4.2 (L) 5.0 - 12.0 %    Eosinophil % 0.3 0.3 - 6.2 %    Basophil % 0.5 0.0 - 1.5 %    Immature Grans % 0.6 (H) 0.0 - 0.5 %    Neutrophils, Absolute 11.52 (H) 1.70 - 7.00 10*3/mm3    Lymphocytes, Absolute 0.53 (L) 0.70 - 3.10 10*3/mm3    Monocytes, Absolute 0.54 0.10 - 0.90 10*3/mm3    Eosinophils, Absolute 0.04 0.00 - 0.40 10*3/mm3    Basophils, Absolute 0.06 0.00 - 0.20 10*3/mm3    Immature Grans, Absolute 0.08 (H) 0.00 - 0.05 10*3/mm3    nRBC 0.0 0.0 - 0.2 /100 WBC   POC Creatinine    Collection Time: 11/13/23 11:25 AM    Specimen: Blood   Result Value Ref Range    Creatinine 4.50 mg/dL   POC Creatinine    Collection Time: 11/13/23 11:25 AM    Specimen: Blood   Result Value Ref Range    Creatinine 4.50 (H) 0.60 - 1.30 mg/dL     Note: In addition to lab results from this visit, the labs listed above may include labs taken at another facility or during a different encounter within the last 24 hours. Please correlate lab times with ED admission and discharge times for further clarification of the services performed during this visit.    CT Abdomen Pelvis Without Contrast   Final Result   Impression:   1.Left inguinal hernia with evidence of obstructed and strangulated small intestine. Small intestine proximal to the hernia is dilated and distal to the hernia is decompressed.   2.Vague patchy lower lung airspace disease may represent pneumonia in the proper clinical setting.   3.Small to moderate volume ascites.   4.Persistent left nephrogram from recent contrasted study. Correlate with patient history.   5.Other incidental findings as detailed above.                  Electronically Signed: Michele Garber MD     11/13/2023 11:44 AM CST     Workstation ID: [id number]        Vitals:    11/13/23 1610 11/13/23 1625 11/13/23 1640 11/13/23 1701   BP: 137/59 140/61  142/65    BP Location:    Left arm   Patient Position:    Lying   Pulse: 71 74 89 92   Resp: 18 20 16 16   Temp:  97.8 °F (36.6 °C)  96 °F (35.6 °C)   TempSrc:  Temporal  Axillary   SpO2: 100% 100% 100% 95%   Weight:       Height:         Medications   Sodium Chloride (PF) 0.9 % 10 mL ( Intravenous MAR Unhold 11/13/23 1706)   allopurinol (ZYLOPRIM) tablet 100 mg (100 mg Oral Given 11/13/23 1830)   atorvastatin (LIPITOR) tablet 20 mg (20 mg Oral Given 11/13/23 1830)   carvedilol (COREG) tablet 3.125 mg (has no administration in time range)   doxepin (SINEquan) capsule 10 mg (has no administration in time range)   levothyroxine (SYNTHROID, LEVOTHROID) tablet 50 mcg (50 mcg Oral Not Given 11/13/23 1825)   pantoprazole (PROTONIX) EC tablet 40 mg (40 mg Oral Given 11/13/23 1830)   tamsulosin (FLOMAX) 24 hr capsule 0.4 mg (has no administration in time range)   sodium chloride 0.9 % flush 10 mL (has no administration in time range)   sodium chloride 0.9 % flush 10 mL (has no administration in time range)   sodium chloride 0.9 % infusion 40 mL (has no administration in time range)   sennosides-docusate (PERICOLACE) 8.6-50 MG per tablet 2 tablet (has no administration in time range)     And   polyethylene glycol (MIRALAX) packet 17 g (has no administration in time range)     And   bisacodyl (DULCOLAX) EC tablet 5 mg (has no administration in time range)     And   bisacodyl (DULCOLAX) suppository 10 mg (has no administration in time range)   sodium chloride 0.9 % bolus 500 mL (500 mL Intravenous New Bag 11/13/23 1831)   sodium chloride 0.9 % infusion (has no administration in time range)   prochlorperazine (COMPAZINE) injection 5 mg (has no administration in time range)     Or   prochlorperazine (COMPAZINE) tablet 5 mg (has no administration in time range)     Or   prochlorperazine (COMPAZINE) suppository 25 mg (has no administration in time range)   ondansetron (ZOFRAN) injection 4 mg (has no administration in time range)    oxyCODONE-acetaminophen (PERCOCET) 7.5-325 MG per tablet 1 tablet (has no administration in time range)   lactated ringers infusion ( Intravenous Canceled Entry 11/13/23 1433)   ceFAZolin 2000 mg IVPB in 100 mL NS (MBP) (has no administration in time range)   HYDROmorphone (DILAUDID) injection 0.5 mg (has no administration in time range)   sodium chloride 0.9 % bolus 500 mL (0 mL Intravenous Stopped 11/13/23 1310)   ondansetron (ZOFRAN) injection 4 mg (4 mg Intravenous Given 11/13/23 1148)   HYDROmorphone (DILAUDID) injection 0.5 mg (0.5 mg Intravenous Given 11/13/23 1148)   sodium chloride 0.9 % bolus 500 mL (500 mL Intravenous New Bag 11/13/23 1318)   famotidine (PEPCID) tablet 20 mg (20 mg Oral Given 11/13/23 1414)   ceFAZolin 2000 mg IVPB in 100 mL NS (MBP) (2,000 mg Intravenous New Bag 11/13/23 1445)     ECG/EMG Results (last 24 hours)       ** No results found for the last 24 hours. **          No orders to display              Medical Decision Making  Amount and/or Complexity of Data Reviewed  Labs: ordered.  Radiology: ordered.    Risk  Prescription drug management.  Decision regarding hospitalization.        Final diagnoses:   Strangulated inguinal hernia   Leukocytosis, unspecified type   GABRIELLE (acute kidney injury)       ED Disposition  ED Disposition       ED Disposition   Decision to Admit    Condition   --    Comment   Level of Care: Telemetry [5]   Diagnosis: Inguinal hernia [563458]   Admitting Physician: NARESH ALFARO [[id number]]   Attending Physician: NARESH ALFARO [[id number]]                 No follow-up provider specified.       Medication List      No changes were made to your prescriptions during this visit.            Kyle Allen MD  11/13/23 5006

## 2023-11-13 NOTE — BRIEF OP NOTE
INGUINAL HERNIA REPAIR, LAPAROTOMY EXPLORATORY  Progress Note    Jet Floyd  11/13/2023    Pre-op Diagnosis:   Incarcerated left inguinal hernia  Small bowel obstruction       Post-Op Diagnosis Codes:     * Incarcerated left inguinal hernia [K40.30]    Procedure/CPT® Codes:        Procedure(s):  REPAIR OF LEFT INCARCERATED INGUINAL HERNIA                Surgeon(s):  Kathi Dow MD    Anesthesia: General    Staff:   Circulator: Abeba Houston RN; Kathy Smith RN  Scrub Person: Rosario Adams  Nursing Assistant: Papi Perkins PCT  Assistant: Fredo Ortiz PA-C  Assistant: Fredo Ortiz PA-C      Estimated Blood Loss: minimal    Urine Voided: * No values recorded between 11/13/2023  2:34 PM and 11/13/2023  3:43 PM *    Specimens:                Specimens       ID Source Type Tests Collected By Collected At Frozen?    A Hernia, Sac Tissue TISSUE PATHOLOGY EXAM   Kathi Dow MD 11/13/23 1534 No    Description: Hernia sac for permanent    This specimen was not marked as sent.                  Drains:   Urethral Catheter Silicone 16 Fr. (Active)       [REMOVED] NG/OG Tube Left nostril (Removed)       [REMOVED] Urethral Catheter Silicone 16 Fr. (Removed)       Findings: Incarcerated left inguinal hernia with small bowel obstruction.  Small bowel is viable.  Ascitic fluid noted in the abdomen        Complications: None    Assistant: Fredo Ortiz PA-C  was responsible for performing the following activities: Retraction, Suction, and Placing Dressing and their skilled assistance was necessary for the success of this case.    Kathi Dow MD     Date: 11/13/2023  Time: 15:43 EST

## 2023-11-13 NOTE — CONSULTS
General Surgery Consultation Note    Date of Service: 11/13/2023  Jet V Mariel  [id number]  1945      Referring Provider: Dr. Kang Capps    Location of Consult: Preoperative area     Reason for Consultation: Incarcerated left inguinal hernia with possible strangulation       History of Present Illness:  I am seeing, Jet Floyd, in consultation for Dr. Kang Capps regarding incarcerated left inguinal hernia.  Patient is a 78-year-old gentleman with history of cardiomyopathy, hypertension, hyperlipidemia, status post PPM I/ICD, newly diagnosed head and neck carcinoma with suspected metastatic disease.  He presented emergency room with complaints of persistent left inguinal swelling and nausea and vomiting that started 2 days ago.  Last bowel movement was yesterday.  Patient has known history of bilateral inguinal hernias.  He elected to hold off elective repair.  He underwent emergent repair of a right spigelian hernia in June 2023.  Work-up in the emergency room revealed a white blood count of 12.7 thousand, lactic acid of 2.3 BUN of 44 and creatinine of 3.73 with alkaline phosphatase of 158 and bilirubin of 3.2.  CT scan of the abdomen and pelvis is remarkable for incarcerated left inguinal hernia with obstruction and concern about strangulated small bowel.  Small to moderate amount of ascites noted.     Problems Addressed this Visit    None  Diagnoses    None.         Past Medical History:   Diagnosis Date    Cancer     CHF (congestive heart failure)     High blood pressure     Hypertension     Kidney disease     Kidney disease     Pneumonia        Past Surgical History:    CARDIAC DEFIBRILLATOR PLACEMENT    CARDIAC DEFIBRILLATOR REMOVAL    CARDIAC ELECTROPHYSIOLOGY PROCEDURE    Procedure: BIV ICD 17467;  Surgeon: Mynor Richard MD;  Location: [facility] INVASIVE LOCATION;  Service: Cardiology;  Laterality: N/A;    CATARACT EXTRACTION    CERVICAL SPINE SURGERY    COLONOSCOPY    DIAGNOSTIC  LAPAROSCOPY    Procedure: EXPLORATORY LAPAROTOMY, RIGHT SPIGELIAN HERNIA REPAIR;  Surgeon: Steve Massey MD;  Location: Replaced by [facility];  Service: General;  Laterality: N/A;    HERNIA REPAIR       Allergies   Allergen Reactions    Lactose Intolerance (Gi) Diarrhea    Milk (Cow) Unknown - High Severity    Polysporin [Bacitracin-Polymyxin B] Itching and Rash       No current facility-administered medications on file prior to encounter.     Current Outpatient Medications on File Prior to Encounter   Medication Sig Dispense Refill    allopurinol (ZYLOPRIM) 100 MG tablet Take 1 tablet by mouth Daily. Indications: Disorder of Excessive Uric Acid in the Blood      atorvastatin (LIPITOR) 20 MG tablet Take 1 tablet by mouth Daily. Indications: High Amount of Fats in the Blood      carvedilol (COREG) 3.125 MG tablet Take 1 tablet by mouth 2 (Two) Times a Day With Meals.      doxepin (SINEquan) 10 MG capsule Take 1 capsule by mouth Every Night. Indications: Feeling Anxious      levothyroxine (SYNTHROID, LEVOTHROID) 50 MCG tablet Take 1 tablet by mouth Daily. Indications: Underactive Thyroid      tamsulosin (FLOMAX) 0.4 MG capsule 24 hr capsule Take 0.4 capsules by mouth Daily. Indications: Chronic Prostate Gland Inflammation      [DISCONTINUED] bumetanide (BUMEX) 2 MG tablet Take 1 tablet by mouth As Needed.      [DISCONTINUED] furosemide (LASIX) 40 MG tablet Take 1 tablet by mouth Daily. (Patient not taking: Reported on 10/25/2023)      [DISCONTINUED] HYDROcodone-acetaminophen (NORCO) 5-325 MG per tablet  (Patient not taking: Reported on 10/25/2023)      [DISCONTINUED] pantoprazole (PROTONIX) 40 MG EC tablet Take 1 tablet by mouth Every Morning. (Patient not taking: Reported on 10/25/2023) 30 tablet 1         Current Facility-Administered Medications:     ceFAZolin 2000 mg IVPB in 100 mL NS (MBP), 2,000 mg, Intravenous, Once, Kathi Dow MD    famotidine (PEPCID) tablet 20 mg, 20 mg, Oral, Once, Tobias Sparks,  "MD    lactated ringers infusion, 9 mL/hr, Intravenous, Continuous, Tobias Sparks MD    lidocaine PF 1% (XYLOCAINE) injection 0.5 mL, 0.5 mL, Injection, Once PRN, Tobias Sparks MD    midazolam (VERSED) injection 0.5 mg, 0.5 mg, Intravenous, Q10 Min PRN, Tobias Sparks MD    [MAR Hold] Sodium Chloride (PF) 0.9 % 10 mL, 10 mL, Intravenous, PRN, Kyle Allen MD    Family History   Problem Relation Age of Onset    Dementia Mother     Colon cancer Father     Cancer Father         prostate    No Known Problems Sister     No Known Problems Brother     No Known Problems Daughter      Social History     Socioeconomic History    Marital status:    Tobacco Use    Smoking status: Former     Packs/day: 1.50     Years: 48.00     Additional pack years: 0.00     Total pack years: 72.00     Types: Cigarettes    Smokeless tobacco: Never    Tobacco comments:     quit 2010   Vaping Use    Vaping Use: Never used   Substance and Sexual Activity    Alcohol use: Not Currently     Alcohol/week: 35.0 standard drinks of alcohol     Types: 35 Shots of liquor per week     Comment: liquor daily 5-6    Drug use: No    Sexual activity: Defer       Review of Systems:  Review of Systems  As above  Otherwise the 12 point review of systems is negative.    /67   Pulse 76   Resp 18   Ht 175.3 cm (69\")   Wt 54.4 kg (120 lb)   SpO2 94%   BMI 17.72 kg/m²   Body mass index is 17.72 kg/m².    General: Alert and oriented x3 in no acute distress  HEENT: PER, no icterus, normal sclerae  Cardiac: regular rhythm,  no audible rubs  Pulmonary: bilateral breath sounds, nonlabored  Abdominal: Mild distention soft.  Incarcerated left inguinal hernia with easily reducible right inguinal hernia.  No overlying left inguinal erythema.  No diffuse guarding  Neurologic: awake, alert, no obvious focal deficits  Extremities: warm, no edema  Skin: no obvious rashes nor worrisome lesions seen     CBC  Results from last 7 days   Lab Units " 11/13/23  1111   WBC 10*3/mm3 12.77*   HEMOGLOBIN g/dL 11.8*   HEMATOCRIT % 36.7*   PLATELETS 10*3/mm3 170       CMP  Results from last 7 days   Lab Units 11/13/23  1125 11/13/23  1111   SODIUM mmol/L  --  138   POTASSIUM mmol/L  --  4.2   CHLORIDE mmol/L  --  90*   CO2 mmol/L  --  29.0   BUN mg/dL  --  44*   CREATININE mg/dL 4.50*  4.50 3.73*   CALCIUM mg/dL  --  9.5   BILIRUBIN mg/dL  --  3.2*   ALK PHOS U/L  --  158*   ALT (SGPT) U/L  --  17   AST (SGOT) U/L  --  31   GLUCOSE mg/dL  --  134*       Radiology  Imaging Results (Last 72 Hours)       Procedure Component Value Units Date/Time    CT Abdomen Pelvis Without Contrast [[id number]] Collected: 11/13/23 1236     Updated: 11/13/23 1247    Narrative:      CT ABDOMEN PELVIS WO CONTRAST    Date of Exam: 11/13/2023 11:24 AM CST    Indication: lower abd pain, L ing hernia, concern for incarceration/strangulation.    Comparison: 6/25/2023    Technique: Axial CT images were obtained of the abdomen and pelvis without the administration of contrast. Reconstructed coronal and sagittal images were also obtained. Automated exposure control and iterative construction methods were used.      Findings:  LUNG BASES: Enlarged heart. There is patchy bibasilar airspace disease which may represent developing infection in the proper clinical setting.    LIVER:  Unremarkable parenchyma without focal lesion.  BILIARY/GALLBLADDER: There is a calcified gallstone.  SPLEEN:  Unremarkable  PANCREAS:  Unremarkable  ADRENAL:  Unremarkable  KIDNEYS: There is advanced right renal cortical atrophy. There is more mild left renal cortical atrophy with contrast staining of the cortex consistent with persistent nephrogram typically related to nephropathy. Correlate for recent contrasted study. No   calculus identified.  GASTROINTESTINAL/MESENTERY: Generalized distention of the stomach and small intestine to the level of a left inguinal hernia containing a segment of inflamed small intestine with  surrounding fluid. Findings are suggestive of obstructed and strangulated   left inguinal hernia. Small intestine proximal to the left inguinal hernia measures up to 3.7 cm in diameter. Distal to the hernia it is decompressed.  AORTA/IVC:  Normal caliber.    RETROPERITONEUM/LYMPH NODES:  Unremarkable    REPRODUCTIVE:  Unremarkable  BLADDER:  Unremarkable    OSSEUS STRUCTURES: No acute osseous process is identified.      Impression:      Impression:  1.Left inguinal hernia with evidence of obstructed and strangulated small intestine. Small intestine proximal to the hernia is dilated and distal to the hernia is decompressed.  2.Vague patchy lower lung airspace disease may represent pneumonia in the proper clinical setting.  3.Small to moderate volume ascites.  4.Persistent left nephrogram from recent contrasted study. Correlate with patient history.  5.Other incidental findings as detailed above.            Electronically Signed: Michele Garber MD    11/13/2023 11:44 AM CST    Workstation ID: [id number]              Assessment:  Mr. Floyd is a 78-year-old gentleman with multiple comorbidities including nonischemic cardiomyopathy, hypertension, hyperlipidemia, status post PPM I/ICD and newly diagnosed head and neck carcinoma with concern about metastatic disease.  He presented with 2 days history of persistent left inguinal swelling, nausea and vomiting with work-up concerning for strangulated left inguinal hernia with possible small bowel strangulation.    Plan:  I reviewed the studies and discussed the findings with the patient.  I recommend proceeding with emergent left inguinal exploration and repair of the left inguinal hernia with possible abdominal exploration as well as bowel resection.  Patient is definitely at high risk for this procedure considering his comorbidities.  The risk of anesthesia, infection, bleeding, possible delayed healing as well as bowel injury, cardiac event as well as death were discussed.   He understands and is willing to proceed with the plan as outlined.    Thank you for this consultation.      Kathi Dow MD  11/13/23  14:06 EST

## 2023-11-14 LAB
ALBUMIN SERPL-MCNC: 3.4 G/DL (ref 3.5–5.2)
ALBUMIN/GLOB SERPL: 0.9 G/DL
ALP SERPL-CCNC: 139 U/L (ref 39–117)
ALT SERPL W P-5'-P-CCNC: 5 U/L (ref 1–41)
ANION GAP SERPL CALCULATED.3IONS-SCNC: 22 MMOL/L (ref 5–15)
AST SERPL-CCNC: 17 U/L (ref 1–40)
BILIRUB SERPL-MCNC: 1.4 MG/DL (ref 0–1.2)
BUN SERPL-MCNC: 49 MG/DL (ref 8–23)
BUN/CREAT SERPL: 12.4 (ref 7–25)
CALCIUM SPEC-SCNC: 7.7 MG/DL (ref 8.6–10.5)
CHLORIDE SERPL-SCNC: 98 MMOL/L (ref 98–107)
CO2 SERPL-SCNC: 19 MMOL/L (ref 22–29)
CREAT SERPL-MCNC: 3.94 MG/DL (ref 0.76–1.27)
DEPRECATED RDW RBC AUTO: 54.4 FL (ref 37–54)
EGFRCR SERPLBLD CKD-EPI 2021: 14.9 ML/MIN/1.73
ERYTHROCYTE [DISTWIDTH] IN BLOOD BY AUTOMATED COUNT: 18.6 % (ref 12.3–15.4)
GLOBULIN UR ELPH-MCNC: 3.6 GM/DL
GLUCOSE SERPL-MCNC: 131 MG/DL (ref 65–99)
HCT VFR BLD AUTO: 30.8 % (ref 37.5–51)
HGB BLD-MCNC: 9.9 G/DL (ref 13–17.7)
MCH RBC QN AUTO: 26.1 PG (ref 26.6–33)
MCHC RBC AUTO-ENTMCNC: 32.1 G/DL (ref 31.5–35.7)
MCV RBC AUTO: 81.1 FL (ref 79–97)
PLATELET # BLD AUTO: 135 10*3/MM3 (ref 140–450)
POTASSIUM SERPL-SCNC: 4 MMOL/L (ref 3.5–5.2)
PROT SERPL-MCNC: 7 G/DL (ref 6–8.5)
RBC # BLD AUTO: 3.8 10*6/MM3 (ref 4.14–5.8)
SODIUM SERPL-SCNC: 139 MMOL/L (ref 136–145)
WBC NRBC COR # BLD: 8.05 10*3/MM3 (ref 3.4–10.8)

## 2023-11-14 PROCEDURE — 99232 SBSQ HOSP IP/OBS MODERATE 35: CPT | Performed by: PEDIATRICS

## 2023-11-14 PROCEDURE — 97161 PT EVAL LOW COMPLEX 20 MIN: CPT

## 2023-11-14 PROCEDURE — 85027 COMPLETE CBC AUTOMATED: CPT | Performed by: SURGERY

## 2023-11-14 PROCEDURE — 25010000002 CEFAZOLIN PER 500 MG: Performed by: SURGERY

## 2023-11-14 PROCEDURE — 80053 COMPREHEN METABOLIC PANEL: CPT | Performed by: SURGERY

## 2023-11-14 PROCEDURE — 97116 GAIT TRAINING THERAPY: CPT

## 2023-11-14 PROCEDURE — 25810000003 SODIUM CHLORIDE 0.9 % SOLUTION: Performed by: HOSPITALIST

## 2023-11-14 RX ORDER — HYDROCODONE BITARTRATE AND ACETAMINOPHEN 5; 325 MG/1; MG/1
1 TABLET ORAL EVERY 6 HOURS PRN
Status: DISCONTINUED | OUTPATIENT
Start: 2023-11-14 | End: 2023-11-15 | Stop reason: HOSPADM

## 2023-11-14 RX ADMIN — DOXEPIN HYDROCHLORIDE 10 MG: 10 CAPSULE ORAL at 20:25

## 2023-11-14 RX ADMIN — SODIUM CHLORIDE 2000 MG: 900 INJECTION INTRAVENOUS at 05:17

## 2023-11-14 RX ADMIN — ATORVASTATIN CALCIUM 20 MG: 20 TABLET, FILM COATED ORAL at 08:10

## 2023-11-14 RX ADMIN — Medication 10 ML: at 20:26

## 2023-11-14 RX ADMIN — CARVEDILOL 3.12 MG: 3.12 TABLET, FILM COATED ORAL at 20:25

## 2023-11-14 RX ADMIN — PANTOPRAZOLE SODIUM 40 MG: 40 TABLET, DELAYED RELEASE ORAL at 08:11

## 2023-11-14 RX ADMIN — CARVEDILOL 3.12 MG: 3.12 TABLET, FILM COATED ORAL at 08:11

## 2023-11-14 RX ADMIN — ALLOPURINOL 100 MG: 100 TABLET ORAL at 08:11

## 2023-11-14 RX ADMIN — SODIUM CHLORIDE 2000 MG: 900 INJECTION INTRAVENOUS at 15:03

## 2023-11-14 RX ADMIN — LEVOTHYROXINE SODIUM 50 MCG: 0.05 TABLET ORAL at 05:16

## 2023-11-14 RX ADMIN — SODIUM CHLORIDE 75 ML/HR: 9 INJECTION, SOLUTION INTRAVENOUS at 08:12

## 2023-11-14 RX ADMIN — TAMSULOSIN HYDROCHLORIDE 0.4 MG: 0.4 CAPSULE ORAL at 20:25

## 2023-11-14 NOTE — PLAN OF CARE
Problem: Adult Inpatient Plan of Care  Goal: Absence of Hospital-Acquired Illness or Injury  Intervention: Identify and Manage Fall Risk  Recent Flowsheet Documentation  Taken 11/14/2023 1400 by Rmoa Olguin RN  Safety Promotion/Fall Prevention:   activity supervised   assistive device/personal items within reach   clutter free environment maintained   fall prevention program maintained   toileting scheduled   safety round/check completed   room organization consistent   nonskid shoes/slippers when out of bed  Taken 11/14/2023 1200 by Roma Olguin RN  Safety Promotion/Fall Prevention:   activity supervised   assistive device/personal items within reach   clutter free environment maintained   fall prevention program maintained   toileting scheduled   safety round/check completed   room organization consistent   nonskid shoes/slippers when out of bed  Taken 11/14/2023 0800 by Roma Olguin RN  Safety Promotion/Fall Prevention:   activity supervised   assistive device/personal items within reach   clutter free environment maintained   fall prevention program maintained   toileting scheduled   safety round/check completed   room organization consistent   nonskid shoes/slippers when out of bed  Intervention: Prevent Skin Injury  Recent Flowsheet Documentation  Taken 11/14/2023 1400 by Roma Olguin RN  Body Position: position changed independently  Skin Protection: adhesive use limited  Taken 11/14/2023 1200 by Roma Olguin RN  Body Position: legs elevated  Skin Protection: adhesive use limited  Taken 11/14/2023 0800 by Roma Olguin RN  Body Position: position changed independently  Skin Protection: adhesive use limited  Intervention: Prevent and Manage VTE (Venous Thromboembolism) Risk  Recent Flowsheet Documentation  Taken 11/14/2023 1400 by Roma Olguin RN  Activity Management:   activity encouraged   up in chair  Taken 11/14/2023 1200 by Roma Olguin RN  Activity  Management: activity encouraged  Taken 11/14/2023 0800 by Roma Olguin RN  Activity Management: activity encouraged  VTE Prevention/Management: sequential compression devices on  Range of Motion: active ROM (range of motion) encouraged  Intervention: Prevent Infection  Recent Flowsheet Documentation  Taken 11/14/2023 1400 by Roma Olguin RN  Infection Prevention:   environmental surveillance performed   equipment surfaces disinfected  Taken 11/14/2023 1200 by Roma Olguin RN  Infection Prevention: environmental surveillance performed  Taken 11/14/2023 0800 by Roma Olguin RN  Infection Prevention:   environmental surveillance performed   personal protective equipment utilized     Problem: Skin Injury Risk Increased  Goal: Skin Health and Integrity  Intervention: Optimize Skin Protection  Recent Flowsheet Documentation  Taken 11/14/2023 1400 by Roma Olguin RN  Pressure Reduction Techniques:   frequent weight shift encouraged   weight shift assistance provided  Pressure Reduction Devices: pressure-redistributing mattress utilized  Skin Protection: adhesive use limited  Taken 11/14/2023 1200 by Roma Olguin RN  Pressure Reduction Techniques:   frequent weight shift encouraged   weight shift assistance provided  Head of Bed (HOB) Positioning: HOB elevated  Pressure Reduction Devices: pressure-redistributing mattress utilized  Skin Protection: adhesive use limited  Taken 11/14/2023 0800 by Roma Olguin RN  Pressure Reduction Techniques:   frequent weight shift encouraged   weight shift assistance provided  Head of Bed (HOB) Positioning: HOB elevated  Pressure Reduction Devices:   positioning supports utilized   pressure-redistributing mattress utilized  Skin Protection: adhesive use limited     Problem: Fall Injury Risk  Goal: Absence of Fall and Fall-Related Injury  Intervention: Identify and Manage Contributors  Recent Flowsheet Documentation  Taken 11/14/2023 1400 by  Roma Olguin, RN  Self-Care Promotion:   independence encouraged   BADL personal routines maintained  Taken 11/14/2023 1200 by Roma Olguin, RN  Medication Review/Management: medications reviewed  Taken 11/14/2023 0800 by Roma Olguin RN  Medication Review/Management: medications reviewed  Intervention: Promote Injury-Free Environment  Recent Flowsheet Documentation  Taken 11/14/2023 1400 by Roma Olguin, RN  Safety Promotion/Fall Prevention:   activity supervised   assistive device/personal items within reach   clutter free environment maintained   fall prevention program maintained   toileting scheduled   safety round/check completed   room organization consistent   nonskid shoes/slippers when out of bed  Taken 11/14/2023 1200 by Roma Olguin RN  Safety Promotion/Fall Prevention:   activity supervised   assistive device/personal items within reach   clutter free environment maintained   fall prevention program maintained   toileting scheduled   safety round/check completed   room organization consistent   nonskid shoes/slippers when out of bed  Taken 11/14/2023 0800 by Roma Olguin, RN  Safety Promotion/Fall Prevention:   activity supervised   assistive device/personal items within reach   clutter free environment maintained   fall prevention program maintained   toileting scheduled   safety round/check completed   room organization consistent   nonskid shoes/slippers when out of bed   Goal Outcome Evaluation:

## 2023-11-14 NOTE — PROGRESS NOTES
[facility]  PROGRESS NOTE    Patient Name: Jet Floyd  : 1945  MRN: [id number]    Date of Admission: 2023  Primary Care Physician: Obi Soriano MD    Subjective   Subjective     CC:  Inguinal hernia    HPI:  Patient doing well today.  Had his NG removed.  Tolerating clear liquids      Objective   Objective     Vital Signs:   Temp:  [96 °F (35.6 °C)-97.9 °F (36.6 °C)] 97.5 °F (36.4 °C)  Heart Rate:  [] 77  Resp:  [16-20] 16  BP: (103-143)/(53-70) 130/56  Flow (L/min):  [2-4] 2     Physical Exam:  Constitutional: No acute distress, awake, alert  HENT: NCAT, mucous membranes moist  Respiratory: Clear to auscultation bilaterally, respiratory effort normal   Cardiovascular: RRR, no murmurs, rubs, or gallops  Gastrointestinal: Positive bowel sounds, dressing is clean, dry and intact., nondistended  Musculoskeletal: No bilateral ankle edema  Psychiatric: Appropriate affect, cooperative  Neurologic: Oriented x 3, strength symmetric in all extremities, Cranial Nerves grossly intact to confrontation, speech clear  Skin: No rashes      Results Reviewed:  LAB RESULTS:      Lab 23  0453 23  1827 23  1111   WBC 8.05  --  12.77*   HEMOGLOBIN 9.9*  --  11.8*   HEMATOCRIT 30.8*  --  36.7*   PLATELETS 135*  --  170   NEUTROS ABS  --   --  11.52*   IMMATURE GRANS (ABS)  --   --  0.08*   LYMPHS ABS  --   --  0.53*   MONOS ABS  --   --  0.54   EOS ABS  --   --  0.04   MCV 81.1  --  81.0   LACTATE  --  1.1 2.3*         Lab 23  0557 23  1125 23  1111   SODIUM 139  --  138   POTASSIUM 4.0  --  4.2   CHLORIDE 98  --  90*   CO2 19.0*  --  29.0   ANION GAP 22.0*  --  19.0*   BUN 49*  --  44*   CREATININE 3.94* 4.50*  4.50 3.73*   EGFR 14.9*  --  15.9*   GLUCOSE 131*  --  134*   CALCIUM 7.7*  --  9.5         Lab 23  0557 23  1111   TOTAL PROTEIN 7.0 8.6*   ALBUMIN 3.4* 4.3   GLOBULIN 3.6 4.3   ALT (SGPT) 5 17    AST (SGOT) 17 31   BILIRUBIN 1.4* 3.2*   ALK PHOS 139* 158*   LIPASE  --  21                     Brief Urine Lab Results       None            Microbiology Results Abnormal       None            Peripheral Block    Result Date: 11/13/2023  Keri Huntley CRNA     11/13/2023  3:17 PM Peripheral Block Patient reassessed immediately prior to procedure Patient location during procedure: OR Reason for block: at surgeon's request and post-op pain management Performed by CRNA/CAA: Morgan Vann, CRNA Preanesthetic Checklist Completed: patient identified, IV checked, site marked, risks and benefits discussed, surgical consent, monitors and equipment checked, pre-op evaluation and timeout performed Prep: Pt Position: supine Sterile barriers:cap, gloves, mask and washed/disinfected hands Prep: ChloraPrep Patient monitoring: blood pressure monitoring, continuous pulse oximetry and EKG Procedure Sedation: yes Performed under: general Guidance:ultrasound guided Images:still images obtained, printed/placed on chart Laterality:Bilateral Block Type:TAP Injection Technique:single-shot Needle Type:short-bevel and echogenic Needle Gauge:20 G Resistance on Injection: none Medications Used: bupivacaine PF (MARCAINE) 0.25 % injection - Injection  50 mL - 11/13/2023 2:43:00 PM dexamethasone sodium phosphate injection - Injection  4 mg - 11/13/2023 2:43:00 PM Medications Comment:Block Injection:  LA dose divided between Right and Left block Post Assessment Injection Assessment: negative aspiration for heme, incremental injection and no paresthesia on injection Patient Tolerance:comfortable throughout block Complications:no Additional Notes Mid-Axillary/Lateral TAPs A high-frequency linear transducer, with sterile cover, was placed in the midaxillary line between the ASIS and costal margin. The External Oblique Muscle (EOM), Internal Oblique Muscle (IOM), Transverse Abdominus Muscle (LANE), and Peritoneum were identified. The  "insertion site was prepped in sterile fashion and then localized with 2-5 ml of 1% Lidocaine. Using ultrasound-guidance, a 20-gauge B-Mandujano 4\" Ultraplex 360 non-stimulating echogenic needle was advanced in plane, from medial to lateral, until the tip of the needle was in the fascial plane between the IOM and LANE. 1-3ml of preservative free normal saline was used to hydro-dissect the fascial planes. After the fascial plane was verified, the local anesthetic (LA) was injected. The procedure was repeated on the opposite side for bilateral coverage. Aspiration every 5 ml to prevent intravascular injection. Injection was completed with negative aspiration of blood and negative intravascular injection. Injection pressures were normal with minimal resistance. Midaxillary TAPs should reach intercostal nerves T10- T11 and the subcostal nerve T12.      CT Abdomen Pelvis Without Contrast    Result Date: 11/13/2023  CT ABDOMEN PELVIS WO CONTRAST Date of Exam: 11/13/2023 11:24 AM CST Indication: lower abd pain, L ing hernia, concern for incarceration/strangulation. Comparison: 6/25/2023 Technique: Axial CT images were obtained of the abdomen and pelvis without the administration of contrast. Reconstructed coronal and sagittal images were also obtained. Automated exposure control and iterative construction methods were used. Findings: LUNG BASES: Enlarged heart. There is patchy bibasilar airspace disease which may represent developing infection in the proper clinical setting. LIVER:  Unremarkable parenchyma without focal lesion. BILIARY/GALLBLADDER: There is a calcified gallstone. SPLEEN:  Unremarkable PANCREAS:  Unremarkable ADRENAL:  Unremarkable KIDNEYS: There is advanced right renal cortical atrophy. There is more mild left renal cortical atrophy with contrast staining of the cortex consistent with persistent nephrogram typically related to nephropathy. Correlate for recent contrasted study. No  calculus identified. " GASTROINTESTINAL/MESENTERY: Generalized distention of the stomach and small intestine to the level of a left inguinal hernia containing a segment of inflamed small intestine with surrounding fluid. Findings are suggestive of obstructed and strangulated left inguinal hernia. Small intestine proximal to the left inguinal hernia measures up to 3.7 cm in diameter. Distal to the hernia it is decompressed. AORTA/IVC:  Normal caliber. RETROPERITONEUM/LYMPH NODES:  Unremarkable REPRODUCTIVE:  Unremarkable BLADDER:  Unremarkable OSSEUS STRUCTURES: No acute osseous process is identified.     Impression: Impression: 1.Left inguinal hernia with evidence of obstructed and strangulated small intestine. Small intestine proximal to the hernia is dilated and distal to the hernia is decompressed. 2.Vague patchy lower lung airspace disease may represent pneumonia in the proper clinical setting. 3.Small to moderate volume ascites. 4.Persistent left nephrogram from recent contrasted study. Correlate with patient history. 5.Other incidental findings as detailed above. Electronically Signed: Michele Garber MD  11/13/2023 11:44 AM CST  Workstation ID: [id number]     Results for orders placed during the hospital encounter of 03/28/22    Adult Transthoracic Echo Complete W/ Cont if Necessary Per Protocol    Interpretation Summary  · Left ventricular ejection fraction appears to be 31 - 35%. Left ventricular systolic function is moderately decreased.  · The following left ventricular wall segments are dyskinetic: apical septal, basal anteroseptal and mid anteroseptal.  · The right atrial cavity is dilated.  · Moderate tricuspid valve regurgitation is present.  · Estimated right ventricular systolic pressure from tricuspid regurgitation is normal (<35 mmHg).      Current medications:  Scheduled Meds:allopurinol, 100 mg, Oral, Daily  atorvastatin, 20 mg, Oral, Daily  carvedilol, 3.125 mg, Oral, BID  ceFAZolin, 2,000 mg, Intravenous, Q8H  doxepin,  10 mg, Oral, Nightly  levothyroxine, 50 mcg, Oral, Q AM  pantoprazole, 40 mg, Oral, Daily  senna-docusate sodium, 2 tablet, Oral, BID  sodium chloride, 10 mL, Intravenous, Q12H  tamsulosin, 0.4 mg, Oral, Nightly      Continuous Infusions:sodium chloride, 75 mL/hr, Last Rate: 75 mL/hr (11/14/23 0812)      PRN Meds:.  senna-docusate sodium **AND** polyethylene glycol **AND** bisacodyl **AND** bisacodyl    HYDROcodone-acetaminophen    HYDROmorphone    ondansetron    prochlorperazine **OR** prochlorperazine **OR** prochlorperazine    Sodium Chloride (PF)    sodium chloride    sodium chloride    Assessment & Plan   Assessment & Plan     Active Hospital Problems    Diagnosis  POA    **Inguinal hernia [K40.90]  Yes      Resolved Hospital Problems   No resolved problems to display.        Brief Hospital Course to date:  Jet Floyd is a 78 y.o. male with a past medical history of heart failure with reduced ejection fraction, secondary to nonischemic cardiomyopathy, status post pacemaker/ICD placement hypothyroidism and newly diagnosed head neck cancer presents with left lower quadrant pain.  Patient found to have a inguinal hernia with strangulation and obstruction.  Patient underwent repair of the incarcerated left inguinal hernia.    N/V/Abdominal pain  Inguinal hernia with strangulation/obstruction  -s/p repair on 11/13/23  -Cruciate surgery input.    GABRIELLE/CKD III  -IVF  -Creatinine seems to be trending down.  BMP in am     Hx of NICM, HFrEF  -continue BB, hold bumex     GERD  -PPI     Hypothyroidism  -synthroid     Neck mass  Suspected neoplasm  -outpatient follow up        Expected Discharge Location and Transportation: home  Expected Discharge   Expected discharge date/ time has not been documented.     DVT prophylaxis:  Mechanical DVT prophylaxis orders are present.     AM-PAC 6 Clicks Score (PT): 14 (11/14/23 0800)    CODE STATUS:   Code Status and Medical Interventions:   Ordered at: 11/13/23 1324     Code  Status (Patient has no pulse and is not breathing):    CPR (Attempt to Resuscitate)     Medical Interventions (Patient has pulse or is breathing):    Full Support       Ashlyn Bernal MD  11/14/23

## 2023-11-14 NOTE — PROGRESS NOTES
"Jet ZAVALA Zhengbrypura  1945  [id number]    Surgery Progress Note    Date of visit: 11/14/2023    Subjective: No complaints of abdominal pain  Feels much better  Having flatus  Minimal nasogastric tube drainage    Objective:    /56 (BP Location: Right arm)   Pulse 77   Temp 97.5 °F (36.4 °C) (Oral)   Resp 16   Ht 175.3 cm (69\")   Wt 54.4 kg (120 lb)   SpO2 93%   BMI 17.72 kg/m²     Intake/Output Summary (Last 24 hours) at 11/14/2023 0955  Last data filed at 11/14/2023 0625  Gross per 24 hour   Intake 1512.5 ml   Output 1350 ml   Net 162.5 ml       CV: Regular rate and rhythm  L: normal air entry  Abd: Soft  Nontender left inguinal  Dressing is intact and dry      LABS:    Results from last 7 days   Lab Units 11/14/23  0453   WBC 10*3/mm3 8.05   HEMOGLOBIN g/dL 9.9*   HEMATOCRIT % 30.8*   PLATELETS 10*3/mm3 135*     Results from last 7 days   Lab Units 11/14/23  0557   SODIUM mmol/L 139   POTASSIUM mmol/L 4.0   CHLORIDE mmol/L 98   CO2 mmol/L 19.0*   BUN mg/dL 49*   CREATININE mg/dL 3.94*   CALCIUM mg/dL 7.7*   BILIRUBIN mg/dL 1.4*   ALK PHOS U/L 139*   ALT (SGPT) U/L 5   AST (SGOT) U/L 17   GLUCOSE mg/dL 131*     Results from last 7 days   Lab Units 11/14/23  0557   SODIUM mmol/L 139   POTASSIUM mmol/L 4.0   CHLORIDE mmol/L 98   CO2 mmol/L 19.0*   BUN mg/dL 49*   CREATININE mg/dL 3.94*   GLUCOSE mg/dL 131*   CALCIUM mg/dL 7.7*     Lab Results   Lab Value Date/Time    LIPASE 21 11/13/2023 1111    LIPASE 18 06/25/2023 1127    LIPASE 37 05/25/2019 0944    LIPASE 38 09/03/2015 0256         Assessment/ Plan: Stable course status post emergent repair of incarcerated left inguinal hernia  Patient is progressing well  Plan to remove the nasogastric tube and a Trevizo catheter  Start on clear liquid diet   increase activity      Problem List Items Addressed This Visit          Gastrointestinal Abdominal     * (Principal) Inguinal hernia    Relevant Orders    Tissue Pathology Exam     Other Visit Diagnoses       " Strangulated inguinal hernia    -  Primary    Leukocytosis, unspecified type        GABRIELLE (acute kidney injury)                  Kathi Dow MD  11/14/2023  09:55 EST

## 2023-11-14 NOTE — THERAPY EVALUATION
Patient Name: Jet Floyd  : 1945    MRN: [id number]                              Today's Date: 2023       Admit Date: 2023    Visit Dx:     ICD-10-CM ICD-9-CM   1. Strangulated inguinal hernia  K40.30 550.10   2. Inguinal hernia  K40.90 550.90   3. Leukocytosis, unspecified type  D72.829 288.60   4. GABRIELLE (acute kidney injury)  N17.9 584.9     Patient Active Problem List   Diagnosis    Sepsis    CAP (community acquired pneumonia)    Essential hypertension    Hyperlipidemia    Alcohol abuse, daily use    Former smoker    Dermatitis    Balance problem    Hypoglycemia    Metabolic encephalopathy    Lactic acidosis    History of alcoholism    Acute kidney injury superimposed on chronic kidney disease    Hyperglycemia    Pleural effusion    CKD (chronic kidney disease) stage 3, GFR 30-59 ml/min    Cardiorenal syndrome    Severe mitral valve regurgitation    Chronic systolic heart failure    Spondylosis of lumbar region without myelopathy or radiculopathy    DDD (degenerative disc disease), lumbar    Osteoarthritis of right hip    Polyneuropathy    History of compression fracture of vertebral column    Acute respiratory failure    Anemia of chronic renal failure    Other specified hypothyroidism    Inguinal hernia     Past Medical History:   Diagnosis Date    Cancer     CHF (congestive heart failure)     High blood pressure     Hypertension     Kidney disease     Kidney disease     Pneumonia      Past Surgical History:   Procedure Laterality Date    CARDIAC DEFIBRILLATOR PLACEMENT Bilateral     CARDIAC DEFIBRILLATOR REMOVAL Bilateral     CARDIAC ELECTROPHYSIOLOGY PROCEDURE N/A 2020    Procedure: BIV ICD 15745;  Surgeon: Mynor Richard MD;  Location: [facility] INVASIVE LOCATION;  Service: Cardiology;  Laterality: N/A;    CATARACT EXTRACTION Bilateral     CERVICAL SPINE SURGERY Bilateral     COLONOSCOPY      DIAGNOSTIC LAPAROSCOPY N/A 2023    Procedure: EXPLORATORY  LAPAROTOMY, RIGHT SPIGELIAN HERNIA REPAIR;  Surgeon: Steve Massey MD;  Location:  [facility];  Service: General;  Laterality: N/A;    HERNIA REPAIR      INGUINAL HERNIA REPAIR Left 11/13/2023    Procedure: REPAIR OF LEFT INCARCERATED INGUINAL HERNIA WITH MESH;  Surgeon: Kathi Dow MD;  Location:  [facility];  Service: General;  Laterality: Left;    NECK SURGERY        General Information       [facility] Name 11/14/23 1041          Physical Therapy Time and Intention    Document Type evaluation  -     Mode of Treatment physical therapy  -[facility] Name 11/14/23 1041          General Information    Patient Profile Reviewed yes  -     Prior Level of Function independent:;all household mobility;gait;transfer;bed mobility;ADL's;dressing;bathing;dependent:;driving  Uses rollator at baseline for household and community distances. Also has SPC at home. Uses shower chair in tub shower.  -     Existing Precautions/Restrictions fall;other (see comments)  Abd wound  -     Barriers to Rehab medically complex  -[facility] Name 11/14/23 1041          Living Environment    People in Home spouse  -[facility] Name 11/14/23 1041          Home Main Entrance    Number of Stairs, Main Entrance one  -     Stair Railings, Main Entrance none  -       Row Name 11/14/23 1041          Stairs Within Home, Primary    Stairs, Within Home, Primary Split level home  -     Number of Stairs, Within Home, Primary other (see comments)  7+8  -     Stair Railings, Within Home, Primary railing on left side (ascending)  -[facility] Name 11/14/23 1041          Cognition    Orientation Status (Cognition) oriented x 3  -[facility] Name 11/14/23 1041          Safety Issues, Functional Mobility    Safety Issues Affecting Function (Mobility) insight into deficits/self-awareness;safety precaution awareness;sequencing abilities  -     Impairments Affecting Function (Mobility) balance;endurance/activity tolerance;strength  -                User Key  (r) = Recorded By, (t) = Taken By, (c) = Cosigned By      Initials Name Provider Type     Alejandra Bay PT Physical Therapist                   Mobility       Row Name 11/14/23 1046          Bed Mobility    Bed Mobility supine-sit  -     Supine-Sit Simms (Bed Mobility) minimum assist (75% patient effort);verbal cues  -     Assistive Device (Bed Mobility) bed rails;head of bed elevated  -     Comment, (Bed Mobility) VCs for hand placement and sequencing. Educated on log roll technique. Requires increased time  -[facility] Name 11/14/23 1046          Transfers    Comment, (Transfers) VCs for hand placement and sequencing  -[facility] Name 11/14/23 1046          Sit-Stand Transfer    Sit-Stand Simms (Transfers) minimum assist (75% patient effort);verbal cues  -     Assistive Device (Sit-Stand Transfers) walker, front-wheeled  -     Comment, (Sit-Stand Transfer) STS x1 from EOB  -[facility] Name 11/14/23 1046          Gait/Stairs (Locomotion)    Simms Level (Gait) contact guard;verbal cues  -     Assistive Device (Gait) walker, front-wheeled  -     Distance in Feet (Gait) 50  -     Deviations/Abnormal Patterns (Gait) bilateral deviations;base of support, narrow;asde decreased;gait speed decreased;stride length decreased  -     Bilateral Gait Deviations forward flexed posture;heel strike decreased  -     Comment, (Gait/Stairs) Pt demo step through gait pattern w/ narrow SANDRO and decreased speed. VCs for sequencing, upright posture, and to stay close to walker. No LOB or unsteadiness noted. Distance limited by fatigue  -               User Key  (r) = Recorded By, (t) = Taken By, (c) = Cosigned By      Initials Name Provider Type     Alejandra Bay PT Physical Therapist                   Obj/Interventions       Row Name 11/14/23 1049          Range of Motion Comprehensive    General Range of Motion bilateral lower extremity ROM WFL  -        [facility] Name 11/14/23 1049          Strength Comprehensive (MMT)    General Manual Muscle Testing (MMT) Assessment lower extremity strength deficits identified  -     Comment, General Manual Muscle Testing (MMT) Assessment B LE grossly 4-/5  -[facility] Name 11/14/23 1049          Balance    Balance Assessment sitting static balance;sitting dynamic balance;sit to stand dynamic balance;standing static balance;standing dynamic balance  -     Static Sitting Balance standby assist  -     Dynamic Sitting Balance contact guard  -     Position, Sitting Balance unsupported;sitting edge of bed  -     Sit to Stand Dynamic Balance minimal assist;verbal cues  -     Static Standing Balance standby assist;verbal cues  -     Dynamic Standing Balance contact guard;verbal cues  -     Position/Device Used, Standing Balance supported;walker, front-wheeled  -     Comment, Balance Pt stood at toilet for ~ 30 seconds w/ SBA and no LOB  -[facility] Name 11/14/23 1049          Sensory Assessment (Somatosensory)    Sensory Assessment (Somatosensory) LE sensation intact  -               User Key  (r) = Recorded By, (t) = Taken By, (c) = Cosigned By      Initials Name Provider Type     Alejandra Bay, PT Physical Therapist                   Goals/Plan       [facility] Name 11/14/23 1059          Bed Mobility Goal 1 (PT)    Activity/Assistive Device (Bed Mobility Goal 1, PT) sit to supine/supine to sit  -     [place] Level/Cues Needed (Bed Mobility Goal 1, PT) modified independence  [facility]     Time Frame (Bed Mobility Goal 1, PT) long term goal (LTG);10 days  -[facility] Name 11/14/23 1057          Transfer Goal 1 (PT)    Activity/Assistive Device (Transfer Goal 1, PT) sit-to-stand/stand-to-sit;bed-to-chair/chair-to-bed  -     [place] Level/Cues Needed (Transfer Goal 1, PT) modified independence  -     Time Frame (Transfer Goal 1, PT) long term goal (LTG);10 days  -[facility] Name 11/14/23 1056           Gait Training Goal 1 (PT)    Activity/Assistive Device (Gait Training Goal 1, PT) gait (walking locomotion);assistive device use  -     [place] Level (Gait Training Goal 1, PT) modified independence  -     Distance (Gait Training Goal 1, PT) 200  -     Time Frame (Gait Training Goal 1, PT) long term goal (LTG);10 days  -       Row Name 11/14/23 1054          Stairs Goal 1 (PT)    Activity/Assistive Device (Stairs Goal 1, PT) ascending stairs;descending stairs;using handrail, left  -     [place] Level/Cues Needed (Stairs Goal 1, PT) standby assist  -     Number of Stairs (Stairs Goal 1, PT) 7+8  -     Time Frame (Stairs Goal 1, PT) long term goal (LTG);10 days  -[facility] Name 11/14/23 1057          Therapy Assessment/Plan (PT)    Planned Therapy Interventions (PT) balance training;bed mobility training;gait training;home exercise program;neuromuscular re-education;patient/family education;postural re-education;ROM (range of motion);strengthening;stair training;stretching;transfer training  -               User Key  (r) = Recorded By, (t) = Taken By, (c) = Cosigned By      Initials Name Provider Type     Alejandra Bay, PT Physical Therapist                   Clinical Impression       [facility] Name 11/14/23 1052          Pain    Pretreatment Pain Rating 0/10 - no pain  -     Posttreatment Pain Rating 0/10 - no pain  -[facility] Name 11/14/23 1052          Plan of Care Review    Plan of Care Reviewed With patient  -     Outcome Evaluation PT eval completed. Pt presents below baseline function d/t generalized weakness, decreased balance, and decreased activity tolerance. Pt required Shana for STS and CGA to ambulate 50 ft w/ FWW. Pt would benefit from skilled IP PT. Recommend home w/ assist and HH PT at d/c.  -       Row Name 11/14/23 1059          Therapy Assessment/Plan (PT)    Patient/Family Therapy Goals Statement (PT) to go home  -     Rehab Potential (PT) good, to achieve  stated therapy goals  -     Criteria for Skilled Interventions Met (PT) yes;meets criteria;skilled treatment is necessary  -     Therapy Frequency (PT) daily  -       Row Name 11/14/23 1052          Vital Signs    Pre Systolic BP Rehab 130  -     Pre Treatment Diastolic BP 66  -LH     Post Systolic BP Rehab 137  -LH     Post Treatment Diastolic BP 69  -     Pretreatment Heart Rate (beats/min) 74  -LH     Posttreatment Heart Rate (beats/min) 73  -LH     Pre SpO2 (%) 93  -LH     O2 Delivery Pre Treatment room air  -     O2 Delivery Intra Treatment room air  -LH     Post SpO2 (%) 94  -LH     O2 Delivery Post Treatment room air  -LH     Pre Patient Position Supine  -LH     Intra Patient Position Standing  -LH     Post Patient Position Sitting  -       Row Name 11/14/23 1052          Positioning and Restraints    Pre-Treatment Position in bed  -     Post Treatment Position chair  -     In Chair reclined;sitting;call light within reach;encouraged to call for assist;exit alarm on;waffle cushion;legs elevated;notified [facility]  -               User Key  (r) = Recorded By, (t) = Taken By, (c) = Cosigned By      Initials Name Provider Type     Alejandra Bay, PT Physical Therapist                   Outcome Measures       Row Name 11/14/23 1055 11/14/23 0800       How much help from another person do you currently need...    Turning from your back to your side while in flat bed without using bedrails? 3  - 3  -CJ    Moving from lying on back to sitting on the side of a flat bed without bedrails? 3  - 2  -CJ    Moving to and from a bed to a chair (including a wheelchair)? 3  - 2  -CJ    Standing up from a chair using your arms (e.g., wheelchair, bedside chair)? 3  - 3  -CJ    Climbing 3-5 steps with a railing? 2  - 2  -CJ    To walk in hospital room? 3  - 2  -CJ    AM-PAC 6 Clicks Score (PT) 17  - 14  -    Highest Level of Mobility Goal 5 --> Static standing  - 4 --> Transfer to chair/commode   -      Row Name 11/14/23 1055          Functional Assessment    Outcome Measure Options AM-PAC 6 Clicks Basic Mobility (PT)  -               User Key  (r) = Recorded By, (t) = Taken By, (c) = Cosigned By      Initials Name Provider Type     Roma Olguin, RN Registered Nurse     Alejandra Bay, [facility] Physical Therapist                                 Physical Therapy Education       Title: PT OT SLP Therapies (In Progress)       Topic: Physical Therapy (In Progress)       Point: Mobility training (Done)       Learning Progress Summary             Patient Acceptance, E, VU,NR by  at 11/14/2023 1056                         Point: Home exercise program (Not Started)       Learner Progress:  Not documented in this visit.              Point: Body mechanics (Done)       Learning Progress Summary             Patient Acceptance, E, VU,NR by  at 11/14/2023 1056                         Point: Precautions (Done)       Learning Progress Summary             Patient Acceptance, E, VU,NR by  at 11/14/2023 1056                                         User Key       Initials Effective Dates Name Provider Type Discipline     09/21/23 -  Alejandra Bay, [facility] Physical Therapist PT                  PT Recommendation and Plan  Planned Therapy Interventions (PT): balance training, bed mobility training, gait training, home exercise program, neuromuscular re-education, patient/family education, postural re-education, ROM (range of motion), strengthening, stair training, stretching, transfer training  Plan of Care Reviewed With: patient  Outcome Evaluation: PT eval completed. Pt presents below baseline function d/t generalized weakness, decreased balance, and decreased activity tolerance. Pt required Shana for STS and CGA to ambulate 50 ft w/ FWW. Pt would benefit from skilled IP PT. Recommend home w/ assist and HH PT at d/c.     Time Calculation:   PT Evaluation Complexity  History, PT Evaluation Complexity: 3 or more  personal factors and/or comorbidities  Examination of Body Systems (PT Eval Complexity): total of 4 or more elements  Clinical Presentation (PT Evaluation Complexity): stable  Clinical Decision Making (PT Evaluation Complexity): low complexity  Overall Complexity (PT Evaluation Complexity): low complexity     PT Charges       Row Name 11/14/23 1056             Time Calculation    Start Time 0937  -      PT Received On 11/14/23  -      PT Goal Re-Cert Due Date 11/24/23  -         Timed Charges    83587 - Gait Training Minutes  10  -         Untimed Charges    PT Eval/Re-eval Minutes 36  -         Total Minutes    Timed Charges Total Minutes 10  -      Untimed Charges Total Minutes 36  -       Total Minutes 46  -LH                User Key  (r) = Recorded By, (t) = Taken By, (c) = Cosigned By      Initials Name Provider Type     Alejandra Bay, PT Physical Therapist                  Therapy Charges for Today       Code Description Service Date Service Provider Modifiers Qty    35206882708 HC GAIT TRAINING EA 15 MIN 11/14/2023 Alejandra Bay, PT GP 1    78549450112 HC PT EVAL LOW COMPLEXITY 3 11/14/2023 Alejandra Bay, PT GP 1            PT G-Codes  Outcome Measure Options: AM-PAC 6 Clicks Basic Mobility (PT)  AM-PAC 6 Clicks Score (PT): 17  PT Discharge Summary  Anticipated Discharge Disposition (PT): home with assist, home with home health    Alejandra Bay PT  11/14/2023

## 2023-11-14 NOTE — PLAN OF CARE
Goal Outcome Evaluation:  Plan of Care Reviewed With: patient           Outcome Evaluation: PT eval completed. Pt presents below baseline function d/t generalized weakness, decreased balance, and decreased activity tolerance. Pt required Shana for STS and CGA to ambulate 50 ft w/ FWW. Pt would benefit from skilled IP PT. Recommend home w/ assist and HH PT at d/c.      Anticipated Discharge Disposition (PT): home with assist, home with home health

## 2023-11-14 NOTE — PROGRESS NOTES
"                  Clinical Nutrition   Nutrition Support Assessment  Reason for Visit: MDR, Identified at risk by screening criteria, BMI      Patient Name: Jet Floyd  YOB: 1945  MRN: [id number]  Date of Encounter: 11/14/23 12:00 EST  Admission date: 11/13/2023      Nutrition Assessment   Admission Diagnosis:  Inguinal hernia [K40.90]    Problem List:    Inguinal hernia    PMH:   He  has a past medical history of Cancer, CHF (congestive heart failure), High blood pressure, Hypertension, Kidney disease, Kidney disease, and Pneumonia.    PSH:  He  has a past surgical history that includes Cardiac defibrillator placement (Bilateral, 1998); Cardiac defibrillator removal (Bilateral, 2003); Cataract extraction (Bilateral); Cervical spine surgery (Bilateral, 2014); Cardiac electrophysiology procedure (N/A, 03/17/2020); Laparoscopy (N/A, 06/25/2023); Colonoscopy; Hernia repair; Neck surgery; and Inguinal Hernia Repair (Left, 11/13/2023).    Applicable Interval History:   (11/13) S/P emergent repair of incarcerated left inguinal hernia.      Reported/Observed/Food/Nutrition Related History:   11/14  Sitting in chair at bedside, NGT has been removed.  Patient reports he feels good this morning, drinking some broth and is tolerating it well.  Reports typically has a good appetite and weight is stable ~ 125lb.  Admission weight of stated weight is 120lb. Patient feels he had some weight loss (fluid) due to 3 days of nausea and vomiting prior to admission.     Anthropometrics     Flowsheet Rows      Flowsheet Row First Filed Value   Admission Height 175.3 cm (69\") Documented at 11/13/2023 1034   Admission Weight 54.4 kg (120 lb) Documented at 11/13/2023 1034     Height: Height: 175.3 cm (69\")  Last Filed Weight: Weight: 54.4 kg (120 lb) (11/13/23 1034)  Method: Weight Method: Stated  BMI: BMI (Calculated): 17.7  BMI classification: Underweight:<18.5kg/m2  IBW:  160lb    UBW:     Weight       Weight (kg) " Weight (lbs) Weight Method Visit Report   4/3/2022 54.704 kg  120 lb 9.6 oz  Bed scale      54.885 kg  121 lb  Bed scale     4/4/2022 56.427 kg  124 lb 6.4 oz      4/5/2022 56.382 kg  124 lb 4.8 oz      6/25/2023 56.7 kg  125 lb  Stated     6/26/2023 56.7 kg  125 lb      6/27/2023 57 kg  125 lb 10.6 oz  Bed scale     6/30/2023 56.7 kg  125 lb      8/25/2023 58.877 kg  129 lb 12.8 oz   --    10/25/2023 57.698 kg  127 lb 3.2 oz   --    11/13/2023 54.432 kg  120 lb  Stated       Weight change:   ? 7lb weight loss x 1 month (however patient feels most weight changes are fluid related).      Nutrition Focused Physical Exam     Date:    11/14     NFPE completed, patient does not meet criteria for MSA at this time.   Some muscle and fat waste noted, however patient reports this is his baseline for many years.  Reports he eats well and has been a steady weight of ~ 125lb for many years.     Current Nutrition Prescription   PO: Diet: Liquid Diets; Clear Liquid; Fluid Consistency: Thin (IDDSI 0)  Oral Nutrition Supplement:   Intake: Insufficient data  diet started for lunch meal today       Nutrition Diagnosis   Date:  11/14            Updated:    Problem Predicted suboptimal energy intake   Etiology Altered GI function    Signs/Symptoms Previus/Current GI issues/ surgery is predicted decreased ability to consume sufficient injury.    Status:     Goal:   General: Nutrition support treatment  PO: Establish PO, Tolerate PO  EN/PN: N/A    Nutrition Intervention      Follow treatment progress, Care plan reviewed, Supplement provided    - Patient requested Boost supplement with meals once diet is advanced past clear liquids.     Monitoring/Evaluation:   Per protocol, I&O, PO intake, Supplement intake, Pertinent labs, GI status      Yanna Rodriguez RD  Time Spent: 30min

## 2023-11-14 NOTE — PLAN OF CARE
Problem: Adult Inpatient Plan of Care  Goal: Plan of Care Review  Outcome: Ongoing, Progressing  Goal: Patient-Specific Goal (Individualized)  Outcome: Ongoing, Progressing  Goal: Absence of Hospital-Acquired Illness or Injury  Outcome: Ongoing, Progressing  Intervention: Identify and Manage Fall Risk  Recent Flowsheet Documentation  Taken 11/13/2023 1933 by Ang Ross RN  Safety Promotion/Fall Prevention:   assistive device/personal items within reach   clutter free environment maintained   fall prevention program maintained   lighting adjusted   nonskid shoes/slippers when out of bed   room organization consistent   safety round/check completed   toileting scheduled  Intervention: Prevent Skin Injury  Recent Flowsheet Documentation  Taken 11/13/2023 1933 by Ang Ross RN  Body Position: position changed independently  Skin Protection:   adhesive use limited   tubing/devices free from skin contact  Intervention: Prevent and Manage VTE (Venous Thromboembolism) Risk  Recent Flowsheet Documentation  Taken 11/13/2023 1933 by Ang Ross RN  Activity Management: activity encouraged  VTE Prevention/Management:   bilateral   sequential compression devices on  Range of Motion: active ROM (range of motion) encouraged  Intervention: Prevent Infection  Recent Flowsheet Documentation  Taken 11/13/2023 1933 by Agn Ross RN  Infection Prevention:   environmental surveillance performed   equipment surfaces disinfected   hand hygiene promoted   rest/sleep promoted   single patient room provided  Goal: Optimal Comfort and Wellbeing  Outcome: Ongoing, Progressing  Intervention: Monitor Pain and Promote Comfort  Recent Flowsheet Documentation  Taken 11/13/2023 1933 by Ang Ross RN  Pain Management Interventions: (pt denies pain att)   medication offered but refused   pillow support provided   position adjusted   quiet environment facilitated  Intervention: Provide  Person-Centered Care  Recent Flowsheet Documentation  Taken 11/13/2023 1933 by Ang Ross RN  Trust Relationship/Rapport:   care explained   questions answered   questions encouraged   thoughts/feelings acknowledged  Goal: Readiness for Transition of Care  Outcome: Ongoing, Progressing  Intervention: Mutually Develop Transition Plan  Recent Flowsheet Documentation  Taken 11/13/2023 2214 by Ang Ross RN  Equipment Currently Used at Home: other (see comments)     Problem: Skin Injury Risk Increased  Goal: Skin Health and Integrity  Outcome: Ongoing, Progressing  Intervention: Optimize Skin Protection  Recent Flowsheet Documentation  Taken 11/13/2023 1933 by Ang Ross RN  Pressure Reduction Techniques: (WSM)   frequent weight shift encouraged   heels elevated off bed   weight shift assistance provided  Head of Bed (HOB) Positioning: HOB at 30-45 degrees  Pressure Reduction Devices:   positioning supports utilized   pressure-redistributing mattress utilized  Skin Protection:   adhesive use limited   tubing/devices free from skin contact   Goal Outcome Evaluation:            VSS, RA, patient has continually denied need for pain medications with each round, patient resting well, no complications noted/stated.

## 2023-11-15 ENCOUNTER — READMISSION MANAGEMENT (OUTPATIENT)
Dept: CALL CENTER | Facility: HOSPITAL | Age: 78
End: 2023-11-15
Payer: MEDICARE

## 2023-11-15 VITALS
TEMPERATURE: 98.2 F | BODY MASS INDEX: 17.77 KG/M2 | HEART RATE: 77 BPM | OXYGEN SATURATION: 95 % | RESPIRATION RATE: 18 BRPM | HEIGHT: 69 IN | DIASTOLIC BLOOD PRESSURE: 54 MMHG | WEIGHT: 120 LBS | SYSTOLIC BLOOD PRESSURE: 119 MMHG

## 2023-11-15 PROBLEM — K40.90 INGUINAL HERNIA: Status: RESOLVED | Noted: 2023-11-13 | Resolved: 2023-11-15

## 2023-11-15 PROBLEM — I50.22 CHRONIC HFREF (HEART FAILURE WITH REDUCED EJECTION FRACTION): Status: ACTIVE | Noted: 2023-11-15

## 2023-11-15 PROBLEM — J18.9 CAP (COMMUNITY ACQUIRED PNEUMONIA): Status: RESOLVED | Noted: 2018-03-20 | Resolved: 2023-11-15

## 2023-11-15 PROBLEM — G93.41 METABOLIC ENCEPHALOPATHY: Status: RESOLVED | Noted: 2018-03-21 | Resolved: 2023-11-15

## 2023-11-15 PROBLEM — J96.00 ACUTE RESPIRATORY FAILURE: Status: RESOLVED | Noted: 2022-03-28 | Resolved: 2023-11-15

## 2023-11-15 PROBLEM — E16.2 HYPOGLYCEMIA: Status: RESOLVED | Noted: 2018-03-21 | Resolved: 2023-11-15

## 2023-11-15 PROBLEM — F10.21 HISTORY OF ALCOHOLISM: Status: RESOLVED | Noted: 2020-02-14 | Resolved: 2023-11-15

## 2023-11-15 PROBLEM — E87.20 LACTIC ACIDOSIS: Status: RESOLVED | Noted: 2018-03-21 | Resolved: 2023-11-15

## 2023-11-15 PROBLEM — A41.9 SEPSIS: Status: RESOLVED | Noted: 2018-03-20 | Resolved: 2023-11-15

## 2023-11-15 PROBLEM — N18.4 STAGE 4 CHRONIC KIDNEY DISEASE: Status: ACTIVE | Noted: 2020-02-20

## 2023-11-15 LAB
ANION GAP SERPL CALCULATED.3IONS-SCNC: 14 MMOL/L (ref 5–15)
BUN SERPL-MCNC: 62 MG/DL (ref 8–23)
BUN/CREAT SERPL: 15.7 (ref 7–25)
CALCIUM SPEC-SCNC: 7.8 MG/DL (ref 8.6–10.5)
CHLORIDE SERPL-SCNC: 98 MMOL/L (ref 98–107)
CO2 SERPL-SCNC: 23 MMOL/L (ref 22–29)
CREAT SERPL-MCNC: 3.96 MG/DL (ref 0.76–1.27)
CYTO UR: NORMAL
EGFRCR SERPLBLD CKD-EPI 2021: 14.8 ML/MIN/1.73
GLUCOSE SERPL-MCNC: 155 MG/DL (ref 65–99)
LAB AP CASE REPORT: NORMAL
LAB AP CLINICAL INFORMATION: NORMAL
PATH REPORT.FINAL DX SPEC: NORMAL
PATH REPORT.GROSS SPEC: NORMAL
POTASSIUM SERPL-SCNC: 4.1 MMOL/L (ref 3.5–5.2)
SODIUM SERPL-SCNC: 135 MMOL/L (ref 136–145)

## 2023-11-15 PROCEDURE — 80048 BASIC METABOLIC PNL TOTAL CA: CPT | Performed by: SURGERY

## 2023-11-15 PROCEDURE — 97165 OT EVAL LOW COMPLEX 30 MIN: CPT

## 2023-11-15 PROCEDURE — 99238 HOSP IP/OBS DSCHRG MGMT 30/<: CPT | Performed by: PEDIATRICS

## 2023-11-15 RX ORDER — HYDROCODONE BITARTRATE AND ACETAMINOPHEN 5; 325 MG/1; MG/1
1 TABLET ORAL EVERY 8 HOURS PRN
Qty: 7 TABLET | Refills: 0 | Status: SHIPPED | OUTPATIENT
Start: 2023-11-15 | End: 2023-11-18

## 2023-11-15 RX ADMIN — ALLOPURINOL 100 MG: 100 TABLET ORAL at 08:39

## 2023-11-15 RX ADMIN — LEVOTHYROXINE SODIUM 50 MCG: 0.05 TABLET ORAL at 05:08

## 2023-11-15 RX ADMIN — ATORVASTATIN CALCIUM 20 MG: 20 TABLET, FILM COATED ORAL at 08:39

## 2023-11-15 RX ADMIN — Medication 10 ML: at 08:40

## 2023-11-15 RX ADMIN — PANTOPRAZOLE SODIUM 40 MG: 40 TABLET, DELAYED RELEASE ORAL at 08:39

## 2023-11-15 RX ADMIN — CARVEDILOL 3.12 MG: 3.12 TABLET, FILM COATED ORAL at 08:39

## 2023-11-15 NOTE — DISCHARGE INSTRUCTIONS
Take all medications as prescribed.  Keep all follow up appointments as scheduled.  Seek medical care if any symptoms return or worsen.

## 2023-11-15 NOTE — PROGRESS NOTES
"Enter Query Response Below      Query Response:  Underweight    Electronically signed by Ashlyn Bernal MD, 11/15/23, 2:58 PM EST.               If applicable, please update the problem list.     Patient: Jet Floyd        : 1945  Account: [id number]           Admit Date: 2023        How to Respond to this query:       a. Click New Note     b. Answer query within the yellow box.                c. Update the Problem List, if applicable.      If you have any questions about this query contact me at: [email or web address]     Dr. Bernal,    Patient has documented height of 175.3 cm (69\") and weight 54.432 kg (120lb) with BMI 17.7. Dietitian's note documents \"BMI: BMI (Calculated): 17.7; BMI classification: Underweight. patient does not meet criteria for MSA at this time. Some muscle and fat waste noted, however patient reports this is his baseline for many years.  Reports he eats well and has been a steady weight of ~ 125lb for many years.\" The patient was treated with Boost supplement with meals and GI diet, fiber-restricted.    Please clarify if patient treated/monitored for one or more of the following:    - Underweight  - Normal body habitus  - Other- specify __________  - Unable to determine    By submitting this query, we are merely seeking further clarification of documentation to accurately reflect all conditions that you are monitoring, evaluating, treating or that extend the hospitalization or utilize additional resources of care. Please utilize your independent clinical judgment when addressing the question(s) above.     This query and your response, once completed, will be entered into the legal medical record.    Sincerely,  Nimisha Carrillo RN  Clinical Documentation [facility]   "

## 2023-11-15 NOTE — PLAN OF CARE
Problem: Adult Inpatient Plan of Care  Goal: Absence of Hospital-Acquired Illness or Injury  Intervention: Identify and Manage Fall Risk  Recent Flowsheet Documentation  Taken 11/15/2023 0800 by Roma Olguin RN  Safety Promotion/Fall Prevention:   activity supervised   assistive device/personal items within reach   clutter free environment maintained   toileting scheduled   safety round/check completed   room organization consistent   nonskid shoes/slippers when out of bed   fall prevention program maintained  Intervention: Prevent Skin Injury  Recent Flowsheet Documentation  Taken 11/15/2023 0800 by Roma Olguin RN  Body Position: position changed independently  Skin Protection:   adhesive use limited   transparent dressing maintained  Intervention: Prevent and Manage VTE (Venous Thromboembolism) Risk  Recent Flowsheet Documentation  Taken 11/15/2023 0800 by Roma Olguin RN  Activity Management: activity encouraged  VTE Prevention/Management: sequential compression devices on  Range of Motion: active ROM (range of motion) encouraged  Intervention: Prevent Infection  Recent Flowsheet Documentation  Taken 11/15/2023 0800 by Roma Olguin RN  Infection Prevention: environmental surveillance performed     Problem: Skin Injury Risk Increased  Goal: Skin Health and Integrity  Intervention: Optimize Skin Protection  Recent Flowsheet Documentation  Taken 11/15/2023 0800 by Roma Olguin RN  Pressure Reduction Techniques:   frequent weight shift encouraged   weight shift assistance provided  Head of Bed (HOB) Positioning: HOB elevated  Pressure Reduction Devices:   pressure-redistributing mattress utilized   positioning supports utilized  Skin Protection:   adhesive use limited   transparent dressing maintained     Problem: Fall Injury Risk  Goal: Absence of Fall and Fall-Related Injury  Intervention: Identify and Manage Contributors  Recent Flowsheet Documentation  Taken 11/15/2023 0800 by  Roma Olguin, RN  Medication Review/Management: medications reviewed  Self-Care Promotion:   independence encouraged   BADL personal objects within reach   BADL personal routines maintained  Intervention: Promote Injury-Free Environment  Recent Flowsheet Documentation  Taken 11/15/2023 0800 by Roma Olguin, RN  Safety Promotion/Fall Prevention:   activity supervised   assistive device/personal items within reach   clutter free environment maintained   toileting scheduled   safety round/check completed   room organization consistent   nonskid shoes/slippers when out of bed   fall prevention program maintained   Goal Outcome Evaluation:

## 2023-11-15 NOTE — PLAN OF CARE
Goal Outcome Evaluation:  Plan of Care Reviewed With: patient           Outcome Evaluation: Pt presents below baseline with self care/mobility d/t weakness, decreased balance and activity tolerance.  Pt CGA LBD,  CGA to ambulate with RW.  OT will follow to advance pt toward PLOF.  Recommend home with assist and HHOT upon d/c.      Anticipated Discharge Disposition (OT): home with assist, home with home health

## 2023-11-15 NOTE — DISCHARGE SUMMARY
[facility] Medicine Services  DISCHARGE SUMMARY    Patient Name: Jet Floyd  : 1945  MRN: [id number]    Date of Admission: 2023 11:02 AM  Date of Discharge:  11/15/2023  Primary Care Physician: Obi Soriano MD    Consults       No orders found from 10/15/2023 to 2023.            Hospital Course     Presenting Problem: incarcerated L inguinal hernia    Active Hospital Problems    Diagnosis  POA    Chronic HFrEF (heart failure with reduced ejection fraction) [I50.22]  Yes    Chronic systolic heart failure [I50.22]  Yes    Stage 4 chronic kidney disease [N18.4]  Yes    Hyperlipidemia [E78.5]  Yes    Alcohol abuse, daily use [F10.10]  Yes      Resolved Hospital Problems    Diagnosis Date Resolved POA    **Inguinal hernia [K40.90] 11/15/2023 Yes          Hospital Course:  Jet Floyd is a 78 y.o. male with a past medical history of heart failure with reduced ejection fraction, secondary to nonischemic cardiomyopathy, status post pacemaker/ICD placement hypothyroidism and newly diagnosed head neck cancer presents with left lower quadrant pain.  Patient found to have a inguinal hernia with strangulation and obstruction.  Patient underwent repair of the incarcerated left inguinal hernia.     Inguinal hernia with strangulation/obstruction  -s/p repair on 23  -Progressed well.  Tolerated diet prior to d/c.  Pain control.  -F.u with Dr. Dow in 2 weeks.     GABRIELLE/CKD III  --Seems to be at baseline.  Followed by [facility], has appt scheduled already for follow up     Hx of NICM, HFrEF  -Cont home meds.     Neck mass  Suspected neoplasm  --Ordered by Dr. Yeoh.  -outpatient follow up        Discharge Follow Up Recommendations for outpatient labs/diagnostics:   F/u with surgery    Day of Discharge     HPI:   Pt doing well.  Pain controlled.  OK with d/c today if tolerates diet.        Vital Signs:   Temp:  [97.5 °F (36.4 °C)-98.2 °F (36.8  °C)] 98.2 °F (36.8 °C)  Heart Rate:  [71-80] 77  Resp:  [16-18] 18  BP: (119-135)/(53-69) 119/54      Physical Exam:  Constitutional: No acute distress, awake, alert  HENT: NCAT, mucous membranes moist  Respiratory: Clear to auscultation bilaterally, respiratory effort normal   Cardiovascular: RRR, no murmurs, rubs, or gallops  Gastrointestinal: Positive bowel sounds, dressing is dry and intact with some small bloody drainage on bandage, nondistended  Musculoskeletal: No bilateral ankle edema  Psychiatric: Appropriate affect, cooperative  Neurologic: Oriented x 3, strength symmetric in all extremities, Cranial Nerves grossly intact to confrontation, speech clear  Skin: No rashes    Pertinent  and/or Most Recent Results     LAB RESULTS:      Lab 11/14/23  0453 11/13/23  1827 11/13/23  1111   WBC 8.05  --  12.77*   HEMOGLOBIN 9.9*  --  11.8*   HEMATOCRIT 30.8*  --  36.7*   PLATELETS 135*  --  170   NEUTROS ABS  --   --  11.52*   IMMATURE GRANS (ABS)  --   --  0.08*   LYMPHS ABS  --   --  0.53*   MONOS ABS  --   --  0.54   EOS ABS  --   --  0.04   MCV 81.1  --  81.0   LACTATE  --  1.1 2.3*         Lab 11/15/23  0322 11/14/23  0557 11/13/23  1125 11/13/23  1111   SODIUM 135* 139  --  138   POTASSIUM 4.1 4.0  --  4.2   CHLORIDE 98 98  --  90*   CO2 23.0 19.0*  --  29.0   ANION GAP 14.0 22.0*  --  19.0*   BUN 62* 49*  --  44*   CREATININE 3.96* 3.94* 4.50*  4.50 3.73*   EGFR 14.8* 14.9*  --  15.9*   GLUCOSE 155* 131*  --  134*   CALCIUM 7.8* 7.7*  --  9.5         Lab 11/14/23  0557 11/13/23  1111   TOTAL PROTEIN 7.0 8.6*   ALBUMIN 3.4* 4.3   GLOBULIN 3.6 4.3   ALT (SGPT) 5 17   AST (SGOT) 17 31   BILIRUBIN 1.4* 3.2*   ALK PHOS 139* 158*   LIPASE  --  21                     Brief Urine Lab Results       None          Microbiology Results (last 10 days)       ** No results found for the last 240 hours. **            Peripheral Block    Result Date: 11/13/2023  Keri Huntley, CRNA     11/13/2023  3:17 PM Peripheral Block  "Patient reassessed immediately prior to procedure Patient location during procedure: OR Reason for block: at surgeon's request and post-op pain management Performed by CRNA/CAA: Morgan Vann, CRNA Preanesthetic Checklist Completed: patient identified, IV checked, site marked, risks and benefits discussed, surgical consent, monitors and equipment checked, pre-op evaluation and timeout performed Prep: Pt Position: supine Sterile barriers:cap, gloves, mask and washed/disinfected hands Prep: ChloraPrep Patient monitoring: blood pressure monitoring, continuous pulse oximetry and EKG Procedure Sedation: yes Performed under: general Guidance:ultrasound guided Images:still images obtained, printed/placed on chart Laterality:Bilateral Block Type:TAP Injection Technique:single-shot Needle Type:short-bevel and echogenic Needle Gauge:20 G Resistance on Injection: none Medications Used: bupivacaine PF (MARCAINE) 0.25 % injection - Injection  50 mL - 11/13/2023 2:43:00 PM dexamethasone sodium phosphate injection - Injection  4 mg - 11/13/2023 2:43:00 PM Medications Comment:Block Injection:  LA dose divided between Right and Left block Post Assessment Injection Assessment: negative aspiration for heme, incremental injection and no paresthesia on injection Patient Tolerance:comfortable throughout block Complications:no Additional Notes Mid-Axillary/Lateral TAPs A high-frequency linear transducer, with sterile cover, was placed in the midaxillary line between the ASIS and costal margin. The External Oblique Muscle (EOM), Internal Oblique Muscle (IOM), Transverse Abdominus Muscle (LANE), and Peritoneum were identified. The insertion site was prepped in sterile fashion and then localized with 2-5 ml of 1% Lidocaine. Using ultrasound-guidance, a 20-gauge B-Mandujano 4\" Ultraplex 360 non-stimulating echogenic needle was advanced in plane, from medial to lateral, until the tip of the needle was in the fascial plane between the IOM and " LANE. 1-3ml of preservative free normal saline was used to hydro-dissect the fascial planes. After the fascial plane was verified, the local anesthetic (LA) was injected. The procedure was repeated on the opposite side for bilateral coverage. Aspiration every 5 ml to prevent intravascular injection. Injection was completed with negative aspiration of blood and negative intravascular injection. Injection pressures were normal with minimal resistance. Midaxillary TAPs should reach intercostal nerves T10- T11 and the subcostal nerve T12.      CT Abdomen Pelvis Without Contrast    Result Date: 11/13/2023  CT ABDOMEN PELVIS WO CONTRAST Date of Exam: 11/13/2023 11:24 AM CST Indication: lower abd pain, L ing hernia, concern for incarceration/strangulation. Comparison: 6/25/2023 Technique: Axial CT images were obtained of the abdomen and pelvis without the administration of contrast. Reconstructed coronal and sagittal images were also obtained. Automated exposure control and iterative construction methods were used. Findings: LUNG BASES: Enlarged heart. There is patchy bibasilar airspace disease which may represent developing infection in the proper clinical setting. LIVER:  Unremarkable parenchyma without focal lesion. BILIARY/GALLBLADDER: There is a calcified gallstone. SPLEEN:  Unremarkable PANCREAS:  Unremarkable ADRENAL:  Unremarkable KIDNEYS: There is advanced right renal cortical atrophy. There is more mild left renal cortical atrophy with contrast staining of the cortex consistent with persistent nephrogram typically related to nephropathy. Correlate for recent contrasted study. No  calculus identified. GASTROINTESTINAL/MESENTERY: Generalized distention of the stomach and small intestine to the level of a left inguinal hernia containing a segment of inflamed small intestine with surrounding fluid. Findings are suggestive of obstructed and strangulated left inguinal hernia. Small intestine proximal to the left  inguinal hernia measures up to 3.7 cm in diameter. Distal to the hernia it is decompressed. AORTA/IVC:  Normal caliber. RETROPERITONEUM/LYMPH NODES:  Unremarkable REPRODUCTIVE:  Unremarkable BLADDER:  Unremarkable OSSEUS STRUCTURES: No acute osseous process is identified.     Impression: 1.Left inguinal hernia with evidence of obstructed and strangulated small intestine. Small intestine proximal to the hernia is dilated and distal to the hernia is decompressed. 2.Vague patchy lower lung airspace disease may represent pneumonia in the proper clinical setting. 3.Small to moderate volume ascites. 4.Persistent left nephrogram from recent contrasted study. Correlate with patient history. 5.Other incidental findings as detailed above. Electronically Signed: Michele Garber MD  11/13/2023 11:44 AM CST  Workstation ID: [id number]    CT Chest With Contrast Diagnostic    Result Date: 11/11/2023  CLINICAL INDICATION: Head/neck cancer, staging TECHNIQUE: Multiple CT helical images were obtained from thoracic inlet through upper abdomen with administration of IV contrast. 100  mL of Omnipaque-300 were administered intravenously.   Total DLP (Dose-Length Product): 70.61 mGy.cm. Please note: The reported value represents the total of one or more individual components during the CT acquisition on this date and at this time, and as such, the same value may appear in more than one CT report depending on the interpreting/reporting physicians. COMPARISON: PET/CT 11/10/2023 CT soft tissue neck 10/31/2023 FINDINGS: Mediastinum and Pleura: No mediastinal or hilar adenopathy. Trace right pleural effusion. No pericardial effusion. Marked cardiomegaly with reflux of contrast of the hepatic veins and IVC concerning for increased right heart pressure. Right chest wall ICD in place. Aortic arch and coronary artery calcifications. Partially imaged mass in the right lower neck, better characterized on recent CT neck. Please refer to the recent CT  neck and PET/CT or report for details of head and neck findings. Redemonstrated thickening and luminal narrowing of the proximal esophagus at the level of T1 and T2, previously FDG avid on the prior PET/CT (series 4 image 60). Lungs: Moderate upper lobe predominant centrilobular and paraseptal emphysema. Mild amount of debris within the trachea and bilateral mainstem bronchi. Bibasilar atelectasis or scarring. No focal consolidation. No suspicious pulmonary nodules. Upper Abdomen: Abdominal aortic atherosclerosis. Asymmetric atrophy of the right kidney. Mild to moderate upper abdominal ascites. Cholelithiasis. Musculoskeletal: No suspicious lytic or sclerotic lesion. Unchanged compression deformities of T12 and L2. Degenerative changes of the spine.     1.No suspicious pulmonary nodules or enlarged thoracic adenopathy to suggest metastatic disease.  Partially imaged right lower neck mass consistent with malignancy/metastatic disease. Please refer to recent CT neck and PET/CT reports. 2.Redemonstrated focal soft tissue thickening with luminal narrowing of the upper thoracic esophagus, corresponding to FDG avid lesion characterized on recent PET/CT, most concerning for malignancy until proven otherwise. Recommend upper endoscopy. 3.Marked cardiomegaly with reflux of contrast into the hepatic veins and IVC indicative of increase right heart pressure. Correlation with echocardiogram is recommended. 4.Other findings as above. CRITICAL RESULT: No. COMMUNICATION: Per this written report. By electronically signing this report, I, the attending physician, attest that I have personally reviewed the images/data for the above examination(s) and agree with the final edited report. 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 Staging evaluation. TECHNIQUE: Preparation: Last oral intake (except water) on 11/9/2023. Diabetic: No. Blood glucose at time of FDG administration: 84 mg/dL. Radiopharmaceutical: 10.10 mCi of F-18 FDG administered intravenously at right antecubital fossa at 12:00 PM. Incubation interval: 50 minutes. Oral contrast: Not applicable. Positioning: Arms raised for whole body scan, by sides for neck scan PET/CT scanner: Siemens Biograph 40 mCT. PET/CT acquisition: Vertex-to-feet, plus magnification (zoomed) neck. Standardized uptake value (SUV): Corrected for body weight only. CT: Low-dose, non-breath-hold, without intravenous contrast. TOTAL DLP (Dose Length Product): 538 mGy*cm. COMPARISON/CORRELATION: No comparison. Recent Correlative Imaging: CT soft tissue neck with contrast from 10/31/2023. FINDINGS: Technical quality: Diagnostic. Measurements: Unless otherwise specified, all SUVs refer to maximum value in the target (mSUV). Mean SUV liver: 2.1. Head and Neck: Large, intensely hypermetabolic central necrotic mass in the right neck/level 2 measuring 5.0 x 5.5 x 4.5 cm with maximum SUV of 19.4 (image 78). Intensely hypermetabolic nodular mass involving the proximal epiglottis measures 0.7 x 1.2 x 2.0 cm with maximum SUV 14.0 (image 76). Moderately hypermetabolic circumferential wall thickening of proximal esophagus at the level of T1-T2 with maximum SUV of 11.2 measuring approximately 0.7 x 1.8 cm (4 image 96). No acute or suspicious intracranial findings with grossly symmetric FDG uptake in the brain. Disproportionate enlargement of the ventricles in relation to the sulci raises suspicion for normal pressure hydrocephalus. Recommend clinical correlation. Chest: No suspicious hypermetabolic foci in the chest. No new or suspicious pulmonary nodules or masses. Upper lobe predominant paraseptal emphysema. " Dependent atelectasis in bilateral lower lobes. No suspicious hypermetabolic or pathologically enlarged mediastinal, hilar, or axillary lymph nodes. Moderate-to-severe cardiomegaly. Diffuse atherosclerotic calcifications. Trace right pleural effusion. No pericardial effusion. Right chest wall triple lead AICD. Abdomen and Pelvis: No suspicious hypermetabolic foci in the abdomen or pelvis. Solid Abdominal Organs: No discrete focal liver lesions. Cholelithiasis. Normal size spleen. No suspicious pancreatic masses. No suspicious adrenal nodularity. Atrophic right kidney. Unremarkable left kidney. No hydronephrosis. GI Tract/Mesentery/Peritoneum: Focal FDG uptake in the right abdomen correlates with the loop of ascending colon maximum SUV of 6.4 (12 image 139). Otherwise physiologic FDG uptake in the brain is noted. Normal caliber small and large bowel with physiologic uptake. Lymph Nodes: No pathologically enlarged or hypermetabolic lymph nodes in the abdomen or pelvis. Free Fluid: Moderate abdominal perihepatic, perisplenic with extension to the pelvis ascites extending into bilateral inguinal hernias. Pelvic Viscera: Unremarkable pelvic viscera. Fat-containing inguinal hernias bilaterally, left greater than right, containing ascites. Mild prostatomegaly. Vasculature: Severe aortoiliac atherosclerosis. Skeleton and Soft Tissues: No suspicious hypermetabolic foci in the visualized bones and soft tissues. No aggressive lytic or sclerotic bony lesions. Lumbar scoliosis. Remote compression fractures of T12 and L2 and fractures of the left superior and inferior pubic rami.    1. Intensely hypermetabolic mass involving the epiglottis, possible site of primary malignancy. 2. Large FDG avid central necrotic negar conglomerate in the right neck consistent with negar metastatic disease. 3. FDG avid circumferential thickening of proximal esophagus at the cervicothoracic junction is highly concerning for second primary. Further  evaluation with endoscopy and tissue sampling is recommended. 4. No evidence of FDG avid distant metastasis in chest, abdomen and pelvis. 5. Focal hypermetabolic activity of the ascending colon may represents a small polyp. If clinically warranted further evaluation with colonoscopy. 5. Moderate amount of ascites and right-sided pleural effusion are sequela of congestive heart disease. 6. Disproportionate enlargement of the ventricles in relation to the sulci is nonspecific but may represent normal pressure hydrocephalus. Recommend clinical correlation. CRITICAL RESULT: No. COMMUNICATION: Per this written report. By electronically signing this report, I, the attending physician, attest that I have personally reviewed the images/data for the above examination(s) and agree with the final edited report. Drafted by Tanisha Tyson MD on 11/10/2023 2:52 PM Final report signed by Andra Cam MD on 11/10/2023 4:05 PM             Results for orders placed during the hospital encounter of 03/28/22    Adult Transthoracic Echo Complete W/ Cont if Necessary Per Protocol    Interpretation Summary  · Left ventricular ejection fraction appears to be 31 - 35%. Left ventricular systolic function is moderately decreased.  · The following left ventricular wall segments are dyskinetic: apical septal, basal anteroseptal and mid anteroseptal.  · The right atrial cavity is dilated.  · Moderate tricuspid valve regurgitation is present.  · Estimated right ventricular systolic pressure from tricuspid regurgitation is normal (<35 mmHg).      Plan for Follow-up of Pending Labs/Results: Surgery follow up  Pending Labs       Order Current Status    Tissue Pathology Exam In process          Discharge Details        Discharge Medications        New Medications        Instructions Start Date   HYDROcodone-acetaminophen 5-325 MG per tablet  Commonly known as: Norco   1 tablet, Oral, Every 8 Hours PRN             Continue These Medications         Instructions Start Date   allopurinol 100 MG tablet  Commonly known as: ZYLOPRIM   100 mg, Oral, Daily      atorvastatin 20 MG tablet  Commonly known as: LIPITOR   20 mg, Oral, Daily      carvedilol 3.125 MG tablet  Commonly known as: COREG   3.125 mg, Oral, 2 Times Daily With Meals      doxepin 10 MG capsule  Commonly known as: SINEquan   10 mg, Oral, Nightly      levothyroxine 50 MCG tablet  Commonly known as: SYNTHROID, LEVOTHROID   50 mcg, Oral, Daily      tamsulosin 0.4 MG capsule 24 hr capsule  Commonly known as: FLOMAX   0.4 capsules, Oral, Daily               Allergies   Allergen Reactions    Lactose Intolerance (Gi) Diarrhea    Milk (Cow) Unknown - High Severity    Polysporin [Bacitracin-Polymyxin B] Itching and Rash         Discharge Disposition:  Home or Self Care    Diet:  Hospital:  Diet Order   Procedures    Diet: Gastrointestinal Diets; Fiber-Restricted; Texture: Regular Texture (IDDSI 7); Fluid Consistency: Thin (IDDSI 0)            Activity:      Restrictions or Other Recommendations:  No baths.  No lifting heavy objects.       CODE STATUS:    Code Status and Medical Interventions:   Ordered at: 11/13/23 1324     Code Status (Patient has no pulse and is not breathing):    CPR (Attempt to Resuscitate)     Medical Interventions (Patient has pulse or is breathing):    Full Support       Future Appointments   Date Time Provider Department Center   4/22/2024 12:00 PM Timothy Devries APRN [facility] [address]                 Ashlyn Bernal MD  11/15/23      Time Spent on Discharge:  I spent  23  minutes on this discharge activity which included: face-to-face encounter with the patient, reviewing the data in the system, coordination of the care with the nursing staff as well as consultants, documentation, and entering orders.

## 2023-11-15 NOTE — OUTREACH NOTE
Prep Survey      Flowsheet Row Responses   [facility] facility patient discharged from? [facility]   Is LACE score < 7 ? No   Eligibility Readm Mgmt   Discharge diagnosis Inguinal hernia   Does the patient have one of the following disease processes/diagnoses(primary or secondary)? General Surgery   Does the patient have Home health ordered? No   Is there a DME ordered? No   Medication alerts for this patient Norco   General alerts for this patient REPAIR OF LEFT INCARCERATED INGUINAL HERNIA WITH MESH   Prep survey completed? Yes            Tennille ABBASI - Registered Nurse

## 2023-11-15 NOTE — THERAPY EVALUATION
Patient Name: Jet Floyd  : 1945    MRN: [id number]                              Today's Date: 11/15/2023       Admit Date: 2023    Visit Dx:     ICD-10-CM ICD-9-CM   1. Strangulated inguinal hernia  K40.30 550.10   2. Inguinal hernia  K40.90 550.90   3. Leukocytosis, unspecified type  D72.829 288.60   4. GABRIELLE (acute kidney injury)  N17.9 584.9     Patient Active Problem List   Diagnosis    Sepsis    CAP (community acquired pneumonia)    Essential hypertension    Hyperlipidemia    Alcohol abuse, daily use    Former smoker    Dermatitis    Balance problem    Hypoglycemia    Metabolic encephalopathy    Lactic acidosis    History of alcoholism    Acute kidney injury superimposed on chronic kidney disease    Hyperglycemia    Pleural effusion    CKD (chronic kidney disease) stage 3, GFR 30-59 ml/min    Cardiorenal syndrome    Severe mitral valve regurgitation    Chronic systolic heart failure    Spondylosis of lumbar region without myelopathy or radiculopathy    DDD (degenerative disc disease), lumbar    Osteoarthritis of right hip    Polyneuropathy    History of compression fracture of vertebral column    Acute respiratory failure    Anemia of chronic renal failure    Other specified hypothyroidism    Inguinal hernia     Past Medical History:   Diagnosis Date    Cancer     CHF (congestive heart failure)     High blood pressure     Hypertension     Kidney disease     Kidney disease     Pneumonia      Past Surgical History:   Procedure Laterality Date    CARDIAC DEFIBRILLATOR PLACEMENT Bilateral     CARDIAC DEFIBRILLATOR REMOVAL Bilateral     CARDIAC ELECTROPHYSIOLOGY PROCEDURE N/A 2020    Procedure: BIV ICD 91440;  Surgeon: Mynor Richard MD;  Location: [facility] INVASIVE LOCATION;  Service: Cardiology;  Laterality: N/A;    CATARACT EXTRACTION Bilateral     CERVICAL SPINE SURGERY Bilateral     COLONOSCOPY      DIAGNOSTIC LAPAROSCOPY N/A 2023    Procedure: EXPLORATORY  LAPAROTOMY, RIGHT SPIGELIAN HERNIA REPAIR;  Surgeon: Steve Massey MD;  Location:  [facility];  Service: General;  Laterality: N/A;    HERNIA REPAIR      INGUINAL HERNIA REPAIR Left 11/13/2023    Procedure: REPAIR OF LEFT INCARCERATED INGUINAL HERNIA WITH MESH;  Surgeon: Kathi Dow MD;  Location:  [facility];  Service: General;  Laterality: Left;    NECK SURGERY        General Information       Row Name 11/15/23 0859          OT Time and Intention    Document Type evaluation  -AC     Mode of Treatment occupational therapy  -AC       Row Name 11/15/23 0859          General Information    Patient Profile Reviewed yes  -AC     Prior Level of Function independent:;all household mobility;community mobility;ADL's;dependent:;driving  -AC     Existing Precautions/Restrictions fall  abd incision  -AC       Row Name 11/15/23 0859          Occupational Profile    Environmental Supports and Barriers (Occupational Profile) uses rollator in home, SPC in cmty, tub shower with shower seat  -AC       Row Name 11/15/23 0859          Living Environment    People in Home spouse  -AC       Row Name 11/15/23 0859          Home Main Entrance    Number of Stairs, Main Entrance one  -AC       Row Name 11/15/23 0859          Stairs Within Home, Primary    Stairs, Within Home, Primary split level  -AC     Number of Stairs, Within Home, Primary --  7 steps with HR R with landing and 8 more steps with HR L  -AC       Row Name 11/15/23 0859          Cognition    Orientation Status (Cognition) oriented x 3  -AC       Row Name 11/15/23 0859          Safety Issues, Functional Mobility    Safety Issues Affecting Function (Mobility) safety precaution awareness  -AC     Impairments Affecting Function (Mobility) balance;endurance/activity tolerance;strength  -AC               User Key  (r) = Recorded By, (t) = Taken By, (c) = Cosigned By      Initials Name Provider Type    AC Barbara Sarabia OT Occupational Therapist                      Mobility/ADL's       Row Name 11/15/23 0918          Bed Mobility    Bed Mobility supine-sit  -     Supine-Sit [place] (Bed Mobility) modified independence  -     Assistive Device (Bed Mobility) bed rails;head of bed elevated  -     Comment, (Bed Mobility) increased time and effort  -       Row Name 11/15/23 0918          Transfers    Transfers sit-stand transfer  -       Row Name 11/15/23 0918          Sit-Stand Transfer    Sit-Stand [place] (Transfers) contact guard  -     Assistive Device (Sit-Stand Transfers) walker, front-wheeled  -     Comment, (Sit-Stand Transfer) Vcs for hand placement  -       Row Name 11/15/23 0918          Functional Mobility    Functional Mobility- Ind. Level contact guard assist  -     Functional Mobility- Device walker, front-wheeled  -     Functional Mobility-Distance (Feet) 30  -       Row Name 11/15/23 0918          Activities of Daily Living    BADL Assessment/Intervention lower body dressing  -AC       Row Name 11/15/23 0918          Lower Body Dressing Assessment/Training    [place] Level (Lower Body Dressing) don;socks;contact guard assist  -AC     Position (Lower Body Dressing) unsupported sitting  -               User Key  (r) = Recorded By, (t) = Taken By, (c) = Cosigned By      Initials Name Provider Type     Barbara Sarabia, OT Occupational Therapist                   Obj/Interventions       Row Name 11/15/23 0920          Sensory Assessment (Somatosensory)    Sensory Assessment (Somatosensory) UE sensation intact  -[facility] Name 11/15/23 0920          Vision Assessment/Intervention    Visual Impairment/Limitations corrective lenses full-time  -       Row Name 11/15/23 0920          Range of Motion Comprehensive    General Range of Motion bilateral upper extremity ROM WNL  -       Row Name 11/15/23 0920          Strength Comprehensive (MMT)    Comment, General Manual Muscle Testing (MMT) Assessment BBUE grossly  4/5  -                User Key  (r) = Recorded By, (t) = Taken By, (c) = Cosigned By      Initials Name Provider Type    Barbara Guaman, OT Occupational Therapist                   Goals/Plan       Row Name 11/15/23 0923          Transfer Goal 1 (OT)    Activity/Assistive Device (Transfer Goal 1, OT) bed-to-chair/chair-to-bed;toilet;walker, rolling  -AC     [place] Level/Cues Needed (Transfer Goal 1, OT) standby assist  -AC     Time Frame (Transfer Goal 1, OT) by discharge  -AC     Progress/Outcome (Transfer Goal 1, OT) new goal;goal ongoing  -       Row Name 11/15/23 0923          Dressing Goal 1 (OT)    Activity/Device (Dressing Goal 1, OT) lower body dressing  -AC     [place]/Cues Needed (Dressing Goal 1, OT) set-up required  -AC     Time Frame (Dressing Goal 1, OT) by discharge  -AC     Progress/Outcome (Dressing Goal 1, OT) new goal;goal ongoing  -       Row Name 11/15/23 0923          Toileting Goal 1 (OT)    Activity/Device (Toileting Goal 1, OT) adjust/manage clothing;perform perineal hygiene  -AC     [place] Level/Cues Needed (Toileting Goal 1, OT) standby assist  -AC     Time Frame (Toileting Goal 1, OT) by discharge  -AC     Progress/Outcome (Toileting Goal 1, OT) new goal;goal ongoing  -       Row Name 11/15/23 0923          Therapy Assessment/Plan (OT)    Planned Therapy Interventions (OT) activity tolerance training;BADL retraining;functional balance retraining;occupation/activity based interventions;patient/caregiver education/training;strengthening exercise;transfer/mobility retraining  -AC               User Key  (r) = Recorded By, (t) = Taken By, (c) = Cosigned By      Initials Name Provider Type    Barbara Guaman, OT Occupational Therapist                   Clinical Impression       Row Name 11/15/23 0920          Pain Assessment    Pretreatment Pain Rating 0/10 - no pain  -AC     Posttreatment Pain Rating 0/10 - no pain  -       Row Name 11/15/23 0920          Plan of Care Review     Plan of Care Reviewed With patient  -AC     Outcome Evaluation Pt presents below baseline with self care/mobility d/t weakness, decreased balance and activity tolerance.  Pt CGA LBD,  CGA to ambulate with RW.  OT will follow to advance pt toward PLOF.  Recommend home with assist and HHOT upon d/c.  -       Row Name 11/15/23 0920          Therapy Assessment/Plan (OT)    Rehab Potential (OT) good, to achieve stated therapy goals  -     Criteria for Skilled Therapeutic Interventions Met (OT) yes;skilled treatment is necessary  -     Therapy Frequency (OT) daily  -AC       Row Name 11/15/23 0920          Therapy Plan Review/Discharge Plan (OT)    Anticipated Discharge Disposition (OT) home with assist;home with home health  -       Row Name 11/15/23 0920          Vital Signs    Pre Systolic BP Rehab 119  -AC     Pre Treatment Diastolic BP 54  -AC     Pretreatment Heart Rate (beats/min) 81  -AC     Posttreatment Heart Rate (beats/min) 81  -AC     Pre SpO2 (%) 94  -AC     O2 Delivery Pre Treatment room air  -AC     O2 Delivery Intra Treatment room air  -AC     Post SpO2 (%) 95  -AC     O2 Delivery Post Treatment room air  -AC     Pre Patient Position Supine  -AC     Post Patient Position Sitting  -AC       Row Name 11/15/23 0920          Positioning and Restraints    Pre-Treatment Position in bed  -AC     Post Treatment Position chair  -AC     In Chair notified nsg;reclined;call light within reach;encouraged to call for assist;exit alarm on;waffle cushion  -AC               User Key  (r) = Recorded By, (t) = Taken By, (c) = Cosigned By      Initials Name Provider Type    AC Barbara Sarabia, OT Occupational Therapist                   Outcome Measures       Row Name 11/15/23 0924          How much help from another is currently needed...    Putting on and taking off regular lower body clothing? 3  -AC     Bathing (including washing, rinsing, and drying) 3  -AC     Toileting (which includes using toilet bed pan or  urinal) 3  -AC     Putting on and taking off regular upper body clothing 4  -AC     Taking care of personal grooming (such as brushing teeth) 3  -AC     Eating meals 4  -AC     AM-PAC 6 Clicks Score (OT) 20  -       Row Name 11/15/23 0800          How much help from another person do you currently need...    Turning from your back to your side while in flat bed without using bedrails? 3  -CJ     Moving from lying on back to sitting on the side of a flat bed without bedrails? 3  -CJ     Moving to and from a bed to a chair (including a wheelchair)? 3  -CJ     Standing up from a chair using your arms (e.g., wheelchair, bedside chair)? 3  -CJ     Climbing 3-5 steps with a railing? 3  -CJ     To walk in hospital room? 3  -CJ     AM-PAC 6 Clicks Score (PT) 18  -CJ     Highest Level of Mobility Goal 6 --> Walk 10 steps or more  -       Row Name 11/15/23 0924          Functional Assessment    Outcome Measure Options AM-PAC 6 Clicks Daily Activity (OT)  -               User Key  (r) = Recorded By, (t) = Taken By, (c) = Cosigned By      Initials Name Provider Type    Barbara Guaman OT Occupational Therapist    CJ Roma Olguin, RN Registered Nurse                    Occupational Therapy Education       Title: PT OT SLP Therapies (In Progress)       Topic: Occupational Therapy (In Progress)       Point: ADL training (Done)       Description:   Instruct learner(s) on proper safety adaptation and remediation techniques during self care or transfers.   Instruct in proper use of assistive devices.                  Learning Progress Summary             Patient Acceptance, E, VU by  at 11/15/2023 0925                         Point: Home exercise program (Not Started)       Description:   Instruct learner(s) on appropriate technique for monitoring, assisting and/or progressing therapeutic exercises/activities.                  Learner Progress:  Not documented in this visit.              Point: Precautions (Not  Started)       Description:   Instruct learner(s) on prescribed precautions during self-care and functional transfers.                  Learner Progress:  Not documented in this visit.              Point: Body mechanics (Not Started)       Description:   Instruct learner(s) on proper positioning and spine alignment during self-care, functional mobility activities and/or exercises.                  Learner Progress:  Not documented in this visit.                              User Key       Initials Effective Dates Name Provider Type Discipline     02/03/23 -  Barbara Sarabia, OT Occupational Therapist OT                  OT Recommendation and Plan  Planned Therapy Interventions (OT): activity tolerance training, BADL retraining, functional balance retraining, occupation/activity based interventions, patient/caregiver education/training, strengthening exercise, transfer/mobility retraining  Therapy Frequency (OT): daily  Plan of Care Review  Plan of Care Reviewed With: patient  Outcome Evaluation: Pt presents below baseline with self care/mobility d/t weakness, decreased balance and activity tolerance.  Pt CGA LBD,  CGA to ambulate with RW.  OT will follow to advance pt toward PLOF.  Recommend home with assist and HHOT upon d/c.     Time Calculation:   Evaluation Complexity (OT)  Review Occupational Profile/Medical/Therapy History Complexity: brief/low complexity  Assessment, Occupational Performance/Identification of Deficit Complexity: 1-3 performance deficits  Clinical Decision Making Complexity (OT): problem focused assessment/low complexity  Overall Complexity of Evaluation (OT): low complexity     Time Calculation- OT       Row Name 11/15/23 0901             Time Calculation- OT    OT Start Time 0901  -AC      OT Received On 11/15/23  -      OT Goal Re-Cert Due Date 11/25/23  -AC         Untimed Charges    OT Eval/Re-eval Minutes 48  -AC         Total Minutes    Untimed Charges Total Minutes 48  -AC        Total Minutes 48  -AC                User Key  (r) = Recorded By, (t) = Taken By, (c) = Cosigned By      Initials Name Provider Type    AC Barbara Sarabia, OT Occupational Therapist                  Therapy Charges for Today       Code Description Service Date Service Provider Modifiers Qty    [id number]  OT EVAL LOW COMPLEXITY 4 11/15/2023 Barbara Sarabia, OT GO 1                 Barbara Sarabia OT  11/15/2023

## 2023-11-15 NOTE — CASE MANAGEMENT/SOCIAL WORK
Case Management Discharge Note      Final Note: CM met with the patient at the bedside. He plans to return home at discharge. CM offered to set up home health for the patient but he declined. Patient has a ride home and denied any needs from CM at this time.         Selected Continued Care - Admitted Since 11/13/2023       Destination    No services have been selected for the patient.                Durable Medical Equipment    No services have been selected for the patient.                Dialysis/Infusion    No services have been selected for the patient.                Home Medical Care    No services have been selected for the patient.                Therapy    No services have been selected for the patient.                Community Resources    No services have been selected for the patient.                Community & DME    No services have been selected for the patient.                    Transportation Services  Private: Car    Final Discharge Disposition Code: 01 - home or self-care

## 2023-11-15 NOTE — PLAN OF CARE
Problem: Adult Inpatient Plan of Care  Goal: Plan of Care Review  Outcome: Ongoing, Progressing  Goal: Patient-Specific Goal (Individualized)  Outcome: Ongoing, Progressing  Goal: Absence of Hospital-Acquired Illness or Injury  Outcome: Ongoing, Progressing  Intervention: Identify and Manage Fall Risk  Recent Flowsheet Documentation  Taken 11/14/2023 2200 by Ang Ross RN  Safety Promotion/Fall Prevention:   assistive device/personal items within reach   clutter free environment maintained   fall prevention program maintained   lighting adjusted   nonskid shoes/slippers when out of bed   room organization consistent   safety round/check completed   toileting scheduled  Taken 11/14/2023 1923 by Ang Ross RN  Safety Promotion/Fall Prevention:   assistive device/personal items within reach   clutter free environment maintained   fall prevention program maintained   lighting adjusted   nonskid shoes/slippers when out of bed   room organization consistent   safety round/check completed   toileting scheduled  Intervention: Prevent Skin Injury  Recent Flowsheet Documentation  Taken 11/14/2023 2200 by Ang Ross RN  Body Position: position changed independently  Taken 11/14/2023 1923 by Ang Ross RN  Body Position: position changed independently  Skin Protection:   adhesive use limited   tubing/devices free from skin contact  Intervention: Prevent and Manage VTE (Venous Thromboembolism) Risk  Recent Flowsheet Documentation  Taken 11/14/2023 2200 by Ang Ross RN  Activity Management: activity encouraged  Taken 11/14/2023 1923 by Ang Ross RN  Activity Management: activity encouraged  VTE Prevention/Management:   bilateral   sequential compression devices on  Range of Motion: active ROM (range of motion) encouraged  Intervention: Prevent Infection  Recent Flowsheet Documentation  Taken 11/14/2023 2200 by Ang Ross RN  Infection  Prevention:   environmental surveillance performed   equipment surfaces disinfected   hand hygiene promoted   rest/sleep promoted   single patient room provided  Taken 11/14/2023 1923 by Ang Ross RN  Infection Prevention:   environmental surveillance performed   equipment surfaces disinfected   hand hygiene promoted   rest/sleep promoted   single patient room provided  Goal: Optimal Comfort and Wellbeing  Outcome: Ongoing, Progressing  Intervention: Monitor Pain and Promote Comfort  Recent Flowsheet Documentation  Taken 11/14/2023 1923 by Ang Ross RN  Pain Management Interventions:   medication offered but refused   pillow support provided   position adjusted   quiet environment facilitated  Intervention: Provide Person-Centered Care  Recent Flowsheet Documentation  Taken 11/14/2023 1923 by Ang Ross RN  Trust Relationship/Rapport:   care explained   questions answered   questions encouraged   thoughts/feelings acknowledged  Goal: Readiness for Transition of Care  Outcome: Ongoing, Progressing     Problem: Skin Injury Risk Increased  Goal: Skin Health and Integrity  Outcome: Ongoing, Progressing  Intervention: Promote and Optimize Oral Intake  Recent Flowsheet Documentation  Taken 11/14/2023 1923 by Ang Ross RN  Oral Nutrition Promotion: rest periods promoted  Intervention: Optimize Skin Protection  Recent Flowsheet Documentation  Taken 11/14/2023 2200 by Ang Ross RN  Head of Bed (HOB) Positioning: HOB at 30-45 degrees  Taken 11/14/2023 1923 by Ang Ross RN  Pressure Reduction Techniques: (WSM)   frequent weight shift encouraged   heels elevated off bed   weight shift assistance provided  Head of Bed (HOB) Positioning: HOB at 30-45 degrees  Pressure Reduction Devices:   positioning supports utilized   pressure-redistributing mattress utilized  Skin Protection:   adhesive use limited   tubing/devices free from skin contact      Problem: Fall Injury Risk  Goal: Absence of Fall and Fall-Related Injury  Outcome: Ongoing, Progressing  Intervention: Identify and Manage Contributors  Recent Flowsheet Documentation  Taken 11/14/2023 2200 by Ang Ross RN  Medication Review/Management: medications reviewed  Taken 11/14/2023 1923 by Ang Ross RN  Medication Review/Management: medications reviewed  Intervention: Promote Injury-Free Environment  Recent Flowsheet Documentation  Taken 11/14/2023 2200 by Ang Ross RN  Safety Promotion/Fall Prevention:   assistive device/personal items within reach   clutter free environment maintained   fall prevention program maintained   lighting adjusted   nonskid shoes/slippers when out of bed   room organization consistent   safety round/check completed   toileting scheduled  Taken 11/14/2023 1923 by Ang Ross RN  Safety Promotion/Fall Prevention:   assistive device/personal items within reach   clutter free environment maintained   fall prevention program maintained   lighting adjusted   nonskid shoes/slippers when out of bed   room organization consistent   safety round/check completed   toileting scheduled   Goal Outcome Evaluation:

## 2023-11-15 NOTE — PROGRESS NOTES
"Jet ZAVALA Mariel  1945  [id number]    Surgery Progress Note    Date of visit: 11/15/2023    Subjective: Eating breakfast  No complaints of abdominal pain  Ambulating  Having bowel movements    Objective:    /66 (BP Location: Right arm, Patient Position: Lying)   Pulse 77   Temp 98 °F (36.7 °C) (Oral)   Resp 16   Ht 175.3 cm (69\")   Wt 54.4 kg (120 lb)   SpO2 95%   BMI 17.72 kg/m²     Intake/Output Summary (Last 24 hours) at 11/15/2023 0835  Last data filed at 11/14/2023 1800  Gross per 24 hour   Intake --   Output 200 ml   Net -200 ml       CV: Regular rate and rhythm  L: normal air entry  Abd: Soft  Nontender  Left inguinal dressing is intact  No swelling      LABS:    Results from last 7 days   Lab Units 11/14/23  0453   WBC 10*3/mm3 8.05   HEMOGLOBIN g/dL 9.9*   HEMATOCRIT % 30.8*   PLATELETS 10*3/mm3 135*     Results from last 7 days   Lab Units 11/15/23  0322 11/14/23  0557   SODIUM mmol/L 135* 139   POTASSIUM mmol/L 4.1 4.0   CHLORIDE mmol/L 98 98   CO2 mmol/L 23.0 19.0*   BUN mg/dL 62* 49*   CREATININE mg/dL 3.96* 3.94*   CALCIUM mg/dL 7.8* 7.7*   BILIRUBIN mg/dL  --  1.4*   ALK PHOS U/L  --  139*   ALT (SGPT) U/L  --  5   AST (SGOT) U/L  --  17   GLUCOSE mg/dL 155* 131*     Results from last 7 days   Lab Units 11/15/23  0322   SODIUM mmol/L 135*   POTASSIUM mmol/L 4.1   CHLORIDE mmol/L 98   CO2 mmol/L 23.0   BUN mg/dL 62*   CREATININE mg/dL 3.96*   GLUCOSE mg/dL 155*   CALCIUM mg/dL 7.8*     Lab Results   Lab Value Date/Time    LIPASE 21 11/13/2023 1111    LIPASE 18 06/25/2023 1127    LIPASE 37 05/25/2019 0944    LIPASE 38 09/03/2015 0256         Assessment/ Plan: Stable course status post emergent repair of incarcerated left inguinal hernia  Patient is progressing well  Plan to advance diet  Increase activity  Stable for discharge home from surgical standpoint  Instructions were given to him  [place] as needed  Follow-up in the office in 2 weeks    Problem List Items Addressed This Visit  "         Gastrointestinal Abdominal     * (Principal) Inguinal hernia    Relevant Orders    Tissue Pathology Exam     Other Visit Diagnoses       Strangulated inguinal hernia    -  Primary    Leukocytosis, unspecified type        GABRIELLE (acute kidney injury)                  Kathi Dow MD  11/15/2023  08:35 EST

## 2023-12-01 ENCOUNTER — READMISSION MANAGEMENT (OUTPATIENT)
Dept: CALL CENTER | Facility: HOSPITAL | Age: 78
End: 2023-12-01
Payer: MEDICARE

## 2023-12-01 NOTE — OUTREACH NOTE
General Surgery Week 3 Survey      Flowsheet Row Responses   [facility] patient discharged from? [place]   Does the patient have one of the following disease processes/diagnoses(primary or secondary)? General Surgery   Week 3 attempt successful? Yes   Call start time 1555   Call end time 1557   General alerts for this patient REPAIR OF LEFT INCARCERATED INGUINAL HERNIA WITH MESH   Discharge diagnosis Inguinal hernia   Person spoke with today (if not patient) and relationship pt   Meds reviewed with patient/caregiver? Yes   Is the patient having any side effects they believe may be caused by any medication additions or changes? No   Does the patient have all medications related to this admission filled (includes all antibiotics, pain medications, etc.) Yes   Is the patient taking all medications as directed (includes completed medication regime)? Yes   Does the patient have a follow up appointment scheduled with their surgeon? Yes   Has the patient kept scheduled appointments due by today? Yes   Psychosocial issues? No   What is the patient's perception of their health status since discharge? Improving   Nursing interventions Nurse provided patient education   Is the patient /caregiver able to teach back basic post-op care? Keep incision areas clean,dry and protected   Is the patient/caregiver able to teach back signs and symptoms of incisional infection? Increased redness, swelling or pain at the incisonal site, Increased drainage or bleeding, Incisional warmth, Pus or odor from incision, Fever   Is the patient/caregiver able to teach back steps to recovery at home? Rest and rebuild strength, gradually increase activity   Week 3 call completed? Yes   Graduated Yes   Is the patient interested in additional calls from an ambulatory ? No   Would this patient benefit from a Referral to Amb Social Work? No   Wrap up additional comments Pt states he is doing good, and denies any s/s of infection. No  medication issues. Pt had post-op/PCP fu appts.   Call end time [id number]            Torri NIELSEN - Registered Nurse

## 2024-04-19 ENCOUNTER — TELEPHONE (OUTPATIENT)
Dept: GASTROENTEROLOGY | Facility: CLINIC | Age: 79
End: 2024-04-19
Payer: MEDICARE

## 2024-04-19 NOTE — TELEPHONE ENCOUNTER
I called patient to schedule a follow up appointment concerning liver. He canceled one appointment and provider Timothy JAIMES no longer practices here. I left patient a voice message informing him to call our office to schedule 6 month follow up with Katheryn JAIMES along with our office contact number.

## (undated) DEVICE — PREMIUM DRY TRAY LF: Brand: MEDLINE INDUSTRIES, INC.

## (undated) DEVICE — GUIDE CATHETER: Brand: ACUITY® PRO

## (undated) DEVICE — 3M™ TEGADERM™ CHG DRESSING 25/CARTON 4 CARTONS/CASE 1659: Brand: TEGADERM™

## (undated) DEVICE — TBG PENCL TELESCP MEGADYNE SMOKE EVAC 10FT

## (undated) DEVICE — 3M™ IOBAN™ 2 ANTIMICROBIAL INCISE DRAPE 6650EZ: Brand: IOBAN™ 2

## (undated) DEVICE — GLV SURG SENSICARE PI ORTHO SZ7.5 LF STRL

## (undated) DEVICE — LEX GENERAL ABDOMINAL SPLIT: Brand: MEDLINE INDUSTRIES, INC.

## (undated) DEVICE — LEX CATH LAB MINOR: Brand: MEDLINE INDUSTRIES, INC.

## (undated) DEVICE — SUT MNCRYL PLS ANTIB UD 4/0 PS2 18IN

## (undated) DEVICE — TRAP FLD MINIVAC MEGADYNE 100ML

## (undated) DEVICE — CABL CONN ST JUDE W MEDTRONIC 4051L

## (undated) DEVICE — ARM SLING II: Brand: DEROYAL

## (undated) DEVICE — SUT SILK 2/0 TIES 18IN A185H

## (undated) DEVICE — GW LUGE .014 182 CM

## (undated) DEVICE — DRN PENRS SIL 1/4X18IN LF STRL

## (undated) DEVICE — CATH EP 6F REPR

## (undated) DEVICE — ANTIBACTERIAL UNDYED BRAIDED (POLYGLACTIN 910), SYNTHETIC ABSORBABLE SUTURE: Brand: COATED VICRYL

## (undated) DEVICE — PK MINOR SPLT 10

## (undated) DEVICE — INTRO TEAR AWAY/LVD W/SD PRT 10.5F 13CM

## (undated) DEVICE — GOWN,NON-REINFORCED,SIRUS,SET IN SLV,XL: Brand: MEDLINE

## (undated) DEVICE — LIMB HOLDER, WRIST/ANKLE: Brand: DEROYAL

## (undated) DEVICE — TOTAL TRAY, 16FR 10ML SIL FOLEY, URN: Brand: MEDLINE

## (undated) DEVICE — CLTH CLENS READYCLEANSE PERI CARE PK/5

## (undated) DEVICE — INTRO TEAR AWAY/LVD W/SD PRT 6F 13CM

## (undated) DEVICE — SAFESECURE,SECUREMENT,FOLEY CATH,STERILE: Brand: MEDLINE

## (undated) DEVICE — SUT SILK 3/0 TIES 18IN A184H

## (undated) DEVICE — INTRO TEAR AWAY/LVD W/SD PRT 7F 13CM